# Patient Record
Sex: FEMALE | Race: WHITE | Employment: OTHER
[De-identification: names, ages, dates, MRNs, and addresses within clinical notes are randomized per-mention and may not be internally consistent; named-entity substitution may affect disease eponyms.]

---

## 2017-07-17 PROBLEM — E66.01 OBESITY, CLASS III, BMI 40-49.9 (MORBID OBESITY) (HCC): Status: ACTIVE | Noted: 2017-07-17

## 2017-07-17 PROBLEM — E66.813 OBESITY, CLASS III, BMI 40-49.9 (MORBID OBESITY) (HCC): Status: ACTIVE | Noted: 2017-07-17

## 2018-02-08 ENCOUNTER — TELEPHONE (OUTPATIENT)
Dept: CASE MANAGEMENT | Age: 68
End: 2018-02-08

## 2018-02-08 NOTE — LETTER
___________________________________________________________________________________________________________________________________________________________________________________________________________________________________________________________________________________________________________________________________________________________________________________________________________________________________________________________________________________________________________________________________________________________________________

## 2018-02-08 NOTE — TELEPHONE ENCOUNTER
A lung screening packet was mailed to Kayla Mercado on 2/8/2018. This packet includes: an appointment reminder for her CT Lung Screening scheduled for 2/16/18 , a patient questionnaire,a lung screening brochure and the [facility] Lung Screening Patient Education Sheet.       CARLA Nicholas R.T.(R)(T)  Patient [facility]    [phone]

## 2018-02-19 ENCOUNTER — TELEPHONE (OUTPATIENT)
Dept: CASE MANAGEMENT | Age: 68
End: 2018-02-19

## 2018-03-13 ENCOUNTER — OFFICE VISIT (OUTPATIENT)
Dept: RHEUMATOLOGY | Age: 68
End: 2018-03-13
Payer: MEDICARE

## 2018-03-13 ENCOUNTER — HOSPITAL ENCOUNTER (OUTPATIENT)
Age: 68
Discharge: HOME | End: 2018-03-13
Payer: MEDICARE

## 2018-03-13 VITALS
BODY MASS INDEX: 40.34 KG/M2 | SYSTOLIC BLOOD PRESSURE: 136 MMHG | WEIGHT: 236.3 LBS | HEIGHT: 64 IN | HEART RATE: 89 BPM | RESPIRATION RATE: 20 BRPM | DIASTOLIC BLOOD PRESSURE: 79 MMHG | TEMPERATURE: 98.3 F

## 2018-03-13 DIAGNOSIS — Z79.899 HIGH RISK MEDICATIONS (NOT ANTICOAGULANTS) LONG-TERM USE: ICD-10-CM

## 2018-03-13 DIAGNOSIS — M06.9 RHEUMATOID ARTHRITIS INVOLVING MULTIPLE SITES, UNSPECIFIED RHEUMATOID FACTOR PRESENCE: ICD-10-CM

## 2018-03-13 DIAGNOSIS — M05.79 RHEUMATOID ARTHRITIS INVOLVING MULTIPLE SITES WITH POSITIVE RHEUMATOID FACTOR (HCC): ICD-10-CM

## 2018-03-13 LAB
ALT SERPL-CCNC: 34 U/L (ref 0–32)
AST SERPL-CCNC: 25 U/L (ref 0–31)
C-REACTIVE PROTEIN: 0.3 MG/DL (ref 0–0.4)
CREAT SERPL-MCNC: 0.7 MG/DL (ref 0.5–1)
GFR AFRICAN AMERICAN: >60
GFR NON-AFRICAN AMERICAN: >60 ML/MIN/1.73
HCT VFR BLD CALC: 40.8 % (ref 34–48)
HEMOGLOBIN: 13.7 G/DL (ref 11.5–15.5)
MCH RBC QN AUTO: 29.8 PG (ref 26–35)
MCHC RBC AUTO-ENTMCNC: 33.6 % (ref 32–34.5)
MCV RBC AUTO: 88.7 FL (ref 80–99.9)
PDW BLD-RTO: 14.3 FL (ref 11.5–15)
PLATELET # BLD: 271 E9/L (ref 130–450)
PMV BLD AUTO: 8.6 FL (ref 7–12)
RBC # BLD: 4.6 E12/L (ref 3.5–5.5)
SEDIMENTATION RATE, ERYTHROCYTE: 5 MM/HR (ref 0–20)
WBC # BLD: 8.9 E9/L (ref 4.5–11.5)

## 2018-03-13 PROCEDURE — 99214 OFFICE O/P EST MOD 30 MIN: CPT | Performed by: INTERNAL MEDICINE

## 2018-03-13 PROCEDURE — 82565 ASSAY OF CREATININE: CPT

## 2018-03-13 PROCEDURE — 1090F PRES/ABSN URINE INCON ASSESS: CPT | Performed by: INTERNAL MEDICINE

## 2018-03-13 PROCEDURE — G8417 CALC BMI ABV UP PARAM F/U: HCPCS | Performed by: INTERNAL MEDICINE

## 2018-03-13 PROCEDURE — G8427 DOCREV CUR MEDS BY ELIG CLIN: HCPCS | Performed by: INTERNAL MEDICINE

## 2018-03-13 PROCEDURE — G8399 PT W/DXA RESULTS DOCUMENT: HCPCS | Performed by: INTERNAL MEDICINE

## 2018-03-13 PROCEDURE — 84450 TRANSFERASE (AST) (SGOT): CPT

## 2018-03-13 PROCEDURE — 3017F COLORECTAL CA SCREEN DOC REV: CPT | Performed by: INTERNAL MEDICINE

## 2018-03-13 PROCEDURE — 3014F SCREEN MAMMO DOC REV: CPT | Performed by: INTERNAL MEDICINE

## 2018-03-13 PROCEDURE — 85651 RBC SED RATE NONAUTOMATED: CPT

## 2018-03-13 PROCEDURE — 1123F ACP DISCUSS/DSCN MKR DOCD: CPT | Performed by: INTERNAL MEDICINE

## 2018-03-13 PROCEDURE — 85027 COMPLETE CBC AUTOMATED: CPT

## 2018-03-13 PROCEDURE — 99212 OFFICE O/P EST SF 10 MIN: CPT | Performed by: INTERNAL MEDICINE

## 2018-03-13 PROCEDURE — 86140 C-REACTIVE PROTEIN: CPT

## 2018-03-13 PROCEDURE — 4040F PNEUMOC VAC/ADMIN/RCVD: CPT | Performed by: INTERNAL MEDICINE

## 2018-03-13 PROCEDURE — 36415 COLL VENOUS BLD VENIPUNCTURE: CPT

## 2018-03-13 PROCEDURE — 84460 ALANINE AMINO (ALT) (SGPT): CPT

## 2018-03-13 PROCEDURE — G8484 FLU IMMUNIZE NO ADMIN: HCPCS | Performed by: INTERNAL MEDICINE

## 2018-03-13 PROCEDURE — 1036F TOBACCO NON-USER: CPT | Performed by: INTERNAL MEDICINE

## 2018-03-13 RX ORDER — HYDROXYCHLOROQUINE SULFATE 200 MG/1
TABLET, FILM COATED ORAL
Qty: 135 TABLET | Refills: 3 | Status: SHIPPED | OUTPATIENT
Start: 2018-03-13 | End: 2018-10-02 | Stop reason: SDUPTHER

## 2018-03-13 RX ORDER — FOLIC ACID 1 MG/1
1 TABLET ORAL DAILY
Qty: 90 TABLET | Refills: 3 | Status: SHIPPED | OUTPATIENT
Start: 2018-03-13 | End: 2018-10-02 | Stop reason: SDUPTHER

## 2018-03-13 NOTE — PROGRESS NOTES
J O I N T  C A R E  C L I N I C        The patient is seen in follow-up for:      Here for rheumatoid follow up. Has no complaints today. Did not have labs drawn.                Patient History Update Since Previous Visit      Yes No Comment      New illnesses  x       Seen other healthcare provider x  pcp      New x-ray, labs, or procedures  x       Started / changed / stopped medications  x       New allergies / reactions to medications  x       Changes in family medical history  x       Changes in patient social history  x       Morning stiffness x  Length:  15      Worst Joint:   Feet ankles and hips      Review of Systems      Key:    1  Not present today;  2  Much Better;  3  Better;  4  Same;  5  Worse;  N  New Problem          Rating  Rating   Joint pain (not including back pain) 4 Skin ulcers: 1   Joint swellin Bruisin   Fatigue: 4 Sickness or rash with sun exposure 1   Muscle aches: 4 Swollen glands 1   Ongoing fever: 1 Dry eyes 1   Unintended weight loss: 1 Painful red eyes 1   Migraine headache: 1 Dry mouth 4   Difficulty sleepin Mouth sores 1   Snore or gag at night: 1 Upset stomach 1   Heart palpitations: 1 Diarrhea 1   Cough: 1 Depressed mood 1   Shortness of breath: 1 Overall stress level in life 4   Pain with breathing 1 Overall assessment 4   Skin rash 1         Abbreviated Exam    System    nl   abn   Comment     1 Gen nutrition/hygiene x      appearance x     2 Skin turgor / integrity x      rash x      ecchymosis x     3 Psych orientation x      memory       mood x      cooperative x     4 HENT mouth / throat x     5 Resp resp. effort x      auscultation x     6 CV heart auscultation x      extremity edema x     7 Other             Joint Assessment      Patient Right     Patient Left     x (check if no synovitis seen on exam)   Joint Pain Swelling Joint Pain Swelling   Shoulder   Shoulder     Elbow   Elbow     Wrist   Wrist     Knee   Knee     MCP I   MCP I     MCP II   MCP II

## 2018-03-29 DIAGNOSIS — E55.9 VITAMIN D DEFICIENCY: ICD-10-CM

## 2018-03-29 RX ORDER — MULTIVIT-MIN/IRON/FOLIC ACID/K 18-600-40
2000 CAPSULE ORAL 2 TIMES DAILY
Qty: 60 CAPSULE | Refills: 2
Start: 2018-03-29 | End: 2020-03-11 | Stop reason: SDUPTHER

## 2018-03-29 RX ORDER — MULTIVIT-MIN/IRON/FOLIC ACID/K 18-600-40
1 CAPSULE ORAL 2 TIMES DAILY
Qty: 60 CAPSULE | Refills: 2 | Status: CANCELLED | OUTPATIENT
Start: 2018-03-29

## 2018-04-25 ENCOUNTER — HOSPITAL ENCOUNTER (OUTPATIENT)
Dept: MRI IMAGING | Age: 68
Discharge: HOME | End: 2018-04-27
Payer: MEDICARE

## 2018-04-25 ENCOUNTER — OFFICE VISIT (OUTPATIENT)
Dept: INTERNAL MEDICINE | Age: 68
End: 2018-04-25
Payer: MEDICARE

## 2018-04-25 VITALS
DIASTOLIC BLOOD PRESSURE: 62 MMHG | TEMPERATURE: 97.5 F | WEIGHT: 240.8 LBS | HEIGHT: 64 IN | HEART RATE: 86 BPM | SYSTOLIC BLOOD PRESSURE: 137 MMHG | RESPIRATION RATE: 16 BRPM | BODY MASS INDEX: 41.11 KG/M2

## 2018-04-25 DIAGNOSIS — M25.561 ACUTE PAIN OF RIGHT KNEE: Primary | ICD-10-CM

## 2018-04-25 DIAGNOSIS — M25.561 ACUTE PAIN OF RIGHT KNEE: ICD-10-CM

## 2018-04-25 PROCEDURE — 1123F ACP DISCUSS/DSCN MKR DOCD: CPT

## 2018-04-25 PROCEDURE — 4040F PNEUMOC VAC/ADMIN/RCVD: CPT

## 2018-04-25 PROCEDURE — 3017F COLORECTAL CA SCREEN DOC REV: CPT

## 2018-04-25 PROCEDURE — G8399 PT W/DXA RESULTS DOCUMENT: HCPCS

## 2018-04-25 PROCEDURE — 1036F TOBACCO NON-USER: CPT

## 2018-04-25 PROCEDURE — 6360000004 HC RX CONTRAST MEDICATION: Performed by: RADIOLOGY

## 2018-04-25 PROCEDURE — A9579 GAD-BASE MR CONTRAST NOS,1ML: HCPCS | Performed by: RADIOLOGY

## 2018-04-25 PROCEDURE — 99213 OFFICE O/P EST LOW 20 MIN: CPT

## 2018-04-25 PROCEDURE — 1090F PRES/ABSN URINE INCON ASSESS: CPT

## 2018-04-25 PROCEDURE — 99212 OFFICE O/P EST SF 10 MIN: CPT

## 2018-04-25 PROCEDURE — G8427 DOCREV CUR MEDS BY ELIG CLIN: HCPCS

## 2018-04-25 PROCEDURE — G8417 CALC BMI ABV UP PARAM F/U: HCPCS

## 2018-04-25 PROCEDURE — 73723 MRI JOINT LWR EXTR W/O&W/DYE: CPT

## 2018-04-25 RX ORDER — OXYCODONE HYDROCHLORIDE AND ACETAMINOPHEN 5; 325 MG/1; MG/1
1 TABLET ORAL EVERY 8 HOURS PRN
Qty: 15 TABLET | Refills: 0 | Status: SHIPPED | OUTPATIENT
Start: 2018-04-25 | End: 2018-04-30 | Stop reason: ALTCHOICE

## 2018-04-25 RX ADMIN — GADOTERIDOL 20 ML: 279.3 INJECTION, SOLUTION INTRAVENOUS at 18:03

## 2018-04-25 ASSESSMENT — ENCOUNTER SYMPTOMS
RESPIRATORY NEGATIVE: 1
EYES NEGATIVE: 1
GASTROINTESTINAL NEGATIVE: 1

## 2018-04-30 ENCOUNTER — OFFICE VISIT (OUTPATIENT)
Dept: ORTHOPEDIC SURGERY | Age: 68
End: 2018-04-30
Payer: MEDICARE

## 2018-04-30 ENCOUNTER — OFFICE VISIT (OUTPATIENT)
Dept: INTERNAL MEDICINE | Age: 68
End: 2018-04-30
Payer: MEDICARE

## 2018-04-30 VITALS
SYSTOLIC BLOOD PRESSURE: 130 MMHG | DIASTOLIC BLOOD PRESSURE: 59 MMHG | WEIGHT: 239.6 LBS | RESPIRATION RATE: 20 BRPM | HEART RATE: 87 BPM | HEIGHT: 64 IN | TEMPERATURE: 98.2 F | BODY MASS INDEX: 40.9 KG/M2

## 2018-04-30 VITALS — BODY MASS INDEX: 40.8 KG/M2 | WEIGHT: 239 LBS | HEIGHT: 64 IN

## 2018-04-30 DIAGNOSIS — E78.5 HYPERLIPIDEMIA LDL GOAL <100: ICD-10-CM

## 2018-04-30 DIAGNOSIS — E11.9 CONTROLLED TYPE 2 DIABETES MELLITUS WITHOUT COMPLICATION, WITHOUT LONG-TERM CURRENT USE OF INSULIN (HCC): ICD-10-CM

## 2018-04-30 DIAGNOSIS — S89.91XD INJURY OF RIGHT KNEE, SUBSEQUENT ENCOUNTER: Primary | ICD-10-CM

## 2018-04-30 DIAGNOSIS — M81.0 OSTEOPOROSIS, UNSPECIFIED OSTEOPOROSIS TYPE, UNSPECIFIED PATHOLOGICAL FRACTURE PRESENCE: ICD-10-CM

## 2018-04-30 DIAGNOSIS — I10 HTN, GOAL BELOW 130/80: ICD-10-CM

## 2018-04-30 DIAGNOSIS — M25.561 RIGHT KNEE PAIN, UNSPECIFIED CHRONICITY: Primary | ICD-10-CM

## 2018-04-30 DIAGNOSIS — Z79.899 HIGH RISK MEDICATIONS (NOT ANTICOAGULANTS) LONG-TERM USE: ICD-10-CM

## 2018-04-30 PROCEDURE — 99213 OFFICE O/P EST LOW 20 MIN: CPT | Performed by: INTERNAL MEDICINE

## 2018-04-30 PROCEDURE — 3017F COLORECTAL CA SCREEN DOC REV: CPT | Performed by: ORTHOPAEDIC SURGERY

## 2018-04-30 PROCEDURE — 1090F PRES/ABSN URINE INCON ASSESS: CPT | Performed by: ORTHOPAEDIC SURGERY

## 2018-04-30 PROCEDURE — 3017F COLORECTAL CA SCREEN DOC REV: CPT | Performed by: INTERNAL MEDICINE

## 2018-04-30 PROCEDURE — 1090F PRES/ABSN URINE INCON ASSESS: CPT | Performed by: INTERNAL MEDICINE

## 2018-04-30 PROCEDURE — 99203 OFFICE O/P NEW LOW 30 MIN: CPT | Performed by: ORTHOPAEDIC SURGERY

## 2018-04-30 PROCEDURE — 1036F TOBACCO NON-USER: CPT | Performed by: INTERNAL MEDICINE

## 2018-04-30 PROCEDURE — G8399 PT W/DXA RESULTS DOCUMENT: HCPCS | Performed by: INTERNAL MEDICINE

## 2018-04-30 PROCEDURE — 4040F PNEUMOC VAC/ADMIN/RCVD: CPT | Performed by: INTERNAL MEDICINE

## 2018-04-30 PROCEDURE — 4040F PNEUMOC VAC/ADMIN/RCVD: CPT | Performed by: ORTHOPAEDIC SURGERY

## 2018-04-30 PROCEDURE — G8427 DOCREV CUR MEDS BY ELIG CLIN: HCPCS | Performed by: ORTHOPAEDIC SURGERY

## 2018-04-30 PROCEDURE — G8427 DOCREV CUR MEDS BY ELIG CLIN: HCPCS | Performed by: INTERNAL MEDICINE

## 2018-04-30 PROCEDURE — 1123F ACP DISCUSS/DSCN MKR DOCD: CPT | Performed by: ORTHOPAEDIC SURGERY

## 2018-04-30 PROCEDURE — G8417 CALC BMI ABV UP PARAM F/U: HCPCS | Performed by: INTERNAL MEDICINE

## 2018-04-30 PROCEDURE — 1036F TOBACCO NON-USER: CPT | Performed by: ORTHOPAEDIC SURGERY

## 2018-04-30 PROCEDURE — G8417 CALC BMI ABV UP PARAM F/U: HCPCS | Performed by: ORTHOPAEDIC SURGERY

## 2018-04-30 PROCEDURE — 20610 DRAIN/INJ JOINT/BURSA W/O US: CPT | Performed by: ORTHOPAEDIC SURGERY

## 2018-04-30 PROCEDURE — 3045F PR MOST RECENT HEMOGLOBIN A1C LEVEL 7.0-9.0%: CPT | Performed by: INTERNAL MEDICINE

## 2018-04-30 PROCEDURE — G8399 PT W/DXA RESULTS DOCUMENT: HCPCS | Performed by: ORTHOPAEDIC SURGERY

## 2018-04-30 PROCEDURE — 2022F DILAT RTA XM EVC RTNOPTHY: CPT | Performed by: INTERNAL MEDICINE

## 2018-04-30 PROCEDURE — 1123F ACP DISCUSS/DSCN MKR DOCD: CPT | Performed by: INTERNAL MEDICINE

## 2018-04-30 RX ORDER — OXYCODONE HYDROCHLORIDE AND ACETAMINOPHEN 5; 325 MG/1; MG/1
1 TABLET ORAL EVERY 4 HOURS PRN
COMMUNITY
End: 2018-11-26 | Stop reason: ALTCHOICE

## 2018-04-30 RX ORDER — TRIAMCINOLONE ACETONIDE 40 MG/ML
40 INJECTION, SUSPENSION INTRA-ARTICULAR; INTRAMUSCULAR ONCE
Status: COMPLETED | OUTPATIENT
Start: 2018-04-30 | End: 2018-04-30

## 2018-04-30 RX ORDER — BUPIVACAINE HYDROCHLORIDE 2.5 MG/ML
2 INJECTION, SOLUTION INFILTRATION; PERINEURAL ONCE
Status: COMPLETED | OUTPATIENT
Start: 2018-04-30 | End: 2018-04-30

## 2018-04-30 RX ORDER — ALENDRONATE SODIUM 70 MG/1
TABLET ORAL
Qty: 12 TABLET | Refills: 1 | Status: ON HOLD | OUTPATIENT
Start: 2018-04-30 | End: 2019-03-27

## 2018-04-30 RX ORDER — LIDOCAINE HYDROCHLORIDE 10 MG/ML
2 INJECTION, SOLUTION INFILTRATION; PERINEURAL ONCE
Status: COMPLETED | OUTPATIENT
Start: 2018-04-30 | End: 2018-04-30

## 2018-04-30 RX ORDER — ROSUVASTATIN CALCIUM 5 MG/1
TABLET, COATED ORAL
Qty: 90 TABLET | Refills: 1 | Status: SHIPPED | OUTPATIENT
Start: 2018-04-30 | End: 2018-11-26 | Stop reason: SDUPTHER

## 2018-04-30 RX ORDER — LANOLIN ALCOHOL/MO/W.PET/CERES
1 CREAM (GRAM) TOPICAL DAILY
Qty: 30 TABLET | Refills: 2 | Status: SHIPPED
Start: 2018-04-30 | End: 2021-03-08 | Stop reason: SDUPTHER

## 2018-04-30 RX ORDER — LISINOPRIL 20 MG/1
20 TABLET ORAL DAILY
Qty: 90 TABLET | Refills: 1 | Status: SHIPPED | OUTPATIENT
Start: 2018-04-30 | End: 2018-11-26 | Stop reason: SDUPTHER

## 2018-04-30 RX ADMIN — TRIAMCINOLONE ACETONIDE 40 MG: 40 INJECTION, SUSPENSION INTRA-ARTICULAR; INTRAMUSCULAR at 15:45

## 2018-04-30 RX ADMIN — BUPIVACAINE HYDROCHLORIDE 5 MG: 2.5 INJECTION, SOLUTION INFILTRATION; PERINEURAL at 15:45

## 2018-04-30 RX ADMIN — LIDOCAINE HYDROCHLORIDE 2 ML: 10 INJECTION, SOLUTION INFILTRATION; PERINEURAL at 15:45

## 2018-04-30 ASSESSMENT — ENCOUNTER SYMPTOMS
SHORTNESS OF BREATH: 0
VOMITING: 0
NAUSEA: 0
BLOOD IN STOOL: 0
CONSTIPATION: 0

## 2018-05-07 ENCOUNTER — HOSPITAL ENCOUNTER (OUTPATIENT)
Dept: PHYSICAL THERAPY | Age: 68
Setting detail: THERAPIES SERIES
Discharge: HOME | End: 2018-05-07
Payer: MEDICARE

## 2018-05-07 PROCEDURE — 97161 PT EVAL LOW COMPLEX 20 MIN: CPT | Performed by: PHYSICAL THERAPIST

## 2018-05-07 PROCEDURE — G8978 MOBILITY CURRENT STATUS: HCPCS | Performed by: PHYSICAL THERAPIST

## 2018-05-07 PROCEDURE — G8979 MOBILITY GOAL STATUS: HCPCS | Performed by: PHYSICAL THERAPIST

## 2018-05-07 ASSESSMENT — PAIN DESCRIPTION - ORIENTATION: ORIENTATION: RIGHT

## 2018-05-07 ASSESSMENT — PAIN DESCRIPTION - LOCATION: LOCATION: KNEE

## 2018-05-07 ASSESSMENT — ACTIVITIES OF DAILY LIVING (ADL): EFFECT OF PAIN ON DAILY ACTIVITIES: PAIN HAMPERS PROLONGED STANDING/WALKING

## 2018-05-07 ASSESSMENT — PAIN DESCRIPTION - PAIN TYPE: TYPE: CHRONIC PAIN

## 2018-05-07 ASSESSMENT — PAIN SCALES - GENERAL: PAINLEVEL_OUTOF10: 3

## 2018-05-14 ENCOUNTER — OFFICE VISIT (OUTPATIENT)
Dept: INTERNAL MEDICINE | Age: 68
End: 2018-05-14
Payer: MEDICARE

## 2018-05-14 VITALS
HEIGHT: 64 IN | BODY MASS INDEX: 40.29 KG/M2 | SYSTOLIC BLOOD PRESSURE: 132 MMHG | DIASTOLIC BLOOD PRESSURE: 79 MMHG | TEMPERATURE: 97.4 F | WEIGHT: 236 LBS | HEART RATE: 88 BPM | RESPIRATION RATE: 16 BRPM

## 2018-05-14 DIAGNOSIS — S83.281D TEAR OF LATERAL MENISCUS OF RIGHT KNEE, CURRENT, UNSPECIFIED TEAR TYPE, SUBSEQUENT ENCOUNTER: ICD-10-CM

## 2018-05-14 DIAGNOSIS — I10 HTN, GOAL BELOW 130/80: ICD-10-CM

## 2018-05-14 DIAGNOSIS — E66.01 OBESITY, CLASS III, BMI 40-49.9 (MORBID OBESITY) (HCC): ICD-10-CM

## 2018-05-14 DIAGNOSIS — M05.79 RHEUMATOID ARTHRITIS INVOLVING MULTIPLE SITES WITH POSITIVE RHEUMATOID FACTOR (HCC): ICD-10-CM

## 2018-05-14 DIAGNOSIS — E11.9 CONTROLLED TYPE 2 DIABETES MELLITUS WITHOUT COMPLICATION, WITHOUT LONG-TERM CURRENT USE OF INSULIN (HCC): Primary | ICD-10-CM

## 2018-05-14 PROCEDURE — 3045F PR MOST RECENT HEMOGLOBIN A1C LEVEL 7.0-9.0%: CPT | Performed by: INTERNAL MEDICINE

## 2018-05-14 PROCEDURE — 3017F COLORECTAL CA SCREEN DOC REV: CPT | Performed by: INTERNAL MEDICINE

## 2018-05-14 PROCEDURE — G8399 PT W/DXA RESULTS DOCUMENT: HCPCS | Performed by: INTERNAL MEDICINE

## 2018-05-14 PROCEDURE — 1036F TOBACCO NON-USER: CPT | Performed by: INTERNAL MEDICINE

## 2018-05-14 PROCEDURE — 4040F PNEUMOC VAC/ADMIN/RCVD: CPT | Performed by: INTERNAL MEDICINE

## 2018-05-14 PROCEDURE — 1090F PRES/ABSN URINE INCON ASSESS: CPT | Performed by: INTERNAL MEDICINE

## 2018-05-14 PROCEDURE — G8417 CALC BMI ABV UP PARAM F/U: HCPCS | Performed by: INTERNAL MEDICINE

## 2018-05-14 PROCEDURE — G8427 DOCREV CUR MEDS BY ELIG CLIN: HCPCS | Performed by: INTERNAL MEDICINE

## 2018-05-14 PROCEDURE — 99213 OFFICE O/P EST LOW 20 MIN: CPT | Performed by: INTERNAL MEDICINE

## 2018-05-14 PROCEDURE — 1123F ACP DISCUSS/DSCN MKR DOCD: CPT | Performed by: INTERNAL MEDICINE

## 2018-05-14 PROCEDURE — 2022F DILAT RTA XM EVC RTNOPTHY: CPT | Performed by: INTERNAL MEDICINE

## 2018-05-14 PROCEDURE — 99212 OFFICE O/P EST SF 10 MIN: CPT | Performed by: INTERNAL MEDICINE

## 2018-05-14 ASSESSMENT — ENCOUNTER SYMPTOMS
SORE THROAT: 0
BLURRED VISION: 0
CHEST TIGHTNESS: 0
NAUSEA: 0
HEARTBURN: 0
SHORTNESS OF BREATH: 0
DIARRHEA: 0
TROUBLE SWALLOWING: 0
BACK PAIN: 0
SPUTUM PRODUCTION: 0
DIFFICULTY BREATHING: 0
CONSTIPATION: 0
WHEEZING: 0
ABDOMINAL PAIN: 0
VOMITING: 0
COUGH: 0
RHINORRHEA: 0

## 2018-05-14 ASSESSMENT — COPD QUESTIONNAIRES: COPD: 1

## 2018-06-26 ENCOUNTER — OFFICE VISIT (OUTPATIENT)
Dept: RHEUMATOLOGY | Age: 68
End: 2018-06-26
Payer: MEDICARE

## 2018-06-26 ENCOUNTER — HOSPITAL ENCOUNTER (OUTPATIENT)
Age: 68
Discharge: HOME | End: 2018-06-26
Payer: MEDICARE

## 2018-06-26 VITALS
WEIGHT: 244.3 LBS | TEMPERATURE: 98.2 F | DIASTOLIC BLOOD PRESSURE: 84 MMHG | HEIGHT: 64 IN | RESPIRATION RATE: 18 BRPM | BODY MASS INDEX: 41.71 KG/M2 | HEART RATE: 92 BPM | SYSTOLIC BLOOD PRESSURE: 130 MMHG

## 2018-06-26 DIAGNOSIS — M81.0 AGE-RELATED OSTEOPOROSIS WITHOUT CURRENT PATHOLOGICAL FRACTURE: ICD-10-CM

## 2018-06-26 DIAGNOSIS — M05.79 RHEUMATOID ARTHRITIS INVOLVING MULTIPLE SITES WITH POSITIVE RHEUMATOID FACTOR (HCC): ICD-10-CM

## 2018-06-26 DIAGNOSIS — Z79.899 HIGH RISK MEDICATION USE: Primary | ICD-10-CM

## 2018-06-26 DIAGNOSIS — I10 HTN, GOAL BELOW 130/80: ICD-10-CM

## 2018-06-26 DIAGNOSIS — E66.01 OBESITY, CLASS III, BMI 40-49.9 (MORBID OBESITY) (HCC): ICD-10-CM

## 2018-06-26 DIAGNOSIS — M06.9 RHEUMATOID ARTHRITIS INVOLVING MULTIPLE SITES, UNSPECIFIED RHEUMATOID FACTOR PRESENCE: ICD-10-CM

## 2018-06-26 DIAGNOSIS — E11.9 CONTROLLED TYPE 2 DIABETES MELLITUS WITHOUT COMPLICATION, WITHOUT LONG-TERM CURRENT USE OF INSULIN (HCC): ICD-10-CM

## 2018-06-26 LAB
ALBUMIN SERPL-MCNC: 4.5 G/DL (ref 3.5–5.2)
ALP BLD-CCNC: 40 U/L (ref 35–104)
ALT SERPL-CCNC: 35 U/L (ref 0–32)
ANION GAP SERPL CALCULATED.3IONS-SCNC: 15 MMOL/L (ref 7–16)
AST SERPL-CCNC: 24 U/L (ref 0–31)
BILIRUB SERPL-MCNC: 0.4 MG/DL (ref 0–1.2)
BUN BLDV-MCNC: 12 MG/DL (ref 8–23)
CALCIUM SERPL-MCNC: 8.9 MG/DL (ref 8.6–10.2)
CHLORIDE BLD-SCNC: 101 MMOL/L (ref 98–107)
CO2: 23 MMOL/L (ref 22–29)
CREAT SERPL-MCNC: 0.6 MG/DL (ref 0.5–1)
GFR AFRICAN AMERICAN: >60
GFR NON-AFRICAN AMERICAN: >60 ML/MIN/1.73
GLUCOSE BLD-MCNC: 272 MG/DL (ref 74–109)
HBA1C MFR BLD: 8.2 % (ref 4–5.6)
HCT VFR BLD CALC: 41.4 % (ref 34–48)
HEMOGLOBIN: 14.3 G/DL (ref 11.5–15.5)
MCH RBC QN AUTO: 30.6 PG (ref 26–35)
MCHC RBC AUTO-ENTMCNC: 34.5 % (ref 32–34.5)
MCV RBC AUTO: 88.5 FL (ref 80–99.9)
PDW BLD-RTO: 13.8 FL (ref 11.5–15)
PLATELET # BLD: 242 E9/L (ref 130–450)
PMV BLD AUTO: 8.7 FL (ref 7–12)
POTASSIUM SERPL-SCNC: 4.4 MMOL/L (ref 3.5–5)
RBC # BLD: 4.68 E12/L (ref 3.5–5.5)
SODIUM BLD-SCNC: 139 MMOL/L (ref 132–146)
TOTAL PROTEIN: 6.7 G/DL (ref 6.4–8.3)
WBC # BLD: 7.6 E9/L (ref 4.5–11.5)

## 2018-06-26 PROCEDURE — 85027 COMPLETE CBC AUTOMATED: CPT

## 2018-06-26 PROCEDURE — 4040F PNEUMOC VAC/ADMIN/RCVD: CPT | Performed by: INTERNAL MEDICINE

## 2018-06-26 PROCEDURE — 36415 COLL VENOUS BLD VENIPUNCTURE: CPT

## 2018-06-26 PROCEDURE — 1123F ACP DISCUSS/DSCN MKR DOCD: CPT | Performed by: INTERNAL MEDICINE

## 2018-06-26 PROCEDURE — 3017F COLORECTAL CA SCREEN DOC REV: CPT | Performed by: INTERNAL MEDICINE

## 2018-06-26 PROCEDURE — 1090F PRES/ABSN URINE INCON ASSESS: CPT | Performed by: INTERNAL MEDICINE

## 2018-06-26 PROCEDURE — G8427 DOCREV CUR MEDS BY ELIG CLIN: HCPCS | Performed by: INTERNAL MEDICINE

## 2018-06-26 PROCEDURE — 80053 COMPREHEN METABOLIC PANEL: CPT

## 2018-06-26 PROCEDURE — G8399 PT W/DXA RESULTS DOCUMENT: HCPCS | Performed by: INTERNAL MEDICINE

## 2018-06-26 PROCEDURE — 99214 OFFICE O/P EST MOD 30 MIN: CPT | Performed by: INTERNAL MEDICINE

## 2018-06-26 PROCEDURE — 1036F TOBACCO NON-USER: CPT | Performed by: INTERNAL MEDICINE

## 2018-06-26 PROCEDURE — G8417 CALC BMI ABV UP PARAM F/U: HCPCS | Performed by: INTERNAL MEDICINE

## 2018-06-26 PROCEDURE — 99212 OFFICE O/P EST SF 10 MIN: CPT | Performed by: INTERNAL MEDICINE

## 2018-06-26 PROCEDURE — 83036 HEMOGLOBIN GLYCOSYLATED A1C: CPT

## 2018-09-21 ENCOUNTER — TELEPHONE (OUTPATIENT)
Dept: INTERNAL MEDICINE | Age: 68
End: 2018-09-21

## 2018-10-02 ENCOUNTER — HOSPITAL ENCOUNTER (OUTPATIENT)
Age: 68
Discharge: HOME | End: 2018-10-02
Payer: MEDICARE

## 2018-10-02 ENCOUNTER — OFFICE VISIT (OUTPATIENT)
Dept: RHEUMATOLOGY | Age: 68
End: 2018-10-02
Payer: MEDICARE

## 2018-10-02 VITALS
HEIGHT: 64 IN | RESPIRATION RATE: 18 BRPM | WEIGHT: 238.6 LBS | HEART RATE: 83 BPM | BODY MASS INDEX: 40.74 KG/M2 | DIASTOLIC BLOOD PRESSURE: 77 MMHG | TEMPERATURE: 97.7 F | SYSTOLIC BLOOD PRESSURE: 148 MMHG

## 2018-10-02 DIAGNOSIS — E11.9 CONTROLLED TYPE 2 DIABETES MELLITUS WITHOUT COMPLICATION, WITHOUT LONG-TERM CURRENT USE OF INSULIN (HCC): Primary | ICD-10-CM

## 2018-10-02 DIAGNOSIS — M06.9 RHEUMATOID ARTHRITIS INVOLVING MULTIPLE SITES, UNSPECIFIED RHEUMATOID FACTOR PRESENCE: ICD-10-CM

## 2018-10-02 DIAGNOSIS — Z79.899 HIGH RISK MEDICATIONS (NOT ANTICOAGULANTS) LONG-TERM USE: ICD-10-CM

## 2018-10-02 DIAGNOSIS — M81.0 AGE-RELATED OSTEOPOROSIS WITHOUT CURRENT PATHOLOGICAL FRACTURE: ICD-10-CM

## 2018-10-02 LAB
ALBUMIN SERPL-MCNC: 4.4 G/DL (ref 3.5–5.2)
ALP BLD-CCNC: 36 U/L (ref 35–104)
ALT SERPL-CCNC: 41 U/L (ref 0–32)
ANION GAP SERPL CALCULATED.3IONS-SCNC: 13 MMOL/L (ref 7–16)
AST SERPL-CCNC: 35 U/L (ref 0–31)
BASOPHILS ABSOLUTE: 0.04 E9/L (ref 0–0.2)
BASOPHILS RELATIVE PERCENT: 0.5 % (ref 0–2)
BILIRUB SERPL-MCNC: 0.3 MG/DL (ref 0–1.2)
BUN BLDV-MCNC: 14 MG/DL (ref 8–23)
C-REACTIVE PROTEIN: <0.1 MG/DL (ref 0–0.4)
CALCIUM SERPL-MCNC: 9.4 MG/DL (ref 8.6–10.2)
CHLORIDE BLD-SCNC: 107 MMOL/L (ref 98–107)
CO2: 25 MMOL/L (ref 22–29)
CREAT SERPL-MCNC: 0.9 MG/DL (ref 0.5–1)
EOSINOPHILS ABSOLUTE: 0.27 E9/L (ref 0.05–0.5)
EOSINOPHILS RELATIVE PERCENT: 3.3 % (ref 0–6)
GFR AFRICAN AMERICAN: >60
GFR NON-AFRICAN AMERICAN: >60 ML/MIN/1.73
GLUCOSE BLD-MCNC: 116 MG/DL (ref 74–109)
HCT VFR BLD CALC: 40.7 % (ref 34–48)
HEMOGLOBIN: 13.4 G/DL (ref 11.5–15.5)
IMMATURE GRANULOCYTES #: 0.04 E9/L
IMMATURE GRANULOCYTES %: 0.5 % (ref 0–5)
LYMPHOCYTES ABSOLUTE: 2.1 E9/L (ref 1.5–4)
LYMPHOCYTES RELATIVE PERCENT: 26.1 % (ref 20–42)
MCH RBC QN AUTO: 29.3 PG (ref 26–35)
MCHC RBC AUTO-ENTMCNC: 32.9 % (ref 32–34.5)
MCV RBC AUTO: 89.1 FL (ref 80–99.9)
MONOCYTES ABSOLUTE: 1.02 E9/L (ref 0.1–0.95)
MONOCYTES RELATIVE PERCENT: 12.7 % (ref 2–12)
NEUTROPHILS ABSOLUTE: 4.59 E9/L (ref 1.8–7.3)
NEUTROPHILS RELATIVE PERCENT: 56.9 % (ref 43–80)
PDW BLD-RTO: 13.8 FL (ref 11.5–15)
PLATELET # BLD: 248 E9/L (ref 130–450)
PMV BLD AUTO: 8.9 FL (ref 7–12)
POTASSIUM SERPL-SCNC: 4.4 MMOL/L (ref 3.5–5)
RBC # BLD: 4.57 E12/L (ref 3.5–5.5)
SEDIMENTATION RATE, ERYTHROCYTE: 2 MM/HR (ref 0–20)
SODIUM BLD-SCNC: 145 MMOL/L (ref 132–146)
TOTAL PROTEIN: 6.7 G/DL (ref 6.4–8.3)
WBC # BLD: 8.1 E9/L (ref 4.5–11.5)

## 2018-10-02 PROCEDURE — 1101F PT FALLS ASSESS-DOCD LE1/YR: CPT | Performed by: INTERNAL MEDICINE

## 2018-10-02 PROCEDURE — 1090F PRES/ABSN URINE INCON ASSESS: CPT | Performed by: INTERNAL MEDICINE

## 2018-10-02 PROCEDURE — 3045F PR MOST RECENT HEMOGLOBIN A1C LEVEL 7.0-9.0%: CPT | Performed by: INTERNAL MEDICINE

## 2018-10-02 PROCEDURE — G8399 PT W/DXA RESULTS DOCUMENT: HCPCS | Performed by: INTERNAL MEDICINE

## 2018-10-02 PROCEDURE — 3017F COLORECTAL CA SCREEN DOC REV: CPT | Performed by: INTERNAL MEDICINE

## 2018-10-02 PROCEDURE — 99212 OFFICE O/P EST SF 10 MIN: CPT | Performed by: INTERNAL MEDICINE

## 2018-10-02 PROCEDURE — 1123F ACP DISCUSS/DSCN MKR DOCD: CPT | Performed by: INTERNAL MEDICINE

## 2018-10-02 PROCEDURE — 85651 RBC SED RATE NONAUTOMATED: CPT

## 2018-10-02 PROCEDURE — 80053 COMPREHEN METABOLIC PANEL: CPT

## 2018-10-02 PROCEDURE — G8417 CALC BMI ABV UP PARAM F/U: HCPCS | Performed by: INTERNAL MEDICINE

## 2018-10-02 PROCEDURE — G8484 FLU IMMUNIZE NO ADMIN: HCPCS | Performed by: INTERNAL MEDICINE

## 2018-10-02 PROCEDURE — 1036F TOBACCO NON-USER: CPT | Performed by: INTERNAL MEDICINE

## 2018-10-02 PROCEDURE — 36415 COLL VENOUS BLD VENIPUNCTURE: CPT

## 2018-10-02 PROCEDURE — 2022F DILAT RTA XM EVC RTNOPTHY: CPT | Performed by: INTERNAL MEDICINE

## 2018-10-02 PROCEDURE — 4040F PNEUMOC VAC/ADMIN/RCVD: CPT | Performed by: INTERNAL MEDICINE

## 2018-10-02 PROCEDURE — G8427 DOCREV CUR MEDS BY ELIG CLIN: HCPCS | Performed by: INTERNAL MEDICINE

## 2018-10-02 PROCEDURE — 86140 C-REACTIVE PROTEIN: CPT

## 2018-10-02 PROCEDURE — 85025 COMPLETE CBC W/AUTO DIFF WBC: CPT

## 2018-10-02 PROCEDURE — 99214 OFFICE O/P EST MOD 30 MIN: CPT | Performed by: INTERNAL MEDICINE

## 2018-10-02 RX ORDER — HYDROXYCHLOROQUINE SULFATE 200 MG/1
TABLET, FILM COATED ORAL
Qty: 135 TABLET | Refills: 1 | Status: ON HOLD | OUTPATIENT
Start: 2018-10-02 | End: 2019-04-15 | Stop reason: HOSPADM

## 2018-10-02 RX ORDER — FOLIC ACID 1 MG/1
1 TABLET ORAL DAILY
Qty: 90 TABLET | Refills: 0 | Status: SHIPPED | OUTPATIENT
Start: 2018-10-02 | End: 2019-06-04 | Stop reason: SDUPTHER

## 2018-10-24 ENCOUNTER — TELEPHONE (OUTPATIENT)
Dept: INTERNAL MEDICINE | Age: 68
End: 2018-10-24

## 2018-10-31 ENCOUNTER — TELEPHONE (OUTPATIENT)
Dept: INTERNAL MEDICINE | Age: 68
End: 2018-10-31

## 2018-11-26 ENCOUNTER — OFFICE VISIT (OUTPATIENT)
Dept: INTERNAL MEDICINE | Age: 68
End: 2018-11-26
Payer: MEDICARE

## 2018-11-26 VITALS
RESPIRATION RATE: 16 BRPM | SYSTOLIC BLOOD PRESSURE: 134 MMHG | TEMPERATURE: 98.1 F | WEIGHT: 243 LBS | HEART RATE: 78 BPM | HEIGHT: 64 IN | DIASTOLIC BLOOD PRESSURE: 78 MMHG | BODY MASS INDEX: 41.48 KG/M2

## 2018-11-26 DIAGNOSIS — M05.79 RHEUMATOID ARTHRITIS INVOLVING MULTIPLE SITES WITH POSITIVE RHEUMATOID FACTOR (HCC): ICD-10-CM

## 2018-11-26 DIAGNOSIS — Z09 ENCOUNTER FOR FOLLOW-UP EXAMINATION AFTER COMPLETED TREATMENT FOR CONDITIONS OTHER THAN MALIGNANT NEOPLASM: ICD-10-CM

## 2018-11-26 DIAGNOSIS — E11.9 CONTROLLED TYPE 2 DIABETES MELLITUS WITHOUT COMPLICATION, WITHOUT LONG-TERM CURRENT USE OF INSULIN (HCC): ICD-10-CM

## 2018-11-26 DIAGNOSIS — I10 HTN, GOAL BELOW 130/80: ICD-10-CM

## 2018-11-26 DIAGNOSIS — Z12.39 SCREENING FOR BREAST CANCER: Primary | ICD-10-CM

## 2018-11-26 DIAGNOSIS — R74.01 TRANSAMINITIS: ICD-10-CM

## 2018-11-26 DIAGNOSIS — R25.2 LEG CRAMPS: ICD-10-CM

## 2018-11-26 DIAGNOSIS — E78.5 HYPERLIPIDEMIA LDL GOAL <100: ICD-10-CM

## 2018-11-26 DIAGNOSIS — M85.80 OSTEOPENIA, UNSPECIFIED LOCATION: ICD-10-CM

## 2018-11-26 PROCEDURE — 4040F PNEUMOC VAC/ADMIN/RCVD: CPT | Performed by: INTERNAL MEDICINE

## 2018-11-26 PROCEDURE — 1101F PT FALLS ASSESS-DOCD LE1/YR: CPT | Performed by: INTERNAL MEDICINE

## 2018-11-26 PROCEDURE — 3045F PR MOST RECENT HEMOGLOBIN A1C LEVEL 7.0-9.0%: CPT | Performed by: INTERNAL MEDICINE

## 2018-11-26 PROCEDURE — 2022F DILAT RTA XM EVC RTNOPTHY: CPT | Performed by: INTERNAL MEDICINE

## 2018-11-26 PROCEDURE — G8427 DOCREV CUR MEDS BY ELIG CLIN: HCPCS | Performed by: INTERNAL MEDICINE

## 2018-11-26 PROCEDURE — 99213 OFFICE O/P EST LOW 20 MIN: CPT | Performed by: INTERNAL MEDICINE

## 2018-11-26 PROCEDURE — 1123F ACP DISCUSS/DSCN MKR DOCD: CPT | Performed by: INTERNAL MEDICINE

## 2018-11-26 PROCEDURE — 99212 OFFICE O/P EST SF 10 MIN: CPT | Performed by: INTERNAL MEDICINE

## 2018-11-26 PROCEDURE — G8484 FLU IMMUNIZE NO ADMIN: HCPCS | Performed by: INTERNAL MEDICINE

## 2018-11-26 PROCEDURE — 1090F PRES/ABSN URINE INCON ASSESS: CPT | Performed by: INTERNAL MEDICINE

## 2018-11-26 PROCEDURE — G8399 PT W/DXA RESULTS DOCUMENT: HCPCS | Performed by: INTERNAL MEDICINE

## 2018-11-26 PROCEDURE — 1036F TOBACCO NON-USER: CPT | Performed by: INTERNAL MEDICINE

## 2018-11-26 PROCEDURE — G8417 CALC BMI ABV UP PARAM F/U: HCPCS | Performed by: INTERNAL MEDICINE

## 2018-11-26 PROCEDURE — 3017F COLORECTAL CA SCREEN DOC REV: CPT | Performed by: INTERNAL MEDICINE

## 2018-11-26 RX ORDER — LISINOPRIL 20 MG/1
20 TABLET ORAL DAILY
Qty: 90 TABLET | Refills: 1 | Status: SHIPPED | OUTPATIENT
Start: 2018-11-26 | End: 2019-03-04 | Stop reason: SDUPTHER

## 2018-11-26 RX ORDER — ROSUVASTATIN CALCIUM 5 MG/1
TABLET, COATED ORAL
Qty: 90 TABLET | Refills: 1 | Status: SHIPPED | OUTPATIENT
Start: 2018-11-26 | End: 2019-03-04 | Stop reason: SDUPTHER

## 2018-11-26 ASSESSMENT — ENCOUNTER SYMPTOMS
ABDOMINAL PAIN: 0
VOMITING: 0
NAUSEA: 0
ABDOMINAL DISTENTION: 0
CONSTIPATION: 0
DIARRHEA: 0
WHEEZING: 0

## 2018-11-26 NOTE — PATIENT INSTRUCTIONS
that will allow you to sit while showering. · Get into a tub or shower by putting the weaker leg in first. Get out of a tub or shower with your strong side first.  · Repair loose toilet seats and consider installing a raised toilet seat to make getting on and off the toilet easier. · Keep your bathroom door unlocked while you are in the shower. Where can you learn more? Go to [email or web address] and sign in to your [employer] account. Enter [phone] in the [employer] box to learn more about \"Preventing Falls: Care Instructions. \"     If you do not have an account, please click on the \"Sign Up Now\" link. Current as of: March 16, 2018  Content Version: 11.8  © 5186-2102 Healthwise, Incorporated. Care instructions adapted under license by [facility] ([facility]). If you have questions about a medical condition or this instruction, always ask your healthcare professional. [address] any warranty or liability for your use of this information. 1) Mammogram is ordered- it has been 2 years since your last assessment- strongly advise to schedule. 2) DEXA scan was re-ordered since taking Bisphosphonate holiday- will schedule. 3) Please obtain labs in next month. 4) Please call with any questions.

## 2018-12-14 ENCOUNTER — TELEPHONE (OUTPATIENT)
Dept: CASE MANAGEMENT | Age: 68
End: 2018-12-14

## 2018-12-14 ENCOUNTER — TELEPHONE (OUTPATIENT)
Dept: INTERNAL MEDICINE | Age: 68
End: 2018-12-14

## 2018-12-14 DIAGNOSIS — R91.1 LUNG NODULE: Primary | ICD-10-CM

## 2018-12-14 NOTE — TELEPHONE ENCOUNTER
Called patient- patient had missed multiple appts including follow-up in August to discuss recommended 6 month Dx CT scan for follow-up of small apical lesion. Discussed will order for surveillance. Patient verbalized understanding and agrees to proceed. Order placed.

## 2018-12-14 NOTE — TELEPHONE ENCOUNTER
No call, encounter opened to process CT Lung Screening.       CT Lung Screen 2/16/18 : Findings:    Lung parenchyma and pleura: The tracheobronchial tree is patent. There   are mild centrilobular bullous emphysematous changes in the lungs   bilaterally, most prominently involving the upper lobes. There is a 2   mm subpleural nodule in the right lung apex (series 3, image 24). There is no focal consolidation, pleural effusion or pneumothorax. There are minimal patchy bibasal dependent pulmonary parenchymal   opacities consistent with minimal atelectatic change.        Thyroid: Grossly unremarkable.       Mediastinum: No significant lymphadenopathy.       Jacey: Suboptimally evaluated due to lack of intravenous contrast.       Heart and pericardium: The heart is not enlarged. There is no   pericardial effusion. There are trace coronary artery calcifications.       Chest wall: Unremarkable.       Axilla: No enlarged lymph nodes.       Visualized upper abdomen: There is decreased attenuation of the   hepatic parenchyma, consistent with diffuse fatty infiltration. The   gallbladder is absent and surgical clips are present in the   gallbladder fossa, consistent with prior cholecystectomy. There is a 6   mm calcification in the midpole of the left kidney, consistent with a   nonobstructing calculus (series 2, image 159). There is a 2 mm   calcification in the posterior right hepatic lobe, consistent with a   granuloma (see series 2, image 159). The remaining visualized upper   abdominal organs are grossly unremarkable.       Bones: There are degenerative changes of the spine.           Impression   1. No focal consolidation, pleural effusion or pneumothorax. 2. Right apical pulmonary nodule as described above. Recommend   follow-up as per Fleischner criteria below. 3. Mild pulmonary emphysematous disease. 4. Hepatic steatosis. 5. Nonobstructing left renal calculus.    6. Status post cholecystectomy.       Lung -

## 2018-12-19 ENCOUNTER — TELEPHONE (OUTPATIENT)
Dept: INTERNAL MEDICINE | Age: 68
End: 2018-12-19

## 2018-12-26 ENCOUNTER — HOSPITAL ENCOUNTER (OUTPATIENT)
Dept: CT IMAGING | Age: 68
Discharge: HOME | End: 2018-12-28
Payer: MEDICARE

## 2018-12-26 DIAGNOSIS — R91.1 LUNG NODULE: ICD-10-CM

## 2018-12-26 PROCEDURE — 71250 CT THORAX DX C-: CPT

## 2019-01-15 ENCOUNTER — TELEPHONE (OUTPATIENT)
Dept: INTERNAL MEDICINE | Age: 69
End: 2019-01-15

## 2019-01-30 DIAGNOSIS — M06.9 RHEUMATOID ARTHRITIS INVOLVING MULTIPLE SITES, UNSPECIFIED RHEUMATOID FACTOR PRESENCE: ICD-10-CM

## 2019-02-01 ENCOUNTER — TELEPHONE (OUTPATIENT)
Dept: CASE MANAGEMENT | Age: 69
End: 2019-02-01

## 2019-02-25 ENCOUNTER — TELEPHONE (OUTPATIENT)
Dept: INTERNAL MEDICINE | Age: 69
End: 2019-02-25

## 2019-03-01 ENCOUNTER — HOSPITAL ENCOUNTER (OUTPATIENT)
Age: 69
Discharge: HOME | End: 2019-03-01
Payer: MEDICARE

## 2019-03-01 DIAGNOSIS — E11.9 CONTROLLED TYPE 2 DIABETES MELLITUS WITHOUT COMPLICATION, WITHOUT LONG-TERM CURRENT USE OF INSULIN (HCC): ICD-10-CM

## 2019-03-01 DIAGNOSIS — E78.5 HYPERLIPIDEMIA LDL GOAL <100: ICD-10-CM

## 2019-03-01 LAB
CHOLESTEROL, TOTAL: 128 MG/DL (ref 0–199)
HBA1C MFR BLD: 6 % (ref 4–5.6)
HDLC SERPL-MCNC: 40 MG/DL
LDL CHOLESTEROL CALCULATED: 68 MG/DL (ref 0–99)
TRIGL SERPL-MCNC: 100 MG/DL (ref 0–149)
VLDLC SERPL CALC-MCNC: 20 MG/DL

## 2019-03-01 PROCEDURE — 36415 COLL VENOUS BLD VENIPUNCTURE: CPT

## 2019-03-01 PROCEDURE — 83036 HEMOGLOBIN GLYCOSYLATED A1C: CPT

## 2019-03-01 PROCEDURE — 80061 LIPID PANEL: CPT

## 2019-03-04 ENCOUNTER — OFFICE VISIT (OUTPATIENT)
Dept: INTERNAL MEDICINE | Age: 69
End: 2019-03-04
Payer: MEDICARE

## 2019-03-04 ENCOUNTER — TELEPHONE (OUTPATIENT)
Dept: INTERNAL MEDICINE | Age: 69
End: 2019-03-04

## 2019-03-04 VITALS
RESPIRATION RATE: 14 BRPM | WEIGHT: 227 LBS | HEIGHT: 64 IN | BODY MASS INDEX: 38.76 KG/M2 | SYSTOLIC BLOOD PRESSURE: 145 MMHG | TEMPERATURE: 97.5 F | DIASTOLIC BLOOD PRESSURE: 66 MMHG | HEART RATE: 80 BPM

## 2019-03-04 DIAGNOSIS — Z12.39 SCREENING FOR BREAST CANCER: ICD-10-CM

## 2019-03-04 DIAGNOSIS — E78.5 HYPERLIPIDEMIA LDL GOAL <100: ICD-10-CM

## 2019-03-04 DIAGNOSIS — E11.9 CONTROLLED TYPE 2 DIABETES MELLITUS WITHOUT COMPLICATION, WITHOUT LONG-TERM CURRENT USE OF INSULIN (HCC): ICD-10-CM

## 2019-03-04 DIAGNOSIS — R25.2 LEG CRAMPS: ICD-10-CM

## 2019-03-04 DIAGNOSIS — M05.79 RHEUMATOID ARTHRITIS INVOLVING MULTIPLE SITES WITH POSITIVE RHEUMATOID FACTOR (HCC): ICD-10-CM

## 2019-03-04 DIAGNOSIS — I10 HTN, GOAL BELOW 130/80: ICD-10-CM

## 2019-03-04 PROBLEM — Z23 NEED FOR PROPHYLACTIC VACCINATION AGAINST DIPHTHERIA-TETANUS-PERTUSSIS (DTP): Status: ACTIVE | Noted: 2019-03-04

## 2019-03-04 PROCEDURE — 99214 OFFICE O/P EST MOD 30 MIN: CPT | Performed by: INTERNAL MEDICINE

## 2019-03-04 PROCEDURE — 99212 OFFICE O/P EST SF 10 MIN: CPT | Performed by: INTERNAL MEDICINE

## 2019-03-04 RX ORDER — ROSUVASTATIN CALCIUM 5 MG/1
TABLET, COATED ORAL
Qty: 90 TABLET | Refills: 1 | Status: SHIPPED | OUTPATIENT
Start: 2019-03-04 | End: 2019-03-14 | Stop reason: SDUPTHER

## 2019-03-04 RX ORDER — LISINOPRIL 20 MG/1
20 TABLET ORAL DAILY
Qty: 90 TABLET | Refills: 1 | Status: SHIPPED | OUTPATIENT
Start: 2019-03-04 | End: 2019-03-14 | Stop reason: SDUPTHER

## 2019-03-04 ASSESSMENT — ENCOUNTER SYMPTOMS
CONSTIPATION: 0
NAUSEA: 0
CHANGE IN BOWEL HABIT: 0
COUGH: 0
WHEEZING: 0
ABDOMINAL PAIN: 0
BLURRED VISION: 0
VOMITING: 0
DIARRHEA: 0
SHORTNESS OF BREATH: 0
VISUAL CHANGE: 0
ABDOMINAL DISTENTION: 0
ORTHOPNEA: 0

## 2019-03-05 ENCOUNTER — HOSPITAL ENCOUNTER (OUTPATIENT)
Dept: GENERAL RADIOLOGY | Age: 69
Discharge: HOME | End: 2019-03-07
Payer: MEDICARE

## 2019-03-05 DIAGNOSIS — Z12.39 SCREENING FOR BREAST CANCER: ICD-10-CM

## 2019-03-05 DIAGNOSIS — Z09 ENCOUNTER FOR FOLLOW-UP EXAMINATION AFTER COMPLETED TREATMENT FOR CONDITIONS OTHER THAN MALIGNANT NEOPLASM: ICD-10-CM

## 2019-03-05 DIAGNOSIS — M85.80 OSTEOPENIA, UNSPECIFIED LOCATION: ICD-10-CM

## 2019-03-05 PROCEDURE — 77080 DXA BONE DENSITY AXIAL: CPT

## 2019-03-05 PROCEDURE — 77063 BREAST TOMOSYNTHESIS BI: CPT

## 2019-03-14 DIAGNOSIS — E78.5 HYPERLIPIDEMIA LDL GOAL <100: ICD-10-CM

## 2019-03-14 DIAGNOSIS — M05.79 RHEUMATOID ARTHRITIS INVOLVING MULTIPLE SITES WITH POSITIVE RHEUMATOID FACTOR (HCC): ICD-10-CM

## 2019-03-14 DIAGNOSIS — I10 HTN, GOAL BELOW 130/80: ICD-10-CM

## 2019-03-14 DIAGNOSIS — R25.2 LEG CRAMPS: ICD-10-CM

## 2019-03-14 DIAGNOSIS — E11.9 CONTROLLED TYPE 2 DIABETES MELLITUS WITHOUT COMPLICATION, WITHOUT LONG-TERM CURRENT USE OF INSULIN (HCC): ICD-10-CM

## 2019-03-14 RX ORDER — LISINOPRIL 20 MG/1
20 TABLET ORAL DAILY
Qty: 90 TABLET | Refills: 1 | Status: SHIPPED | OUTPATIENT
Start: 2019-03-14 | End: 2019-07-01 | Stop reason: SDUPTHER

## 2019-03-14 RX ORDER — ROSUVASTATIN CALCIUM 5 MG/1
TABLET, COATED ORAL
Qty: 90 TABLET | Refills: 1 | Status: SHIPPED | OUTPATIENT
Start: 2019-03-14 | End: 2019-07-01 | Stop reason: SDUPTHER

## 2019-03-26 ENCOUNTER — HOSPITAL ENCOUNTER (INPATIENT)
Age: 69
LOS: 3 days | Discharge: HOME | DRG: 853 | End: 2019-03-30
Attending: EMERGENCY MEDICINE | Admitting: INTERNAL MEDICINE
Payer: MEDICARE

## 2019-03-26 DIAGNOSIS — N20.0 KIDNEY STONE: ICD-10-CM

## 2019-03-26 DIAGNOSIS — N17.9 AKI (ACUTE KIDNEY INJURY) (HCC): ICD-10-CM

## 2019-03-26 DIAGNOSIS — N39.0 URINARY TRACT INFECTION IN FEMALE: ICD-10-CM

## 2019-03-26 DIAGNOSIS — A41.9 SEPSIS, DUE TO UNSPECIFIED ORGANISM: Primary | ICD-10-CM

## 2019-03-26 DIAGNOSIS — N13.30 HYDRONEPHROSIS, UNSPECIFIED HYDRONEPHROSIS TYPE: ICD-10-CM

## 2019-03-26 LAB
ALBUMIN SERPL-MCNC: 3.5 G/DL (ref 3.5–5.2)
ALP BLD-CCNC: 65 U/L (ref 35–104)
ALT SERPL-CCNC: 21 U/L (ref 0–32)
ANION GAP SERPL CALCULATED.3IONS-SCNC: 15 MMOL/L (ref 7–16)
AST SERPL-CCNC: 28 U/L (ref 0–31)
BILIRUB SERPL-MCNC: 1.2 MG/DL (ref 0–1.2)
BUN BLDV-MCNC: 32 MG/DL (ref 8–23)
CALCIUM SERPL-MCNC: 8.3 MG/DL (ref 8.6–10.2)
CHLORIDE BLD-SCNC: 101 MMOL/L (ref 98–107)
CO2: 19 MMOL/L (ref 22–29)
CREAT SERPL-MCNC: 1.6 MG/DL (ref 0.5–1)
GFR AFRICAN AMERICAN: 39
GFR NON-AFRICAN AMERICAN: 32 ML/MIN/1.73
GLUCOSE BLD-MCNC: 189 MG/DL (ref 74–99)
LACTIC ACID, SEPSIS: 2.1 MMOL/L (ref 0.5–1.9)
POTASSIUM SERPL-SCNC: 3.6 MMOL/L (ref 3.5–5)
SODIUM BLD-SCNC: 135 MMOL/L (ref 132–146)
TOTAL PROTEIN: 6.3 G/DL (ref 6.4–8.3)
TROPONIN: <0.01 NG/ML (ref 0–0.03)

## 2019-03-26 PROCEDURE — 99285 EMERGENCY DEPT VISIT HI MDM: CPT

## 2019-03-26 PROCEDURE — 87502 INFLUENZA DNA AMP PROBE: CPT

## 2019-03-26 PROCEDURE — 94761 N-INVAS EAR/PLS OXIMETRY MLT: CPT

## 2019-03-26 PROCEDURE — 80053 COMPREHEN METABOLIC PANEL: CPT

## 2019-03-26 PROCEDURE — 93005 ELECTROCARDIOGRAM TRACING: CPT | Performed by: EMERGENCY MEDICINE

## 2019-03-26 PROCEDURE — 85025 COMPLETE CBC W/AUTO DIFF WBC: CPT

## 2019-03-26 PROCEDURE — 84484 ASSAY OF TROPONIN QUANT: CPT

## 2019-03-26 PROCEDURE — 36415 COLL VENOUS BLD VENIPUNCTURE: CPT

## 2019-03-26 PROCEDURE — 2580000003 HC RX 258: Performed by: EMERGENCY MEDICINE

## 2019-03-26 PROCEDURE — 83605 ASSAY OF LACTIC ACID: CPT

## 2019-03-26 RX ORDER — SODIUM CHLORIDE 9 MG/ML
INJECTION, SOLUTION INTRAVENOUS ONCE
Status: COMPLETED | OUTPATIENT
Start: 2019-03-26 | End: 2019-03-26

## 2019-03-26 RX ORDER — ACETAMINOPHEN 500 MG
1000 TABLET ORAL ONCE
Status: COMPLETED | OUTPATIENT
Start: 2019-03-26 | End: 2019-03-27

## 2019-03-26 RX ADMIN — SODIUM CHLORIDE: 9 INJECTION, SOLUTION INTRAVENOUS at 23:05

## 2019-03-26 ASSESSMENT — PAIN SCALES - GENERAL: PAINLEVEL_OUTOF10: 8

## 2019-03-26 ASSESSMENT — PAIN DESCRIPTION - PAIN TYPE: TYPE: ACUTE PAIN

## 2019-03-26 ASSESSMENT — PAIN DESCRIPTION - LOCATION: LOCATION: HEAD

## 2019-03-26 ASSESSMENT — PAIN DESCRIPTION - DESCRIPTORS: DESCRIPTORS: HEADACHE

## 2019-03-27 ENCOUNTER — ANESTHESIA (OUTPATIENT)
Dept: OPERATING ROOM | Age: 69
DRG: 853 | End: 2019-03-27
Payer: MEDICARE

## 2019-03-27 ENCOUNTER — APPOINTMENT (OUTPATIENT)
Dept: CT IMAGING | Age: 69
DRG: 853 | End: 2019-03-27
Payer: MEDICARE

## 2019-03-27 ENCOUNTER — APPOINTMENT (OUTPATIENT)
Dept: GENERAL RADIOLOGY | Age: 69
DRG: 853 | End: 2019-03-27
Payer: MEDICARE

## 2019-03-27 ENCOUNTER — ANESTHESIA EVENT (OUTPATIENT)
Dept: OPERATING ROOM | Age: 69
DRG: 853 | End: 2019-03-27
Payer: MEDICARE

## 2019-03-27 VITALS
SYSTOLIC BLOOD PRESSURE: 124 MMHG | RESPIRATION RATE: 22 BRPM | OXYGEN SATURATION: 95 % | DIASTOLIC BLOOD PRESSURE: 75 MMHG

## 2019-03-27 PROBLEM — N39.0 UTI (URINARY TRACT INFECTION): Status: ACTIVE | Noted: 2019-03-27

## 2019-03-27 PROBLEM — R06.03 RESPIRATORY DISTRESS: Status: ACTIVE | Noted: 2019-03-27

## 2019-03-27 LAB
ALBUMIN SERPL-MCNC: 3.7 G/DL (ref 3.5–5.2)
ALP BLD-CCNC: 82 U/L (ref 35–104)
ALT SERPL-CCNC: 24 U/L (ref 0–32)
ANION GAP SERPL CALCULATED.3IONS-SCNC: 20 MMOL/L (ref 7–16)
AST SERPL-CCNC: 31 U/L (ref 0–31)
B.E.: -11.6 MMOL/L (ref -3–3)
BACTERIA: ABNORMAL /HPF
BASOPHILS ABSOLUTE: 0 E9/L (ref 0–0.2)
BASOPHILS ABSOLUTE: 0.01 E9/L (ref 0–0.2)
BASOPHILS RELATIVE PERCENT: 0.1 % (ref 0–2)
BASOPHILS RELATIVE PERCENT: 0.3 % (ref 0–2)
BILIRUB SERPL-MCNC: 1.2 MG/DL (ref 0–1.2)
BILIRUBIN URINE: NEGATIVE
BLOOD, URINE: ABNORMAL
BUN BLDV-MCNC: 29 MG/DL (ref 8–23)
BURR CELLS: ABNORMAL
CALCIUM SERPL-MCNC: 8.4 MG/DL (ref 8.6–10.2)
CHLORIDE BLD-SCNC: 101 MMOL/L (ref 98–107)
CHLORIDE URINE RANDOM: 27 MMOL/L
CLARITY: CLEAR
CO2: 16 MMOL/L (ref 22–29)
COHB: 1.1 % (ref 0–1.5)
COLOR: YELLOW
CREAT SERPL-MCNC: 1.7 MG/DL (ref 0.5–1)
CREATININE URINE: 183 MG/DL (ref 29–226)
CRITICAL: ABNORMAL
DATE ANALYZED: ABNORMAL
DATE OF COLLECTION: ABNORMAL
DOHLE BODIES: ABNORMAL
EOSINOPHILS ABSOLUTE: 0.01 E9/L (ref 0.05–0.5)
EOSINOPHILS ABSOLUTE: 0.03 E9/L (ref 0.05–0.5)
EOSINOPHILS RELATIVE PERCENT: 0.1 % (ref 0–6)
EOSINOPHILS RELATIVE PERCENT: 0.9 % (ref 0–6)
EPITHELIAL CELLS, UA: ABNORMAL /HPF
FILM ARRAY ADENOVIRUS: NORMAL
FILM ARRAY BORDETELLA PERTUSSIS: NORMAL
FILM ARRAY CHLAMYDOPHILIA PNEUMONIAE: NORMAL
FILM ARRAY CORONAVIRUS 229E: NORMAL
FILM ARRAY CORONAVIRUS HKU1: NORMAL
FILM ARRAY CORONAVIRUS NL63: NORMAL
FILM ARRAY CORONAVIRUS OC43: NORMAL
FILM ARRAY INFLUENZA A VIRUS 09H1: NORMAL
FILM ARRAY INFLUENZA A VIRUS H1: NORMAL
FILM ARRAY INFLUENZA A VIRUS H3: NORMAL
FILM ARRAY INFLUENZA A VIRUS: NORMAL
FILM ARRAY INFLUENZA B: NORMAL
FILM ARRAY METAPNEUMOVIRUS: NORMAL
FILM ARRAY MYCOPLASMA PNEUMONIAE: NORMAL
FILM ARRAY PARAINFLUENZA VIRUS 1: NORMAL
FILM ARRAY PARAINFLUENZA VIRUS 2: NORMAL
FILM ARRAY PARAINFLUENZA VIRUS 3: NORMAL
FILM ARRAY PARAINFLUENZA VIRUS 4: NORMAL
FILM ARRAY RESPIRATORY SYNCITIAL VIRUS: NORMAL
FILM ARRAY RHINOVIRUS/ENTEROVIRUS: NORMAL
FOLATE: 19.8 NG/ML (ref 4.8–24.2)
GFR AFRICAN AMERICAN: 36
GFR NON-AFRICAN AMERICAN: 30 ML/MIN/1.73
GLUCOSE BLD-MCNC: 139 MG/DL (ref 74–99)
GLUCOSE URINE: NEGATIVE MG/DL
HCO3: 11.5 MMOL/L (ref 22–26)
HCT VFR BLD CALC: 36.2 % (ref 34–48)
HCT VFR BLD CALC: 39.4 % (ref 34–48)
HEMOGLOBIN: 12.4 G/DL (ref 11.5–15.5)
HEMOGLOBIN: 12.8 G/DL (ref 11.5–15.5)
HHB: 2.2 % (ref 0–5)
IMMATURE GRANULOCYTES #: 0.23 E9/L
IMMATURE GRANULOCYTES %: 2.3 % (ref 0–5)
INFLUENZA A BY PCR: NOT DETECTED
INFLUENZA B BY PCR: NOT DETECTED
KETONES, URINE: 15 MG/DL
LAB: ABNORMAL
LACTIC ACID: 1.7 MMOL/L (ref 0.5–2.2)
LACTIC ACID: 8.3 MMOL/L (ref 0.5–2.2)
LEUKOCYTE ESTERASE, URINE: ABNORMAL
LYMPHOCYTES ABSOLUTE: 0.3 E9/L (ref 1.5–4)
LYMPHOCYTES ABSOLUTE: 1.02 E9/L (ref 1.5–4)
LYMPHOCYTES RELATIVE PERCENT: 3 % (ref 20–42)
LYMPHOCYTES RELATIVE PERCENT: 33.6 % (ref 20–42)
Lab: ABNORMAL
MAGNESIUM: 1.7 MG/DL (ref 1.6–2.6)
MCH RBC QN AUTO: 29.8 PG (ref 26–35)
MCH RBC QN AUTO: 30.4 PG (ref 26–35)
MCHC RBC AUTO-ENTMCNC: 32.5 % (ref 32–34.5)
MCHC RBC AUTO-ENTMCNC: 34.3 % (ref 32–34.5)
MCV RBC AUTO: 88.7 FL (ref 80–99.9)
MCV RBC AUTO: 91.6 FL (ref 80–99.9)
METER GLUCOSE: 211 MG/DL (ref 74–99)
METER GLUCOSE: 221 MG/DL (ref 74–99)
METER GLUCOSE: 231 MG/DL (ref 74–99)
METER GLUCOSE: 239 MG/DL (ref 74–99)
METER GLUCOSE: 263 MG/DL (ref 74–99)
METHB: 0.3 % (ref 0–1.5)
MODE: ABNORMAL
MONOCYTES ABSOLUTE: 0.15 E9/L (ref 0.1–0.95)
MONOCYTES ABSOLUTE: 0.53 E9/L (ref 0.1–0.95)
MONOCYTES RELATIVE PERCENT: 5.3 % (ref 2–12)
MONOCYTES RELATIVE PERCENT: 5.3 % (ref 2–12)
NEUTROPHILS ABSOLUTE: 1.8 E9/L (ref 1.8–7.3)
NEUTROPHILS ABSOLUTE: 8.96 E9/L (ref 1.8–7.3)
NEUTROPHILS RELATIVE PERCENT: 60.2 % (ref 43–80)
NEUTROPHILS RELATIVE PERCENT: 89.2 % (ref 43–80)
NITRITE, URINE: POSITIVE
O2 SATURATION: 97.8 % (ref 92–98.5)
O2HB: 96.4 % (ref 94–97)
OPERATOR ID: 459
PATIENT TEMP: 37 C
PCO2: 20.9 MMHG (ref 35–45)
PDW BLD-RTO: 13.3 FL (ref 11.5–15)
PDW BLD-RTO: 13.6 FL (ref 11.5–15)
PH BLOOD GAS: 7.36 (ref 7.35–7.45)
PH UA: 5.5 (ref 5–9)
PLATELET # BLD: 118 E9/L (ref 130–450)
PLATELET # BLD: 130 E9/L (ref 130–450)
PMV BLD AUTO: 9.3 FL (ref 7–12)
PMV BLD AUTO: 9.5 FL (ref 7–12)
PO2: 114.5 MMHG (ref 60–100)
POIKILOCYTES: ABNORMAL
POLYCHROMASIA: ABNORMAL
POTASSIUM SERPL-SCNC: 4.17 MMOL/L (ref 3.3–5.1)
POTASSIUM SERPL-SCNC: 4.2 MMOL/L (ref 3.5–5)
POTASSIUM, UR: 71.6 MMOL/L
PRO-BNP: 1843 PG/ML (ref 0–125)
PROTEIN UA: 100 MG/DL
RBC # BLD: 4.08 E12/L (ref 3.5–5.5)
RBC # BLD: 4.3 E12/L (ref 3.5–5.5)
RBC # BLD: NORMAL 10*6/UL
RBC UA: ABNORMAL /HPF (ref 0–2)
SODIUM BLD-SCNC: 137 MMOL/L (ref 132–146)
SODIUM URINE: 47 MMOL/L
SOURCE, BLOOD GAS: ABNORMAL
SPECIFIC GRAVITY UA: >=1.03 (ref 1–1.03)
THB: 13.5 G/DL (ref 11.5–16.5)
TIME ANALYZED: 655
TOTAL PROTEIN: 6.8 G/DL (ref 6.4–8.3)
TROPONIN: 0.02 NG/ML (ref 0–0.03)
TROPONIN: 0.02 NG/ML (ref 0–0.03)
TSH SERPL DL<=0.05 MIU/L-ACNC: 1.06 UIU/ML (ref 0.27–4.2)
UREA NITROGEN, UR: 1378 MG/DL (ref 800–1666)
UROBILINOGEN, URINE: 0.2 E.U./DL
VACUOLATED NEUTROPHILS: ABNORMAL
VITAMIN B-12: 598 PG/ML (ref 211–946)
WBC # BLD: 10 E9/L (ref 4.5–11.5)
WBC # BLD: 3 E9/L (ref 4.5–11.5)
WBC UA: ABNORMAL /HPF (ref 0–5)

## 2019-03-27 PROCEDURE — 83735 ASSAY OF MAGNESIUM: CPT

## 2019-03-27 PROCEDURE — 3700000001 HC ADD 15 MINUTES (ANESTHESIA): Performed by: UROLOGY

## 2019-03-27 PROCEDURE — 84132 ASSAY OF SERUM POTASSIUM: CPT

## 2019-03-27 PROCEDURE — 99223 1ST HOSP IP/OBS HIGH 75: CPT | Performed by: INTERNAL MEDICINE

## 2019-03-27 PROCEDURE — 96365 THER/PROPH/DIAG IV INF INIT: CPT

## 2019-03-27 PROCEDURE — 82805 BLOOD GASES W/O2 SATURATION: CPT

## 2019-03-27 PROCEDURE — 94660 CPAP INITIATION&MGMT: CPT

## 2019-03-27 PROCEDURE — 6370000000 HC RX 637 (ALT 250 FOR IP): Performed by: UROLOGY

## 2019-03-27 PROCEDURE — 2580000003 HC RX 258: Performed by: STUDENT IN AN ORGANIZED HEALTH CARE EDUCATION/TRAINING PROGRAM

## 2019-03-27 PROCEDURE — 6360000002 HC RX W HCPCS

## 2019-03-27 PROCEDURE — 84300 ASSAY OF URINE SODIUM: CPT

## 2019-03-27 PROCEDURE — 94640 AIRWAY INHALATION TREATMENT: CPT

## 2019-03-27 PROCEDURE — 81001 URINALYSIS AUTO W/SCOPE: CPT

## 2019-03-27 PROCEDURE — 82436 ASSAY OF URINE CHLORIDE: CPT

## 2019-03-27 PROCEDURE — 36415 COLL VENOUS BLD VENIPUNCTURE: CPT

## 2019-03-27 PROCEDURE — 87581 M.PNEUMON DNA AMP PROBE: CPT

## 2019-03-27 PROCEDURE — 83880 ASSAY OF NATRIURETIC PEPTIDE: CPT

## 2019-03-27 PROCEDURE — 6360000002 HC RX W HCPCS: Performed by: UROLOGY

## 2019-03-27 PROCEDURE — 87077 CULTURE AEROBIC IDENTIFY: CPT

## 2019-03-27 PROCEDURE — 74176 CT ABD & PELVIS W/O CONTRAST: CPT

## 2019-03-27 PROCEDURE — 2580000003 HC RX 258

## 2019-03-27 PROCEDURE — C1758 CATHETER, URETERAL: HCPCS | Performed by: UROLOGY

## 2019-03-27 PROCEDURE — 80053 COMPREHEN METABOLIC PANEL: CPT

## 2019-03-27 PROCEDURE — 85025 COMPLETE CBC W/AUTO DIFF WBC: CPT

## 2019-03-27 PROCEDURE — 2700000000 HC OXYGEN THERAPY PER DAY

## 2019-03-27 PROCEDURE — 87186 SC STD MICRODIL/AGAR DIL: CPT

## 2019-03-27 PROCEDURE — 87088 URINE BACTERIA CULTURE: CPT

## 2019-03-27 PROCEDURE — 2000000000 HC ICU R&B

## 2019-03-27 PROCEDURE — 2709999900 HC NON-CHARGEABLE SUPPLY: Performed by: UROLOGY

## 2019-03-27 PROCEDURE — 6360000002 HC RX W HCPCS: Performed by: INTERNAL MEDICINE

## 2019-03-27 PROCEDURE — 83605 ASSAY OF LACTIC ACID: CPT

## 2019-03-27 PROCEDURE — 2580000003 HC RX 258: Performed by: UROLOGY

## 2019-03-27 PROCEDURE — 87486 CHLMYD PNEUM DNA AMP PROBE: CPT

## 2019-03-27 PROCEDURE — 3700000000 HC ANESTHESIA ATTENDED CARE: Performed by: UROLOGY

## 2019-03-27 PROCEDURE — 82607 VITAMIN B-12: CPT

## 2019-03-27 PROCEDURE — 84443 ASSAY THYROID STIM HORMONE: CPT

## 2019-03-27 PROCEDURE — 87633 RESP VIRUS 12-25 TARGETS: CPT

## 2019-03-27 PROCEDURE — 2500000003 HC RX 250 WO HCPCS

## 2019-03-27 PROCEDURE — 6360000002 HC RX W HCPCS: Performed by: EMERGENCY MEDICINE

## 2019-03-27 PROCEDURE — 0T778DZ DILATION OF LEFT URETER WITH INTRALUMINAL DEVICE, VIA NATURAL OR ARTIFICIAL OPENING ENDOSCOPIC: ICD-10-PCS | Performed by: UROLOGY

## 2019-03-27 PROCEDURE — 84484 ASSAY OF TROPONIN QUANT: CPT

## 2019-03-27 PROCEDURE — 3600000003 HC SURGERY LEVEL 3 BASE: Performed by: UROLOGY

## 2019-03-27 PROCEDURE — 2580000003 HC RX 258: Performed by: EMERGENCY MEDICINE

## 2019-03-27 PROCEDURE — 71045 X-RAY EXAM CHEST 1 VIEW: CPT

## 2019-03-27 PROCEDURE — 82962 GLUCOSE BLOOD TEST: CPT

## 2019-03-27 PROCEDURE — 84540 ASSAY OF URINE/UREA-N: CPT

## 2019-03-27 PROCEDURE — 84133 ASSAY OF URINE POTASSIUM: CPT

## 2019-03-27 PROCEDURE — 6370000000 HC RX 637 (ALT 250 FOR IP): Performed by: EMERGENCY MEDICINE

## 2019-03-27 PROCEDURE — 3600000013 HC SURGERY LEVEL 3 ADDTL 15MIN: Performed by: UROLOGY

## 2019-03-27 PROCEDURE — 74420 UROGRAPHY RTRGR +-KUB: CPT

## 2019-03-27 PROCEDURE — 87040 BLOOD CULTURE FOR BACTERIA: CPT

## 2019-03-27 PROCEDURE — 82570 ASSAY OF URINE CREATININE: CPT

## 2019-03-27 PROCEDURE — 6370000000 HC RX 637 (ALT 250 FOR IP): Performed by: INTERNAL MEDICINE

## 2019-03-27 PROCEDURE — 93005 ELECTROCARDIOGRAM TRACING: CPT | Performed by: INTERNAL MEDICINE

## 2019-03-27 PROCEDURE — 87798 DETECT AGENT NOS DNA AMP: CPT

## 2019-03-27 PROCEDURE — 6360000002 HC RX W HCPCS: Performed by: STUDENT IN AN ORGANIZED HEALTH CARE EDUCATION/TRAINING PROGRAM

## 2019-03-27 PROCEDURE — 2580000003 HC RX 258: Performed by: INTERNAL MEDICINE

## 2019-03-27 PROCEDURE — 82746 ASSAY OF FOLIC ACID SERUM: CPT

## 2019-03-27 PROCEDURE — 94664 DEMO&/EVAL PT USE INHALER: CPT

## 2019-03-27 PROCEDURE — C2617 STENT, NON-COR, TEM W/O DEL: HCPCS | Performed by: UROLOGY

## 2019-03-27 DEVICE — URETERAL STENT
Type: IMPLANTABLE DEVICE | Status: FUNCTIONAL
Brand: PERCUFLEX™

## 2019-03-27 RX ORDER — MAGNESIUM SULFATE IN WATER 40 MG/ML
2 INJECTION, SOLUTION INTRAVENOUS ONCE
Status: COMPLETED | OUTPATIENT
Start: 2019-03-27 | End: 2019-03-27

## 2019-03-27 RX ORDER — ONDANSETRON 2 MG/ML
4 INJECTION INTRAMUSCULAR; INTRAVENOUS EVERY 6 HOURS PRN
Status: DISCONTINUED | OUTPATIENT
Start: 2019-03-27 | End: 2019-03-30 | Stop reason: HOSPADM

## 2019-03-27 RX ORDER — ASPIRIN 81 MG/1
81 TABLET ORAL DAILY
Status: DISCONTINUED | OUTPATIENT
Start: 2019-03-27 | End: 2019-03-30 | Stop reason: HOSPADM

## 2019-03-27 RX ORDER — HYDROXYCHLOROQUINE SULFATE 200 MG/1
300 TABLET, FILM COATED ORAL DAILY
Status: DISCONTINUED | OUTPATIENT
Start: 2019-03-27 | End: 2019-03-30 | Stop reason: HOSPADM

## 2019-03-27 RX ORDER — SODIUM CHLORIDE 9 MG/ML
INJECTION, SOLUTION INTRAVENOUS CONTINUOUS
Status: DISCONTINUED | OUTPATIENT
Start: 2019-03-27 | End: 2019-03-28

## 2019-03-27 RX ORDER — FENTANYL CITRATE 50 UG/ML
INJECTION, SOLUTION INTRAMUSCULAR; INTRAVENOUS PRN
Status: DISCONTINUED | OUTPATIENT
Start: 2019-03-27 | End: 2019-03-27 | Stop reason: SDUPTHER

## 2019-03-27 RX ORDER — 0.9 % SODIUM CHLORIDE 0.9 %
30 INTRAVENOUS SOLUTION INTRAVENOUS ONCE
Status: COMPLETED | OUTPATIENT
Start: 2019-03-27 | End: 2019-03-27

## 2019-03-27 RX ORDER — SODIUM CHLORIDE 0.9 % (FLUSH) 0.9 %
SYRINGE (ML) INJECTION
Status: DISPENSED
Start: 2019-03-27 | End: 2019-03-27

## 2019-03-27 RX ORDER — NICOTINE POLACRILEX 4 MG
15 LOZENGE BUCCAL PRN
Status: DISCONTINUED | OUTPATIENT
Start: 2019-03-27 | End: 2019-03-30 | Stop reason: HOSPADM

## 2019-03-27 RX ORDER — DEXTROSE MONOHYDRATE 50 MG/ML
100 INJECTION, SOLUTION INTRAVENOUS PRN
Status: DISCONTINUED | OUTPATIENT
Start: 2019-03-27 | End: 2019-03-30 | Stop reason: HOSPADM

## 2019-03-27 RX ORDER — CEFAZOLIN SODIUM 1 G/3ML
INJECTION, POWDER, FOR SOLUTION INTRAMUSCULAR; INTRAVENOUS PRN
Status: DISCONTINUED | OUTPATIENT
Start: 2019-03-27 | End: 2019-03-27 | Stop reason: SDUPTHER

## 2019-03-27 RX ORDER — LIDOCAINE HYDROCHLORIDE 10 MG/ML
INJECTION, SOLUTION EPIDURAL; INFILTRATION; INTRACAUDAL; PERINEURAL PRN
Status: DISCONTINUED | OUTPATIENT
Start: 2019-03-27 | End: 2019-03-27 | Stop reason: SDUPTHER

## 2019-03-27 RX ORDER — METHYLPREDNISOLONE SODIUM SUCCINATE 125 MG/2ML
125 INJECTION, POWDER, LYOPHILIZED, FOR SOLUTION INTRAMUSCULAR; INTRAVENOUS ONCE
Status: COMPLETED | OUTPATIENT
Start: 2019-03-27 | End: 2019-03-27

## 2019-03-27 RX ORDER — METHYLPREDNISOLONE SODIUM SUCCINATE 40 MG/ML
40 INJECTION, POWDER, LYOPHILIZED, FOR SOLUTION INTRAMUSCULAR; INTRAVENOUS EVERY 8 HOURS
Status: DISCONTINUED | OUTPATIENT
Start: 2019-03-27 | End: 2019-03-28

## 2019-03-27 RX ORDER — SODIUM CHLORIDE 9 MG/ML
INJECTION, SOLUTION INTRAVENOUS CONTINUOUS
Status: DISCONTINUED | OUTPATIENT
Start: 2019-03-27 | End: 2019-03-27

## 2019-03-27 RX ORDER — SODIUM CHLORIDE 0.9 % (FLUSH) 0.9 %
10 SYRINGE (ML) INJECTION PRN
Status: DISCONTINUED | OUTPATIENT
Start: 2019-03-27 | End: 2019-03-30 | Stop reason: HOSPADM

## 2019-03-27 RX ORDER — ROSUVASTATIN CALCIUM 5 MG/1
5 TABLET, COATED ORAL NIGHTLY
Status: DISCONTINUED | OUTPATIENT
Start: 2019-03-27 | End: 2019-03-29

## 2019-03-27 RX ORDER — FOLIC ACID 1 MG/1
1 TABLET ORAL DAILY
Status: DISCONTINUED | OUTPATIENT
Start: 2019-03-27 | End: 2019-03-29

## 2019-03-27 RX ORDER — FORMOTEROL FUMARATE 20 UG/2ML
20 SOLUTION RESPIRATORY (INHALATION) 2 TIMES DAILY
Status: DISCONTINUED | OUTPATIENT
Start: 2019-03-27 | End: 2019-03-28

## 2019-03-27 RX ORDER — IPRATROPIUM BROMIDE AND ALBUTEROL SULFATE 2.5; .5 MG/3ML; MG/3ML
1 SOLUTION RESPIRATORY (INHALATION)
Status: DISCONTINUED | OUTPATIENT
Start: 2019-03-27 | End: 2019-03-28

## 2019-03-27 RX ORDER — SODIUM CHLORIDE 0.9 % (FLUSH) 0.9 %
10 SYRINGE (ML) INJECTION EVERY 12 HOURS SCHEDULED
Status: DISCONTINUED | OUTPATIENT
Start: 2019-03-27 | End: 2019-03-30 | Stop reason: HOSPADM

## 2019-03-27 RX ORDER — DEXTROSE MONOHYDRATE 25 G/50ML
12.5 INJECTION, SOLUTION INTRAVENOUS PRN
Status: DISCONTINUED | OUTPATIENT
Start: 2019-03-27 | End: 2019-03-30 | Stop reason: HOSPADM

## 2019-03-27 RX ORDER — BUDESONIDE 0.25 MG/2ML
250 INHALANT ORAL 2 TIMES DAILY
Status: DISCONTINUED | OUTPATIENT
Start: 2019-03-27 | End: 2019-03-28

## 2019-03-27 RX ORDER — SODIUM CHLORIDE 9 MG/ML
INJECTION, SOLUTION INTRAVENOUS CONTINUOUS PRN
Status: DISCONTINUED | OUTPATIENT
Start: 2019-03-27 | End: 2019-03-27 | Stop reason: SDUPTHER

## 2019-03-27 RX ORDER — 0.9 % SODIUM CHLORIDE 0.9 %
1000 INTRAVENOUS SOLUTION INTRAVENOUS ONCE
Status: COMPLETED | OUTPATIENT
Start: 2019-03-27 | End: 2019-03-27

## 2019-03-27 RX ORDER — PROPOFOL 10 MG/ML
INJECTION, EMULSION INTRAVENOUS CONTINUOUS PRN
Status: DISCONTINUED | OUTPATIENT
Start: 2019-03-27 | End: 2019-03-27 | Stop reason: SDUPTHER

## 2019-03-27 RX ADMIN — INSULIN LISPRO 2 UNITS: 100 INJECTION, SOLUTION INTRAVENOUS; SUBCUTANEOUS at 14:48

## 2019-03-27 RX ADMIN — CEFTRIAXONE SODIUM 1 G: 1 INJECTION, POWDER, FOR SOLUTION INTRAMUSCULAR; INTRAVENOUS at 05:03

## 2019-03-27 RX ADMIN — SODIUM CHLORIDE: 9 INJECTION, SOLUTION INTRAVENOUS at 17:15

## 2019-03-27 RX ADMIN — FENTANYL CITRATE 50 MCG: 50 INJECTION, SOLUTION INTRAMUSCULAR; INTRAVENOUS at 16:47

## 2019-03-27 RX ADMIN — BUDESONIDE 250 MCG: 0.25 SUSPENSION RESPIRATORY (INHALATION) at 21:05

## 2019-03-27 RX ADMIN — Medication 10 ML: at 20:43

## 2019-03-27 RX ADMIN — METHYLPREDNISOLONE SODIUM SUCCINATE 125 MG: 125 INJECTION, POWDER, FOR SOLUTION INTRAMUSCULAR; INTRAVENOUS at 10:17

## 2019-03-27 RX ADMIN — METHYLPREDNISOLONE SODIUM SUCCINATE 40 MG: 40 INJECTION, POWDER, FOR SOLUTION INTRAMUSCULAR; INTRAVENOUS at 19:07

## 2019-03-27 RX ADMIN — FENTANYL CITRATE 50 MCG: 50 INJECTION, SOLUTION INTRAMUSCULAR; INTRAVENOUS at 16:51

## 2019-03-27 RX ADMIN — CEFAZOLIN 2000 MG: 1 INJECTION, POWDER, FOR SOLUTION INTRAMUSCULAR; INTRAVENOUS at 16:45

## 2019-03-27 RX ADMIN — PROPOFOL 50 MCG/KG/MIN: 10 INJECTION, EMULSION INTRAVENOUS at 16:47

## 2019-03-27 RX ADMIN — ROSUVASTATIN CALCIUM 5 MG: 5 TABLET, FILM COATED ORAL at 22:08

## 2019-03-27 RX ADMIN — LIDOCAINE HYDROCHLORIDE 20 MG: 10 INJECTION, SOLUTION EPIDURAL; INFILTRATION; INTRACAUDAL; PERINEURAL at 16:47

## 2019-03-27 RX ADMIN — SODIUM CHLORIDE: 9 INJECTION, SOLUTION INTRAVENOUS at 22:03

## 2019-03-27 RX ADMIN — SODIUM CHLORIDE 1000 ML: 9 INJECTION, SOLUTION INTRAVENOUS at 10:00

## 2019-03-27 RX ADMIN — WATER 10 ML: 1 INJECTION INTRAMUSCULAR; INTRAVENOUS; SUBCUTANEOUS at 10:23

## 2019-03-27 RX ADMIN — MEROPENEM 1 G: 1 INJECTION, POWDER, FOR SOLUTION INTRAVENOUS at 23:21

## 2019-03-27 RX ADMIN — BUDESONIDE 250 MCG: 0.25 SUSPENSION RESPIRATORY (INHALATION) at 13:24

## 2019-03-27 RX ADMIN — MAGNESIUM SULFATE HEPTAHYDRATE 2 G: 40 INJECTION, SOLUTION INTRAVENOUS at 10:17

## 2019-03-27 RX ADMIN — Medication 10 ML: at 10:24

## 2019-03-27 RX ADMIN — IPRATROPIUM BROMIDE AND ALBUTEROL SULFATE 1 AMPULE: .5; 3 SOLUTION RESPIRATORY (INHALATION) at 06:40

## 2019-03-27 RX ADMIN — MEROPENEM 1 G: 1 INJECTION, POWDER, FOR SOLUTION INTRAVENOUS at 11:34

## 2019-03-27 RX ADMIN — ENOXAPARIN SODIUM 40 MG: 40 INJECTION, SOLUTION INTRAVENOUS; SUBCUTANEOUS at 10:17

## 2019-03-27 RX ADMIN — INSULIN LISPRO 2 UNITS: 100 INJECTION, SOLUTION INTRAVENOUS; SUBCUTANEOUS at 20:43

## 2019-03-27 RX ADMIN — FORMOTEROL FUMARATE DIHYDRATE 20 MCG: 20 SOLUTION RESPIRATORY (INHALATION) at 13:24

## 2019-03-27 RX ADMIN — FORMOTEROL FUMARATE DIHYDRATE 20 MCG: 20 SOLUTION RESPIRATORY (INHALATION) at 21:05

## 2019-03-27 RX ADMIN — INSULIN LISPRO 2 UNITS: 100 INJECTION, SOLUTION INTRAVENOUS; SUBCUTANEOUS at 19:11

## 2019-03-27 RX ADMIN — SODIUM CHLORIDE 3186 ML: 9 INJECTION, SOLUTION INTRAVENOUS at 11:40

## 2019-03-27 RX ADMIN — IPRATROPIUM BROMIDE AND ALBUTEROL SULFATE 1 AMPULE: .5; 3 SOLUTION RESPIRATORY (INHALATION) at 21:05

## 2019-03-27 RX ADMIN — IPRATROPIUM BROMIDE AND ALBUTEROL SULFATE 1 AMPULE: .5; 3 SOLUTION RESPIRATORY (INHALATION) at 13:25

## 2019-03-27 RX ADMIN — ACETAMINOPHEN 1000 MG: 500 TABLET ORAL at 00:31

## 2019-03-27 RX ADMIN — SODIUM CHLORIDE: 9 INJECTION, SOLUTION INTRAVENOUS at 16:39

## 2019-03-27 ASSESSMENT — PULMONARY FUNCTION TESTS
PIF_VALUE: 1
PIF_VALUE: 0
PIF_VALUE: 1
PIF_VALUE: 1
PIF_VALUE: 0
PIF_VALUE: 1
PIF_VALUE: 0
PIF_VALUE: 1
PIF_VALUE: 1
PIF_VALUE: 0
PIF_VALUE: 0
PIF_VALUE: 1
PIF_VALUE: 1
PIF_VALUE: 0

## 2019-03-27 ASSESSMENT — PAIN DESCRIPTION - DESCRIPTORS
DESCRIPTORS: BURNING;DISCOMFORT;DULL
DESCRIPTORS: ACHING

## 2019-03-27 ASSESSMENT — PAIN SCALES - GENERAL
PAINLEVEL_OUTOF10: 0
PAINLEVEL_OUTOF10: 0
PAINLEVEL_OUTOF10: 2
PAINLEVEL_OUTOF10: 0
PAINLEVEL_OUTOF10: 0
PAINLEVEL_OUTOF10: 5
PAINLEVEL_OUTOF10: 0
PAINLEVEL_OUTOF10: 0

## 2019-03-27 ASSESSMENT — PAIN DESCRIPTION - LOCATION
LOCATION: HEAD
LOCATION: OTHER (COMMENT)

## 2019-03-27 ASSESSMENT — PAIN DESCRIPTION - PAIN TYPE
TYPE: ACUTE PAIN
TYPE: ACUTE PAIN

## 2019-03-27 NOTE — SIGNIFICANT EVENT
Rapid Response Team Note  Date of event: 3/27/2019   Time of event: [address] [place] Edis Gallagher 80y.o. year old female   YOB: 1950   Admit date:  3/26/2019   Location: [address] [place] [address]-A   Witnessed? : [x]Yes  [] No  Monitored? : [x]Yes  [] No  Code status: [x] Full  [] DNR-CCA  []DNR-CC  ______________________________________________________________________  Reason for RRT:    [] RR < 8     [x] RR > 28   [] SpO2 <90%   [] HR < 40 bpm   [] HR > 130 bpm  [] SBP < 90 mmHg    [x] SpO2 <90%   [] LOC   [] Seizures    [] Significant Bleeding Event    [] Other:     Subjective: The patient is a 80 y.o. female with a significant PMH of RA, HTN, DM, COPD, who came to the emergency department complaining of nausea, vomiting and fever for the past 2 days, patient has had back pain radiated to left abdomen, intermittent, with no alleviating or aggravating factors. Her boyfriend also reports some intermittent confusion. She denies SOB, chest pain, cough, diarrhea, vomiting, leg swelling, dysuria, or sick contacts. Per EMS her initial temperature was 106. Upon arrival to the ER patient was started on IVF, CT of the abdomen revealed a 4 mm obstructive stone on the left ureter with hydronephrosis, creatinine elevation 1.7 (baseline 0.9), she was given IVF. During her ER stay patient symptoms improved after acetaminophen was given, rocephin was administered as well. Urology was consulted from ER. This morning upon arrival to floor, patient developed sudden onset severe respiratory distress, central and peripheral cyanosis, she was not previously short of breath before in the ED. BP was 170/10 initially, SpO2 82%, RRT was called, patient was placed on BiPAP, duoneb was given, and her breathing improved significantly, SpO2 100% on BiPAP. ABG revealed 7.36 20.9 114.5, lactic acid was elevated to 8.3 from 2.1 and IVF were given.       Objective:   Vital signs: Temp: 97.2/BP: 170/100/RR: 38/HR: 140  Initial Condition:  Conscious [x] Yes  [] No     Breathing [x] Yes  [] No     Pulse  [x] Yes  [] No    Airway:   [x] Open/ Clear     Intervention: [x] None  [] Pooled secretions     [] Suctioned  [] Stridor      [] Intubation    Lungs:   [x] Symmetrical chest rise/ CTABL Intervention: [] None  [] Use of accessory muscles    [x] NIV (CPAP/BiPAP)  [x] Cyanosis      [] Nasal Oxygen/Mask  [x] Wheezing       [x] ABG             [x] CXR      Circulation:   Rhythm:  [x] Sinus [] Other:   Intervention: [] None            [] IV Access  [] Peripheral              [] Central            [x] EKG            [] Cardioversion            [] Defibrillation     Capillary Refill:  [] > 2 seconds [x] < 2 seconds    Neurologic:   [] NIHSS      [] Pupillary Response:   Response to pain:   [x] Yes  [] No  Follow commands:  [x] Yes  [] No  Facial asymmetry:  [] Yes  [x] No  Motor strength:  [x] Equal  [] Focal deficit:   Diagnostic Test:  Blood Sugar:  139 mg/dL  EKG:    ST  ABG:      Lab Results   Component Value Date    PH 7.360 03/27/2019    PCO2 20.9 03/27/2019    PO2 114.5 03/27/2019    HCO3 11.5 03/27/2019    O2SAT 97.8 03/27/2019     Medication(s) Given:  []  Narcan (Naloxone)   []  Romazicon (Flumazenil)    [x]  Breathing Treatment    [x]  Steroids (Prednisone, Solu-Medrol)  []  Adenosine  [] Cardiac Medicines:     Infusion(s):   [x] Normal Saline    Amount: 1 L       [] Lactate Ringers      [] Other:     Imaging:   [x] CXR:   [] Normal         [] Pneumothorax         [] Pulmonary Edema  [] Infiltrate          [] CT Head  [] Normal          [] ICB          [] [facility]          [] Other:     Laboratory Tests:     CBC:   Lab Results   Component Value Date    WBC 3.0 03/27/2019    RBC 4.30 03/27/2019    HGB 12.8 03/27/2019    HCT 39.4 03/27/2019    MCV 91.6 03/27/2019    MCH 29.8 03/27/2019    MCHC 32.5 03/27/2019    RDW 13.6 03/27/2019     03/27/2019    MPV 9.3 03/27/2019     BMP:    Lab Results   Component Value Date     03/27/2019    K 4.17 03/27/2019  03/27/2019    CO2 16 03/27/2019    BUN 29 03/27/2019    LABALBU 3.7 03/27/2019    LABALBU 4.3 03/25/2011    CREATININE 1.7 03/27/2019    CALCIUM 8.4 03/27/2019    GFRAA 36 03/27/2019    LABGLOM 30 03/27/2019    GLUCOSE 139 03/27/2019    GLUCOSE 125 04/05/2012     Troponin:    Lab Results   Component Value Date    TROPONINI 0.02 03/27/2019     ABG:    Lab Results   Component Value Date    PH 7.360 03/27/2019    PCO2 20.9 03/27/2019    PO2 114.5 03/27/2019    HCO3 11.5 03/27/2019    BE -11.6 03/27/2019    O2SAT 97.8 03/27/2019     LActic acid 8.3      Teams Assessment and Plan:  1. Sepsis complicated by UTI, SOFA scire 8 points (<33% mortality), there is potential risk for septic shock. 2. UTI, 2/2 to obstructive kidney stones  3. Obstructive uropathy, hydronephrosis on left kidney due to 4 mm obstructive stone on left ureter  4. COPD exacerbation, sudden onset respiratory distress that improved with duoneb and BiPAP  5. Acute hypoxic respiratory failure, improved, likely due to bronchospasm or COPD  6. BAHMAN stage 2, likely post renal  7. Lactic acidosis, 8.3      1. Patient will be transferred to ICU  2. Urology consulted  3. Consult ID  4. Urine and blood cultures  5. Start Merropenem  6. IVF bolus  7. Monitor lactic acidosis every 6 months  8. Obtain urine electrolytes, obtain FeNA and monitor renal function and UO  9. Continue BiPAP PRN, and breathing treatments  10. Solumedrol IV was given  11. On MTX and plaquenil at home  12. Monitor BG  13. Avoid nephrotoxic agents      ? Disposition:  [] No transfer   [] Transfer to monitor floor  [x] Transfer to: [x] MICU [] NICU [] CCU [] SICU    Patients family updated: [x] Yes  [] No   Discussed with:  [x] Critical Care Intensivist: Dr. Charal Hamilton      [x] Primary Care Provider: Criss Morgan MD      [] Other: Dr Nupur Sky M.D.   Internal Medicine Resident - PGY 2

## 2019-03-27 NOTE — H&P
[facility]  Internal Medicine Residency Program  History and Physical    Patient:  Wes Gallagher 80 y.o. female MRN: [id number]     Date of Service: 3/27/2019    Hospital Day: 2      Chief complaint: Fever  History of Present Illness   The patient is a 80 y.o. female with a significant PMH of RA, HTN, DM, COPD, who came to the emergency department complaining of nausea, vomiting and fever for the past 2 days, patient has had back pain radiated to left abdomen, intermittent, with no alleviating or aggravating factors. Her boyfriend also reports some intermittent confusion. She denies SOB, chest pain, cough, diarrhea, vomiting, leg swelling, dysuria, or sick contacts. Per EMS her initial temperature was 106. Upon arrival to the ER patient was started on IVF, CT of the abdomen revealed a 4 mm obstructive stone on the left ureter with hydronephrosis, creatinine elevation 1.7 (baseline 0.9), she was given IVF. During her ER stay patient symptoms improved after acetaminophen was given, rocephin was administered as well. Urology was consulted from ER. This morning upon arrival to floor, patient developed sudden onset severe respiratory distress, central and peripheral cyanosis, she was not previously short of breath before in the ED. BP was 170/10 initially, SpO2 82%, RRT was called, patient was placed on BiPAP, duoneb was given, and her breathing improved significantly, SpO2 100% on BiPAP. ABG revealed 7.36 20.9 114.5, lactic acid was elevated to 8.3 from 2.1 and IVF were given.     Past Medical History:      Diagnosis Date    Diabetes mellitus type II, controlled ([address]     Hyperlipidemia     Hypertension     Obesity, Class III, BMI 40-49.9 (morbid obesity) (HCC)     Osteoarthritis     Osteoporosis     Rheumatoid arthritis involving multiple sites ([facility] [address]     Tobacco abuse began age 16    Tubular adenoma of colon 7/2011    colonoscopy       Past Surgical History:        Procedure no slurred speech, no double vision, no tingling or numbness sensation  · Endocrinology: no temperature intolerance, no polyphagia, polydipsia or polyuria  · Hematology: no increased bleeding, no bruising, no lymphadenopathy  · Skin: no skin changes noticed by patient  · Psychology: no depressed mood, no suicidal ideation    Physical Exam   · Vitals: /63   Pulse 114   Temp 97.8 °F (36.6 °C) (Temporal)   Resp 22   Ht 5' 4\" (1.626 m)   Wt 234 lb 2.1 oz (106.2 kg)   SpO2 97%   BMI 40.19 kg/m²     · General Appearance: Alert, in respiratory distress  · Skin: warm and dry, no rash or erythema and ecchymoses noted on perioral and ungual  · Head: normocephalic and atraumatic  · Eyes: pupils equal, round, and reactive to light, extraocular eye movements intact, conjunctivae normal  · ENT: oropharynx clear and moist with normal mucous membranes  · Neck: neck supple and non tender without mass, no thyromegaly or thyroid nodules, no cervical lymphadenopathy   · Pulmonary/Chest: Tachypneic, bilateral expiratory wheezing  · Cardiovascular: normal rate, normal S1 and S2, no gallops, intact distal pulses and no carotid bruits  · Abdomen: Mild tenderness a LLQ, no guarding or rebound  · Extremities: no cyanosis and no clubbing  · Neurologic: reflexes normal and symmetric and no cranial nerve deficit   Labs and Imaging Studies   Basic Labs  Recent Labs     03/26/19 2301 03/27/19  0655 03/27/19  0655    137  --    K 3.6 4.2 4.17    101  --    CO2 19* 16*  --    BUN 32* 29*  --    CREATININE 1.6* 1.7*  --    GLUCOSE 189* 139*  --    CALCIUM 8.3* 8.4*  --        Recent Labs     03/26/19 2301 03/27/19  0655   WBC 10.0 3.0*   RBC 4.08 4.30   HGB 12.4 12.8   HCT 36.2 39.4   MCV 88.7 91.6   MCH 30.4 29.8   MCHC 34.3 32.5   RDW 13.3 13.6    118*   MPV 9.5 9.3       CBC:   Lab Results   Component Value Date    WBC 3.0 03/27/2019    RBC 4.30 03/27/2019    HGB 12.8 03/27/2019    HCT 39.4 03/27/2019    MCV 91.6 03/27/2019    RDW 13.6 03/27/2019     03/27/2019     BMP:    Lab Results   Component Value Date     03/27/2019    K 4.17 03/27/2019     03/27/2019    CO2 16 03/27/2019    BUN 29 03/27/2019     CMP:  Lab Results   Component Value Date     03/27/2019    K 4.17 03/27/2019     03/27/2019    CO2 16 03/27/2019    BUN 29 03/27/2019    PROT 6.8 03/27/2019     TSH:    Lab Results   Component Value Date    TSH 1.600 12/11/2014       Imaging Studies:     Ct Abdomen Pelvis Wo Contrast Additional Contrast? None    Result Date: 3/27/2019  EXAM:  CT Abdomen and Pelvis Without Intravenous Contrast CLINICAL HISTORY:  80years old, female; Pain; Other: Back; Additional info: Abdominal pain TECHNIQUE:  Axial computed tomography images of the abdomen and pelvis without intravenous contrast.  All CT scans at this facility use at least one of these dose optimization techniques: automated exposure control; mA and/or kV adjustment per patient size (includes targeted exams where dose is matched to clinical indication); or iterative reconstruction. Coronal and sagittal reformatted images were created and reviewed. COMPARISON:  No relevant prior studies available. FINDINGS:   Lung bases:  Unremarkable. No mass. No consolidation. ABDOMEN:   Liver:  Fatty infiltration of the liver. Gallbladder and bile ducts:  Unremarkable. No calcified stones. No ductal dilation. Pancreas:  Unremarkable. No ductal dilation. Spleen: The spleen is mildly prominent in size. Adrenals:  Unremarkable. No mass. Kidneys and ureters:  Nonobstructing stones left kidney. No stones in the right kidney or in the right ureter and no hydronephrosis on the right. No hydronephrosis left kidney due to an obstructing 4 mm stone in the proximal left ureter. Right renal cyst measuring larger than a centimeter. Stomach and bowel:  Diverticulosis in the colon without evidence of diverticulitis. No obstruction.   PELVIS: Appendix:  No findings to suggest acute appendicitis. Bladder:  Unremarkable. No stones. Reproductive:  Unremarkable as visualized. ABDOMEN and PELVIS:   Intraperitoneal space:  Unremarkable. No free air. No significant fluid collection. Bones/joints:  Bilateral L5 spondylolyses. No acute fracture. No dislocation. Soft tissues:  Unremarkable. Vasculature: Atherosclerotic disease. No abdominal aortic aneurysm. Lymph nodes:  Unremarkable. No enlarged lymph nodes. Nonobstructing stones left kidney. No stones in the right kidney or in the right ureter and no hydronephrosis on the right. No hydronephrosis left kidney due to an obstructing 4 mm stone in the proximal left ureter. This report has been electronically signed by Scotty Canchola MD.    Dexa Bone Density Axial Skeleton    Result Date: 3/5/2019  Patient MRN:  [id number] : 1950 Age: 80 years Gender: Female Order Date:  3/5/2019 11:15 AM EXAM: DEXA BONE DENSITY AXIAL SKELETON NUMBER OF IMAGES:   3  [facility] for follow[facility]up examination after completed treatment for conditions other than malignant neoplasm  COMPARISON: 2016 TECHNIQUE: Central DEXA scan was performed using AP view of the lumbar spine and AP views of both hips. Bone mineral density was categorized according to the [address] classification. FINDINGS: Lumbar spine: Total bone mineral density of L1-4 on frontal imaging is 1.067 g/sq cm with a T score of -0.9. This represents a 3.4% decrease in bone mineral density relative to the 2016 study. Right hip: Bone mineral density of the right femoral neck is 0.664 g/sq cm with a T score of -2.7. Left hip: Bone mineral density of the left femoral neck is 0.718 g/sq cm with a T score of -2.3. Comparison of the mean value of both hips to the prior exam demonstrates a 3.4% decrease in bone mineral density on today's study.      1. Normal bone mineral density values in the lumbar spine with no statistically significant change. 2. Findings of osteopenia in the left hip with no statistically significant change. 3. Findings of osteoporosis in the right hip with no statistically significant change. Xr Chest Portable    Result Date: 3/27/2019  Patient MRN: [id number] : 1950 Age:  80 years Gender: Female Order Date: 3/27/2019 1:30 AM Exam: XR CHEST PORTABLE Number of Images: 1 view Indication:  Fever with nausea x2 days chest pain Comparison: 3 January 2011 FINDINGS: Pulmonary vascularity normal. Heart size upper normal. Lungs emphysematous. There is some coarsening of pulmonary interstitial markings which appears slightly greater than on prior study and it is not clear whether this represents acute or chronic disease in light of the more than 8 year time interval between the 2 studies. Costophrenic angles sharp. 1. Emphysema. 2. Coarsening of pulmonary interstitial markings acute versus chronic disease. These do appear somewhat more pronounced compared to the study of roughly 8 years prior. Syed Digital Screen W Cad Bilateral    Result Date: 3/5/2019  INDICATION: Screening. HISTORY: The patient has no personal history of cancer. The patient has no family history of breast cancer. The patient has a history of Excisional Biopsy more than 10 years ago - benign - unknown side. MAMMOGRAM VIEWS: The following mammographic views where obtained: bilateral craniocaudal; bilateral craniocaudal with tomosynthesis; bilateral mediolateral oblique; and bilateral mediolateral oblique with tomosynthesis  TOMOSYNTHESIS: Tomosynthesis (3 Dimensional Breast Imaging) was used on this examination to aid in evaluation. COMPARISON: The present examination has been compared to prior imaging studies performed at [address] [place] [address] on 2015 and 2016.   CAD: This exam was reviewed using the [employer] Computer Aided Detection (CAD)  TISSUE DENSITY: The breasts are and procedures. Resident's Assessment and Plan     Assessment  1. Sepsis complicated by UTI, SOFA scire 8 points (<33% mortality), there is potential risk for septic shock. 2. UTI, 2/2 to obstructive kidney stones  3. Obstructive uropathy, hydronephrosis on left kidney due to 4 mm obstructive stone on left ureter  4. COPD exacerbation, sudden onset respiratory distress that improved with duoneb and BiPAP  5. Acute hypoxic respiratory failure, improved, likely due to bronchospasm or COPD  6. BAHMAN stage 2, likely post renal  7. Lactic acidosis, 8.3  8. Acute encephalopathy, improved  9. Hx of RA  10. Hx of DM, well controlled  11. Hx of HTN    Plan  1. Patient will be transferred to ICU  2. Urology consulted  3. Consult ID  4. Urine and blood cultures  5. Start Merropenem  6. IVF bolus  7. Monitor lactic acidosis every 6 months  8. Obtain urine electrolytes, obtain FeNA and monitor renal function and UO  9. Continue BiPAP PRN, and breathing treatments  10. Solumedrol IV was given  11. On MTX and plaquenil at home  12. Monitor BG  13. Avoid nephrotoxic agents    Disposition: ICU    Sherry Mitchell M.D.   Internal Medicine Resident - PGY 2    Attending physician: Dr. Elena Flores

## 2019-03-27 NOTE — CONSULTS
for Pain   Yes Historical Provider, MD   Coenzyme Q10 (CO Q 10 PO) Take 1 tablet by mouth daily QUINOL    Yes Historical Provider, MD   Ascorbic Acid (VITAMIN C) 1000 MG tablet Take 1,000 mg by mouth 2 times daily    Yes Historical Provider, MD   methotrexate (RHEUMATREX) 2.5 MG chemo tablet TAKE 7 TABLETS EVERY WEEK 1/31/19   Mary Welsh MD   Misc. Devices MISC Please give one glucose monitor accuchek or what is covered bu her insurance. 8/13/18   Aj Chahal MD   Lancets MISC Test twice daily  Give what insurance covers 3/26/18   Heriberto Cordova MD   Misc. Devices MISC 1 each by Does not apply route once as needed Glucometer as covered by insurance 10/2/15 10/2/15  Leata Staggers, DO       Allergies:  Randy.Peeling    Social History: :   reports that she quit smoking about 7 years ago. Her smoking use included cigarettes. She has a 40.00 pack-year smoking history. She has never used smokeless tobacco.  ETOH:   reports that she does not drink alcohol. Family History:       Problem Relation Age of Onset    Diabetes Mother     Heart Disease Mother     Arthritis Mother     Diabetes Father     Stroke Father     Diabetes Brother     High Cholesterol Brother     Cancer Paternal Aunt        REVIEW OF SYSTEMS:  · Constitutional: No fever, no chills, no change in weight; good appetite  · HEENT: No blurred vision, no ear problems, no sore throat, no rhinorrhea. · Respiratory: No cough, no sputum production, no pleuritic chest pain, no shortness of breath  · Cardiology: No angina, no dyspnea on exertion, no paroxysmal nocturnal dyspnea, no orthopnea, no palpitation, no leg swelling. · Gastroenterology: No dysphagia, no reflux; no abdominal pain, no nausea or vomiting; no constipation or diarrhea.  No hematochezia   · Genitourinary: No dysuria, no frequency, hesitancy; no hematuria  · Musculoskeletal: no joint pain, no myalgia, no change in range of movement  · Neurology: no focal weakness in extremities, 03/27/2019    MCV 91.6 03/27/2019    RDW 13.6 03/27/2019     03/27/2019       Imaging Studies:     Ct Abdomen Pelvis Wo Contrast Additional Contrast? None    Result Date: 3/27/2019  EXAM:  CT Abdomen and Pelvis Without Intravenous Contrast CLINICAL HISTORY:  80years old, female; Pain; Other: Back; Additional info: Abdominal pain TECHNIQUE:  Axial computed tomography images of the abdomen and pelvis without intravenous contrast.  All CT scans at this facility use at least one of these dose optimization techniques: automated exposure control; mA and/or kV adjustment per patient size (includes targeted exams where dose is matched to clinical indication); or iterative reconstruction. Coronal and sagittal reformatted images were created and reviewed. COMPARISON:  No relevant prior studies available. FINDINGS:   Lung bases:  Unremarkable. No mass. No consolidation. ABDOMEN:   Liver:  Fatty infiltration of the liver. Gallbladder and bile ducts:  Unremarkable. No calcified stones. No ductal dilation. Pancreas:  Unremarkable. No ductal dilation. Spleen: The spleen is mildly prominent in size. Adrenals:  Unremarkable. No mass. Kidneys and ureters:  Nonobstructing stones left kidney. No stones in the right kidney or in the right ureter and no hydronephrosis on the right. No hydronephrosis left kidney due to an obstructing 4 mm stone in the proximal left ureter. Right renal cyst measuring larger than a centimeter. Stomach and bowel:  Diverticulosis in the colon without evidence of diverticulitis. No obstruction. PELVIS:   Appendix:  No findings to suggest acute appendicitis. Bladder:  Unremarkable. No stones. Reproductive:  Unremarkable as visualized. ABDOMEN and PELVIS:   Intraperitoneal space:  Unremarkable. No free air. No significant fluid collection. Bones/joints:  Bilateral L5 spondylolyses. No acute fracture. No dislocation. Soft tissues:  Unremarkable. Vasculature: Atherosclerotic disease. No abdominal aortic aneurysm. Lymph nodes:  Unremarkable. No enlarged lymph nodes. Nonobstructing stones left kidney. No stones in the right kidney or in the right ureter and no hydronephrosis on the right. No hydronephrosis left kidney due to an obstructing 4 mm stone in the proximal left ureter. This report has been electronically signed by Susannah Landers MD.    Dexa Bone Density Axial Skeleton    Result Date: 3/5/2019  Patient MRN:  [id number] : 1950 Age: 80 years Gender: Female Order Date:  3/5/2019 11:15 AM EXAM: DEXA BONE DENSITY AXIAL SKELETON NUMBER OF IMAGES:   3  INDICATION: [address] Encounter for follow-up examination after completed treatment for conditions other than malignant neoplasm  COMPARISON: 2016 TECHNIQUE: Central DEXA scan was performed using AP view of the lumbar spine and AP views of both hips. Bone mineral density was categorized according to the [address] classification. FINDINGS: Lumbar spine: Total bone mineral density of L1-4 on frontal imaging is 1.067 g/sq cm with a T score of -0.9. This represents a 3.4% decrease in bone mineral density relative to the 2016 study. Right hip: Bone mineral density of the right femoral neck is 0.664 g/sq cm with a T score of -2.7. Left hip: Bone mineral density of the left femoral neck is 0.718 g/sq cm with a T score of -2.3. Comparison of the mean value of both hips to the prior exam demonstrates a 3.4% decrease in bone mineral density on today's study. 1. Normal bone mineral density values in the lumbar spine with no statistically significant change. 2. Findings of osteopenia in the left hip with no statistically significant change. 3. Findings of osteoporosis in the right hip with no statistically significant change.      Xr Chest Portable    Result Date: 3/27/2019  Patient MRN: [id number] : 1950 Age:  80 years Gender: Female Order Date: 3/27/2019 1:30 AM Exam: XR CHEST PORTABLE Number of Images: 1 view Indication:  Fever with nausea x2 days chest pain Comparison: 3 January 2011 FINDINGS: Pulmonary vascularity normal. Heart size upper normal. Lungs emphysematous. There is some coarsening of pulmonary interstitial markings which appears slightly greater than on prior study and it is not clear whether this represents acute or chronic disease in light of the more than 8 year time interval between the 2 studies. Costophrenic angles sharp. 1. Emphysema. 2. Coarsening of pulmonary interstitial markings acute versus chronic disease. These do appear somewhat more pronounced compared to the study of roughly 8 years prior. Syed Digital Screen W Cad Bilateral    Result Date: 3/5/2019  INDICATION: Screening. HISTORY: The patient has no personal history of cancer. The patient has no family history of breast cancer. The patient has a history of Excisional Biopsy more than 10 years ago - benign - unknown side. MAMMOGRAM VIEWS: The following mammographic views where obtained: bilateral craniocaudal; bilateral craniocaudal with tomosynthesis; bilateral mediolateral oblique; and bilateral mediolateral oblique with tomosynthesis  TOMOSYNTHESIS: Tomosynthesis (3 Dimensional Breast Imaging) was used on this examination to aid in evaluation. COMPARISON: The present examination has been compared to prior imaging studies performed at [address] [place] [address] on 06/23/2015 and 07/12/2016. CAD: This exam was reviewed using the [employer] Computer Aided Detection (CAD)  TISSUE DENSITY: The breasts are heterogeneously dense (Type 3 density). MAMMOGRAM FINDINGS: Finding 1: No suspicious masses, areas of suspicious architectural distortion, suspicious calcifications, or additional suspicious findings are identified. There are no significant changes from the prior study. IMPRESSION: No mammographic evidence of malignancy. Screening mammogram in 1 year is recommended. ======================================= BI-RADS Category 1:  Negative =======================================  RISK ASSESSMENT: During this patient's visit, information obtained was used to generate a lifetime risk assessment using the Tyrer-Cuzick model (also called the JATIN [International Breast Cancer Intervention Study] Breast Cancer Risk Evaluation Tool). - LIFETIME RISK - Patient has a Tyrer Cuzick score of 6.2% - BREAST TISSUE DENSITY - Heterogeneously dense  -AVERAGE RISK (<15% ) Yearly screening mammograms starting at age 36 and older. Regardless of breast density, tomosynthesis is suggested. Monthly self-breast exams are recommended for women age 27 and older. NOTE: Mammography is not 100% accurate in the detection of breast cancer. Accuracy decreases as mammographic density of the breast increases. A negative mammogram should not deter further evaluation (including biopsy) of suspicious physical findings. Recommendations are based on NCCN ([address]) and ACR [employer] of Radiology) guidelines. Thank you for sending your patient to this ACR and FDA approved facility. If there are any physician concerns regarding this report, a Radiologist can be reached by calling the following number [phone]. Follow up Protocol for the [facility]: BI-RADS 1 or 2:  Center will send a reminder when next mammogram is due. BI-RADS 3:  Center will send a reminder for recommended short term follow up. BI-RADS 0, 4 or 5:  Center will contact patient to schedule all additional views and procedures. Resident's Assessment and Plan     Assessment  1. Sepsis complicated by UTI, SOFA scire 8 points (<33% mortality), there is potential risk for septic shock. 2. UTI, 2/2 to obstructive kidney stones  3. Obstructive uropathy, hydronephrosis on left kidney due to 4 mm obstructive stone on left ureter  4.  COPD exacerbation, sudden onset respiratory distress that improved with duoneb and BiPAP  5. Acute hypoxic respiratory failure, improved, likely due to bronchospasm or COPD  6. BAHMAN stage 2, likely post renal  7. Lactic acidosis, 8.3  8. Acute encephalopathy, improved  9. Hx of RA  10. Hx of DM, well controlled  11. Hx of HTN     Plan  1. Admit to ICU  2. Urology consulted> for OR today  3. Follow urine and blood cultures  4. Merropenem  5. IVF bolus + NS 100cc/h  6. Monitor lactic acidosis every 6 months  7. Obtain urine electrolytes, obtain FeNA and monitor renal function and UO  8. Continue BiPAP PRN, and breathing treatments  9. Solumedrol 40 mg Q8h  10. On MTX and plaquenil at home, hold for now  11. Monitor BG  12. Avoid nephrotoxic agents  13. Pain control per urology  14. Resume diet if no other procedure is planned  15. Pt counseled for possible fever and sepsis after procedure  16. Volume assessment after procedure    DVT prophylaxis/ GI prophylaxis: Lovenox/protonix  Disposition:MICU    Tri Finn MD, PGY-2  Attending physician: Dr. Brandon Sheikh    I personally saw, examined and provided care for the patient. Radiographs, labs and medication list were reviewed by me independently. I spoke with bedside nursing, therapists and consultants. Critical care services and times documented are independent of procedures and multidisciplinary rounds with Residents. Additionally comprehensive, multidisciplinary rounds were conducted with the MICU team. The case was discussed in detail and plans for care were established. Review of Residents documentation was conducted and revisions were made as appropriate.  I agree with the above documented exam, problem list and plan of care with the following additions:    Admitted to the ICU for sepsis in the setting of pyelonephritis from a L obstructing ureteral stone  For cystoscopy today  Antibiotics per ID  Fluid resuscitation  Possible reaction to Rocephin - immediate bronchospasm/SOB - placed on steroids

## 2019-03-27 NOTE — CONSULTS
[address] [place] [address] [facility]    Consultation Note     Admit Date: 3/26/2019 10:44 PM    Reason for Consult:  sepsis    Attending Physician:  Ronny Beard MD       Chief Complaint   Patient presents with    Fever     started 2 days ago    Nausea         History Obtained From:  For a detailed history please see previous and current available medical records. HISTORY OF PRESENT ILLNESS:             The patient is a 80 y.o. female with diabetes admitted with nauea, vomiting and L sided low back pain which radiated to the abdomen. She was also confused. Imaging showed L sided obstructive stone and she was admitted. she was started on iv rocephin at 5 am. She had hypotension and respIratory distress this am and RRT was called and was transferred to the MICU.     Past Medical History:        Diagnosis Date    [facility] mellitus [facility] II, [facility]     Hyperlipidemia     Hypertension     Obesity, Class III, BMI 40-49.9 (morbid obesity) (HCC)     Osteoarthritis     Osteoporosis     Rheumatoid arthritis involving multiple sites ([facility]     Tobacco abuse began age 16    Tubular adenoma of colon 2011    colonoscopy     Past Surgical History:        Procedure Laterality Date    BREAST BIOPSY      benign, pt cannot remember which side    CARPAL TUNNEL RELEASE      right wrist     [facility]     SECTION  1979    CHOLECYSTECTOMY, OPEN      COLONOSCOPY  2011    screening, sigmoid polyp bx (tubular adenoma), Dr. Brittney Kiran, [address] [place] [address] COLONOSCOPY  4/3/2015    submucosal lipoma distal tranverse colon biopsied, diverticulosis, Dr. Brittney Kiran, [facility] [address], [facility]  childhood    TUBAL LIGATION       Current Medications:   Scheduled Meds:   ipratropium-albuterol  1 ampule Inhalation Q4H WA    sodium chloride flush        aspirin EC  81 mg Oral Daily    folic acid  1 mg Oral Daily    hydroxychloroquine  300 mg Oral Daily texture, turgor normal, no rashes or lesions       CBC+dif:  Reviewed and trend followed    Radiology:  Noted    Microbiology:  Pending    Assessment:  · Sepsis complicated UTI  · L URETERAL OBSTRUCTIVE STONE  · Lactic acidosis  · RA on hydroxycholoroquine    Plan:    · Cont iv merrem  · F/u , [facility]  · Urology consulted  · Monitor labs  · Will follow with you    Thank you for having us see this patient in consultation. I will be discussing this case with the treating physicians.     Electronically signed by Patricia Fulton MD on 3/27/2019 at 11:59 AM

## 2019-03-27 NOTE — PLAN OF CARE
Discussed with Dr Salo De La Rosa, about patient about her recent RRT call with event of respiratory distress and elevated lactic acid with concern for impending septic shock. Dr Salo De La Rosa agreed with the transfer to ICU.     Electronically signed by Trang Cross on 3/27/2019 at 9:09 AM

## 2019-03-27 NOTE — CONSULTS
Consults     INPATIENT CONSULTATION RECORD FOR  3/27/2019      [facility]  [address] [facility]. [facility], [address] [facility]  [phone]        REASON FOR CONSULTATION:      Nausea, vomiting, fever, ureteral stone    HISTORY OF PRESENT ILLNESS:      The patient is a 80 y.o. female patient who presents with nausea, vomiting, fever, chills, left flank pain radiating to the groin. Admitted to MICU. Comfortable and hemodynamically stable at the current time. Pain controlled with medications. Son at bedside.         Past Medical History:   Diagnosis Date    Diabetes mellitus type II, controlled ([facility]     Hyperlipidemia     Hypertension     Obesity, Class III, BMI 40-49.9 (morbid obesity) ([facility])     Osteoarthritis     Osteoporosis     Rheumatoid arthritis involving multiple sites ([facility]     Tobacco abuse began age 16    Tubular adenoma of colon 2011    colonoscopy         Past Surgical History:   Procedure Laterality Date    BREAST BIOPSY      benign, pt cannot remember which side    CARPAL TUNNEL RELEASE      right wrist     [facility]     SECTION      CHOLECYSTECTOMY, OPEN      COLONOSCOPY  2011    screening, sigmoid polyp bx (tubular adenoma), Dr. Olivarez Son, [facility] COLONOSCOPY  4/3/2015    submucosal lipoma distal tranverse colon biopsied, diverticulosis, Dr. Olivarez Son, [place] [address], [facility]  childhood    TUBAL LIGATION         Medications Prior to Admission:    Medications Prior to Admission: VENTOLIN  (90 Base) MCG/ACT inhaler, inhale 2 puffs by mouth four times a day if needed  rosuvastatin (CRESTOR) 5 MG tablet, take 1 tablet by mouth once daily  metFORMIN (GLUCOPHAGE) 850 MG tablet, Take 1 tablet by mouth 2 times daily (with meals)  magnesium cl-calcium carbonate (SLOW-MAG) 71.5-119 MG TBEC tablet, take 2 tablets once daily  lisinopril (PRINIVIL;ZESTRIL) 20 MG tablet, Take 1 tablet by mouth daily  folic acid (FOLVITE) 1 MG tablet, Take 1 tablet by mouth daily  hydroxychloroquine (PLAQUENIL) 200 MG tablet, TAKE 1 AND 1/2 TABLETS EVERY DAY  Biotin 300 MCG TABS, Take 1 tablet by mouth daily  Cholecalciferol (VITAMIN D) 2000 units CAPS capsule, Take 2,000 Units by mouth 2 times daily (Patient taking differently: Take 4,000 Units by mouth daily )  aspirin EC 81 MG EC tablet, Take 1 tablet by mouth daily  Naproxen Sodium (ALEVE) 220 MG CAPS, Take 1 capsule by mouth as needed for Pain  Coenzyme Q10 (CO Q 10 PO), Take 1 tablet by mouth daily QUINOL   Ascorbic Acid (VITAMIN C) 1000 MG tablet, Take 1,000 mg by mouth 2 times daily   methotrexate (RHEUMATREX) 2.5 MG chemo tablet, TAKE 7 TABLETS EVERY WEEK  Misc. Devices MISC, Please give one glucose monitor accuchek or what is covered bu her insurance. [DISCONTINUED] alendronate (FOSAMAX) 70 MG tablet, take 1 tablet by mouth every week  Lancets MISC, Test twice daily  Give what insurance covers  [address] Devices MISC, 1 each by Does not apply route once as needed Glucometer as covered by insurance    Allergies:    Patient has no known allergies. Social History:    reports that she quit smoking about 7 years ago. Her smoking use included cigarettes. She has a 40.00 pack-year smoking history. She has never used smokeless tobacco. She reports that she does not drink alcohol or use drugs. Son Nguy[facility]n [facility]    Family History:   Non-contributory to this Urological problem  family history includes Arthritis in her mother; Cancer in her paternal aunt; Diabetes in her brother, father, and mother; Heart Disease in her mother; High Cholesterol in her brother; Stroke in her father.     REVIEW OF SYSTEMS:  Fever, chills  Respiratory: negative for cough and hemoptysis  Cardiovascular: negative for chest pain and dyspnea  Gastrointestinal: +  abdominal pain, nausea and vomiting  Derm: negative for rash and skin lesion(s)  Neurological: negative for seizures and tremors  Endocrine: negative for diabetic symptoms including polydipsia and polyuria  : As above in the HPI, otherwise negative  All other systems negative    PHYSICAL EXAM:    Vitals:  /68   Pulse 100   Temp 98.1 °F (36.7 °C) (Oral)   Resp 18   Ht 5' 4\" (1.626 m)   Wt 228 lb 6.3 oz (103.6 kg)   SpO2 97%   BMI 39.20 kg/m²     General:  Awake, alert, oriented X 3. Well developed, well nourished, well groomed. No apparent distress. HEENT:  Normocephalic, atraumatic. Pupils equal, round. No scleral icterus. No conjunctival injection. Normal lips, teeth, and gums. No nasal discharge. Neck:  Supple, no masses. Heart:  RRR  Lungs:  No audible wheezing. Respirations symmetric and non-labored. Abdomen:  soft, nontender, no masses, no organomegaly, no peritoneal signs  Extremities:  No clubbing, cyanosis, or edema  Skin:  Warm and dry, no open lesions or rashes  Neuro:  Cranial nerves 2-12 intact, no focal deficits  Rectal: deferred  Genitalia:  deferred    LABS:    Lab Results   Component Value Date    WBC 3.0 (L) 03/27/2019    HGB 12.8 03/27/2019    HCT 39.4 03/27/2019    MCV 91.6 03/27/2019     (L) 03/27/2019       Lab Results   Component Value Date    CREATININE 1.7 (H) 03/27/2019       ASSESSMENT / PLAN:      1. Fever, chills, nausea, emesis with probable left ureteral stone. No hydronephrosis. To OR for STAT left ureteral stent insertion. Discussed procedure with patient and son including details and risks / benefits.         Levon Sandifer, M.D.  4:27 PM  3/27/2019

## 2019-03-27 NOTE — ED PROVIDER NOTES
Department of Emergency Medicine   ED  Provider Note  Admit Date/RoomTime: 3/26/2019 10:44 PM  ED Room: 8405/8405-A      History of Present Illness:  3/26/19, Time: 11:46 PM         Ventura Benavides is a 80 y.o. female presenting to the ED for fever and nausea, beginning 2 days ago. The complaint has been persistent, moderate in severity, and worsened by nothing. Pt reports that she has been having fevers, feeling nauseas with minimal emesis and abdominal pain for the past 2 days. States that she had a fever of 101 at home and when EMS arrived on scene her temp was 106. Notes that she may have \"caught something\" when she accompanied her friend to [place] a few days ago. Hx of DM and HTN. Pt denies any hematuria, melena, loc, chest pain, sob, palpitations, chills,  diarrhea, neck pain, extremity pain, edema, HA, congestion, visual disturbances, dysuria, constipation, hematuria, weakness, numbness, tingling, dizziness or any other further symptoms at this time. Review of Systems:   Pertinent positives and negatives are stated within HPI, all other systems reviewed and are negative.    --------------------------------------------- PAST HISTORY ------------------------------------------  Past Medical History:  has a past medical history of Diabetes mellitus type II, controlled ([facility] [address] Hyperlipidemia, Hypertension, Obesity, Class III, BMI 40-49.9 (morbid obesity) ([facility] [address] Osteoarthritis, Osteoporosis, Rheumatoid arthritis involving multiple sites ([facility] [address] Tobacco abuse, and Tubular adenoma of colon. Past Surgical History:  has a past surgical history that includes Tonsillectomy (childhood);  section ();  section ();  section (); Tubal ligation (); Cholecystectomy, open (); Breast biopsy (); Carpal tunnel release (); Colonoscopy (2011); Colonoscopy (4/3/2015); and CYSTOSCOPY INSERTION / REMOVAL STENT / STONE (Left, 3/27/2019).     Social History:  reports Result Value Ref Range    Culture, Blood 2 24 Hours- no growth    Urine Culture   Result Value Ref Range    Urine Culture, Routine      Organism Escherichia coli (A)     Urine Culture, Routine >100,000 CFU/ml        Susceptibility    Escherichia coli - BACTERIAL SUSCEPTIBILITY PANEL BY DEONTE     ampicillin >=^32 Resistant mcg/mL     ceFAZolin <=^4 Sensitive mcg/mL     cefepime <=^0.12 Sensitive mcg/mL     cefTRIAXone <=^0.25 Sensitive mcg/mL     Confirmatory Extended Spectrum Beta-Lactamase Neg  mcg/mL     gentamicin <=^1 Sensitive mcg/mL     imipenem <=^0.25 Sensitive mcg/mL     levofloxacin =^1 Sensitive mcg/mL     nitrofurantoin <=^16 Sensitive mcg/mL     piperacillin-tazobactam <=^4 Sensitive mcg/mL     trimethoprim-sulfamethoxazole <=^20 Sensitive mcg/mL   Respiratory Panel, Film Array   Result Value Ref Range    Film Array Adenovirus       Result: Not Detected  *  *  Normal Range: Not Detected      Film Array Coronavirus HKU1       Result: Not Detected  *  *  Normal Range: Not Detected      Film Array Conoravirus NL63       Result: Not Detected  *  *  Normal Range: Not Detected      Film Array Coronavirus 229E       Result: Not Detected  *  *  Normal Range: Not Detected      Film Array Coronavirus OC43       Result: Not Detected  *  *  Normal Range: Not Detected      Film Array Influenza A Virus       Result: Not Detected  *  *  Normal Range: Not Detected      Film Array Influenza A Virus H1       Result: Not Detected  *  *  Normal Range: Not Detected      Film Array Influenza A Virus 09H1       Result: Not Detected  *  *  Normal Range: Not Detected      Film Array Influenza A Virus H3       Result: Not Detected  *  *  Normal Range: Not Detected      Film Array Influenza B       Result: Not Detected  *  *  Normal Range: Not Detected      Film Array Metapneumovirus       Result: Not Detected  *  *  Normal Range: Not Detected      Film Array Parainfluenza Virus 1       Result: Not Detected  *  *  Normal Range: Not Detected      Film Array Parainfluenza Virus 2       Result: Not Detected  *  *  Normal Range: Not Detected      Film Array Parainfluenza Virus 3       Result: Not Detected  *  *  Normal Range: Not Detected      Film Array Parainfluenza Virus 4       Result: Not Detected  *  *  Normal Range: Not Detected      Film Array Respiratory Syncitial Virus       Result: Not Detected  *  *  Normal Range: Not Detected      Film Array Rhinovirus/Enterovirus       Result: Not Detected  *  *  Normal Range: Not Detected      Film Array Bordetella Pertusis       Result: Not Detected  *  *  Normal Range: Not Detected      Film Array Chlamydophilia Pneumoniae       Result: Not Detected  *  *  Normal Range: Not Detected      Film Array Mycoplasma Pneumoniae       Result: Not Detected  *  *  Normal Range: Not Detected     Rapid influenza A/B antigens   Result Value Ref Range    Influenza A by PCR Not Detected Not Detected    Influenza B by PCR Not Detected Not Detected   Culture Blood #1   Result Value Ref Range    Blood Culture, Routine 24 Hours- no growth    Culture Blood #2   Result Value Ref Range    Culture, Blood 2 24 Hours- no growth    Respiratory Panel, Film Array   Result Value Ref Range    Film Array Adenovirus       Result: Not Detected  *  *  Normal Range: Not Detected      Film Array Coronavirus HKU1       Result: Not Detected  *  *  Normal Range: Not Detected      Film Array Conoravirus NL63       Result: Not Detected  *  *  Normal Range: Not Detected      Film Array Coronavirus 229E       Result: Not Detected  *  *  Normal Range: Not Detected      Film Array Coronavirus OC43       Result: Not Detected  *  *  Normal Range: Not Detected      Film Array Influenza A Virus       Result: Not Detected  *  *  Normal Range: Not Detected      Film Array Influenza A Virus H1       Result: Not Detected  *  *  Normal Range: Not Detected      Film Array Influenza A Virus 09H1       Result: Not Detected  *  *  Normal Range: Not Detected      Film Array Influenza A Virus H3       Result: Not Detected  *  *  Normal Range: Not Detected      Film Array Influenza B       Result: Not Detected  *  *  Normal Range: Not Detected      Film Array Metapneumovirus       Result: Not Detected  *  *  Normal Range: Not Detected      Film Array Parainfluenza Virus 1       Result: Not Detected  *  *  Normal Range: Not Detected      Film Array Parainfluenza Virus 2       Result: Not Detected  *  *  Normal Range: Not Detected      Film Array Parainfluenza Virus 3       Result: Not Detected  *  *  Normal Range: Not Detected      Film Array Parainfluenza Virus 4       Result: Not Detected  *  *  Normal Range: Not Detected      Film Array Respiratory Syncitial Virus       Result: Not Detected  *  *  Normal Range: Not Detected      Film Array Rhinovirus/Enterovirus       Result: Not Detected  *  *  Normal Range: Not Detected      Film Array Bordetella Pertusis       Result: Not Detected  *  *  Normal Range: Not Detected      Film Array Chlamydophilia Pneumoniae       Result: Not Detected  *  *  Normal Range: Not Detected      Film Array Mycoplasma Pneumoniae       Result: Not Detected  *  *  Normal Range: Not Detected     Urine Culture   Result Value Ref Range    Urine Culture, Routine      Organism Escherichia coli (A)     Urine Culture, Routine >100,000 CFU/ml        Susceptibility    Escherichia coli - BACTERIAL SUSCEPTIBILITY PANEL BY DEONTE     ampicillin >=^32 Resistant mcg/mL     ceFAZolin <=^4 Sensitive mcg/mL     cefepime <=^0.12 Sensitive mcg/mL     cefTRIAXone <=^0.25 Sensitive mcg/mL     Confirmatory Extended Spectrum Beta-Lactamase Neg  mcg/mL     gentamicin <=^1 Sensitive mcg/mL     imipenem <=^0.25 Sensitive mcg/mL     levofloxacin =^1 Sensitive mcg/mL     nitrofurantoin <=^16 Sensitive mcg/mL     piperacillin-tazobactam <=^4 Sensitive mcg/mL     trimethoprim-sulfamethoxazole <=^20 Sensitive mcg/mL   CBC Auto Differential   Result Value Ref Range WBC 10.0 4.5 - 11.5 E9/L    RBC 4.08 3.50 - 5.50 E12/L    Hemoglobin 12.4 11.5 - 15.5 g/dL    Hematocrit 36.2 34.0 - 48.0 %    MCV 88.7 80.0 - 99.9 fL    MCH 30.4 26.0 - 35.0 pg    MCHC 34.3 32.0 - 34.5 %    RDW 13.3 11.5 - 15.0 fL    Platelets 792 617 - 913 E9/L    MPV 9.5 7.0 - 12.0 fL    Neutrophils % 89.2 (H) 43.0 - 80.0 %    Immature Granulocytes % 2.3 0.0 - 5.0 %    Lymphocytes % 3.0 (L) 20.0 - 42.0 %    Monocytes % 5.3 2.0 - 12.0 %    Eosinophils % 0.1 0.0 - 6.0 %    Basophils % 0.1 0.0 - 2.0 %    Neutrophils # 8.96 (H) 1.80 - 7.30 E9/L    Immature Granulocytes # 0.23 E9/L    Lymphocytes # 0.30 (L) 1.50 - 4.00 E9/L    Monocytes # 0.53 0.10 - 0.95 E9/L    Eosinophils # 0.01 (L) 0.05 - 0.50 E9/L    Basophils # 0.01 0.00 - 0.20 E9/L    Dohle Bodies 1+     Vacuolated Neutrophils 1+     RBC Morphology Normal    Comprehensive Metabolic Panel   Result Value Ref Range    Sodium 135 132 - 146 mmol/L    Potassium 3.6 3.5 - 5.0 mmol/L    Chloride 101 98 - 107 mmol/L    CO2 19 (L) 22 - 29 mmol/L    Anion Gap 15 7 - 16 mmol/L    Glucose 189 (H) 74 - 99 mg/dL    BUN 32 (H) 8 - 23 mg/dL    CREATININE 1.6 (H) 0.5 - 1.0 mg/dL    GFR Non-African American 32 >=60 mL/min/1.73    GFR African American 39     Calcium 8.3 (L) 8.6 - 10.2 mg/dL    Total Protein 6.3 (L) 6.4 - 8.3 g/dL    Alb 3.5 3.5 - 5.2 g/dL    Total Bilirubin 1.2 0.0 - 1.2 mg/dL    Alkaline Phosphatase 65 35 - 104 U/L    ALT 21 0 - 32 U/L    AST 28 0 - 31 U/L   Troponin   Result Value Ref Range    Troponin <0.01 0.00 - 0.03 ng/mL   Lactate, Sepsis   Result Value Ref Range    Lactic Acid, Sepsis 2.1 (H) 0.5 - 1.9 mmol/L   Urinalysis   Result Value Ref Range    Color, UA Yellow Straw/Yellow    Clarity, UA Clear Clear    Glucose, Ur Negative Negative mg/dL    Bilirubin Urine Negative Negative    Ketones, Urine 15 (A) Negative mg/dL    Specific Gravity, UA >=1.030 1.005 - 1.030    Blood, Urine SMALL (A) Negative    pH, UA 5.5 5.0 - 9.0    Protein,  (A) Negative mg/dL    Urobilinogen, Urine 0.2 <2.0 E.U./dL    Nitrite, Urine POSITIVE (A) Negative    Leukocyte Esterase, Urine SMALL (A) Negative   Microscopic Urinalysis   Result Value Ref Range    WBC, UA 1-3 0 - 5 /HPF    RBC, UA 1-3 0 - 2 /HPF    Epi Cells RARE /HPF    Bacteria, UA MODERATE (A) /HPF   Brain Natriuretic Peptide   Result Value Ref Range    Pro-BNP 1,843 (H) 0 - 125 pg/mL   CBC Auto Differential   Result Value Ref Range    WBC 3.0 (L) 4.5 - 11.5 E9/L    RBC 4.30 3.50 - 5.50 E12/L    Hemoglobin 12.8 11.5 - 15.5 g/dL    Hematocrit 39.4 34.0 - 48.0 %    MCV 91.6 80.0 - 99.9 fL    MCH 29.8 26.0 - 35.0 pg    MCHC 32.5 32.0 - 34.5 %    RDW 13.6 11.5 - 15.0 fL    Platelets 209 (L) 550 - 450 E9/L    MPV 9.3 7.0 - 12.0 fL    Neutrophils % 60.2 43.0 - 80.0 %    Lymphocytes % 33.6 20.0 - 42.0 %    Monocytes % 5.3 2.0 - 12.0 %    Eosinophils % 0.9 0.0 - 6.0 %    Basophils % 0.3 0.0 - 2.0 %    Neutrophils # 1.80 1.80 - 7.30 E9/L    Lymphocytes # 1.02 (L) 1.50 - 4.00 E9/L    Monocytes # 0.15 0.10 - 0.95 E9/L    Eosinophils # 0.03 (L) 0.05 - 0.50 E9/L    Basophils # 0.00 0.00 - 0.20 E9/L    Polychromasia 1+     Poikilocytes 1+     Welch Cells 1+    Lactic Acid, Plasma   Result Value Ref Range    Lactic Acid 8.3 (HH) 0.5 - 2.2 mmol/L   Comprehensive Metabolic Panel   Result Value Ref Range    Sodium 137 132 - 146 mmol/L    Potassium 4.2 3.5 - 5.0 mmol/L    Chloride 101 98 - 107 mmol/L    CO2 16 (L) 22 - 29 mmol/L    Anion Gap 20 (H) 7 - 16 mmol/L    Glucose 139 (H) 74 - 99 mg/dL    BUN 29 (H) 8 - 23 mg/dL    CREATININE 1.7 (H) 0.5 - 1.0 mg/dL    GFR Non-African American 30 >=60 mL/min/1.73    GFR African American 36     Calcium 8.4 (L) 8.6 - 10.2 mg/dL    Total Protein 6.8 6.4 - 8.3 g/dL    Alb 3.7 3.5 - 5.2 g/dL    Total Bilirubin 1.2 0.0 - 1.2 mg/dL    Alkaline Phosphatase 82 35 - 104 U/L    ALT 24 0 - 32 U/L    AST 31 0 - 31 U/L   Magnesium   Result Value Ref Range    Magnesium 1.7 1.6 - 2.6 mg/dL   Troponin   Result Value Ref Range    Troponin 0.02 0.00 - 0.03 ng/mL   Blood Gas, Arterial   Result Value Ref Range    Date Analyzed [id number]     Time Analyzed 0614     Source: Blood Arterial     pH, Blood Gas 7.360 7.350 - 7.450    PCO2 20.9 (L) 35.0 - 45.0 mmHg    PO2 114.5 (H) 60.0 - 100.0 mmHg    HCO3 11.5 (L) 22.0 - 26.0 mmol/L    B.E. -11.6 (L) -3.0 - 3.0 mmol/L    O2 Sat 97.8 92.0 - 98.5 %    O2Hb 96.4 94.0 - 97.0 %    COHb 1.1 0.0 - 1.5 %    MetHb 0.3 0.0 - 1.5 %    HHb 2.2 0.0 - 5.0 %    tHb (est) 13.5 11.5 - 16.5 g/dL    Potassium 4.17 3.30 - 5.10 mmol/L    Mode AFM 8lpm     Date Of Collection      Time Collected      Pt Temp 37.0 C     ID [id number]     Lab [id number]     Critical(s) Notified .  No Critical Values    URINE ELECTROLYTES   Result Value Ref Range    Sodium, Ur 47 Not Established mmol/L    Potassium, Ur 71.6 Not Established mmol/L    Chloride 27 Not Established mmol/L   UREA NITROGEN, URINE   Result Value Ref Range    Urea Nitrogen, Ur 1378 800 - 1666 mg/dL   CREATININE, RANDOM URINE   Result Value Ref Range    Creatinine, Ur 183 29 - 226 mg/dL   Troponin   Result Value Ref Range    Troponin 0.02 0.00 - 0.03 ng/mL   TSH without Reflex   Result Value Ref Range    TSH 1.060 0.270 - 4.200 uIU/mL   Vitamin B12 & folate   Result Value Ref Range    Vitamin B-12 598 211 - 946 pg/mL    Folate 19.8 4.8 - 24.2 ng/mL   Lactic Acid, Plasma   Result Value Ref Range    Lactic Acid 1.7 0.5 - 2.2 mmol/L   Basic Metabolic Panel w/ Reflex to MG   Result Value Ref Range    Sodium 140 132 - 146 mmol/L    Potassium reflex Magnesium 3.9 3.5 - 5.0 mmol/L    Chloride 110 (H) 98 - 107 mmol/L    CO2 14 (L) 22 - 29 mmol/L    Anion Gap 16 7 - 16 mmol/L    Glucose 350 (H) 74 - 99 mg/dL    BUN 28 (H) 8 - 23 mg/dL    CREATININE 1.1 (H) 0.5 - 1.0 mg/dL    GFR Non-African American 49 >=60 mL/min/1.73    GFR African American 60     Calcium 7.3 (L) 8.6 - 10.2 mg/dL   Magnesium   Result Value Ref Range    Magnesium 2.5 1.6 - 2.6 mg/dL   CBC auto differential   Result Value Ref Range    WBC 7.4 4.5 - 11.5 E9/L    RBC 3.47 (L) 3.50 - 5.50 E12/L    Hemoglobin 10.5 (L) 11.5 - 15.5 g/dL    Hematocrit 32.3 (L) 34.0 - 48.0 %    MCV 93.1 80.0 - 99.9 fL    MCH 30.3 26.0 - 35.0 pg    MCHC 32.5 32.0 - 34.5 %    RDW 14.0 11.5 - 15.0 fL    Platelets 92 (L) 636 - 450 E9/L    MPV 10.5 7.0 - 12.0 fL    Neutrophils % 86.2 (H) 43.0 - 80.0 %    Immature Granulocytes % 0.4 0.0 - 5.0 %    Lymphocytes % 6.0 (L) 20.0 - 42.0 %    Monocytes % 7.3 2.0 - 12.0 %    Eosinophils % 0.0 0.0 - 6.0 %    Basophils % 0.1 0.0 - 2.0 %    Neutrophils # 6.41 1.80 - 7.30 E9/L    Immature Granulocytes # 0.03 E9/L    Lymphocytes # 0.45 (L) 1.50 - 4.00 E9/L    Monocytes # 0.54 0.10 - 0.95 E9/L    Eosinophils # 0.00 (L) 0.05 - 0.50 E9/L    Basophils # 0.01 0.00 - 0.20 E9/L    Poikilocytes 2+     Elina Cells 2+    Platelet Confirmation   Result Value Ref Range    Platelet Confirmation CONFIRMED    Beta-Hydroxybutyrate   Result Value Ref Range    Beta-Hydroxybutyrate 0.71 (H) 0.02 - 0.27 mmol/L   Blood Gas, Arterial   Result Value Ref Range    Date Analyzed [id number]     Time Analyzed 0760     Source: Blood Arterial     pH, Blood Gas 7.400 7.350 - 7.450    PCO2 24.4 (L) 35.0 - 45.0 mmHg    PO2 80.1 60.0 - 100.0 mmHg    HCO3 14.8 (L) 22.0 - 26.0 mmol/L    B.E. -8.4 (L) -3.0 - 3.0 mmol/L    O2 Sat 95.3 92.0 - 98.5 %    PO2/FIO2 3.81 mmHg/%    AaDO2 35.2 mmHg    RI(T) 0.44     O2Hb 94.6 94.0 - 97.0 %    COHb 0.5 0.0 - 1.5 %    MetHb 0.2 0.0 - 1.5 %    HHb 4.7 0.0 - 5.0 %    tHb (est) 11.7 11.5 - 16.5 g/dL    Mode RA     FIO2 21.0 %    Date Of Collection      Time Collected      Pt Temp 37.0 C     ID [id number]     Lab [id number]     Critical(s) Notified .  No Critical Values    Troponin   Result Value Ref Range    Troponin <0.01 0.00 - 0.03 ng/mL   Basic metabolic panel   Result Value Ref Range    Sodium 137 132 - 146 mmol/L    Potassium 4.2 3.5 - 5.0 mmol/L    Chloride 106 98 - 107 mmol/L    CO2 17 (L) 22 - 29 mmol/L    Anion Gap 14 7 - 16 mmol/L    Glucose 367 (H) 74 - 99 mg/dL    BUN 31 (H) 8 - 23 mg/dL    CREATININE 1.1 (H) 0.5 - 1.0 mg/dL    GFR Non-African American 49 >=60 mL/min/1.73    GFR African American 60     Calcium 7.8 (L) 8.6 - 10.2 mg/dL   Basic metabolic panel   Result Value Ref Range    Sodium 141 132 - 146 mmol/L    Potassium 3.8 3.5 - 5.0 mmol/L    Chloride 108 (H) 98 - 107 mmol/L    CO2 15 (L) 22 - 29 mmol/L    Anion Gap 18 (H) 7 - 16 mmol/L    Glucose 215 (H) 74 - 99 mg/dL    BUN 34 (H) 8 - 23 mg/dL    CREATININE 1.1 (H) 0.5 - 1.0 mg/dL    GFR Non-African American 49 >=60 mL/min/1.73    GFR African American 60     Calcium 7.7 (L) 8.6 - 10.2 mg/dL   CBC Auto Differential   Result Value Ref Range    WBC 16.9 (H) 4.5 - 11.5 E9/L    RBC 3.71 3.50 - 5.50 E12/L    Hemoglobin 11.1 (L) 11.5 - 15.5 g/dL    Hematocrit 33.0 (L) 34.0 - 48.0 %    MCV 88.9 80.0 - 99.9 fL    MCH 29.9 26.0 - 35.0 pg    MCHC 33.6 32.0 - 34.5 %    RDW 14.1 11.5 - 15.0 fL    Platelets 899 355 - 874 E9/L    MPV 10.2 7.0 - 12.0 fL    Neutrophils % 85.6 (H) 43.0 - 80.0 %    Immature Granulocytes % 1.0 0.0 - 5.0 %    Lymphocytes % 4.9 (L) 20.0 - 42.0 %    Monocytes % 8.4 2.0 - 12.0 %    Eosinophils % 0.0 0.0 - 6.0 %    Basophils % 0.1 0.0 - 2.0 %    Neutrophils # 14.44 (H) 1.80 - 7.30 E9/L    Immature Granulocytes # 0.17 E9/L    Lymphocytes # 0.83 (L) 1.50 - 4.00 E9/L    Monocytes # 1.42 (H) 0.10 - 0.95 E9/L    Eosinophils # 0.00 (L) 0.05 - 0.50 E9/L    Basophils # 0.02 0.00 - 0.20 E9/L   Beta-Hydroxybutyrate   Result Value Ref Range    Beta-Hydroxybutyrate 0.68 (H) 0.02 - 0.27 mmol/L   POCT Glucose   Result Value Ref Range    Meter Glucose 263 (H) 74 - 99 mg/dL   POCT Glucose   Result Value Ref Range    Meter Glucose 239 (H) 74 - 99 mg/dL   POCT Glucose   Result Value Ref Range    Meter Glucose 231 (H) 74 - 99 mg/dL   POCT Glucose   Result Value Ref Range    Meter Glucose 221 (H) 74 - 99 mg/dL   POCT Glucose   Result Value Ref Range kidney or in the    right ureter and no hydronephrosis on the right. No hydronephrosis left kidney    due to an obstructing 4 mm stone in the proximal left ureter. This report has been electronically signed by Akbar Myers MD.      XR CHEST PORTABLE   Final Result      1. Emphysema. 2. Coarsening of pulmonary interstitial markings acute versus chronic   disease. These do appear somewhat more pronounced compared to the   study of roughly 8 years prior. EKG:  This EKG is signed and interpreted by the EP. Time: 2307  Rate: 116  Rhythm: Sinus tachycardia  Interpretation: sinus tachycardia and no ST elevation or depression. No obvious ischemic changes  Comparison: no previous EKG available    ------------------------- NURSING NOTES AND VITALS REVIEWED ---------------------------   The nursing notes within the ED encounter and vital signs as below have been reviewed by myself. /63   Pulse 86   Temp 98.2 °F (36.8 °C) (Temporal)   Resp 18   Ht 5' 4\" (1.626 m)   Wt 235 lb 1.6 oz (106.6 kg)   SpO2 97%   BMI 40.35 kg/m²   Oxygen Saturation Interpretation: Normal    The patients available past medical records and past encounters were reviewed.       ------------------------- ED COURSE/MEDICAL DECISION MAKING----------------------  Medications   sodium chloride flush 0.9 % injection (  Canceled Entry 3/27/19 1137)   aspirin EC tablet 81 mg (81 mg Oral Given 3/29/19 0852)   hydroxychloroquine (PLAQUENIL) tablet 300 mg ( Oral Automatically Held 4/2/19 0900)   sodium chloride flush 0.9 % injection 10 mL (10 mLs Intravenous Given 3/29/19 0837)   sodium chloride flush 0.9 % injection 10 mL (has no administration in time range)   magnesium hydroxide (MILK OF MAGNESIA) 400 MG/5ML suspension 30 mL (has no administration in time range)   ondansetron (ZOFRAN) injection 4 mg (has no administration in time range)   enoxaparin (LOVENOX) injection 40 mg (40 mg Subcutaneous Given 3/29/19 0852)   glucose (GLUTOSE) 40 % oral gel 15 g (has no administration in time range)   dextrose 50 % solution 12.5 g (has no administration in time range)   glucagon (rDNA) injection 1 mg (has no administration in time range)   dextrose 5 % solution (has no administration in time range)   opium-belladonna (B&O SUPPRETTES) 16.2-60 MG suppository 60 mg (60 mg Rectal Given 3/29/19 0633)   pantoprazole (PROTONIX) injection 40 mg (40 mg Intravenous Given 3/29/19 0836)   formoterol (PERFOROMIST) nebulizer solution 20 mcg (has no administration in time range)   budesonide (PULMICORT) nebulizer suspension 250 mcg (has no administration in time range)   ipratropium-albuterol (DUONEB) nebulizer solution 1 ampule (1 ampule Inhalation Given 3/29/19 0230)   benzonatate (TESSALON) capsule 100 mg (100 mg Oral Given 3/29/19 0852)   hydrochlorothiazide (HYDRODIURIL) tablet 12.5 mg (12.5 mg Oral Given 3/29/19 0854)   insulin lispro (HUMALOG) injection vial 0-3 Units (has no administration in time range)   insulin lispro (HUMALOG) injection vial 0-6 Units (1 Units Subcutaneous Given 3/29/19 0908)   insulin lispro (HUMALOG) injection vial 3 Units (3 Units Subcutaneous Given 3/29/19 0907)   levofloxacin (LEVAQUIN) tablet 750 mg (has no administration in time range)   insulin glargine (LANTUS) injection vial 10 Units (has no administration in time range)   rosuvastatin (CRESTOR) tablet 20 mg (has no administration in time range)   0.9 % sodium chloride infusion ( Intravenous Stopped 3/26/19 9723)   acetaminophen (TYLENOL) tablet 1,000 mg (1,000 mg Oral Given 3/27/19 0031)   cefTRIAXone (ROCEPHIN) 1 g in dextrose 5 % 50 mL IVPB (vial-mate) (0 g Intravenous Stopped 3/27/19 2244)   methylPREDNISolone sodium (SOLU-MEDROL) injection 125 mg (125 mg Intravenous Given 3/27/19 1017)   0.9 % sodium chloride bolus (0 mLs Intravenous Stopped 3/27/19 1230)   magnesium sulfate 2 g in 50 mL IVPB premix (0 g Intravenous Stopped 3/27/19 8303)   sterile water injection (10 mLs  Given 3/27/19 1023)   0.9 % sodium chloride bolus (0 mLs Intravenous Stopped 3/27/19 1902)   insulin regular (HUMULIN R;NOVOLIN R) injection 10 Units (10 Units Subcutaneous Given 3/28/19 6639)       Medical Decision Making:    Pt is a 80 y.o. female presenting to the ED for fever and nausea. Pt given IVF and tylenol for fever. Labwork showing an acute kidney injury with a creatinine of 1.6. Lactate mildly elevated at 2.1. No leukocytosis. Urinalysis appears to be infected and positive nitrites and leukocytes. CT of abdomen and pelvis shows nonobstructing stones in the left kidney. There is hydronephrosis of the left kidney due to an obstructing 4 mm stone in the left proximal ureter. Pt started on IV ABX (Rocephin) and will be admitted for further treatment of urosepsis complicated by renal calculi. This patient's ED course included: a personal history and physicial examination, re-evaluation prior to disposition and continuous pulse oximetry    This patient has remained hemodynamically stable during their ED course. Re-Evaluations:           Re-evaluation. Patients symptoms are improving     Counseling: The emergency provider has spoken with the patient and discussed todays results, in addition to providing specific details for the plan of care and counseling regarding the diagnosis and prognosis. Questions are answered at this time and they are agreeable with the plan.     ---------------------------- IMPRESSION AND DISPOSITION ---------------------------------    IMPRESSION  1. Sepsis, due to unspecified organism ([address]    2. Urinary tract infection in female    3. Kidney stone    4. Hydronephrosis, unspecified hydronephrosis type    5.  BAHMAN (acute kidney injury) ([address]        DISPOSITION  Disposition: Admit to med/surg floor  Patient condition is stable    SCRIBE ATTESTATION  3/26/19, 11:46 PM.    This note is prepared by Maritza Carvalho, acting as Scribe for Edward Stoner MD.    Nilo Gamboa Arturo Enrique MD: The scribe's documentation has been prepared under my direction and personally reviewed by me in its entirety. I confirm that the note above accurately reflects all work, treatment, procedures, and medical decision making performed by me. NOTE: This report was transcribed using voice recognition software. Every effort was made to ensure accuracy; however, inadvertent computerized transcription errors may be present.         Saintclair Pavy, MD  03/29/19 2098

## 2019-03-27 NOTE — ED NOTES
Pt updated on plan of care and advised that she will be going for a CT and x-ray. Pt and family voiced understanding.       Ruiz Robledo RN  03/27/19 [id number]

## 2019-03-27 NOTE — OP NOTE
SURGEON: DANIEL J. [place], M.D.     Brie Kern: None. PREOPERATIVE DIAGNOSES: Left ureteral stone, sepsis of urinary origin    POSTOPERATIVE DIAGNOSES: same     OPERATION: Cystoscopy, left ureteral stent insertion, insertion of Nevarez catheter    ANESTHESIA: LMAC. ESTIMATED BLOOD LOSS: Minimal.     INDICATION FOR PROCEDURE: Dutch Sherman is a 80 y.o. female who presents with several days of fever, chills, left flank pain. She has a left distal ureteral stone with sepsis of urinary origin and is admitted to the intensive care unit. She presents for a stat cystoscopy with stent insertion. She understands the risks, benefits, alternatives of the procedure. Signed informed consent and agrees to proceed. PROCEDURE:   The patient was brought into the operating room and placed under anesthesia in the dorsal lithotomy position. She was prepped and draped in a sterile fashion. A 21-Occitan cystoscope with a 30-degree lens was passed through the urethra and into the bladder. The entire length of the urethra was examined and found to be without strictures or other abnormalities. The entire bladder mucosal surface was examined under 30-degree endoscopy and found to be without calculi, tumors, diverticula, or other abnormalities. The ureteral orifices were normal in size, number, and location. .  There is a light pale stone at the left ureteral orifice which was not able to be grasped. .  A wire was passed beyond the stone and into the renal pelvis under fluoroscopic guidance. An immediate rush of purulent material was seen coming from the left ureter consistent with pyonephrosis. A 6 x 24 double-J ureteral stent was left indwelling with a good curl seen in the kidney as well as the bladder. A Nevarez catheter was inserted. Patrick Byrd was awakened from anesthesia and taken recovery in stable condition.     PLAN:  Patrick Byrd will follow-up in 3 weeks for cystoscopy with left ureteroscopy, laser lithotripsy and stone removal.  She does have a distal stone, but very well may have a proximal stone as well.       Boris Valero M.D.  3/27/2019  5:02 PM

## 2019-03-27 NOTE — PROGRESS NOTES
[facility]  Internal Medicine Residency / House Medicine Service      Initial H and P    Attending Physician Statement  I have discussed the case, including pertinent history and exam findings with the resident and the team. I have reviewed all significant past medical history, surgical history, social history, family history, allergies, and home medications independently. I have seen and examined the patient and the key elements of the encounter have been performed by me. I agree with the assessment, plan and orders as documented by the resident. Case Discussed During AM Rounds    Admitted overnight for acute obstructive nephropathy from renal stone    Upon admission to floor- immediate RRT was called due to acute respiratory distress   Management noted and patient was discussed with ICU team for transfer to ICU due to concern for sepsis with potential risk for septic shock    Respiratory status improved with oxygen and breathing treatments    Currently placed on BiPAP     Acute Respiratory Failure- hypoxemic    Tolerating BiPAP   Continue prn BiPAP   ICU following     Sepsis likely secondary to obstructive renal stone    Significant elevation of lactic acidosis during RRT from initial    ? Reaction to initial atb therapy- less likely    ID consultation   Atbs continued   Urology consultation     BAHMAN- ? Etiology   Check urine lytes   IVF bolus initiated    Monitor renal function     Lactic Acidosis    IVF bolus   Cycle lactic acid levels     Leukopenia    Likely infectious in etiology     COPD/Mixed overlap syndrome    Aerosols   Oxygen supplementation   PRN BiPAP     Type II DM- controlled   Hold metformin    ISS     Rheumatoid Arthritis    Hold current meds    Disposition- transferred to ICU on admission. Remainder of medical problems as per resident note.       Calos Turner  Internal Medicine Residency Faculty

## 2019-03-27 NOTE — ED NOTES
Blood cultures obtained from right forearm, per policy. Set one of two drawn at 2301. Blood cultures obtained from left AC, per policy. Set two of two drawn at 2305.               Tono Shelton RN  03/27/19 0682

## 2019-03-28 LAB
AADO2: 35.2 MMHG
ANION GAP SERPL CALCULATED.3IONS-SCNC: 14 MMOL/L (ref 7–16)
ANION GAP SERPL CALCULATED.3IONS-SCNC: 16 MMOL/L (ref 7–16)
B.E.: -8.4 MMOL/L (ref -3–3)
BASOPHILS ABSOLUTE: 0.01 E9/L (ref 0–0.2)
BASOPHILS RELATIVE PERCENT: 0.1 % (ref 0–2)
BETA-HYDROXYBUTYRATE: 0.71 MMOL/L (ref 0.02–0.27)
BUN BLDV-MCNC: 28 MG/DL (ref 8–23)
BUN BLDV-MCNC: 31 MG/DL (ref 8–23)
BURR CELLS: ABNORMAL
CALCIUM SERPL-MCNC: 7.3 MG/DL (ref 8.6–10.2)
CALCIUM SERPL-MCNC: 7.8 MG/DL (ref 8.6–10.2)
CHLORIDE BLD-SCNC: 106 MMOL/L (ref 98–107)
CHLORIDE BLD-SCNC: 110 MMOL/L (ref 98–107)
CO2: 14 MMOL/L (ref 22–29)
CO2: 17 MMOL/L (ref 22–29)
COHB: 0.5 % (ref 0–1.5)
CREAT SERPL-MCNC: 1.1 MG/DL (ref 0.5–1)
CREAT SERPL-MCNC: 1.1 MG/DL (ref 0.5–1)
CRITICAL: ABNORMAL
DATE ANALYZED: ABNORMAL
DATE OF COLLECTION: ABNORMAL
EKG ATRIAL RATE: 130 BPM
EKG P AXIS: 67 DEGREES
EKG P-R INTERVAL: 144 MS
EKG Q-T INTERVAL: 302 MS
EKG QRS DURATION: 70 MS
EKG QTC CALCULATION (BAZETT): 444 MS
EKG R AXIS: -6 DEGREES
EKG T AXIS: 63 DEGREES
EKG VENTRICULAR RATE: 130 BPM
EOSINOPHILS ABSOLUTE: 0 E9/L (ref 0.05–0.5)
EOSINOPHILS RELATIVE PERCENT: 0 % (ref 0–6)
FILM ARRAY ADENOVIRUS: NORMAL
FILM ARRAY BORDETELLA PERTUSSIS: NORMAL
FILM ARRAY CHLAMYDOPHILIA PNEUMONIAE: NORMAL
FILM ARRAY CORONAVIRUS 229E: NORMAL
FILM ARRAY CORONAVIRUS HKU1: NORMAL
FILM ARRAY CORONAVIRUS NL63: NORMAL
FILM ARRAY CORONAVIRUS OC43: NORMAL
FILM ARRAY INFLUENZA A VIRUS 09H1: NORMAL
FILM ARRAY INFLUENZA A VIRUS H1: NORMAL
FILM ARRAY INFLUENZA A VIRUS H3: NORMAL
FILM ARRAY INFLUENZA A VIRUS: NORMAL
FILM ARRAY INFLUENZA B: NORMAL
FILM ARRAY METAPNEUMOVIRUS: NORMAL
FILM ARRAY MYCOPLASMA PNEUMONIAE: NORMAL
FILM ARRAY PARAINFLUENZA VIRUS 1: NORMAL
FILM ARRAY PARAINFLUENZA VIRUS 2: NORMAL
FILM ARRAY PARAINFLUENZA VIRUS 3: NORMAL
FILM ARRAY PARAINFLUENZA VIRUS 4: NORMAL
FILM ARRAY RESPIRATORY SYNCITIAL VIRUS: NORMAL
FILM ARRAY RHINOVIRUS/ENTEROVIRUS: NORMAL
FIO2: 21 %
GFR AFRICAN AMERICAN: 60
GFR AFRICAN AMERICAN: 60
GFR NON-AFRICAN AMERICAN: 49 ML/MIN/1.73
GFR NON-AFRICAN AMERICAN: 49 ML/MIN/1.73
GLUCOSE BLD-MCNC: 350 MG/DL (ref 74–99)
GLUCOSE BLD-MCNC: 367 MG/DL (ref 74–99)
HCO3: 14.8 MMOL/L (ref 22–26)
HCT VFR BLD CALC: 32.3 % (ref 34–48)
HEMOGLOBIN: 10.5 G/DL (ref 11.5–15.5)
HHB: 4.7 % (ref 0–5)
IMMATURE GRANULOCYTES #: 0.03 E9/L
IMMATURE GRANULOCYTES %: 0.4 % (ref 0–5)
LAB: ABNORMAL
LYMPHOCYTES ABSOLUTE: 0.45 E9/L (ref 1.5–4)
LYMPHOCYTES RELATIVE PERCENT: 6 % (ref 20–42)
Lab: ABNORMAL
MAGNESIUM: 2.5 MG/DL (ref 1.6–2.6)
MCH RBC QN AUTO: 30.3 PG (ref 26–35)
MCHC RBC AUTO-ENTMCNC: 32.5 % (ref 32–34.5)
MCV RBC AUTO: 93.1 FL (ref 80–99.9)
METER GLUCOSE: 261 MG/DL (ref 74–99)
METER GLUCOSE: 262 MG/DL (ref 74–99)
METER GLUCOSE: 332 MG/DL (ref 74–99)
METER GLUCOSE: 338 MG/DL (ref 74–99)
METER GLUCOSE: 342 MG/DL (ref 74–99)
METER GLUCOSE: 346 MG/DL (ref 74–99)
METER GLUCOSE: 347 MG/DL (ref 74–99)
METER GLUCOSE: 366 MG/DL (ref 74–99)
METHB: 0.2 % (ref 0–1.5)
MODE: ABNORMAL
MONOCYTES ABSOLUTE: 0.54 E9/L (ref 0.1–0.95)
MONOCYTES RELATIVE PERCENT: 7.3 % (ref 2–12)
NEUTROPHILS ABSOLUTE: 6.41 E9/L (ref 1.8–7.3)
NEUTROPHILS RELATIVE PERCENT: 86.2 % (ref 43–80)
O2 SATURATION: 95.3 % (ref 92–98.5)
O2HB: 94.6 % (ref 94–97)
OPERATOR ID: 2067
PATIENT TEMP: 37 C
PCO2: 24.4 MMHG (ref 35–45)
PDW BLD-RTO: 14 FL (ref 11.5–15)
PFO2: 3.81 MMHG/%
PH BLOOD GAS: 7.4 (ref 7.35–7.45)
PLATELET # BLD: 92 E9/L (ref 130–450)
PLATELET CONFIRMATION: NORMAL
PMV BLD AUTO: 10.5 FL (ref 7–12)
PO2: 80.1 MMHG (ref 60–100)
POIKILOCYTES: ABNORMAL
POTASSIUM REFLEX MAGNESIUM: 3.9 MMOL/L (ref 3.5–5)
POTASSIUM SERPL-SCNC: 4.2 MMOL/L (ref 3.5–5)
RBC # BLD: 3.47 E12/L (ref 3.5–5.5)
RI(T): 0.44
SODIUM BLD-SCNC: 137 MMOL/L (ref 132–146)
SODIUM BLD-SCNC: 140 MMOL/L (ref 132–146)
SOURCE, BLOOD GAS: ABNORMAL
THB: 11.7 G/DL (ref 11.5–16.5)
TIME ANALYZED: 736
TROPONIN: <0.01 NG/ML (ref 0–0.03)
WBC # BLD: 7.4 E9/L (ref 4.5–11.5)

## 2019-03-28 PROCEDURE — 94640 AIRWAY INHALATION TREATMENT: CPT

## 2019-03-28 PROCEDURE — 80048 BASIC METABOLIC PNL TOTAL CA: CPT

## 2019-03-28 PROCEDURE — 6360000002 HC RX W HCPCS: Performed by: UROLOGY

## 2019-03-28 PROCEDURE — 6370000000 HC RX 637 (ALT 250 FOR IP): Performed by: UROLOGY

## 2019-03-28 PROCEDURE — 82962 GLUCOSE BLOOD TEST: CPT

## 2019-03-28 PROCEDURE — 97530 THERAPEUTIC ACTIVITIES: CPT

## 2019-03-28 PROCEDURE — 6360000002 HC RX W HCPCS: Performed by: INTERNAL MEDICINE

## 2019-03-28 PROCEDURE — 99232 SBSQ HOSP IP/OBS MODERATE 35: CPT | Performed by: INTERNAL MEDICINE

## 2019-03-28 PROCEDURE — 97535 SELF CARE MNGMENT TRAINING: CPT

## 2019-03-28 PROCEDURE — 93010 ELECTROCARDIOGRAM REPORT: CPT | Performed by: INTERNAL MEDICINE

## 2019-03-28 PROCEDURE — 2580000003 HC RX 258: Performed by: UROLOGY

## 2019-03-28 PROCEDURE — 6370000000 HC RX 637 (ALT 250 FOR IP): Performed by: INTERNAL MEDICINE

## 2019-03-28 PROCEDURE — 85025 COMPLETE CBC W/AUTO DIFF WBC: CPT

## 2019-03-28 PROCEDURE — 82010 KETONE BODYS QUAN: CPT

## 2019-03-28 PROCEDURE — 97162 PT EVAL MOD COMPLEX 30 MIN: CPT

## 2019-03-28 PROCEDURE — 2000000000 HC ICU R&B

## 2019-03-28 PROCEDURE — 84484 ASSAY OF TROPONIN QUANT: CPT

## 2019-03-28 PROCEDURE — 36415 COLL VENOUS BLD VENIPUNCTURE: CPT

## 2019-03-28 PROCEDURE — 97166 OT EVAL MOD COMPLEX 45 MIN: CPT

## 2019-03-28 PROCEDURE — 82805 BLOOD GASES W/O2 SATURATION: CPT

## 2019-03-28 PROCEDURE — 93005 ELECTROCARDIOGRAM TRACING: CPT | Performed by: INTERNAL MEDICINE

## 2019-03-28 PROCEDURE — C9113 INJ PANTOPRAZOLE SODIUM, VIA: HCPCS | Performed by: INTERNAL MEDICINE

## 2019-03-28 PROCEDURE — 83735 ASSAY OF MAGNESIUM: CPT

## 2019-03-28 RX ORDER — BUDESONIDE 0.25 MG/2ML
250 INHALANT ORAL 2 TIMES DAILY PRN
Status: DISCONTINUED | OUTPATIENT
Start: 2019-03-28 | End: 2019-03-30 | Stop reason: HOSPADM

## 2019-03-28 RX ORDER — IPRATROPIUM BROMIDE AND ALBUTEROL SULFATE 2.5; .5 MG/3ML; MG/3ML
1 SOLUTION RESPIRATORY (INHALATION) 4 TIMES DAILY PRN
Status: DISCONTINUED | OUTPATIENT
Start: 2019-03-28 | End: 2019-03-30 | Stop reason: HOSPADM

## 2019-03-28 RX ORDER — INSULIN GLARGINE 100 [IU]/ML
15 INJECTION, SOLUTION SUBCUTANEOUS
Status: DISCONTINUED | OUTPATIENT
Start: 2019-03-29 | End: 2019-03-29

## 2019-03-28 RX ORDER — ATROPA BELLADONNA AND OPIUM 16.2; 6 MG/1; MG/1
60 SUPPOSITORY RECTAL EVERY 8 HOURS PRN
Status: DISCONTINUED | OUTPATIENT
Start: 2019-03-28 | End: 2019-03-30 | Stop reason: HOSPADM

## 2019-03-28 RX ORDER — METHYLPREDNISOLONE SODIUM SUCCINATE 40 MG/ML
40 INJECTION, POWDER, LYOPHILIZED, FOR SOLUTION INTRAMUSCULAR; INTRAVENOUS EVERY 12 HOURS
Status: DISCONTINUED | OUTPATIENT
Start: 2019-03-28 | End: 2019-03-28

## 2019-03-28 RX ORDER — FORMOTEROL FUMARATE 20 UG/2ML
20 SOLUTION RESPIRATORY (INHALATION) 2 TIMES DAILY PRN
Status: DISCONTINUED | OUTPATIENT
Start: 2019-03-28 | End: 2019-03-30 | Stop reason: HOSPADM

## 2019-03-28 RX ORDER — PANTOPRAZOLE SODIUM 40 MG/10ML
40 INJECTION, POWDER, LYOPHILIZED, FOR SOLUTION INTRAVENOUS DAILY
Status: DISCONTINUED | OUTPATIENT
Start: 2019-03-28 | End: 2019-03-30 | Stop reason: HOSPADM

## 2019-03-28 RX ORDER — INSULIN GLARGINE 100 [IU]/ML
8 INJECTION, SOLUTION SUBCUTANEOUS
Status: DISCONTINUED | OUTPATIENT
Start: 2019-03-28 | End: 2019-03-28

## 2019-03-28 RX ADMIN — INSULIN LISPRO 8 UNITS: 100 INJECTION, SOLUTION INTRAVENOUS; SUBCUTANEOUS at 06:10

## 2019-03-28 RX ADMIN — ROSUVASTATIN CALCIUM 5 MG: 5 TABLET, FILM COATED ORAL at 20:51

## 2019-03-28 RX ADMIN — INSULIN LISPRO 10 UNITS: 100 INJECTION, SOLUTION INTRAVENOUS; SUBCUTANEOUS at 12:12

## 2019-03-28 RX ADMIN — BUDESONIDE 250 MCG: 0.25 SUSPENSION RESPIRATORY (INHALATION) at 09:07

## 2019-03-28 RX ADMIN — ENOXAPARIN SODIUM 40 MG: 40 INJECTION, SOLUTION INTRAVENOUS; SUBCUTANEOUS at 08:49

## 2019-03-28 RX ADMIN — INSULIN LISPRO 8 UNITS: 100 INJECTION, SOLUTION INTRAVENOUS; SUBCUTANEOUS at 17:06

## 2019-03-28 RX ADMIN — FORMOTEROL FUMARATE DIHYDRATE 20 MCG: 20 SOLUTION RESPIRATORY (INHALATION) at 09:07

## 2019-03-28 RX ADMIN — MEROPENEM 1 G: 1 INJECTION, POWDER, FOR SOLUTION INTRAVENOUS at 23:00

## 2019-03-28 RX ADMIN — SODIUM CHLORIDE: 9 INJECTION, SOLUTION INTRAVENOUS at 09:08

## 2019-03-28 RX ADMIN — METHYLPREDNISOLONE SODIUM SUCCINATE 40 MG: 40 INJECTION, POWDER, FOR SOLUTION INTRAMUSCULAR; INTRAVENOUS at 02:10

## 2019-03-28 RX ADMIN — INSULIN LISPRO 4 UNITS: 100 INJECTION, SOLUTION INTRAVENOUS; SUBCUTANEOUS at 17:02

## 2019-03-28 RX ADMIN — INSULIN LISPRO 4 UNITS: 100 INJECTION, SOLUTION INTRAVENOUS; SUBCUTANEOUS at 02:08

## 2019-03-28 RX ADMIN — INSULIN LISPRO 4 UNITS: 100 INJECTION, SOLUTION INTRAVENOUS; SUBCUTANEOUS at 12:12

## 2019-03-28 RX ADMIN — IPRATROPIUM BROMIDE AND ALBUTEROL SULFATE 1 AMPULE: .5; 3 SOLUTION RESPIRATORY (INHALATION) at 06:45

## 2019-03-28 RX ADMIN — PANTOPRAZOLE SODIUM 40 MG: 40 INJECTION, POWDER, FOR SOLUTION INTRAVENOUS at 17:12

## 2019-03-28 RX ADMIN — INSULIN LISPRO 4 UNITS: 100 INJECTION, SOLUTION INTRAVENOUS; SUBCUTANEOUS at 09:19

## 2019-03-28 RX ADMIN — IPRATROPIUM BROMIDE AND ALBUTEROL SULFATE 1 AMPULE: .5; 3 SOLUTION RESPIRATORY (INHALATION) at 18:10

## 2019-03-28 RX ADMIN — INSULIN HUMAN 10 UNITS: 100 INJECTION, SOLUTION PARENTERAL at 15:09

## 2019-03-28 RX ADMIN — INSULIN LISPRO 5 UNITS: 100 INJECTION, SOLUTION INTRAVENOUS; SUBCUTANEOUS at 20:59

## 2019-03-28 RX ADMIN — ASPIRIN 81 MG: 81 TABLET ORAL at 08:48

## 2019-03-28 RX ADMIN — IPRATROPIUM BROMIDE AND ALBUTEROL SULFATE 1 AMPULE: .5; 3 SOLUTION RESPIRATORY (INHALATION) at 12:54

## 2019-03-28 RX ADMIN — MEROPENEM 1 G: 1 INJECTION, POWDER, FOR SOLUTION INTRAVENOUS at 12:06

## 2019-03-28 RX ADMIN — Medication 10 ML: at 20:50

## 2019-03-28 RX ADMIN — INSULIN LISPRO 8 UNITS: 100 INJECTION, SOLUTION INTRAVENOUS; SUBCUTANEOUS at 09:22

## 2019-03-28 RX ADMIN — FOLIC ACID 1 MG: 1 TABLET ORAL at 08:49

## 2019-03-28 RX ADMIN — Medication 10 ML: at 08:50

## 2019-03-28 RX ADMIN — INSULIN GLARGINE 8 UNITS: 100 INJECTION, SOLUTION SUBCUTANEOUS at 06:10

## 2019-03-28 ASSESSMENT — PAIN SCALES - GENERAL
PAINLEVEL_OUTOF10: 0

## 2019-03-28 ASSESSMENT — ENCOUNTER SYMPTOMS
ABDOMINAL PAIN: 0
CHEST TIGHTNESS: 0
VOMITING: 0
SHORTNESS OF BREATH: 0

## 2019-03-28 NOTE — CARE COORDINATION
3/28  Transition of care notes  Was on telem unit  3/27  RRT called for rr> 28  Pox 80  pmh  Copd  Dm htn  Transfer to icu level of caer  On 3/27 s/p post cysto  stent    Per urology  notes=  Can likely transfer to floor today   Can have catheter discontinued once more ambulatory   Continue IVF   Will need left ureteroscopy, laser lithotripsy and stone removal in several weeks once infection has cleared  this can be set up as out patient   Awaiting icu team for plan of care  Iv meropenem  Met with patient up in chair  Lives in 2 story with partner  Her bed bath on first floor  No equipment at home  No history of home care or pau  Has a ramp outside to enter home  Verbalized understanding of her home meds  And understands surgery that was done and that she will need   To return in several weeks for another procedure  No needs identified at present  [address]

## 2019-03-28 NOTE — PROGRESS NOTES
OCCUPATIONAL THERAPY INITIAL EVALUATION      Date:3/28/2019  Patient Name: Idania Mazariegos  MRN: [id number]  : 1950  Room: [address] [place], [place] [address]    Evaluating OT: JEFRY Nova/L    AM-PAC Daily Activity Raw Score: 20/24  Recommended Adaptive Equipment: to be determined; potential needs include shower chair     Comments: Based on patient's functional performance as stated above and level of assistance needed prior to admission, this therapist believes that the patient will likely have no skilled OT needs following hospital discharge. Diagnosis: UTI  Surgery: Cystoscopy, left ureteral stent insertion, insertion of Nevarez catheter 3/28/19   Pertinent Medical History: DM, HLD, HTN, obesity, OA, osteoporosis, RA, tobacco abuse, tubular adenoma of colon     Precautions:  Falls     Home Living: Pt lives with significant other in a 2 story home with ramped entry; bed/bath on main floor  Bathroom setup: walk-in shower  Equipment owned: none    Prior Level of Function: independent with ADLs and with IADLs; using no device for ambulation. 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 ..    Feeding Independent        Grooming SBA standing at sink to brush teeth and brush hair; SBA dynamic standing    improve grooming/hygiene tasks to independent while standing at sink    UB Dressing Independent  To chris/doff hospital gown       LB Dressing SBA  To doff/chris socks while seated in bedside chair; crossover technique utilized with increased time needed    improve LB dressing to independent with AE PRN   Bathing SBA    improve UB/LB bathing to independent   Toileting SBA    improve toileting task and all clothing mgmt to independent   Bed Mobility  Supine to sit: Independent  Sit to supine: NT     Functional Transfers Sit to stand: SBA  Stand to sit: SBA  improve all functional transfers to independent   Functional Mobility SBA with no AE  Community distance; 1 LOB with self-correction  improve functional mobility to independent   Balance Sitting:     Static:  Independent    Dynamic:supervision  Standing: SBA  demo independent dynamic sitting balance EOB during ADL tasks   Endurance/Activity Tolerance Good-  demo good activity tolerance/endurance during 15-20 min ADL task    Visual/  Perceptual Glasses: yes, not in room              Hand dominance: R  UE ROM: RUE: WFL  LUE: WFL  Strength: RUE: grossly 4+/5 LUE: grossly 4+/5   Strength: WFL  Fine Motor Coordination: WFL    Hearing: WNL  Sensation: No c/o numbness or tingling  Tone: WNL  Edema: unremarkable    Vitals:  Blood Pressure at rest 126/71 Blood Pressure post session 153/88   Heart Rate at rest 92 bpm Heart Rate post session 99 bpm   SPO2 at rest 96% RA SPO2 post session 96% RA                             Comments/Treatment Narrative: OK from RN to see patient. Evaluation completed with PT collaboration. Upon arrival patient supine with HOB slightly elevated. Supine to sit. Sit to stand pivot to bedside chair, stand to sit. UE ROM/MMT completed at bedside chair. UB dressing task as noted above. STS. Functional mobility as noted above, stood at sink. Grooming tasks completed as above. Functional mobility to bedside chair. Stand to sit. LB dressing task as above. Patient properly positioned using pillows and bed mechanics to improve interaction with environment, overall functioning and decrease/prevent edema and contractures. After session, patient in position as stated above with all devices within reach, all lines and tubes intact, nursing notified.      Pt required cues and education as noted above for safe facilitation and completion of tasks. During functional activites and ADLs pt educated on proper hand placement, safety technique, sequencing, and energy conservation techniques. Therapist provided skilled monitoring of HR, O2 saturation, blood pressure and patient's response during treatment session. Prior to and at the end of session, environmental modifications/line management completed for patients safety and efficiency of treatment session. Eval Complexity:   · Medium Complexity  · History: Expanded review of medical records and additional review of physical, cognitive, or psychosocial history related to current functional performance  · Exam: 3+ performance deficits  · Assistance/Modification: Min/mod assistance or modifications required to perform tasks. May have comorbidities that affect occupational performance. Assessment of current deficits   Functional mobility [x]  ADLs [x] Strength [x]  Cognition []  Functional transfers  [x] IADLs [x] Safety Awareness []  Endurance [x]  Fine Motor Coordination [] Balance [x] Vision/perception [] Sensation []   Gross Motor Coordination [] ROM [] Delirium []                  Motor Control []    Plan of Care:  ADL retraining [x]   Equipment needs [x]   Neuromuscular re-education [] Energy Conservation Techniques [x]  Functional Transfer training [x] Patient and/or Family Education [x]  Functional Mobility training [x]  Environmental Modifications [x]  Cognitive re-training []   Compensatory techniques for ADLs [x]  Splinting Needs []   Positioning to improve overall function [x]   Therapeutic Activity [x]                       Therapeutic Exercise  [x]  Visual/Perceptual: []    Delirium prevention/treatment  [x]   Other:  []    Rehab Potential: Good for established goals    Patient / Family Goal: None stated     Patient and/or family were instructed/educated on diagnosis, prognosis/goals and plan of care.  Demonstrated good understanding, further information not needed. [] Malnutrition indicators have been identified and nursing has been notified to ensure a dietitian consult is ordered.       Medium Complexity Evaluation + 45 min timed tx     Tx time in: [address] [facility]  Tx time out: [address], OTR/L  [id number]

## 2019-03-28 NOTE — PLAN OF CARE
Problem: Gas Exchange - Impaired:  Goal: Levels of oxygenation will improve  Outcome: Met This Shift     Problem: Respiratory  Goal: No pulmonary complications  Outcome: Met This Shift  Goal: O2 Sat > 90%  Outcome: Met This Shift  Goal: Supplemental O2 requirements decreased  Outcome: Met This Shift

## 2019-03-28 NOTE — PROGRESS NOTES
Physical Therapy    Initial Assessment     Name: Idania Mazariegos  : 1950  MRN: [id number]    Date of Service: 3/28/2019    Evaluating PT:  Alex Zhao, PT, DPT [id number]    Room #:  5310/9491-Q  Diagnosis:  UTI  Procedure:  Cystoscopy, left ureteral stent insertion, insertion of Nevarez catheter 3/28/19  PMHx:  DM, HLD, HTN, obesity, OA, osteoporosis, RA, tobacco abuse, tubular adenoma of colon   Precautions:  Falls   Equipment Needs:  TBD    Pt lives with  in a 2 story home with ramp entry. Bedroom and bathroom are on the main level. Pt ambulated with no AD, independent, and drives PTA. Initial Evaluation  Date: 3/28/19 Treatment Short Term/ Long Term   Goals   AM-PAC 6 Clicks      Was pt agreeable to Eval/treatment? Yes     Does pt have pain? No c/o pain      Bed Mobility  Rolling: Independent   Supine to sit: Independent  Sit to supine: NT  Scooting: Independent to EOB  NA   Transfers Sit to stand: SBA  Stand to sit: SBA  Stand pivot: SBA no AD  Sit to stand: Independent  Stand to sit:  Independent  Stand pivot: Independent    Ambulation    200 feet with no AD CGA  >300 feet with no AD Independent    Stair negotiation: ascended and descended  NT  >4 steps with 1 rail Modified Independent     ROM BUE:  Per OT eval   BLE:  WFL     Strength BUE:  Per OT eval   BLE:  WFL     Balance Sitting EOB:  Independent   Dynamic Standing:  SBA<>CGA  Sitting EOB:  Independent   Dynamic Standing:  Independent      Pt is A & O x 4  RASS:  0   CAM-ICU:  Negative   Sensation:  Pt denies numbness and tingling to extremities  Edema:  Unremarkable     Vitals:  Blood Pressure at rest 126/71 Blood Pressure post session 153/88   Heart Rate at rest 92 bpm Heart Rate post session 99 bpm   SPO2 at rest 96% RA SPO2 post session 96% RA     Functional Status Score-Intensive Care Unit (FSS-ICU)   Rolling    Supine to sit transfer    Unsupported sitting     Sit to stand transfers    Ambulation    Total

## 2019-03-28 NOTE — PROGRESS NOTES
[address] [facility]  Department of Internal Medicine  Internal Medicine Residency Program  Resident Progress  Note      Patient:  Cristobal Gallagher 80 y.o. female MRN: [id number]     Date of Service: 3/28/2019    Allergy: Rocephin [ceftriaxone]  CC: F/u: Fever and back pain  Subjective       The patients seen and examined AM. Says has some chest pain below the sternum, with no radiation. Has some cough. On NC, with no distress. ROS: 10 systems negative, otherwise described above  Objective   Physical Exam:  · Vitals: /69   Pulse 87   Temp 97.8 °F (36.6 °C) (Oral)   Resp 23   Ht 5' 4\" (1.626 m)   Wt 233 lb 11.2 oz (106 kg)   SpO2 97%   BMI 40.11 kg/m²     · I & O - 24hr: I/O this shift:  · In: 240 [P.O.:240]  · Out: 400 [Urine:400]   · General Appearance: alert, appears stated age, cooperative and morbidly obese  · HEENT:  Head: Normocephalic, no lesions, without obvious abnormality. · Neck: supple, symmetrical, trachea midline  · Lung: clear to auscultation bilaterally  · Heart: regular rate and rhythm, S1, S2 normal, no murmur, click, rub or gallop  · Abdomen: soft, non-tender; bowel sounds normal; no masses,  no organomegaly  · Extremities:  extremities normal, atraumatic, no cyanosis or edema  · Musculokeletal: No joint swelling, no muscle tenderness. ROM normal in all joints of extremities.    · Neurologic: Mental status: Alert, oriented, thought content appropriate    Pertinent/ New Labs and Imaging Studies     CBC with Differential:    Lab Results   Component Value Date    WBC 7.4 03/28/2019    RBC 3.47 03/28/2019    HGB 10.5 03/28/2019    HCT 32.3 03/28/2019    PLT 92 03/28/2019    MCV 93.1 03/28/2019    MCH 30.3 03/28/2019    MCHC 32.5 03/28/2019    RDW 14.0 03/28/2019    SEGSPCT 55 02/03/2012    SEGSPCT 69 10/22/2010    LYMPHOPCT 6.0 03/28/2019    MONOPCT 7.3 03/28/2019    EOSPCT 1 10/22/2010    BASOPCT 0.1 03/28/2019    MONOSABS 0.54 03/28/2019    LYMPHSABS 0.45 03/28/2019    EOSABS Merle Chiang

## 2019-03-28 NOTE — PROGRESS NOTES
03/28/2019    MCH 30.3 03/28/2019    MCHC 32.5 03/28/2019    RDW 14.0 03/28/2019    PLT 92 03/28/2019    MPV 10.5 03/28/2019        Notable Cultures:       Culture  Date sent  Result    Nasal      Sputum      Urine Strep pneumonia Ag        Urine Legionella Ag        Blood        Urine          Antibiotic  Days  Day started     meropenem     3/27                         Oxygen:   Nasal cannula L/min     Face mask %     Reservoirs mask %       ABG:  No results found for: PHART, RXN5JVM, PO2ART, L5XPRAZS, CRM5PEY, BEART      Lines:  Site  Day  Date inserted     TLC              PICC              Arterial line              Peripheral line   R foream                        DVT Prophylaxis      Heparin         Enoxaparin   x     PCDs        Imaging studies:  Ct Abdomen Pelvis Wo Contrast Additional Contrast? None    Result Date: 3/27/2019  EXAM:  CT Abdomen and Pelvis Without Intravenous Contrast CLINICAL HISTORY:  80years old, female; Pain; Other: Back; Additional info: Abdominal pain TECHNIQUE:  Axial computed tomography images of the abdomen and pelvis without intravenous contrast.  All CT scans at this facility use at least one of these dose optimization techniques: automated exposure control; mA and/or kV adjustment per patient size (includes targeted exams where dose is matched to clinical indication); or iterative reconstruction. Coronal and sagittal reformatted images were created and reviewed. COMPARISON:  No relevant prior studies available. FINDINGS:   Lung bases:  Unremarkable. No mass. No consolidation. ABDOMEN:   Liver:  Fatty infiltration of the liver. Gallbladder and bile ducts:  Unremarkable. No calcified stones. No ductal dilation. Pancreas:  Unremarkable. No ductal dilation. Spleen: The spleen is mildly prominent in size. Adrenals:  Unremarkable. No mass. Kidneys and ureters:  Nonobstructing stones left kidney.  No stones in the right kidney or in the right ureter and no hydronephrosis on the right. No hydronephrosis left kidney due to an obstructing 4 mm stone in the proximal left ureter. Right renal cyst measuring larger than a centimeter. Stomach and bowel:  Diverticulosis in the colon without evidence of diverticulitis. No obstruction. PELVIS:   Appendix:  No findings to suggest acute appendicitis. Bladder:  Unremarkable. No stones. Reproductive:  Unremarkable as visualized. ABDOMEN and PELVIS:   Intraperitoneal space:  Unremarkable. No free air. No significant fluid collection. Bones/joints:  Bilateral L5 spondylolyses. No acute fracture. No dislocation. Soft tissues:  Unremarkable. Vasculature: Atherosclerotic disease. No abdominal aortic aneurysm. Lymph nodes:  Unremarkable. No enlarged lymph nodes. Nonobstructing stones left kidney. No stones in the right kidney or in the right ureter and no hydronephrosis on the right. No hydronephrosis left kidney due to an obstructing 4 mm stone in the proximal left ureter. This report has been electronically signed by Mario Curry MD.    Fl Retrograde Pyelogram W Wo Kub    Result Date: 3/27/2019  Patient MRN: [id number] : 1950 Age:  80 years Gender: Female Order Date: 3/27/2019 5:15 PM Exam: FL RETROGRADE PYELOGRAM W WO KUB Number of Images: 15 views Indication:  Stent insertion Comparison: None. TECHNIQUE: Intraoperative fluoroscopic images were acquired. Please note, this radiologist was NOT present at the time of this procedure. Fluoroscopy time: 15.9 seconds Reference air kerma: 6.91 mGy FINDINGS: Sequential frontal fluoroscopic images of the abdomen and pelvis demonstrate interval placement of a pigtail stent with the superior tip projecting over the left renal shadow. Intraoperative fluoroscopic imaging as described above. Please refer to the operative report for further details.      Xr Chest Portable    Result Date: 3/27/2019  Patient MRN: [id number] : 1950 Age: 80 years Gender: Female Order Date: 3/27/2019 1:30 AM Exam: XR CHEST PORTABLE Number of Images: 1 view Indication:  Fever with nausea x2 days chest pain Comparison: 3 January 2011 FINDINGS: Pulmonary vascularity normal. Heart size upper normal. Lungs emphysematous. There is some coarsening of pulmonary interstitial markings which appears slightly greater than on prior study and it is not clear whether this represents acute or chronic disease in light of the more than 8 year time interval between the 2 studies. Costophrenic angles sharp. 1. Emphysema. 2. Coarsening of pulmonary interstitial markings acute versus chronic disease. These do appear somewhat more pronounced compared to the study of roughly 8 years prior. Resident's Assessment & Plan     Assessment  1. Sepsis complicated by UTI  2. UTI, 2/2 to obstructive kidney stones, UC shows E.coli  3. Obstructive uropathy, hydronephrosis on left kidney due to 4 mm obstructive stone on left ureter, s/p stent placement 3/27/2019  4. COPD exacerbation, sudden onset respiratory distress that improved with duoneb and BiPAP, improved  5. Acute hypoxic respiratory failure, improved, likely due to bronchospasm or COPD, improved  6. BAHMAN stage 2, likely post renal, improving  7. Lactic acidosis, 8.3, improving  8. Acute encephalopathy, improved  9. Hx of RA  10. Hx of DM, well controlled  11. Hx of HTN     Plan  1. Continue care in ICU due to high sugars  2. S/p stent placement, for lithotripsy in 3 weeks, monitor for fever and sepsis  3. Meropenem  4. Stop IVF, continue oral hydration  5. Monitor lactic acidosis every 6 hours  6. Continue BiPAP PRN, and breathing treatments  7. Stop solumedrol  8. On MTX and plaquenil at home, hold for now  9. Monitor BG q 2 hrs  10. MDSS + 8 units lantus  11. One time regular insulin 10 units given  12. Avoid nephrotoxic agents  13. Pain control per urology  14.  Carb control diet     DVT prophylaxis/ GI prophylaxis: Lovenox/protonix  Disposition:MICU, for possible transfer tomorrow      Miguelina Brown M.D., PGY 2    Attending physician: Dr. Gayleen Boas    I personally saw, examined and provided care for the patient. Radiographs, labs and medication list were reviewed by me independently. I spoke with bedside nursing, therapists and consultants. Critical care services and times documented are independent of procedures and multidisciplinary rounds with Residents. Additionally comprehensive, multidisciplinary rounds were conducted with the MICU team. The case was discussed in detail and plans for care were established. Review of Residents documentation was conducted and revisions were made as appropriate. I agree with the above documented exam, problem list and plan of care with the following additions:    S/p cystoscopy with stent - pus behind blocking stone  Abx per ID  Sepsis now under control  Main issue is uncontrolled hyperglycemia with very mild DKA  Keep in ICU for improved glucose control  Likely transfer within 24 hours  Discussed with Dr. Patti Pedro    33 minutes of CCT spent with the patient, reviewing the chart including imaging studies, and discussing the case with other health care professionals.     Britney Mercer MD

## 2019-03-28 NOTE — PROGRESS NOTES
[facility]  PROGRESS NOTE    Subjective:  Reports she was having bladder pain with urination last night  reports hematuria and purulent drainage I her catheter   Is eating and tolerating diet  Denies N/V, fevers, or chills  Mild left flank pain     Review of Systems   Constitutional: Positive for fatigue. Negative for chills and fever. Respiratory: Negative for chest tightness and shortness of breath. Cardiovascular: Negative for chest pain, palpitations and leg swelling. Gastrointestinal: Negative for abdominal pain and vomiting. Genitourinary: Positive for flank pain and hematuria. Nevarez    Skin: Negative for pallor and rash. Neurological: Negative for dizziness and light-headedness. Psychiatric/Behavioral: Negative for agitation. The patient is not nervous/anxious. Objective:    Vitals:    19 0907   BP:    Pulse:    Resp: 18   Temp:    SpO2: 96%       Allergies: Patient has no known allergies.     PAST MEDICAL HISTORY:   Past Medical History:   Diagnosis Date    Diabetes mellitus type II, controlled ([facility]     Hyperlipidemia     Hypertension     Obesity, Class III, BMI 40-49.9 (morbid obesity) ([facility] [address]     Osteoarthritis     Osteoporosis     Rheumatoid arthritis involving multiple sites ([facility] [address] [facility]     Tobacco abuse began age 16    Tubular adenoma of colon 2011    colonoscopy       PAST SURGICAL HISTORY:   Past Surgical History:   Procedure Laterality Date    BREAST BIOPSY      benign, pt cannot remember which side    CARPAL TUNNEL RELEASE      right wrist   [address]     SECTION      CHOLECYSTECTOMY, OPEN      COLONOSCOPY  2011    screening, sigmoid polyp bx (tubular adenoma), Dr. Chun Alex, [address] [facility] COLONOSCOPY  4/3/2015    submucosal lipoma distal tranverse colon biopsied, diverticulosis, Dr. Jos Monsalve / REMOVAL STENT / STONE Left 3/27/2019    CYSTOSCOPY RETROGRADE PYELOGRAM

## 2019-03-28 NOTE — PROGRESS NOTES
[place]  Internal Medicine Residency / [address]      Attending Physician Statement  I have discussed the case, including pertinent history and exam findings with the resident and the team.  I have seen and examined the patient and the key elements of the encounter have been performed by me. I agree with the assessment, plan and orders as documented by the resident. Case Discussed During AM Rounds    Remains in ICU    Atbs continued   S/P ureteral stent placement   ID/Urology/ICU team following    Some chest discomfort this am    NO tele changes     Sepsis complicated by left ureteral obstructive stone with pyonephrosis    ID/ICU/Urology following   Atbs continued   S/P stent placement    Plan for 3 week follow-up for lithotripsy per op report    Continue current management     BAHMAN- improving   Pre-renal azotemia   IVFs continued     Metabolic acidosis   AG is 16   Possibly hyperchloremic metabolic acidosis    Slight increase in BHB- but likely not DKA    Prior LA resolved    Anemia- new onset   ? Etiology   Monitor H/H    Thrombocytopenia- likely secondary to underlying sepsis     Type II DM- uncontrolled    Likely secondary to infection/stress   A1c at baseline less than 7%   Hold home meds   Insulin regimen initiated   Reduce steroids    No current signs of DKA- ABG completed and repeat BMP planned for noon to follow if any worsening     Chest pain   Check ECG    Check troponins     Lactic Acidosis- resolved     Leukopenia- resolved     Disposition- Consider Transfer monitored bed if stable this afternoon          Remainder of medical problems as per resident note.       [place]  Internal Medicine Residency Faculty

## 2019-03-29 LAB
ANION GAP SERPL CALCULATED.3IONS-SCNC: 18 MMOL/L (ref 7–16)
BASOPHILS ABSOLUTE: 0.02 E9/L (ref 0–0.2)
BASOPHILS RELATIVE PERCENT: 0.1 % (ref 0–2)
BETA-HYDROXYBUTYRATE: 0.68 MMOL/L (ref 0.02–0.27)
BUN BLDV-MCNC: 34 MG/DL (ref 8–23)
CALCIUM SERPL-MCNC: 7.7 MG/DL (ref 8.6–10.2)
CHLORIDE BLD-SCNC: 108 MMOL/L (ref 98–107)
CO2: 15 MMOL/L (ref 22–29)
CREAT SERPL-MCNC: 1.1 MG/DL (ref 0.5–1)
EKG ATRIAL RATE: 100 BPM
EKG ATRIAL RATE: 116 BPM
EKG P AXIS: 66 DEGREES
EKG P AXIS: 67 DEGREES
EKG P-R INTERVAL: 156 MS
EKG P-R INTERVAL: 158 MS
EKG Q-T INTERVAL: 334 MS
EKG Q-T INTERVAL: 374 MS
EKG QRS DURATION: 70 MS
EKG QRS DURATION: 76 MS
EKG QTC CALCULATION (BAZETT): 464 MS
EKG QTC CALCULATION (BAZETT): 482 MS
EKG R AXIS: -12 DEGREES
EKG R AXIS: -18 DEGREES
EKG T AXIS: 24 DEGREES
EKG T AXIS: 52 DEGREES
EKG VENTRICULAR RATE: 100 BPM
EKG VENTRICULAR RATE: 116 BPM
EOSINOPHILS ABSOLUTE: 0 E9/L (ref 0.05–0.5)
EOSINOPHILS RELATIVE PERCENT: 0 % (ref 0–6)
GFR AFRICAN AMERICAN: 60
GFR NON-AFRICAN AMERICAN: 49 ML/MIN/1.73
GLUCOSE BLD-MCNC: 215 MG/DL (ref 74–99)
HCT VFR BLD CALC: 33 % (ref 34–48)
HEMOGLOBIN: 11.1 G/DL (ref 11.5–15.5)
IMMATURE GRANULOCYTES #: 0.17 E9/L
IMMATURE GRANULOCYTES %: 1 % (ref 0–5)
LYMPHOCYTES ABSOLUTE: 0.83 E9/L (ref 1.5–4)
LYMPHOCYTES RELATIVE PERCENT: 4.9 % (ref 20–42)
MCH RBC QN AUTO: 29.9 PG (ref 26–35)
MCHC RBC AUTO-ENTMCNC: 33.6 % (ref 32–34.5)
MCV RBC AUTO: 88.9 FL (ref 80–99.9)
METER GLUCOSE: 105 MG/DL (ref 74–99)
METER GLUCOSE: 146 MG/DL (ref 74–99)
METER GLUCOSE: 158 MG/DL (ref 74–99)
METER GLUCOSE: 168 MG/DL (ref 74–99)
METER GLUCOSE: 187 MG/DL (ref 74–99)
MONOCYTES ABSOLUTE: 1.42 E9/L (ref 0.1–0.95)
MONOCYTES RELATIVE PERCENT: 8.4 % (ref 2–12)
NEUTROPHILS ABSOLUTE: 14.44 E9/L (ref 1.8–7.3)
NEUTROPHILS RELATIVE PERCENT: 85.6 % (ref 43–80)
ORGANISM: ABNORMAL
ORGANISM: ABNORMAL
PDW BLD-RTO: 14.1 FL (ref 11.5–15)
PLATELET # BLD: 175 E9/L (ref 130–450)
PMV BLD AUTO: 10.2 FL (ref 7–12)
POTASSIUM SERPL-SCNC: 3.8 MMOL/L (ref 3.5–5)
RBC # BLD: 3.71 E12/L (ref 3.5–5.5)
SODIUM BLD-SCNC: 141 MMOL/L (ref 132–146)
URINE CULTURE, ROUTINE: ABNORMAL
WBC # BLD: 16.9 E9/L (ref 4.5–11.5)

## 2019-03-29 PROCEDURE — 80048 BASIC METABOLIC PNL TOTAL CA: CPT

## 2019-03-29 PROCEDURE — 2580000003 HC RX 258: Performed by: UROLOGY

## 2019-03-29 PROCEDURE — 1200000000 HC SEMI PRIVATE

## 2019-03-29 PROCEDURE — 97535 SELF CARE MNGMENT TRAINING: CPT

## 2019-03-29 PROCEDURE — 85025 COMPLETE CBC W/AUTO DIFF WBC: CPT

## 2019-03-29 PROCEDURE — 6370000000 HC RX 637 (ALT 250 FOR IP): Performed by: NURSE PRACTITIONER

## 2019-03-29 PROCEDURE — C9113 INJ PANTOPRAZOLE SODIUM, VIA: HCPCS | Performed by: INTERNAL MEDICINE

## 2019-03-29 PROCEDURE — 94640 AIRWAY INHALATION TREATMENT: CPT

## 2019-03-29 PROCEDURE — 6370000000 HC RX 637 (ALT 250 FOR IP): Performed by: INTERNAL MEDICINE

## 2019-03-29 PROCEDURE — 82010 KETONE BODYS QUAN: CPT

## 2019-03-29 PROCEDURE — 6360000002 HC RX W HCPCS: Performed by: INTERNAL MEDICINE

## 2019-03-29 PROCEDURE — 97530 THERAPEUTIC ACTIVITIES: CPT

## 2019-03-29 PROCEDURE — 6360000002 HC RX W HCPCS: Performed by: UROLOGY

## 2019-03-29 PROCEDURE — 99232 SBSQ HOSP IP/OBS MODERATE 35: CPT | Performed by: INTERNAL MEDICINE

## 2019-03-29 PROCEDURE — 82962 GLUCOSE BLOOD TEST: CPT

## 2019-03-29 PROCEDURE — 6370000000 HC RX 637 (ALT 250 FOR IP): Performed by: UROLOGY

## 2019-03-29 PROCEDURE — 36415 COLL VENOUS BLD VENIPUNCTURE: CPT

## 2019-03-29 RX ORDER — INSULIN GLARGINE 100 [IU]/ML
10 INJECTION, SOLUTION SUBCUTANEOUS
Status: DISCONTINUED | OUTPATIENT
Start: 2019-03-30 | End: 2019-03-29

## 2019-03-29 RX ORDER — HYDROCHLOROTHIAZIDE 12.5 MG/1
12.5 TABLET ORAL DAILY
Status: DISCONTINUED | OUTPATIENT
Start: 2019-03-29 | End: 2019-03-30 | Stop reason: HOSPADM

## 2019-03-29 RX ORDER — ROSUVASTATIN CALCIUM 20 MG/1
20 TABLET, COATED ORAL NIGHTLY
Status: DISCONTINUED | OUTPATIENT
Start: 2019-03-29 | End: 2019-03-30 | Stop reason: HOSPADM

## 2019-03-29 RX ORDER — LEVOFLOXACIN 750 MG/1
750 TABLET ORAL DAILY
Status: DISCONTINUED | OUTPATIENT
Start: 2019-03-29 | End: 2019-03-30 | Stop reason: HOSPADM

## 2019-03-29 RX ORDER — INSULIN GLARGINE 100 [IU]/ML
12 INJECTION, SOLUTION SUBCUTANEOUS
Status: DISCONTINUED | OUTPATIENT
Start: 2019-03-30 | End: 2019-03-30 | Stop reason: HOSPADM

## 2019-03-29 RX ORDER — LEVOFLOXACIN 500 MG/1
500 TABLET, FILM COATED ORAL DAILY
Status: DISCONTINUED | OUTPATIENT
Start: 2019-03-29 | End: 2019-03-29

## 2019-03-29 RX ORDER — BENZONATATE 100 MG/1
100 CAPSULE ORAL 3 TIMES DAILY PRN
Status: DISCONTINUED | OUTPATIENT
Start: 2019-03-29 | End: 2019-03-30 | Stop reason: HOSPADM

## 2019-03-29 RX ADMIN — HYDROCHLOROTHIAZIDE 12.5 MG: 12.5 TABLET ORAL at 08:54

## 2019-03-29 RX ADMIN — MEROPENEM 1 G: 1 INJECTION, POWDER, FOR SOLUTION INTRAVENOUS at 11:00

## 2019-03-29 RX ADMIN — INSULIN LISPRO 3 UNITS: 100 INJECTION, SOLUTION INTRAVENOUS; SUBCUTANEOUS at 09:07

## 2019-03-29 RX ADMIN — Medication 10 ML: at 08:37

## 2019-03-29 RX ADMIN — ASPIRIN 81 MG: 81 TABLET ORAL at 08:52

## 2019-03-29 RX ADMIN — FOLIC ACID 1 MG: 1 TABLET ORAL at 08:52

## 2019-03-29 RX ADMIN — IPRATROPIUM BROMIDE AND ALBUTEROL SULFATE 1 AMPULE: .5; 3 SOLUTION RESPIRATORY (INHALATION) at 02:30

## 2019-03-29 RX ADMIN — INSULIN LISPRO 3 UNITS: 100 INJECTION, SOLUTION INTRAVENOUS; SUBCUTANEOUS at 17:05

## 2019-03-29 RX ADMIN — BENZONATATE 100 MG: 100 CAPSULE ORAL at 20:40

## 2019-03-29 RX ADMIN — ROSUVASTATIN CALCIUM 20 MG: 20 TABLET, FILM COATED ORAL at 20:40

## 2019-03-29 RX ADMIN — INSULIN LISPRO 1 UNITS: 100 INJECTION, SOLUTION INTRAVENOUS; SUBCUTANEOUS at 17:06

## 2019-03-29 RX ADMIN — LEVOFLOXACIN 750 MG: 750 TABLET, FILM COATED ORAL at 15:49

## 2019-03-29 RX ADMIN — INSULIN GLARGINE 15 UNITS: 100 INJECTION, SOLUTION SUBCUTANEOUS at 08:31

## 2019-03-29 RX ADMIN — BENZONATATE 100 MG: 100 CAPSULE ORAL at 08:52

## 2019-03-29 RX ADMIN — ATROPA BELLADONNA AND OPIUM 60 MG: 16.2; 6 SUPPOSITORY RECTAL at 06:33

## 2019-03-29 RX ADMIN — PANTOPRAZOLE SODIUM 40 MG: 40 INJECTION, POWDER, FOR SOLUTION INTRAVENOUS at 08:36

## 2019-03-29 RX ADMIN — ENOXAPARIN SODIUM 40 MG: 40 INJECTION, SOLUTION INTRAVENOUS; SUBCUTANEOUS at 08:52

## 2019-03-29 RX ADMIN — INSULIN LISPRO 1 UNITS: 100 INJECTION, SOLUTION INTRAVENOUS; SUBCUTANEOUS at 09:08

## 2019-03-29 RX ADMIN — BENZONATATE 100 MG: 100 CAPSULE ORAL at 15:49

## 2019-03-29 ASSESSMENT — ENCOUNTER SYMPTOMS
VOMITING: 0
NAUSEA: 0
CHEST TIGHTNESS: 0
CONSTIPATION: 0
ABDOMINAL PAIN: 0
COUGH: 1

## 2019-03-29 ASSESSMENT — PAIN SCALES - GENERAL
PAINLEVEL_OUTOF10: 0

## 2019-03-29 NOTE — PROGRESS NOTES
CC- abd pain    Subjective: The patient is awake and alert. Tolerating medications. Reports no side effects. Afebrile. 10 ROS otherwise negative unless otherwise specified above. Objective:    Vitals:    03/29/19 1100   BP:    Pulse: 83   Resp: 18   Temp:    SpO2: 98%       General Appearance:    Awake, alert , no acute distress.    HEENT:    Normocephalic,PERRL,neck supple, no JVD, mucosa moist, no thrush   Lungs:     Clear to auscultation bilaterally, no wheeze , crackles   Heart:    Regular rate and rhythm, no murmur   Abdomen:     Soft, non-tender, not distended  bowel sounds present,   Extremities:   No edema,no open wound,no erythema, non  tender   Skin:   no rashes or lesions        CBC+dif:  Reviewed and trend followed     Radiology:  Noted     Microbiology:  UC E COLI     Assessment:  · Sepsis from E coli complicated UTI POD #1 Cystoscopy, left ureteral stent insertion, insertion of Nevarez catheter  · L URETERAL OBSTRUCTIVE STONE  · Lactic acidosis  · RA on hydroxycholoroquine     Plan:    · Switch to oral levaquin  · DC iv merrem 1 g q12    Electronically signed by Triston Judd MD on 3/29/2019 at 11:33 AM

## 2019-03-29 NOTE — PROGRESS NOTES
Physical Therapy    Daily treatment note      Name: Denisha Juarez  : 1950  MRN: [id number]    Date of Service: 3/29/2019    Evaluating PT:  Antione Loyd, PT, DPT [id number]    Room #:  6637/4302-K  Diagnosis:  UTI  Procedure:  Cystoscopy, left ureteral stent insertion, insertion of Nevarez catheter 3/28/19  PMHx:  DM, HLD, HTN, obesity, OA, osteoporosis, RA, tobacco abuse, tubular adenoma of colon   Precautions:  Falls   Equipment Needs:  TBD    Pt lives with  in a 2 story home with ramp entry. Bedroom and bathroom are on the main level. Pt ambulated with no AD, independent, and drives PTA. Initial Evaluation  Date: 3/28/19 Treatment  3/29/19 Short Term/ Long Term   Goals   AM-PAC 6 Clicks 18/93 43/77     Was pt agreeable to Eval/treatment? Yes Yes    Does pt have pain? No c/o pain  No c/o pain     Bed Mobility  Rolling: Independent   Supine to sit: Independent  Sit to supine: NT  Scooting: Independent to EOB Rolling: Independent   Supine to sit: Min A  Sit to supine: NT  Scooting: Independent to EOB NA   Transfers Sit to stand: SBA  Stand to sit: SBA  Stand pivot: SBA no AD Sit to stand: SBA  Stand to sit: SBA  Stand pivot: SBA no AD Sit to stand: Independent  Stand to sit:  Independent  Stand pivot: Independent    Ambulation    200 feet with no AD  feet with no AD CGA >300 feet with no AD Independent    Stair negotiation: ascended and descended  NT 12 steps with 1 rail CGA >4 steps with 1 rail Modified Independent     ROM BUE:  Per OT eval   BLE:  WFL See eval    Strength BUE:  Per OT eval   BLE:  WFL See eval    Balance Sitting EOB:  Independent   Dynamic Standing:  SBA<>CGA Sitting EOB:  Independent   Dynamic Standing:  SBA<>CGA Sitting EOB:  Independent   Dynamic Standing:  Independent      Pt is A & O x 4  RASS:  0   CAM-ICU:  Negative   Sensation:  Pt denies numbness and tingling to extremities  Edema:  Unremarkable     Vitals:  Blood Pressure at rest 150/94 Blood Pressure post session -   Heart Rate at rest 90 bpm Heart Rate post session -   SPO2 at rest 98% RA SPO2 post session -     Functional Status Score-Intensive Care Unit (FSS-ICU)   Rolling 7/7   Supine to sit transfer 4/7    Unsupported sitting  7/7   Sit to stand transfers 5/7   Ambulation 4/7   Total  27     Patient education  Pt educated on PT role, safety during functional mobility, gait training, stair training     Patient response to education:   Pt verbalized understanding Pt demonstrated skill Pt requires further education in this area   Yes Yes Reinforce      Comments:  Pt received supine and agreeable to PT treatment with OT collaboration. Pt cleared for participation by RN prior to session. Vitals monitored during session. Pt requires some assistance of trunk during bed mobility today. Reports not sleeping well and very tired this AM.  BP at /76. Pt performed STS and stand pivot to chair. Assisted with gown change. Pt ambulated around unit and performed stair training. Pt became SOB and mildly diaphoretic upon completion of x12 stairs using 1 handrail. Given standing rest break in hallway. Pt returned to room and sat in chair. Vitals assessed.  bpm and SpO2 97% after mobility. Pt left under the care of OT undergoing further ADL training. RN updated on session. Discussed pt case at interdisciplinary rounds in AM.     PLAN  Pt is making good progress towards established goals. Continue PT POC.        Time in  0745  Time out  94 Stewart Galindo, PT, DPT  [id number]

## 2019-03-29 NOTE — CARE COORDINATION
3/29  Transition of care notes  Remains in icu  Needed check of frequent blood sugars  Now on po antib  Up with pt this am  Met with patient  Updated on status  Nevarez out  No needs identified  She know she will return in several weeks for another procedure  Now going to gen floor  Miki Cuellar RN Case Manager

## 2019-03-29 NOTE — PROGRESS NOTES
[address]   Department of Internal Medicine  Internal Medicine Residency Program  Resident Progress  Note      Patient:  Kash Gallagher 80 y.o. female MRN: [id number]     Date of Service: 3/29/2019    Allergy: Rocephin [ceftriaxone]  CC: F/u: Fever and back pain  Subjective       Overnight events: no    Relevant reports or input from nurses/consults/labs:  - Glucose 167 mg/dL  - For transfer per ICU team    Current status description: Doing excellently- Denies further complaints. Sitting ip in chair and enjoying breakfast. Afebrile overnight. Ambulation: walking over 200 feet yesterday. ROS: 10 systems negative, otherwise described above  Objective   Physical Exam:  · Vitals: /76   Pulse 94   Temp 97 °F (36.1 °C) (Temporal)   Resp (!) 37   Ht 5' 4\" (1.626 m)   Wt 235 lb 1.6 oz (106.6 kg)   SpO2 98%   BMI 40.35 kg/m²     I & O - 24hr: I/O this shift:  In: -   · Out: 65 [Urine:65]   · General Appearance: alert, appears stated age, cooperative and morbidly obese  · HEENT:  Head: Normocephalic, no lesions, without obvious abnormality. · Neck: supple, symmetrical, trachea midline  · Lung: clear to auscultation bilaterally  · Heart: regular rate and rhythm, S1, S2 normal, no murmur, click, rub or gallop  · Abdomen: soft, non-tender; bowel sounds normal; no masses,  no organomegaly  · Extremities:  extremities normal, atraumatic, no cyanosis or edema  · Musculokeletal: No joint swelling, no muscle tenderness. ROM normal in all joints of extremities.    · Neurologic: Mental status: Alert, oriented, thought content appropriate    Pertinent/ New Labs and Imaging Studies     CBC with Differential:    Lab Results   Component Value Date    WBC 16.9 03/29/2019    RBC 3.71 03/29/2019    HGB 11.1 03/29/2019    HCT 33.0 03/29/2019     03/29/2019    MCV 88.9 03/29/2019    MCH 29.9 03/29/2019    MCHC 33.6 03/29/2019    RDW 14.1 03/29/2019    SEGSPCT 55 02/03/2012    SEGSPCT 69 10/22/2010 DVT/GI  Protonix/Lovenox    Diet: carb control    Transfer out of ICU today     Fiorella López M.D., PGY 2    Attending physician: Dr. Vikas Duggan

## 2019-03-29 NOTE — PROGRESS NOTES
2011    screening, sigmoid polyp bx (tubular adenoma), Dr. Rosenda Feliciano, [address] [facility] COLONOSCOPY  4/3/2015    submucosal lipoma distal tranverse colon biopsied, diverticulosis, Dr. Rosenda Feliciano, [address] [facility] / REMOVAL STENT / STONE Left 3/27/2019    CYSTOSCOPY RETROGRADE PYELOGRAM STENT INSERTION performed by Fernie Rojas MD at [facility]  childhood    TUBAL LIGATION          PAST FAMILY HISTORY:    Family History   Problem Relation Age of Onset    Diabetes Mother     Heart Disease Mother     Arthritis Mother     Diabetes Father     Stroke Father     Diabetes Brother     High Cholesterol Brother     Cancer Paternal Aunt        PAST SOCIAL HISTORY:    Social History     Socioeconomic History    Marital status:      Spouse name: None    Number of children: None    Years of education: None    Highest education level: None   Occupational History    None   Social Needs    Financial resource strain: None    Food insecurity:     Worry: None     Inability: None    Transportation needs:     Medical: None     Non-medical: None   Tobacco Use    Smoking status: Former Smoker     Packs/day: 1.00     Years: 40.00     Pack years: 40.00     Types: Cigarettes     Last attempt to quit: 2011     Years since quittin.7    Smokeless tobacco: Never Used   Substance and Sexual Activity    Alcohol use: No     Alcohol/week: 0.0 oz     Comment: rarely    Drug use: No     Comment: occasional    Sexual activity: Not Currently   Lifestyle    Physical activity:     Days per week: None     Minutes per session: None    Stress: None   Relationships    Social connections:     Talks on phone: None     Gets together: None     Attends Druze service: None     Active member of club or organization: None     Attends meetings of clubs or organizations: None     Relationship status: None    Intimate partner violence:     Fear of current or ex partner: None     Emotionally abused: None     Physically abused: None     Forced sexual activity: None   Other Topics Concern    None   Social History Narrative    None       IV:    dextrose         PRN: benzonatate, opium-belladonna, formoterol, budesonide, ipratropium-albuterol, sodium chloride flush, magnesium hydroxide, ondansetron, glucose, dextrose, glucagon (rDNA), dextrose    Scheduled:    hydrochlorothiazide  12.5 mg Oral Daily    insulin lispro  0-3 Units Subcutaneous Nightly    insulin lispro  0-6 Units Subcutaneous TID WC    insulin lispro  3 Units Subcutaneous TID WC    pantoprazole  40 mg Intravenous Daily    insulin glargine  15 Units Subcutaneous QAM AC    aspirin EC  81 mg Oral Daily    folic acid  1 mg Oral Daily    [Held by provider] hydroxychloroquine  300 mg Oral Daily    rosuvastatin  5 mg Oral Nightly    sodium chloride flush  10 mL Intravenous 2 times per day    enoxaparin  40 mg Subcutaneous Daily    meropenem  1 g Intravenous Q12H       Lab Results   Component Value Date     03/29/2019    K 3.8 03/29/2019    K 3.9 03/28/2019    BUN 34 03/29/2019    CREATININE 1.1 03/29/2019        Lab Results   Component Value Date    HGB 11.1 03/29/2019    HCT 33.0 03/29/2019       Physical Exam    Skin dry, without rashes  Respirations non-labored, intact  Abdomen soft, non-tender, non-distended. Active bowel sounds.   Alert and oriented x3  Nevarez draining clear, yellow to pink urine      Assessment and Plan:  POD #1 Cystoscopy, left ureteral stent insertion, insertion of Nevarez catheter     Good urine output  Iv antibiotics - rocephin stopped due to SOB  Merrem started   Afebrile over night and hemodynamically stable   Can  transfer to floor today   Can have catheter discontinued   Advised to push fluids - 2.5 L a day   Will need left ureteroscopy, laser lithotripsy and stone removal in several weeks once infection has cleared  this can be set up as out patient  Discussed risk factors for kidney stones; high protein diet, high red meat intake. High sodium intake, caffeine intake, milk products, oxalates, High vitamin C supplements.    We will plan for stone work up 4 weeks after  stone is removed     Advised the office will call her to get her set up for second procedure which will be done as outpatient     We will s/o call if needed     EDILIA Mojica  3/29/2019  9:30 AM

## 2019-03-29 NOTE — PROGRESS NOTES
[facility]  Internal Medicine Residency / [address]      Attending Physician Statement  I have discussed the case, including pertinent history and exam findings with the resident and the team.  I have seen and examined the patient and the key elements of the encounter have been performed by me. I agree with the assessment, plan and orders as documented by the resident. Case Discussed During AM Rounds    No overnight events   Transfer from ICU today    Jazzy Handler noted in urine culture- ID following   Urology following for planned outpatient intervention    Glucose improving and monitor HCO3     Sepsis complicated by left ureteral obstructive stone with pyonephrosis    ID/ICU/Urology following   Atbs continued   S/P stent placement    Plan for 3 week follow-up for lithotripsy per op report    Continue current management     BAHMAN- improving   Pre-renal azotemia    Cr stable    Continue current management     Metabolic acidosis   Bicarb remains low   Repeat BMP this afternoon    Glucose monitoring continued     Anemia- stable   H/H stable     Thrombocytopenia- resolved     Type II DM- uncontrolled    Glucose improving   Steroids stopped    Monitor closely     Disposition- Transfer to tele          Remainder of medical problems as per resident note.       Daphnie Mora  Internal Medicine Residency Faculty

## 2019-03-29 NOTE — PROGRESS NOTES
OCCUPATIONAL THERAPY TREATMENT NOTE      Date:3/29/2019  Patient Name: Meli Lambert  MRN: [id number]  : 1950  Room: [address] [place] [employer] [address]    Evaluating OT: JEFRY Carrington/L    AM-PAC Daily Activity Raw Score: 20/24  Recommended Adaptive Equipment: to be determined; potential needs include shower chair     Comments: Based on patient's functional performance as stated above and level of assistance needed prior to admission, this therapist believes that the patient will likely have no skilled OT needs following hospital discharge. Diagnosis: UTI  Surgery: Cystoscopy, left ureteral stent insertion, insertion of Nevarez catheter 3/28/19   Pertinent Medical History: DM, HLD, HTN, obesity, OA, osteoporosis, RA, tobacco abuse, tubular adenoma of colon     Precautions:  Falls     Home Living: Pt lives with significant other in a 2 story home with ramped entry; bed/bath on main floor  Bathroom setup: walk-in shower  Equipment owned: none    Prior Level of Function: independent with ADLs and with IADLs; using no device for ambulation. 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 ..    Feeding Independent Independent       Grooming SBA standing at sink to brush teeth and brush hair; SBA dynamic standing Supervision to brush teeth at sink in standing   improve grooming/hygiene tasks to independent while standing at sink    UB Dressing Independent  To chris/doff hospital gown Independent  To chris/doff gown while seated/standing      LB Dressing SBA  To doff/chris socks while seated in bedside chair; crossover technique utilized with increased time needed    improve LB dressing to independent with AE PRN   Bathing SBA Supervision overall  Independent to wash underarms and arms with bathcloths and to wash in seated; supervision to bathe perineal areas in standing improve UB/LB bathing to independent   Toileting SBA SBA   improve toileting task and all clothing mgmt to independent   Bed Mobility  Supine to sit: Independent  Sit to supine: NT Supine to sit: Independent  Sit to supine: NT    Functional Transfers Sit to stand: SBA  Stand to sit: SBA Sit to stand: SBA  Stand to sit: SBA improve all functional transfers to independent   Functional Mobility SBA with no AE  Community distance; 1 LOB with self-correction SBA with no AE  In-room distance; further distance completed with PT improve functional mobility to independent   Balance Sitting:     Static:  Independent    Dynamic:supervision  Standing: SBA Sitting:     Static:  Independent   Dynamic:independent  Standing: Supervision demo independent dynamic sitting balance EOB during ADL tasks   Endurance/Activity Tolerance Good- Good- demo good activity tolerance/endurance during 15-20 min ADL task    Visual/  Perceptual Glasses: yes, not in room              Hand dominance: R  Edema: unremarkable    Vitals:  Blood Pressure at rest 150/94 Blood Pressure post session 147/108   Heart Rate at rest 90 bpm Heart Rate post session 96 bpm   SPO2 at rest 98% RA SPO2 post session 98% on RA                            Comments/Treatment Narrative: OK from RN to see patient. Treatment completed with PT collaboration. Upon arrival patient supine with HOB slightly elevated. Supine to sit. Sit to stand pivot to bedside chair, stand to sit. UB dressing task as noted above. STS. Functional mobility as noted above, to bedside chair, STS. Bathing tasks completed in seated /standing as above. STS and walked to sink, grooming tasks in standing at sink as noted above. Functional mobility to bedside chair. Stand to sit. Patient properly positioned using pillows and bed mechanics to improve interaction with environment, overall functioning and decrease/prevent edema and contractures. After session, patient in position as stated above with all devices within reach, all lines and tubes intact, nursing notified. Pt required cues and education as noted above for safe facilitation and completion of tasks. During functional activites and ADLs pt educated on proper hand placement, safety technique, sequencing, and energy conservation techniques. Therapist provided skilled monitoring of HR, O2 saturation, blood pressure and patient's response during treatment session. Prior to and at the end of session, environmental modifications/line management completed for patients safety and efficiency of treatment session. Patient has made fair progress toward set goals, continue with POC.     60 min timed tx     Tx time in: 0583  Tx time out: [address] Ryder Hernandez, OTR/L  [id number]

## 2019-03-30 VITALS
DIASTOLIC BLOOD PRESSURE: 69 MMHG | WEIGHT: 243.83 LBS | TEMPERATURE: 98.3 F | OXYGEN SATURATION: 99 % | BODY MASS INDEX: 41.63 KG/M2 | HEART RATE: 79 BPM | RESPIRATION RATE: 18 BRPM | HEIGHT: 64 IN | SYSTOLIC BLOOD PRESSURE: 141 MMHG

## 2019-03-30 LAB
ANION GAP SERPL CALCULATED.3IONS-SCNC: 11 MMOL/L (ref 7–16)
ATYPICAL LYMPHOCYTE RELATIVE PERCENT: 1.7 % (ref 0–4)
BASOPHILIC STIPPLING: ABNORMAL
BASOPHILS ABSOLUTE: 0 E9/L (ref 0–0.2)
BASOPHILS RELATIVE PERCENT: 0.5 % (ref 0–2)
BUN BLDV-MCNC: 29 MG/DL (ref 8–23)
CALCIUM SERPL-MCNC: 8.1 MG/DL (ref 8.6–10.2)
CHLORIDE BLD-SCNC: 104 MMOL/L (ref 98–107)
CO2: 19 MMOL/L (ref 22–29)
CREAT SERPL-MCNC: 1.1 MG/DL (ref 0.5–1)
EOSINOPHILS ABSOLUTE: 0 E9/L (ref 0.05–0.5)
EOSINOPHILS RELATIVE PERCENT: 0.3 % (ref 0–6)
GFR AFRICAN AMERICAN: 60
GFR NON-AFRICAN AMERICAN: 49 ML/MIN/1.73
GLUCOSE BLD-MCNC: 93 MG/DL (ref 74–99)
HCT VFR BLD CALC: 41.2 % (ref 34–48)
HEMOGLOBIN: 13.4 G/DL (ref 11.5–15.5)
LYMPHOCYTES ABSOLUTE: 1.57 E9/L (ref 1.5–4)
LYMPHOCYTES RELATIVE PERCENT: 8.7 % (ref 20–42)
MCH RBC QN AUTO: 29.7 PG (ref 26–35)
MCHC RBC AUTO-ENTMCNC: 32.5 % (ref 32–34.5)
MCV RBC AUTO: 91.4 FL (ref 80–99.9)
METER GLUCOSE: 101 MG/DL (ref 74–99)
METER GLUCOSE: 143 MG/DL (ref 74–99)
MONOCYTES ABSOLUTE: 0.63 E9/L (ref 0.1–0.95)
MONOCYTES RELATIVE PERCENT: 4.3 % (ref 2–12)
MYELOCYTE PERCENT: 0.9 % (ref 0–0)
NEUTROPHILS ABSOLUTE: 13.35 E9/L (ref 1.8–7.3)
NEUTROPHILS RELATIVE PERCENT: 84.3 % (ref 43–80)
PDW BLD-RTO: 14 FL (ref 11.5–15)
PLATELET # BLD: 205 E9/L (ref 130–450)
PMV BLD AUTO: 10 FL (ref 7–12)
POIKILOCYTES: ABNORMAL
POLYCHROMASIA: ABNORMAL
POTASSIUM SERPL-SCNC: 4.2 MMOL/L (ref 3.5–5)
RBC # BLD: 4.51 E12/L (ref 3.5–5.5)
SODIUM BLD-SCNC: 134 MMOL/L (ref 132–146)
SPHEROCYTES: ABNORMAL
WBC # BLD: 15.7 E9/L (ref 4.5–11.5)

## 2019-03-30 PROCEDURE — 2580000003 HC RX 258: Performed by: UROLOGY

## 2019-03-30 PROCEDURE — 80048 BASIC METABOLIC PNL TOTAL CA: CPT

## 2019-03-30 PROCEDURE — 6360000002 HC RX W HCPCS: Performed by: INTERNAL MEDICINE

## 2019-03-30 PROCEDURE — 6370000000 HC RX 637 (ALT 250 FOR IP): Performed by: INTERNAL MEDICINE

## 2019-03-30 PROCEDURE — 6360000002 HC RX W HCPCS: Performed by: UROLOGY

## 2019-03-30 PROCEDURE — 6370000000 HC RX 637 (ALT 250 FOR IP): Performed by: UROLOGY

## 2019-03-30 PROCEDURE — 82962 GLUCOSE BLOOD TEST: CPT

## 2019-03-30 PROCEDURE — 94640 AIRWAY INHALATION TREATMENT: CPT

## 2019-03-30 PROCEDURE — 36415 COLL VENOUS BLD VENIPUNCTURE: CPT

## 2019-03-30 PROCEDURE — C9113 INJ PANTOPRAZOLE SODIUM, VIA: HCPCS | Performed by: INTERNAL MEDICINE

## 2019-03-30 PROCEDURE — 85025 COMPLETE CBC W/AUTO DIFF WBC: CPT

## 2019-03-30 PROCEDURE — 99238 HOSP IP/OBS DSCHRG MGMT 30/<: CPT | Performed by: INTERNAL MEDICINE

## 2019-03-30 RX ORDER — LEVOFLOXACIN 750 MG/1
750 TABLET ORAL DAILY
Qty: 7 TABLET | Refills: 0 | Status: SHIPPED | OUTPATIENT
Start: 2019-03-31 | End: 2019-04-04 | Stop reason: SDUPTHER

## 2019-03-30 RX ORDER — LEVOFLOXACIN 750 MG/1
750 TABLET ORAL DAILY
Qty: 5 TABLET | Refills: 0 | Status: SHIPPED | OUTPATIENT
Start: 2019-03-31 | End: 2019-03-30

## 2019-03-30 RX ADMIN — INSULIN GLARGINE 12 UNITS: 100 INJECTION, SOLUTION SUBCUTANEOUS at 06:37

## 2019-03-30 RX ADMIN — ASPIRIN 81 MG: 81 TABLET ORAL at 09:39

## 2019-03-30 RX ADMIN — INSULIN LISPRO 3 UNITS: 100 INJECTION, SOLUTION INTRAVENOUS; SUBCUTANEOUS at 13:07

## 2019-03-30 RX ADMIN — PANTOPRAZOLE SODIUM 40 MG: 40 INJECTION, POWDER, FOR SOLUTION INTRAVENOUS at 09:40

## 2019-03-30 RX ADMIN — ENOXAPARIN SODIUM 40 MG: 40 INJECTION, SOLUTION INTRAVENOUS; SUBCUTANEOUS at 09:39

## 2019-03-30 RX ADMIN — HYDROCHLOROTHIAZIDE 12.5 MG: 12.5 TABLET ORAL at 09:39

## 2019-03-30 RX ADMIN — FORMOTEROL FUMARATE DIHYDRATE 20 MCG: 20 SOLUTION RESPIRATORY (INHALATION) at 08:21

## 2019-03-30 RX ADMIN — BUDESONIDE 250 MCG: 0.25 SUSPENSION RESPIRATORY (INHALATION) at 08:22

## 2019-03-30 RX ADMIN — LEVOFLOXACIN 750 MG: 750 TABLET, FILM COATED ORAL at 09:39

## 2019-03-30 RX ADMIN — INSULIN LISPRO 1 UNITS: 100 INJECTION, SOLUTION INTRAVENOUS; SUBCUTANEOUS at 13:05

## 2019-03-30 RX ADMIN — Medication 10 ML: at 09:40

## 2019-03-30 RX ADMIN — IPRATROPIUM BROMIDE AND ALBUTEROL SULFATE 1 AMPULE: .5; 3 SOLUTION RESPIRATORY (INHALATION) at 08:21

## 2019-03-30 ASSESSMENT — PAIN SCALES - GENERAL: PAINLEVEL_OUTOF10: 0

## 2019-03-30 NOTE — PROGRESS NOTES
[address] [facility]  Department of Internal Medicine  Internal Medicine Residency Program  Resident Progress  Note      Patient:  Devon Gallagher 80 y.o. female MRN: [id number]     Date of Service: 3/30/2019    Allergy: Rocephin [ceftriaxone]  CC: F/u: Fever and back pain  Subjective       The patient seen and examined AM. She currently denies any active complaints. Says wants to go home. ROS: 10 systems negative, otherwise described above  Objective   Physical Exam:  · Vitals: BP (!) 141/69   Pulse 79   Temp 98.3 °F (36.8 °C) (Temporal)   Resp 18   Ht 5' 4\" (1.626 m)   Wt 243 lb 13.3 oz (110.6 kg)   SpO2 99%   BMI 41.85 kg/m²     · I & O - 24hr: No intake/output data recorded. · General Appearance: alert, appears stated age, cooperative and morbidly obese  · HEENT:  Head: Normocephalic, no lesions, without obvious abnormality. · Neck: supple, symmetrical, trachea midline  · Lung: clear to auscultation bilaterally  · Heart: regular rate and rhythm, S1, S2 normal, no murmur, click, rub or gallop  · Abdomen: soft, non-tender; bowel sounds normal; no masses,  no organomegaly  · Extremities:  extremities normal, atraumatic, no cyanosis or edema  · Musculokeletal: No joint swelling, no muscle tenderness. ROM normal in all joints of extremities.    · Neurologic: Mental status: Alert, oriented, thought content appropriate    Pertinent/ New Labs and Imaging Studies     CBC with Differential:    Lab Results   Component Value Date    WBC 15.7 03/30/2019    RBC 4.51 03/30/2019    HGB 13.4 03/30/2019    HCT 41.2 03/30/2019     03/30/2019    MCV 91.4 03/30/2019    MCH 29.7 03/30/2019    MCHC 32.5 03/30/2019    RDW 14.0 03/30/2019    SEGSPCT 55 02/03/2012    SEGSPCT 69 10/22/2010    LYMPHOPCT 8.7 03/30/2019    MONOPCT 4.3 03/30/2019    MYELOPCT 0.9 03/30/2019    EOSPCT 1 10/22/2010    BASOPCT 0.5 03/30/2019    MONOSABS 0.63 03/30/2019    LYMPHSABS 1.57 03/30/2019    EOSABS 0.00 03/30/2019    BASOSABS 0.00 03/30/2019     CMP:    Lab Results   Component Value Date     03/30/2019    K 4.2 03/30/2019    K 3.9 03/28/2019     03/30/2019    CO2 19 03/30/2019    BUN 29 03/30/2019    CREATININE 1.1 03/30/2019    GFRAA 60 03/30/2019    LABGLOM 49 03/30/2019    GLUCOSE 93 03/30/2019    GLUCOSE 125 04/05/2012    PROT 6.8 03/27/2019    LABALBU 3.7 03/27/2019    LABALBU 4.3 03/25/2011    CALCIUM 8.1 03/30/2019    BILITOT 1.2 03/27/2019    ALKPHOS 82 03/27/2019    AST 31 03/27/2019    ALT 24 03/27/2019       Radiology:  CT ABDOMEN PELVIS WO CONTRAST Additional Contrast  Nonobstructing stones left kidney. No stones in the right kidney or in the  right ureter and no hydronephrosis on the right. No hydronephrosis left kidney due to an obstructing 4 mm stone in the proximal left ureter. Resident's Assessment and PLan     Urosepis/pyelonephritis 2/2 obstructive uropathy   S/p urethral stent placement for 4 mm stone in the left ureter  E.coli in the urine   ID switched antibiotics to Levaquin and okay to discharge with 7 days of Levaquin  Urology outpatient for stent exchange & lithotripsy - f/u note     Acute hypoxic respiratory failure   Underlying mild COPD/Emphysema- resolved     Type 2 DM   On Metformin at home, holding 2/2 contrast during procedure  Fairly controlled now  Carb control diet  Continue Metformin on discharge    BAHMAN- Pre renal and with component of obstructive uropathy  Currently stable    Thrombocytopenia - resolved  Likely 2/2 sepsis    Hx of RA  No recent flares  Plaquenil to resume on discharge    DVT/GI  Protonix/Lovenox    Diet: carb control    Discharge to home today    Reilly Garcia M.D., PGY 3    Attending physician: Dr. Shirlyn Soulier, [address] [facility]      Attending Physician Statement  I have discussed the case, including pertinent history and exam findings with the resident. I have seen and examined the patient and the key elements of the encounter have been performed by me.   I agree with

## 2019-03-30 NOTE — PLAN OF CARE
Problem: Discharge Planning:  Goal: Discharged to appropriate level of care  Description  Discharged to appropriate level of care  Outcome: Met This Shift     Problem: Gas Exchange - Impaired:  Goal: Levels of oxygenation will improve  Description  Levels of oxygenation will improve  Outcome: Met This Shift

## 2019-03-31 ENCOUNTER — CARE COORDINATION (OUTPATIENT)
Dept: CASE MANAGEMENT | Age: 69
End: 2019-03-31

## 2019-03-31 DIAGNOSIS — N39.0 URINARY TRACT INFECTION WITH HEMATURIA, SITE UNSPECIFIED: Primary | ICD-10-CM

## 2019-03-31 DIAGNOSIS — R31.9 URINARY TRACT INFECTION WITH HEMATURIA, SITE UNSPECIFIED: Primary | ICD-10-CM

## 2019-03-31 PROCEDURE — 1111F DSCHRG MED/CURRENT MED MERGE: CPT | Performed by: INTERNAL MEDICINE

## 2019-04-01 ENCOUNTER — TELEPHONE (OUTPATIENT)
Dept: INTERNAL MEDICINE | Age: 69
End: 2019-04-01

## 2019-04-01 LAB
BLOOD CULTURE, ROUTINE: NORMAL
BLOOD CULTURE, ROUTINE: NORMAL
CULTURE, BLOOD 2: NORMAL

## 2019-04-01 NOTE — TELEPHONE ENCOUNTER
Called patient- discussed after review with Dr. Eddie Yeh- late entry 1/4 blood cultures + for GNR- likely Ecoli- which noted in urine. Admitted for sepsis secondary to obstructed stone with ECOLI positive. Following with ID. Will discuss with Dr. Lloyd Burgos for any additional recommendations. Follow-up appt to be scheduled as post-hospital follow-up tomorrow 1 pm prior to originally scheduled Rheum appt at 2:30 PM. Patient verbalized understanding.

## 2019-04-02 ENCOUNTER — OFFICE VISIT (OUTPATIENT)
Dept: RHEUMATOLOGY | Age: 69
End: 2019-04-02
Payer: MEDICARE

## 2019-04-02 ENCOUNTER — OFFICE VISIT (OUTPATIENT)
Dept: INTERNAL MEDICINE | Age: 69
End: 2019-04-02
Payer: MEDICARE

## 2019-04-02 VITALS
RESPIRATION RATE: 20 BRPM | WEIGHT: 217 LBS | HEIGHT: 64 IN | BODY MASS INDEX: 37.05 KG/M2 | HEART RATE: 90 BPM | SYSTOLIC BLOOD PRESSURE: 130 MMHG | TEMPERATURE: 97.8 F | DIASTOLIC BLOOD PRESSURE: 80 MMHG

## 2019-04-02 VITALS
HEART RATE: 90 BPM | BODY MASS INDEX: 37.05 KG/M2 | RESPIRATION RATE: 18 BRPM | WEIGHT: 217 LBS | SYSTOLIC BLOOD PRESSURE: 130 MMHG | DIASTOLIC BLOOD PRESSURE: 80 MMHG | TEMPERATURE: 97.8 F | HEIGHT: 64 IN

## 2019-04-02 DIAGNOSIS — N20.0 URINARY TRACT OBSTRUCTION BY KIDNEY STONE: ICD-10-CM

## 2019-04-02 DIAGNOSIS — E66.01 OBESITY, CLASS III, BMI 40-49.9 (MORBID OBESITY) (HCC): ICD-10-CM

## 2019-04-02 DIAGNOSIS — N13.6 ACUTE PYONEPHROSIS: ICD-10-CM

## 2019-04-02 DIAGNOSIS — M81.0 OSTEOPOROSIS, UNSPECIFIED OSTEOPOROSIS TYPE, UNSPECIFIED PATHOLOGICAL FRACTURE PRESENCE: ICD-10-CM

## 2019-04-02 DIAGNOSIS — M05.79 RHEUMATOID ARTHRITIS INVOLVING MULTIPLE SITES WITH POSITIVE RHEUMATOID FACTOR (HCC): ICD-10-CM

## 2019-04-02 DIAGNOSIS — N13.8 URINARY TRACT OBSTRUCTION BY KIDNEY STONE: ICD-10-CM

## 2019-04-02 DIAGNOSIS — A41.51 SEPSIS DUE TO ESCHERICHIA COLI (HCC): Primary | ICD-10-CM

## 2019-04-02 DIAGNOSIS — M06.9 RHEUMATOID ARTHRITIS INVOLVING MULTIPLE SITES, UNSPECIFIED RHEUMATOID FACTOR PRESENCE: Primary | ICD-10-CM

## 2019-04-02 DIAGNOSIS — Z79.899 HIGH RISK MEDICATIONS (NOT ANTICOAGULANTS) LONG-TERM USE: ICD-10-CM

## 2019-04-02 DIAGNOSIS — N17.9 AKI (ACUTE KIDNEY INJURY) (HCC): ICD-10-CM

## 2019-04-02 DIAGNOSIS — E87.20 METABOLIC ACIDOSIS: ICD-10-CM

## 2019-04-02 LAB — ORGANISM: ABNORMAL

## 2019-04-02 PROCEDURE — 99495 TRANSJ CARE MGMT MOD F2F 14D: CPT | Performed by: INTERNAL MEDICINE

## 2019-04-02 PROCEDURE — 99214 OFFICE O/P EST MOD 30 MIN: CPT | Performed by: INTERNAL MEDICINE

## 2019-04-02 PROCEDURE — 1111F DSCHRG MED/CURRENT MED MERGE: CPT | Performed by: INTERNAL MEDICINE

## 2019-04-02 PROCEDURE — 99212 OFFICE O/P EST SF 10 MIN: CPT | Performed by: INTERNAL MEDICINE

## 2019-04-02 RX ORDER — METHOTREXATE 2.5 MG/1
10 TABLET ORAL WEEKLY
Qty: 13 TABLET | Refills: 2 | Status: SHIPPED | OUTPATIENT
Start: 2019-04-02 | End: 2019-06-04 | Stop reason: SDUPTHER

## 2019-04-02 RX ORDER — HYDROXYCHLOROQUINE SULFATE 200 MG/1
200 TABLET, FILM COATED ORAL DAILY
Qty: 30 TABLET | Refills: 3 | Status: SHIPPED | OUTPATIENT
Start: 2019-04-02 | End: 2019-06-04 | Stop reason: SDUPTHER

## 2019-04-02 ASSESSMENT — ENCOUNTER SYMPTOMS
NAUSEA: 1
SHORTNESS OF BREATH: 0
CONSTIPATION: 0
WHEEZING: 0
BLOOD IN STOOL: 0
COUGH: 0
VOMITING: 0
ABDOMINAL DISTENTION: 0
DIARRHEA: 0

## 2019-04-02 ASSESSMENT — PATIENT HEALTH QUESTIONNAIRE - PHQ9
SUM OF ALL RESPONSES TO PHQ QUESTIONS 1-9: 0
1. LITTLE INTEREST OR PLEASURE IN DOING THINGS: 0
SUM OF ALL RESPONSES TO PHQ9 QUESTIONS 1 & 2: 0
SUM OF ALL RESPONSES TO PHQ QUESTIONS 1-9: 0
2. FEELING DOWN, DEPRESSED OR HOPELESS: 0

## 2019-04-02 NOTE — PATIENT INSTRUCTIONS
Patient Education        Preventing Falls: Care Instructions  Your Care Instructions    Getting around your home safely can be a challenge if you have injuries or health problems that make it easy for you to fall. Loose rugs and furniture in walkways are among the dangers for many older people who have problems walking or who have poor eyesight. People who have conditions such as arthritis, osteoporosis, or dementia also have to be careful not to fall. You can make your home safer with a few simple measures. Follow-up care is a key part of your treatment and safety. Be sure to make and go to all appointments, and call your doctor if you are having problems. It's also a good idea to know your test results and keep a list of the medicines you take. How can you care for yourself at home? Taking care of yourself  · You may get dizzy if you do not drink enough water. To prevent dehydration, drink plenty of fluids, enough so that your urine is light yellow or clear like water. Choose water and other caffeine-free clear liquids. If you have kidney, heart, or liver disease and have to limit fluids, talk with your doctor before you increase the amount of fluids you drink. · Exercise regularly to improve your strength, muscle tone, and balance. Walk if you can. Swimming may be a good choice if you cannot walk easily. · Have your vision and hearing checked each year or any time you notice a change. If you have trouble seeing and hearing, you might not be able to avoid objects and could lose your balance. · Know the side effects of the medicines you take. Ask your doctor or pharmacist whether the medicines you take can affect your balance. Sleeping pills or sedatives can affect your balance. · Limit the amount of alcohol you drink. Alcohol can impair your balance and other senses. · Ask your doctor whether calluses or corns on your feet need to be removed.  If you wear loose-fitting shoes because of calluses or corns, that will allow you to sit while showering. · Get into a tub or shower by putting the weaker leg in first. Get out of a tub or shower with your strong side first.  · Repair loose toilet seats and consider installing a raised toilet seat to make getting on and off the toilet easier. · Keep your bathroom door unlocked while you are in the shower. Where can you learn more? Go to [email or web address] org and sign in to your [employer] account. Enter [phone] [id number] [phone] in the [employer] box to learn more about \"Preventing Falls: Care Instructions. \"     If you do not have an account, please click on the \"Sign Up Now\" link. Current as of: March 15, 2018  Content Version: 11.9  © 1343-5402 [employer]. Care instructions adapted under license by [facility] ([facility]). If you have questions about a medical condition or this instruction, always ask your healthcare professional. [address] any warranty or liability for your use of this information. continue Levaquin until prescription completed as instructed  continue all other medications as instructed. Have blood work done prior to next visit on Friday as instructed  Follow up as scheduled with urology and   Please call if any questions or concerns.

## 2019-04-02 NOTE — PROGRESS NOTES
Post-Discharge Transitional Care Management Services or Hospital Follow Up      Godwin Gallagher   YOB: 1950    Date of Office Visit:  4/2/2019  Date of Hospital Admission: 3/26/19  Date of Hospital Discharge: 3/30/19  Readmission Risk Score(high >=14%.  Medium >=10%):Readmission Risk Score: 16      Care management risk score Rising risk (score 2-5) and Complex Care (Scores >=6): 3     Non face to face  following discharge, date last encounter closed (first attempt may have been earlier): 4/1/2019  3:46 PM 4/1/2019  3:46 PM    Call initiated 2 business days of discharge: Yes     Patient Active Problem List   Diagnosis    DM II (diabetes mellitus, type II), controlled ([facility]    Tobacco abuse, in remission    Hyperlipidemia LDL goal <100    GERD (gastroesophageal reflux disease)    Vitamin D deficiency    HTN, goal below 130/80    Back pain    Osteoporosis    [facility] arthritis involving multiple sites [facility]    History of colon polyps    Lipoma of colon    Diverticulosis of colon    Obesity, Class III, BMI 40-49.9 (morbid obesity) ([facility]    Need for prophylactic vaccination against diphtheria-tetanus-pertussis (DTP)    UTI (urinary tract infection)    Respiratory distress       Allergies   Allergen Reactions    Rocephin [Ceftriaxone] Shortness Of Breath     Bronchospasm 3/27 and hypotension immediately after ceftriaxone       Medications listed as ordered at the time of discharge from hospital   [place], [address] Medication Instructions [id number]    Printed on:04/02/19 6636   Medication Information                      aspirin EC 81 MG EC tablet  Take 1 tablet by mouth daily             Biotin 300 MCG TABS  Take 1 tablet by mouth daily             Cholecalciferol (VITAMIN D) 2000 units CAPS capsule  Take 2,000 Units by mouth 2 times daily             Coenzyme Q10 (CO Q 10 PO)  Take 1 tablet by mouth daily QUINOL              folic acid (FOLVITE) 1 MG tablet  Take 1 tablet by mouth daily             hydroxychloroquine (PLAQUENIL) 200 MG tablet  TAKE 1 AND 1/2 TABLETS EVERY DAY             Lancets MISC  Test twice daily  Give what insurance covers             levofloxacin (LEVAQUIN) 750 MG tablet  Take 1 tablet by mouth daily for 7 days             lisinopril (PRINIVIL;ZESTRIL) 20 MG tablet  Take 1 tablet by mouth daily             magnesium cl-calcium carbonate (SLOW-MAG) 71.5-119 MG TBEC tablet  take 2 tablets once daily             metFORMIN (GLUCOPHAGE) 850 MG tablet  Take 1 tablet by mouth 2 times daily (with meals)             methotrexate (RHEUMATREX) 2.5 MG chemo tablet  TAKE 7 TABLETS EVERY WEEK             Misc. Devices MISC  1 each by Does not apply route once as needed Glucometer as covered by [employer]. Devices MISC  Please give one glucose monitor accuchek or what is covered bu her insurance.              rosuvastatin (CRESTOR) 5 MG tablet  take 1 tablet by mouth once daily             VENTOLIN  (90 Base) MCG/ACT inhaler  inhale 2 puffs by mouth four times a day if needed                   Medications marked \"taking\" at this time  Outpatient Medications Marked as Taking for the 4/2/19 encounter (Office Visit) with Gama Dennis MD   Medication Sig Dispense Refill    levofloxacin (LEVAQUIN) 750 MG tablet Take 1 tablet by mouth daily for 7 days 7 tablet 0    VENTOLIN  (90 Base) MCG/ACT inhaler inhale 2 puffs by mouth four times a day if needed 1 Inhaler 2    rosuvastatin (CRESTOR) 5 MG tablet take 1 tablet by mouth once daily 90 tablet 1    metFORMIN (GLUCOPHAGE) 850 MG tablet Take 1 tablet by mouth 2 times daily (with meals) 180 tablet 1    magnesium cl-calcium carbonate (SLOW-MAG) 71.5-119 MG TBEC tablet take 2 tablets once daily 60 tablet 1    lisinopril (PRINIVIL;ZESTRIL) 20 MG tablet Take 1 tablet by mouth daily 90 tablet 1    folic acid (FOLVITE) 1 MG tablet Take 1 tablet by mouth daily 90 tablet 0    Biotin 300 MCG TABS Take 1 tablet by mouth daily 30 tablet 2    Cholecalciferol (VITAMIN D) 2000 units CAPS capsule Take 2,000 Units by mouth 2 times daily (Patient taking differently: Take 4,000 Units by mouth daily ) 60 capsule 2    Lancets MISC Test twice daily  Give what insurance covers 100 each 5    aspirin EC 81 MG EC tablet Take 1 tablet by mouth daily 90 tablet 1    Coenzyme Q10 (CO Q 10 PO) Take 1 tablet by mouth daily QUINOL           Medications patient taking as of now reconciled against medications ordered at time of hospital discharge: Yes    Chief Complaint   Patient presents with   [facility]       HPI    Inpatient course: Discharge summary reviewed- see chart. Interval history/Current status: Admitted for acute sepsis secondary to obstructed renal stone s/p ureteral stenting. Atbs continued- on levaquin with + Marvin Speedy in urine and 1/4 blood cultures. Reviewed sensitivities and + sensitivity of ecoli in blood/urine to levaquin. Tolerating atb- slight nausea. Drinking plenty of fluids. No difficulty urinating; slight irritation after sims catheter- rapidly improving. Plan for intervention on 4/14 by Dr. Carolina Khan. Spoke with Dr. Raciel Parekh and reviewed cultures- instructed to continued levaquin with stop date on 4/7/19. Review of Systems   Constitutional: Negative for chills and fever. Respiratory: Negative for cough, shortness of breath and wheezing. Cardiovascular: Negative for chest pain, palpitations and leg swelling. Gastrointestinal: Positive for nausea (intermittently ). Negative for abdominal distention, blood in stool, constipation, diarrhea and vomiting. Genitourinary: Negative for difficulty urinating, dyspareunia, dysuria, flank pain, hematuria and urgency. Neurological: Negative for dizziness and headaches.        Vitals:    04/02/19 1302 04/02/19 1316   BP: (!) 140/80 130/80   Site: Left Upper Arm    Position: Sitting    Cuff Size: Large Adult    Pulse: 90    Resp: 20    Temp: 97.8 °F (36.6 °C)    TempSrc: Oral    Weight: 217 lb (98.4 kg)    Height: 5' 4\" (1.626 m)      Body mass index is 37.25 kg/m². Wt Readings from Last 3 Encounters:   04/02/19 217 lb (98.4 kg)   03/30/19 243 lb 13.3 oz (110.6 kg)   03/04/19 227 lb (103 kg)     BP Readings from Last 3 Encounters:   04/02/19 130/80   03/30/19 (!) 141/69   03/27/19 124/75       Physical Exam   Constitutional: She is oriented to person, place, and time. No distress. Cardiovascular: Normal rate, regular rhythm, normal heart sounds and intact distal pulses. Exam reveals no friction rub. No murmur heard. Pulmonary/Chest: Effort normal and breath sounds normal. She has no wheezes. She has no rales. Abdominal: Soft. Bowel sounds are normal. There is no tenderness. There is CVA tenderness (No significant CVA tenderness on exam ). There is no rigidity and no guarding. Neurological: She is alert and oriented to person, place, and time. Skin: Skin is warm and dry. She is not diaphoretic. Psychiatric: She has a normal mood and affect. Assessment/Plan:  1. Sepsis due to Escherichia coli [facility])  Improving  Continue levaquin to stop date  Maintain follow-up with Urology   Rheum appt today to discuss RA management   Continue current management  Repeat BMP/CBC for persistent leukocytosis and acidosis prior to discharge secondary to diagnosis. - CBC WITH AUTO DIFFERENTIAL; Future  - Basic Metabolic Panel; Future    2. Acute pyonephrosis  Stable- plan for intervention in 2 weeks     3. Urinary tract obstruction by kidney stone  As above     4. BAHMAN (acute kidney injury) ([address]  Improving  Cr. 1.1 upon DC   Continue current management  Oral hydration  Repeat BMP on Friday   - Basic Metabolic Panel; Future    5. Metabolic acidosis  As above   - Basic Metabolic Panel;  Future        Medical Decision Making: moderate complexity

## 2019-04-02 NOTE — PROGRESS NOTES
J O I N T  C A R E  C L I N I C        The patient is seen in follow-up for:  Rheumatoid  Arthritis   missed appointments-- ran out of meds 3 weeks ago MTX    Then got sick one week ago   Urosepis/pyelonephritis 2/2 obstructive uropathy   S/p urethral stent placement for 4 mm stone in the left ureter  E.coli in the urine   [facility]  Urology outpatient for stent exchange & lithotripsy - f/u next week     BAHMAN- Pre renal and with component of obstructive uropathy  Pus drained-by stent  Acute hypoxic respiratory failure   Underlying mild COPD/Emphysema- resolved     At this time stopped HCQ  But was on steroids in hosptial   Hx of RA  No recent flares  Plaquenil to resume on discharge  Joints are doing stable right now -- off meds 1-3 weeks  bilateral sore hands    Am stiffness 10-15min  Seen ortho -- dr. Yane Ortiz going to him anymore\"  -- knee better  Last visit --Mild flare of hands- Lwrist             Patient History Update Since Previous Visit      Yes No Comment      New illnesses x  Hospitalize for renal calculi with pus, sepsis      Seen other healthcare provider x  Dr. Valadez Single x-ray, labs, or procedures x  Had Surgery      Started / changed / stopped medications x  Methrexate and Plaquinel stopped      New allergies / reactions to medications x  ?  Antibiotic IV unable to breathe      Changes in family medical history  x       Changes in patient social history  x       Morning stiffness x  Length:  15 miinutes      Worst Joint: low back and hips      Review of Systems      Key:    1  Not present today;  2  Much Better;  3  Better;  4  Same;  5  Worse;  N  New Problem          Rating  Rating   Joint pain (not including back pain) 4 Skin ulcers: 1   Joint swellin Bruisin   Fatigue: 4 Sickness or rash with sun exposure 1   Muscle aches: 4 Swollen glands 1   Ongoing fever: 3 Dry eyes 3   Unintended weight loss: 1 Painful red eyes 1   Migraine headache: 1 Dry mouth 2   Difficulty sleepin Mouth sores 1   Snore or gag at night: 1 Upset stomach n   Heart palpitations: 1 Diarrhea 1   Cough: 4 Depressed mood 1   Shortness of breath: 1 Overall stress level in life 4   Pain with breathing 1 Overall assessment 4   Skin rash 1         Abbreviated Exam    System    nl   abn   Comment     1 Gen nutrition/hygiene x      appearance x     2 Skin turgor / integrity x      rash x      ecchymosis x     3 Psych orientation x      memory x      mood x      cooperative x     4 HENT mouth / throat x     5 Resp resp. effort x      auscultation x     6 CV heart auscultation x      extremity edema x     7 Other             Joint Assessment      Patient Right     Patient Left     x (check if no synovitis seen on exam)   Joint Pain Swelling Joint Pain Swelling   Shoulder   Shoulder     Elbow   Elbow     Wrist   Wrist     Knee   Knee     MCP I   MCP I     MCP II   MCP II     MCP III   MCP III     MCP IV   MCP IV     MCP V   MCP V     IP I   IP I     PIP II   PIP II     PIP III   PIP III     PIP IV   PIP IV     PIP V   PIP V             Generalized Ligament Laxity Prior Test / Diagnostics    Yes  No  Equivocal        Tender Points     Yes s No  Equivocal         GTB pain / tenderness     SI pain / tenderness     Heberdens Nodes         Impression     Rheumatoid  Arthritis   missed appointments-- ran out of meds 3 weeks ago MTX  --non compliance fu --then got sick      Hospitalization--then got sick one week ago   Urosepis/pyelonephritis 2/2 obstructive uropathy   S/p urethral stent placement for 4 mm stone in the left ureter  E.coli in the urine   ID switched antibiotics to Levaquin 4/7 days left of [facility]  Urology outpatient for stent exchange & lithotripsy - f/u next week     BAHMAN- Pre renal and with component of obstructive uropathy  Pus drained-by stent  Acute hypoxic respiratory failure   Underlying mild COPD/Emphysema- resolved         RA --Mild flare of hands-- left wrist  +RA  +CCP +RF   Inflammatory arthritis -stable off MTX [id number]  At this time stopped HCQ  But was on steroids in hosptial   Hx of RA  No recent flares  Plaquenil to resume on discharge  Joints are doing stable right now -- off meds 1-3 weeks  bilateral sore hands    Am stiffness 10-15min  Seen ortho -- dr. Ceballos Fast going to him anymore\"  -- knee better  Last visit --Mild flare of hands- Lwrist      High risk meds- CBC, liver stable,  Cr. 0.6  Inc liver in past, borderline--but since lost weight  Liver alt 24, AST 31 in March     dm2-aic 8.2  Down to 6.0,inc bmi  -- ?fatty liver-- dm2 stable   lost weight intentional then got sick-- now 217 (total 30 lbs)  Osteoporosis-- self stopped alendronate 6 months ago  There is severe osteopenia in the femoral necks  -2.2 Tscore  -- discussed risk/benefits at length  Vit D am, calcium at night-- inconsistent use of alendronate--dicssussed reassess in future, on fu dexa       Plan     Resume  MTX at lower dose 10mg /week--after finishing abx  Resume hcq 200mg daily    (OK to defer restarting alendronate till later reassess)    Labs prior to return    Fu June 4th        Counseling and Coordination of Care    Prognosis  Prior Authorization       x Risk / Benefits of RX  Disability Forms        Compliance  Discussion and / or letter to other health care provider         Risk Reduction     x More than half of the face-to-face time with the patient was spent  in counseling or coordinating care    Exercise           Brochure / Handout      Other:    Referral:

## 2019-04-02 NOTE — PROGRESS NOTES
Patient verbalized understanding of office instructions. She will call with questions or concerns. She was given discharge instructions, and scripts for Lab work to be done. All questions were fully answered.   Printed AVS given

## 2019-04-03 ENCOUNTER — CARE COORDINATION (OUTPATIENT)
Dept: CASE MANAGEMENT | Age: 69
End: 2019-04-03

## 2019-04-03 NOTE — CARE COORDINATION
[facility] Transitions Follow Up Call    4/3/2019    Patient: Paula Gallagher  Patient : 1950   MRN: [id number]  Reason for Admission: UTI, sepsis, s/p cysto with stent placement 3/27/19  Discharge Date: 3/30/19 RARS: Readmission Risk Score: 12    Spoke with: [facility] Transitions Subsequent and Final Call    Subsequent and Final Calls  Do you have any ongoing symptoms?:  No  Have your medications changed?:  No  Do you have any questions related to your medications?:  No  Do you currently have any active services?:  No  Do you have any needs or concerns that I can assist you with?:  No  Identified Barriers:  None  Care Transitions Interventions  No Identified Needs  Other Interventions:          Spoke with Blanquita Mora for follow up care transition call. She reports that she is continuing to feel \"good\". She reports mild \"tingling\" with urination but denies any pain, urgency, or frequency. She is currently napping and denies any concerns or needs at this time, she is agreeable to follow up next week.      Follow Up  Future Appointments   Date Time Provider [place]   2019  2:30 PM Lisette Jean MD [facility]   6/10/2019  8:30 AM Rufina Hope MD [facility]       Denisse Sotomayor RN

## 2019-04-04 ENCOUNTER — HOSPITAL ENCOUNTER (OUTPATIENT)
Age: 69
Discharge: HOME | End: 2019-04-04
Payer: MEDICARE

## 2019-04-04 ENCOUNTER — TELEPHONE (OUTPATIENT)
Dept: INTERNAL MEDICINE | Age: 69
End: 2019-04-04

## 2019-04-04 DIAGNOSIS — Z79.899 HIGH RISK MEDICATIONS (NOT ANTICOAGULANTS) LONG-TERM USE: ICD-10-CM

## 2019-04-04 DIAGNOSIS — A41.51 SEPSIS DUE TO ESCHERICHIA COLI (HCC): Primary | ICD-10-CM

## 2019-04-04 DIAGNOSIS — M81.0 OSTEOPOROSIS, UNSPECIFIED OSTEOPOROSIS TYPE, UNSPECIFIED PATHOLOGICAL FRACTURE PRESENCE: ICD-10-CM

## 2019-04-04 DIAGNOSIS — M06.9 RHEUMATOID ARTHRITIS INVOLVING MULTIPLE SITES, UNSPECIFIED RHEUMATOID FACTOR PRESENCE: ICD-10-CM

## 2019-04-04 LAB
ALBUMIN SERPL-MCNC: 3.7 G/DL (ref 3.5–5.2)
ALP BLD-CCNC: 41 U/L (ref 35–104)
ALT SERPL-CCNC: 17 U/L (ref 0–32)
ANION GAP SERPL CALCULATED.3IONS-SCNC: 12 MMOL/L (ref 7–16)
ANISOCYTOSIS: ABNORMAL
AST SERPL-CCNC: 20 U/L (ref 0–31)
BASOPHILS ABSOLUTE: 0 E9/L (ref 0–0.2)
BASOPHILS RELATIVE PERCENT: 0.3 % (ref 0–2)
BILIRUB SERPL-MCNC: 0.4 MG/DL (ref 0–1.2)
BUN BLDV-MCNC: 14 MG/DL (ref 8–23)
C-REACTIVE PROTEIN: 1.5 MG/DL (ref 0–0.4)
CALCIUM SERPL-MCNC: 9.1 MG/DL (ref 8.6–10.2)
CHLORIDE BLD-SCNC: 101 MMOL/L (ref 98–107)
CO2: 25 MMOL/L (ref 22–29)
CREAT SERPL-MCNC: 1.2 MG/DL (ref 0.5–1)
EOSINOPHILS ABSOLUTE: 0.16 E9/L (ref 0.05–0.5)
EOSINOPHILS RELATIVE PERCENT: 0.9 % (ref 0–6)
GFR AFRICAN AMERICAN: 54
GFR NON-AFRICAN AMERICAN: 45 ML/MIN/1.73
GLUCOSE BLD-MCNC: 85 MG/DL (ref 74–99)
HCT VFR BLD CALC: 42.6 % (ref 34–48)
HEMOGLOBIN: 14 G/DL (ref 11.5–15.5)
LYMPHOCYTES ABSOLUTE: 1.07 E9/L (ref 1.5–4)
LYMPHOCYTES RELATIVE PERCENT: 6.1 % (ref 20–42)
MCH RBC QN AUTO: 29.4 PG (ref 26–35)
MCHC RBC AUTO-ENTMCNC: 32.9 % (ref 32–34.5)
MCV RBC AUTO: 89.3 FL (ref 80–99.9)
METAMYELOCYTES RELATIVE PERCENT: 0.9 % (ref 0–1)
MONOCYTES ABSOLUTE: 2.31 E9/L (ref 0.1–0.95)
MONOCYTES RELATIVE PERCENT: 13 % (ref 2–12)
NEUTROPHILS ABSOLUTE: 14.24 E9/L (ref 1.8–7.3)
NEUTROPHILS RELATIVE PERCENT: 79.1 % (ref 43–80)
PDW BLD-RTO: 13.4 FL (ref 11.5–15)
PLATELET # BLD: 352 E9/L (ref 130–450)
PMV BLD AUTO: 8.5 FL (ref 7–12)
POLYCHROMASIA: ABNORMAL
POTASSIUM SERPL-SCNC: 4.2 MMOL/L (ref 3.5–5)
RBC # BLD: 4.77 E12/L (ref 3.5–5.5)
SEDIMENTATION RATE, ERYTHROCYTE: 37 MM/HR (ref 0–20)
SODIUM BLD-SCNC: 138 MMOL/L (ref 132–146)
TOTAL PROTEIN: 6.9 G/DL (ref 6.4–8.3)
VITAMIN D 25-HYDROXY: 41 NG/ML (ref 30–100)
WBC # BLD: 17.8 E9/L (ref 4.5–11.5)

## 2019-04-04 PROCEDURE — 85025 COMPLETE CBC W/AUTO DIFF WBC: CPT

## 2019-04-04 PROCEDURE — 86140 C-REACTIVE PROTEIN: CPT

## 2019-04-04 PROCEDURE — 82306 VITAMIN D 25 HYDROXY: CPT

## 2019-04-04 PROCEDURE — 80053 COMPREHEN METABOLIC PANEL: CPT

## 2019-04-04 PROCEDURE — 85651 RBC SED RATE NONAUTOMATED: CPT

## 2019-04-04 PROCEDURE — 36415 COLL VENOUS BLD VENIPUNCTURE: CPT

## 2019-04-04 RX ORDER — LEVOFLOXACIN 750 MG/1
750 TABLET ORAL DAILY
Qty: 7 TABLET | Refills: 0 | Status: SHIPPED | OUTPATIENT
Start: 2019-04-08 | End: 2019-04-15

## 2019-04-04 NOTE — TELEPHONE ENCOUNTER
Called patient and discussed results with patient. Cr is stable at 1.2. Hydrating well. States feels fatigued today, but no additional symptoms. Urinary symptoms have improved. No systemic symptoms- afebrile. No additional findings. Discussed results of persistent leukocytosis- planning for intervention on 4/15 with Urology. Discussed with patient recommendations from Dr. Chase Larkin- plan to extend levaquin for additional 7 days to be completed in anticipation of intervention. Anticipatory guidance given- patient continues to feel better since discharge- no alarm symptoms- does not feel needs to be re-evaluated at this time- if any new onset symptoms including fevers, chills, back pain, new urinary symptoms- seek immediate medical attn- all questions answered. Also gave ID office number to patient for call to office to establish with Dr. Chase Larkin. Patient will contact office tomorrow. Additional levaquin order sent to pharmacy to initiate on 4/8 after completion of original order and continue till 4/15.

## 2019-04-08 ENCOUNTER — CARE COORDINATION (OUTPATIENT)
Dept: CASE MANAGEMENT | Age: 69
End: 2019-04-08

## 2019-04-09 RX ORDER — ASCORBIC ACID 500 MG
2000 TABLET ORAL 2 TIMES DAILY
Status: ON HOLD | COMMUNITY
End: 2020-11-19 | Stop reason: HOSPADM

## 2019-04-09 NOTE — CARE COORDINATION
[facility] Transitions Follow Up Call    2019    Patient: Zaheer Gallagher  Patient : 1950   MRN: [id number]  Reason for Admission: UTI, sepsis   Discharge Date: 3/30/19 RARS: Readmission Risk Score: 16    Attempted to reach the patient for Care Transition call post hospital discharge. Message left with CTC's contact information requesting return phone call.      Follow Up  Future Appointments   Date Time Provider Mil Dueñas   2019 10:00 AM Jeanette Vanegas APRN - NP AFLNE[facility]INFILSA ROMAN [facility] INF   2019  2:30 PM Jaqueline Jaimes MD [facility]   6/10/2019  8:30 AM Liborio Mcrae MD [facility]       Shelton Cowart RN

## 2019-04-09 NOTE — CARE COORDINATION
[facility] Transitions Follow Up Call    2019    Patient: Fortunato Gallagher  Patient : 1950   MRN: [id number]  Reason for Admission: UTI, sepsis   Discharge Date: 3/30/19 RARS: Readmission Risk Score: 12         Spoke with: [facility] Transitions Subsequent and Final Call    Subsequent and Final Calls  Do you have any ongoing symptoms?:  Yes  Patient-reported symptoms:  Fatigue  Have your medications changed?:  No  Do you have any questions related to your medications?:  No  Do you currently have any active services?:  No  Do you have any needs or concerns that I can assist you with?:  No  Identified Barriers:  None  Care Transitions Interventions  No Identified Needs  Other Interventions:          Betsy Pacheco returned [facility]'s call for follow up care transition call. She reports that she feels tired but otherwise she denies any pain or burning with urination, urgency, or frequency. She reports that she is looking forward to having her next procedure done on 4/15/19 with Dr. Mena and is hopeful that it will help her to continue to improve. Betsy Pacheco denies any needs, questions, or concerns at this time and denies the need for further follow up calls at this time.     Follow Up  Future Appointments   Date Time Provider Mil Dueñas   2019 10:00 AM NHAN Mccray NP [facility] INF   2019  2:30 PM Lori Trujillo MD [facility]   6/10/2019  8:30 AM Michela Arreola MD [facility]       Noni Barkley RN

## 2019-04-09 NOTE — PROGRESS NOTES
[facility] PRE-ADMISSION TESTING INSTRUCTIONS    The Preadmission Testing patient is instructed accordingly using the following criteria (check applicable):    ARRIVAL INSTRUCTIONS:  [x] Parking the day of Surgery is located in the Main Entrance lot. Upon entering the door, make an immediate right to the surgery reception desk    [x] Complimentary [facility] Parking is available Monday through Friday 6 am to 6 pm    [x] Bring photo ID and insurance card    [] Bring in a copy of Living will or Durable Power of  papers. [x] Please be sure to arrange for responsible adult to provide transportation to and from the hospital    [x] Please arrange for responsible adult to be with you for the 24 hour period post procedure due to having anesthesia      GENERAL INSTRUCTIONS:    [x] Nothing by mouth after midnight, including gum, candy, mints or water    [x] You may brush your teeth, but do not swallow any water    [] Take medications as instructed with 1-2 oz of water    [x] Stop herbal supplements and vitamins 5 days prior to procedure    [x] Follow preop dosing of blood thinners per physician instructions    [] Take 1/2 dose of evening insulin, but no insulin after midnight    [x] No oral diabetic medications after midnight    [x] If diabetic and have low blood sugar or feel symptomatic, take 1-2oz apple juice only    [x] Bring inhalers day of surgery    [] Bring C-PAP/ Bi-Pap day of surgery    [] Bring urine specimen day of surgery    [x] Shower or bath with soap, lather and rinse well, AM of Surgery, no lotion, powders or creams     [] Follow bowel prep as instructed per surgeon    [] No tobacco products within 24 hours of surgery     [x] No alcohol or illegal drug use within 24 hours of surgery.     [x] Jewelry, body piercing's, eyeglasses, contact lenses and dentures are not permitted into surgery (bring cases)      [x] Please do not wear any nail polish, make up or hair products on the day of surgery    [x] If not already done, you can expect a call from registration    [x] You can expect a call the business day prior to procedure to notify you if your arrival time changes    [x] If you receive a survey after surgery we would greatly appreciate your comments    [] Parent/guardian of a minor must accompany their child and remain on the premises  the entire time they are under our care     [] Pediatric patients may bring favorite toy, blanket or comfort item with them    [] A caregiver or family member must remain with the patient during their stay if they are mentally handicapped, have dementia, disoriented or unable to use a call light or would be a safety concern if left unattended    [x] Please notify surgeon if you develop any illness between now and time of surgery (cold, cough, sore throat, fever, nausea, vomiting) or any signs of infections  including skin, wounds, and dental.    [x]  The Outpatient Pharmacy is available to fill your prescription here on your day of surgery, ask your preop nurse for details    [] Other instructions  EDUCATIONAL MATERIALS PROVIDED:    [] PAT Preoperative Education Packet/Booklet     [] Medication List    [] Fluoroscopy Information Pamphlet    [] Transfusion bracelet applied with instructions    [] Joint replacement video reviewed    [] Shower with soap, lather and rinse well, and use CHG wipes provided the evening before surgery as instructed

## 2019-04-10 ENCOUNTER — TELEPHONE (OUTPATIENT)
Dept: INTERNAL MEDICINE | Age: 69
End: 2019-04-10

## 2019-04-15 ENCOUNTER — ANESTHESIA EVENT (OUTPATIENT)
Dept: OPERATING ROOM | Age: 69
End: 2019-04-15
Payer: MEDICARE

## 2019-04-15 ENCOUNTER — HOSPITAL ENCOUNTER (OUTPATIENT)
Age: 69
Setting detail: OUTPATIENT SURGERY
Discharge: HOME | End: 2019-04-15
Attending: UROLOGY | Admitting: UROLOGY
Payer: MEDICARE

## 2019-04-15 ENCOUNTER — ANESTHESIA (OUTPATIENT)
Dept: OPERATING ROOM | Age: 69
End: 2019-04-15
Payer: MEDICARE

## 2019-04-15 ENCOUNTER — HOSPITAL ENCOUNTER (OUTPATIENT)
Dept: GENERAL RADIOLOGY | Age: 69
Discharge: HOME | End: 2019-04-17
Attending: UROLOGY
Payer: MEDICARE

## 2019-04-15 VITALS
TEMPERATURE: 96.8 F | HEART RATE: 80 BPM | HEIGHT: 64 IN | DIASTOLIC BLOOD PRESSURE: 58 MMHG | SYSTOLIC BLOOD PRESSURE: 119 MMHG | BODY MASS INDEX: 36.19 KG/M2 | WEIGHT: 212 LBS | RESPIRATION RATE: 20 BRPM | OXYGEN SATURATION: 98 %

## 2019-04-15 VITALS — OXYGEN SATURATION: 95 % | SYSTOLIC BLOOD PRESSURE: 118 MMHG | DIASTOLIC BLOOD PRESSURE: 61 MMHG

## 2019-04-15 DIAGNOSIS — R52 PAIN: ICD-10-CM

## 2019-04-15 DIAGNOSIS — G89.18 POST-OPERATIVE PAIN: Primary | ICD-10-CM

## 2019-04-15 LAB — METER GLUCOSE: 99 MG/DL (ref 74–99)

## 2019-04-15 PROCEDURE — 7100000010 HC PHASE II RECOVERY - FIRST 15 MIN: Performed by: UROLOGY

## 2019-04-15 PROCEDURE — 2720000010 HC SURG SUPPLY STERILE: Performed by: UROLOGY

## 2019-04-15 PROCEDURE — 7100000011 HC PHASE II RECOVERY - ADDTL 15 MIN: Performed by: UROLOGY

## 2019-04-15 PROCEDURE — 6360000002 HC RX W HCPCS: Performed by: NURSE ANESTHETIST, CERTIFIED REGISTERED

## 2019-04-15 PROCEDURE — 3600000013 HC SURGERY LEVEL 3 ADDTL 15MIN: Performed by: UROLOGY

## 2019-04-15 PROCEDURE — 2500000003 HC RX 250 WO HCPCS: Performed by: NURSE ANESTHETIST, CERTIFIED REGISTERED

## 2019-04-15 PROCEDURE — 6360000002 HC RX W HCPCS: Performed by: UROLOGY

## 2019-04-15 PROCEDURE — 2709999900 HC NON-CHARGEABLE SUPPLY: Performed by: UROLOGY

## 2019-04-15 PROCEDURE — 74420 UROGRAPHY RTRGR +-KUB: CPT

## 2019-04-15 PROCEDURE — 2580000003 HC RX 258: Performed by: UROLOGY

## 2019-04-15 PROCEDURE — 3700000000 HC ANESTHESIA ATTENDED CARE: Performed by: UROLOGY

## 2019-04-15 PROCEDURE — C1769 GUIDE WIRE: HCPCS | Performed by: UROLOGY

## 2019-04-15 PROCEDURE — C2617 STENT, NON-COR, TEM W/O DEL: HCPCS | Performed by: UROLOGY

## 2019-04-15 PROCEDURE — 6370000000 HC RX 637 (ALT 250 FOR IP)

## 2019-04-15 PROCEDURE — 3600000003 HC SURGERY LEVEL 3 BASE: Performed by: UROLOGY

## 2019-04-15 PROCEDURE — 82365 CALCULUS SPECTROSCOPY: CPT

## 2019-04-15 PROCEDURE — 82962 GLUCOSE BLOOD TEST: CPT

## 2019-04-15 PROCEDURE — 88300 SURGICAL PATH GROSS: CPT

## 2019-04-15 PROCEDURE — 3700000001 HC ADD 15 MINUTES (ANESTHESIA): Performed by: UROLOGY

## 2019-04-15 DEVICE — URETERAL STENT SET
Type: IMPLANTABLE DEVICE | Site: URETER | Status: FUNCTIONAL
Brand: PERCUFLEX™

## 2019-04-15 RX ORDER — CIPROFLOXACIN 250 MG/1
250 TABLET, FILM COATED ORAL 2 TIMES DAILY
Qty: 6 TABLET | Refills: 0 | Status: SHIPPED | OUTPATIENT
Start: 2019-04-15 | End: 2019-04-25

## 2019-04-15 RX ORDER — SODIUM CHLORIDE 0.9 % (FLUSH) 0.9 %
10 SYRINGE (ML) INJECTION EVERY 12 HOURS SCHEDULED
Status: DISCONTINUED | OUTPATIENT
Start: 2019-04-15 | End: 2019-04-15 | Stop reason: HOSPADM

## 2019-04-15 RX ORDER — HYDROCODONE BITARTRATE AND ACETAMINOPHEN 5; 325 MG/1; MG/1
1 TABLET ORAL ONCE
Status: COMPLETED | OUTPATIENT
Start: 2019-04-15 | End: 2019-04-15

## 2019-04-15 RX ORDER — SODIUM CHLORIDE 0.9 % (FLUSH) 0.9 %
10 SYRINGE (ML) INJECTION PRN
Status: DISCONTINUED | OUTPATIENT
Start: 2019-04-15 | End: 2019-04-15 | Stop reason: HOSPADM

## 2019-04-15 RX ORDER — SODIUM CHLORIDE 9 MG/ML
INJECTION, SOLUTION INTRAVENOUS CONTINUOUS
Status: DISCONTINUED | OUTPATIENT
Start: 2019-04-15 | End: 2019-04-15 | Stop reason: HOSPADM

## 2019-04-15 RX ORDER — HYDROCODONE BITARTRATE AND ACETAMINOPHEN 5; 325 MG/1; MG/1
1 TABLET ORAL EVERY 6 HOURS PRN
Qty: 10 TABLET | Refills: 0 | Status: SHIPPED | OUTPATIENT
Start: 2019-04-15 | End: 2019-04-22

## 2019-04-15 RX ORDER — CIPROFLOXACIN 2 MG/ML
400 INJECTION, SOLUTION INTRAVENOUS EVERY 12 HOURS
Status: DISCONTINUED | OUTPATIENT
Start: 2019-04-15 | End: 2019-04-15 | Stop reason: HOSPADM

## 2019-04-15 RX ORDER — HYDROCODONE BITARTRATE AND ACETAMINOPHEN 5; 325 MG/1; MG/1
TABLET ORAL
Status: COMPLETED
Start: 2019-04-15 | End: 2019-04-15

## 2019-04-15 RX ORDER — FENTANYL CITRATE 50 UG/ML
INJECTION, SOLUTION INTRAMUSCULAR; INTRAVENOUS PRN
Status: DISCONTINUED | OUTPATIENT
Start: 2019-04-15 | End: 2019-04-15 | Stop reason: SDUPTHER

## 2019-04-15 RX ORDER — MIDAZOLAM HYDROCHLORIDE 1 MG/ML
INJECTION INTRAMUSCULAR; INTRAVENOUS PRN
Status: DISCONTINUED | OUTPATIENT
Start: 2019-04-15 | End: 2019-04-15 | Stop reason: SDUPTHER

## 2019-04-15 RX ORDER — LIDOCAINE HYDROCHLORIDE 20 MG/ML
INJECTION, SOLUTION EPIDURAL; INFILTRATION; INTRACAUDAL; PERINEURAL PRN
Status: DISCONTINUED | OUTPATIENT
Start: 2019-04-15 | End: 2019-04-15 | Stop reason: SDUPTHER

## 2019-04-15 RX ORDER — PROPOFOL 10 MG/ML
INJECTION, EMULSION INTRAVENOUS CONTINUOUS PRN
Status: DISCONTINUED | OUTPATIENT
Start: 2019-04-15 | End: 2019-04-15 | Stop reason: SDUPTHER

## 2019-04-15 RX ADMIN — PROPOFOL 180 MCG/KG/MIN: 10 INJECTION, EMULSION INTRAVENOUS at 15:38

## 2019-04-15 RX ADMIN — SODIUM CHLORIDE: 9 INJECTION, SOLUTION INTRAVENOUS at 13:26

## 2019-04-15 RX ADMIN — HYDROCODONE BITARTRATE AND ACETAMINOPHEN 1 TABLET: 5; 325 TABLET ORAL at 16:27

## 2019-04-15 RX ADMIN — CIPROFLOXACIN 400 MG: 2 INJECTION, SOLUTION INTRAVENOUS at 13:53

## 2019-04-15 RX ADMIN — LIDOCAINE HYDROCHLORIDE 50 MG: 20 INJECTION, SOLUTION EPIDURAL; INFILTRATION; INTRACAUDAL; PERINEURAL at 15:38

## 2019-04-15 RX ADMIN — FENTANYL CITRATE 50 MCG: 50 INJECTION, SOLUTION INTRAMUSCULAR; INTRAVENOUS at 15:48

## 2019-04-15 RX ADMIN — MIDAZOLAM HYDROCHLORIDE 2 MG: 1 INJECTION, SOLUTION INTRAMUSCULAR; INTRAVENOUS at 15:33

## 2019-04-15 RX ADMIN — FENTANYL CITRATE 50 MCG: 50 INJECTION, SOLUTION INTRAMUSCULAR; INTRAVENOUS at 15:38

## 2019-04-15 ASSESSMENT — PAIN DESCRIPTION - ORIENTATION
ORIENTATION: INNER

## 2019-04-15 ASSESSMENT — PAIN DESCRIPTION - ONSET
ONSET: ON-GOING

## 2019-04-15 ASSESSMENT — PAIN DESCRIPTION - FREQUENCY
FREQUENCY: CONTINUOUS

## 2019-04-15 ASSESSMENT — PULMONARY FUNCTION TESTS
PIF_VALUE: 1
PIF_VALUE: 0
PIF_VALUE: 0
PIF_VALUE: 1
PIF_VALUE: 0
PIF_VALUE: 0
PIF_VALUE: 1
PIF_VALUE: 1
PIF_VALUE: 0
PIF_VALUE: 1
PIF_VALUE: 1
PIF_VALUE: 0
PIF_VALUE: 1
PIF_VALUE: 0
PIF_VALUE: 1
PIF_VALUE: 0
PIF_VALUE: 1

## 2019-04-15 ASSESSMENT — PAIN DESCRIPTION - DESCRIPTORS
DESCRIPTORS: ACHING
DESCRIPTORS: ACHING
DESCRIPTORS: DISCOMFORT
DESCRIPTORS: ACHING

## 2019-04-15 ASSESSMENT — PAIN SCALES - GENERAL
PAINLEVEL_OUTOF10: 10
PAINLEVEL_OUTOF10: 10
PAINLEVEL_OUTOF10: 8
PAINLEVEL_OUTOF10: 10

## 2019-04-15 ASSESSMENT — PAIN DESCRIPTION - LOCATION
LOCATION: ABDOMEN

## 2019-04-15 ASSESSMENT — PAIN DESCRIPTION - PAIN TYPE
TYPE: SURGICAL PAIN

## 2019-04-15 ASSESSMENT — PAIN - FUNCTIONAL ASSESSMENT: PAIN_FUNCTIONAL_ASSESSMENT: 0-10

## 2019-04-15 NOTE — OP NOTE
SURGEON: MARQUIS Fenton:       PREOPERATIVE DIAGNOSIS: Left ureteral calculus. POSTOPERATIVE DIAGNOSIS: Left ureteral calculus. OPERATION: Cystoscopy, left ureteroscopy, stone basket extraction ureteral stent placement    ANESTHESIA: LMAC. ESTIMATED BLOOD LOSS: Minimal.     COMPLICATIONS: none      INDICATIONS FOR PROCEDURE: Mayur hannon 80 y.o. with a history of nephrolithiasis. The patient has a left ureteral calculus and is having severe pain. The patient presents for ureteroscopy, laser lithotripsy, understands the risks, benefits and alternatives of the procedure, signed informed consent, and agreed to proceed. DESCRIPTION OF PROCEDURE: The patient was brought into the operating room and placed under anesthesia in the dorsal lithotomy position. The patient was prepped and draped in a sterile fashion. A 21-Romansh cystoscope with a 30-degree lens was passed into the bladder. The entire urethra was examined and found to be within normal limits. 30- degree endoscopy was utilized to inspect the entire bladder mucosal surface. There was no evidence of tumors, calculi, diverticula, or other abnormalities throughout the entire bladder. The ureteral orifices were normal in size, number, and location. The entire cystoscopic procedure was within normal limits. The bladder was drained. The stent coming from the left ureteral orifice was delivered to the urethral meatus and removed. A wire was passed beyond the stone into the renal pelvis under fluoroscopic guidance. A semirigid ureteroscope was passed into the left ureter and a stone was identified within the ureter. The stone was removed with the [facility] basket, with minimal minor trauma to the ureter. The remainder of the ureter was examined and found to be free of stones. A 4.8 x 24 double-J ureteral stent was passed over the wire, with good curl seen in the kidney as well as in the bladder.  The bladder was drained. The patient was awakened from anesthesia and taken to recovery in stable condition. PLAN: The patient is stone-free on the left side. An indwelling stent with a string was left indwelling. She will remove this stent Friday morning and will follow-up if any issues in the future.

## 2019-04-15 NOTE — H&P
(LEVAQUIN) 750 MG tablet Take 1 tablet by mouth daily for 7 days Per ID: Additional 7 days of treatment 4/8/19 4/15/19 Yes Jeff Mccabe MD   rosuvastatin (CRESTOR) 5 MG tablet take 1 tablet by mouth once daily 3/14/19  Yes Jeff Mccabe MD   metFORMIN (GLUCOPHAGE) 850 MG tablet Take 1 tablet by mouth 2 times daily (with meals) 3/14/19  Yes Jeff Mccabe MD   lisinopril (PRINIVIL;ZESTRIL) 20 MG tablet Take 1 tablet by mouth daily 3/14/19  Yes Jeff Mccabe MD   methotrexate (RHEUMATREX) 2.5 MG chemo tablet TAKE 7 TABLETS EVERY WEEK  Patient taking differently: TAKE 4 TABLETS EVERY WEEK / has been off since 3/2019 per Dr. Garcia Head 4/2/19   Katrina Joiner MD   hydroxychloroquine (PLAQUENIL) 200 MG tablet Take 1 tablet by mouth daily 4/2/19 4/1/20  Katrina Joiner MD   methotrexate (RHEUMATREX) 2.5 MG chemo tablet Take 4 tablets by mouth once a week 4/2/19 4/1/20  Katrina Joiner MD   VENTOLIN  (05 Base) MCG/ACT inhaler inhale 2 puffs by mouth four times a day if needed 3/14/19   Jeff Mccabe MD   magnesium cl-calcium carbonate (SLOW-MAG) 71.5-119 MG TBEC tablet take 2 tablets once daily  Patient taking differently: 1 tablet daily take 2 tablets once daily / taking for leg cramps / Ld 4/9/2019 3/14/19   Jeff Mccabe MD   folic acid (FOLVITE) 1 MG tablet Take 1 tablet by mouth daily  Patient taking differently: Take 1 mg by mouth daily OTC / LD 4/9/2019 10/2/18   Katrina Joiner MD   hydroxychloroquine (PLAQUENIL) 200 MG tablet TAKE 1 AND 1/2 TABLETS EVERY DAY 10/2/18   Katrina Joiner MD   Misc. Devices MISC Please give one glucose monitor accuchek or what is covered bu her insurance.  8/13/18   Kathia Mendez MD   Biotin 300 MCG TABS Take 1 tablet by mouth daily 4/30/18   Jeff Mccabe MD   Cholecalciferol (VITAMIN D) 2000 units CAPS capsule Take 2,000 Units by mouth 2 times daily  Patient taking differently: Take 2,000 Units by mouth daily  3/29/18   Radha Cheema Rei Ly MD   Lancets MISC Test twice daily  Give what insurance covers 3/26/18   Narcisa Villalba MD   aspirin EC 81 MG EC tablet Take 1 tablet by mouth daily 3/26/18   Narcisa Villalba MD   Coenzyme Q10 (CO Q 10 PO) Take 1 tablet by mouth daily QUINOL  / LD 2019    Historical Provider, MD   Misc. Devices MISC 1 each by Does not apply route once as needed Glucometer as covered by insurance 10/2/15 10/2/15  Jarvis Goldmann, DO        Allergies: Rocephin [ceftriaxone]    Social History     Tobacco Use    Smoking status: Former Smoker     Packs/day: 1.00     Years: 40.00     Pack years: 40.00     Types: Cigarettes     Last attempt to quit: 2011     Years since quittin.7    Smokeless tobacco: Never Used   Substance Use Topics    Alcohol use: Yes     Alcohol/week: 0.0 oz     Comment: rarely        Review of Systems:  Respiratory: negative for cough and hemoptysis  Cardiovascular: negative for chest pain and dyspnea  Gastrointestinal: negative for abdominal pain, diarrhea, nausea and vomiting  Genitourinary: as per HPI, otherwise negative. Derm: negative for rash and skin lesion(s)  Neurological: negative for seizures and tremors  Endocrine: negative for diabetic symptoms including polydipsia and polyuria  All other systems negative    Physical Exam:  Vitals:    04/15/19 1304   BP: (!) 101/52   Pulse: 67   Resp: 16   Temp: 97.8 °F (36.6 °C)   SpO2: 97%      Skin:  Warm and dry.   No rash or bruises  HEENT:  PERRLA, EOMI  Neck:  No JVD, No thyromegaly  Cardiac:  RRR  Lungs:  No audible wheezes, symmetric respirations, non-labored  Abdomen: Soft, non-tender, non-distended  Extremities:  No clubbing, edema or cyanosis  Neurological:  Moves all extremities, normal DTR    Lab Results   Component Value Date    WBC 17.8 (H) 2019    HGB 14.0 2019    HCT 42.6 2019    MCV 89.3 2019     2019       Lab Results   Component Value Date    CREATININE 1.2 (H) 2019 No results found for: PSA    No results for input(s): LABURIN in the last 72 hours. No results for input(s): BC in the last 72 hours. No results for input(s): Yash Coventry in the last 72 hours. Assessment and Plan:  1.  To OR for Cystoscopy, Retrograde Pyelograms, Ureteroscopy, Laser Lithotripsy, Stone Extraction, Stent Placement, left

## 2019-04-15 NOTE — ANESTHESIA PRE PROCEDURE
Department of Anesthesiology  Preprocedure Note       Name:  Lilly Mccormick   Age:  80 y.o.  :  1950                                          MRN:  [id number]         Date:  4/15/2019      Surgeon: Britney Baez):  Maureen Lombardi MD    Procedure: CYSTOSCOPY RETROGRADE PYELOGRAM LASER LITHOTRIPSY URETEROSCOPTY, LEFT STENT INSERTION (Left )    Medications prior to admission:   Prior to Admission medications    Medication Sig Start Date End Date Taking?  Authorizing Provider   vitamin C (ASCORBIC ACID) 500 MG tablet Take 500 mg by mouth 2 times daily LD 2019   Yes Historical Provider, MD   Cyanocobalamin (VITAMIN B 12 PO) Take by mouth daily LD 2019   Yes Historical Provider, MD   levofloxacin (LEVAQUIN) 750 MG tablet Take 1 tablet by mouth daily for 7 days Per ID: Additional 7 days of treatment 4/8/19 4/15/19 Yes Calos Turner MD   rosuvastatin (CRESTOR) 5 MG tablet take 1 tablet by mouth once daily 3/14/19  Yes Calos Turner MD   metFORMIN (GLUCOPHAGE) 850 MG tablet Take 1 tablet by mouth 2 times daily (with meals) 3/14/19  Yes Calos Turner MD   lisinopril (PRINIVIL;ZESTRIL) 20 MG tablet Take 1 tablet by mouth daily 3/14/19  Yes Calos Turner MD   methotrexate (RHEUMATREX) 2.5 MG chemo tablet TAKE 7 TABLETS EVERY WEEK  Patient taking differently: TAKE 4 TABLETS EVERY WEEK / has been off since 3/2019 per Dr. Pelon Montejo 19   Juliana Avalos MD   hydroxychloroquine (PLAQUENIL) 200 MG tablet Take 1 tablet by mouth daily 19  Juliana Avalos MD   methotrexate (RHEUMATREX) 2.5 MG chemo tablet Take 4 tablets by mouth once a week 19  Juliana Avalos MD   VENTOLIN  (11 Base) MCG/ACT inhaler inhale 2 puffs by mouth four times a day if needed 3/14/19   Calos Turner MD   magnesium cl-calcium carbonate (SLOW-MAG) 71.5-119 MG TBEC tablet take 2 tablets once daily  Patient taking differently: 1 tablet daily take 2 tablets once daily / taking for leg cramps / Ld 4/9/2019 3/14/19   Sandra Esquivel MD   folic acid (FOLVITE) 1 MG tablet Take 1 tablet by mouth daily  Patient taking differently: Take 1 mg by mouth daily OTC / LD 4/9/2019 10/2/18   Camron Smallwood MD   hydroxychloroquine (PLAQUENIL) 200 MG tablet TAKE 1 AND 1/2 TABLETS EVERY DAY 10/2/18   Camron Smallwood MD   Misc. Devices MISC Please give one glucose monitor accuchek or what is covered bu her insurance. 8/13/18   Octavio Lopez MD   Biotin 300 MCG TABS Take 1 tablet by mouth daily 4/30/18   Sandra Esquivel MD   Cholecalciferol (VITAMIN D) 2000 units CAPS capsule Take 2,000 Units by mouth 2 times daily  Patient taking differently: Take 2,000 Units by mouth daily  3/29/18   Sandra Esquivel MD   Lancets MISC Test twice daily  Give what insurance covers 3/26/18   Sandra Esquivel MD   aspirin EC 81 MG EC tablet Take 1 tablet by mouth daily 3/26/18   Sandra Esquivel MD   Coenzyme Q10 (CO Q 10 PO) Take 1 tablet by mouth daily QUINOL  / LD 4/9/2019    Historical Provider, MD   Misc. Devices MISC 1 each by Does not apply route once as needed Glucometer as covered by insurance 10/2/15 10/2/15  Jack Republic, DO       Current medications:    Current Facility-Administered Medications   Medication Dose Route Frequency Provider Last Rate Last Dose    0.9 % sodium chloride infusion   Intravenous Continuous Ladan Cui  mL/hr at 04/15/19 1326      sodium chloride flush 0.9 % injection 10 mL  10 mL Intravenous 2 times per day Ladan Cui MD        sodium chloride flush 0.9 % injection 10 mL  10 mL Intravenous PRN Ladan Cui MD        ciprofloxacin (CIPRO) IVPB 400 mg  400 mg Intravenous Q12H Ladan Cui  mL/hr at 04/15/19 1353 400 mg at 04/15/19 1353       Allergies:     Allergies   Allergen Reactions    Rocephin [Ceftriaxone] Shortness Of Breath     Bronchospasm 3/27 and hypotension immediately after ceftriaxone       Problem List: Patient Active Problem List   Diagnosis Code    DM II (diabetes mellitus, type II), controlled ([facility] E11.9    Tobacco abuse, 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    Hyperlipidemia LDL goal <100 E78.5    GERD (gastroesophageal reflux disease) K21.9    Vitamin D deficiency E55.9    HTN, goal below 130/80 I10    Back pain M54.9    Osteoporosis M81.0    Rheumatoid arthritis involving multiple sites ([facility] [facility] M06.9    History of colon polyps Z86.010    Lipoma of colon D17.5    Diverticulosis of colon K57.30    Obesity, Class III, BMI 40-49.9 (morbid obesity) ([facility]) E66.01    Need for prophylactic vaccination against diphtheria-tetanus-pertussis (DTP) Z23    UTI (urinary tract infection) N39.0    Respiratory distress R06.03       Past Medical History:        Diagnosis Date    COPD (chronic obstructive pulmonary disease) ([facility])     mild    Diabetes mellitus type II, controlled ([facility] [address] [facility]     Hyperlipidemia     Hypertension     Kidney stone     Obesity, Class III, BMI 40-49.9 (morbid obesity) ([facility] [facility]     Osteoarthritis     Osteoporosis     Rheumatoid arthritis involving multiple sites ([facility] [address] [facility]     follows with Dr. Dione Rey [facility])     on Levaquin per Dr. Wanda Santoyo since dischaarge from [facility] 3/30/2019    Tobacco abuse began age 16    past hx    Tubular adenoma of colon 2011    colonoscopy       Past Surgical History:        Procedure Laterality Date    BREAST BIOPSY      benign, pt cannot remember which side    CARPAL TUNNEL RELEASE      right wrist     SECTION       SECTION       SECTION      CHOLECYSTECTOMY, OPEN      COLONOSCOPY  2011    screening, sigmoid polyp bx (tubular adenoma), Dr. Boubacar Clark, [address] [place] [address] COLONOSCOPY  4/3/2015    submucosal lipoma distal tranverse colon biopsied, diverticulosis, Dr. Gautam Vallejo / REMOVAL STENT / STONE Left 3/27/2019    CYSTOSCOPY RETROGRADE PYELOGRAM STENT INSERTION performed by Osiris Rodríguez MD

## 2019-04-15 NOTE — PROGRESS NOTES
1605 admitted to [facility] iv cont nourishment offered iv cont  1615 family at bedside

## 2019-04-15 NOTE — ANESTHESIA POSTPROCEDURE EVALUATION
Department of Anesthesiology  Postprocedure Note    Patient: Denisha Juarez  MRN: [id number]  YOB: 1950  Date of evaluation: 4/15/2019  Time:  4:19 PM     Procedure Summary     Date:  04/15/19 Room / Location:  [facility] OR 06 / [facility] OR    Anesthesia Start:  3474 Anesthesia Stop:  1998    Procedure:  CYSTOSCOPY RETROGRADE URETEROSCOPY, stone basket extraction, LEFT STENT INSERTION (Left ) Diagnosis:  (URETERAL STONE)    Surgeon:  Nohemi Ricci MD Responsible Provider:  Roro Rubio MD    Anesthesia Type:  MAC ASA Status:  3          Anesthesia Type: MAC    Dominguez Phase I: Dominguez Score: 10    Dominguez Phase II:      Last vitals: Reviewed and per EMR flowsheets.        Anesthesia Post Evaluation    Patient location during evaluation: PACU  Patient participation: complete - patient participated  Level of consciousness: awake and alert  Pain score: 2  Airway patency: patent  Nausea & Vomiting: no vomiting and no nausea  Complications: no  Cardiovascular status: hemodynamically stable  Respiratory status: spontaneous ventilation  Hydration status: stable

## 2019-04-19 LAB
CALCULI COMPOSITION: NORMAL
MASS: 43 MG
STONE DESCRIPTION: NORMAL
STONE NUMBER: 1
STONE SIZE: NORMAL MM

## 2019-04-26 PROBLEM — N39.0 UTI (URINARY TRACT INFECTION): Status: RESOLVED | Noted: 2019-03-27 | Resolved: 2019-04-26

## 2019-05-09 NOTE — DISCHARGE SUMMARY
Discharge Summary Note  Patient ID:  Marissa Rodriguez  [id number]  80 y.o.  1950    Admit date: 3/26/2019    Discharge date and time: 3/30/2019  5:25 PM     Admitting Physician: Khoi Barber MD     Discharge Physician: Sanjuana Colby    Admission Diagnoses:   UTI (urinary tract infection) [N39.0]  UTI (urinary tract infection) [N39.0]  Respiratory distress [R06.03]    Discharge Diagnoses:   Obstructive urosepsis  Uncontrolled Diabetes Mellitus  BAHMAN-Resolved  Acute hypoxic respiratory failure- Resolved    Admission Condition: critical    Discharged Condition: stable    Hospital Course: 80 y.o. female with a significant PMH of RA, HTN, DM, COPD, who came to the emergency department complaining of nausea, vomiting and fever for 2 days. CT of the abdomen revealed a 4 mm obstructive stone on the left ureter with hydronephrosis, creatinine elevation 1.7 (baseline 0.9), she was given IVF. During her ER stay patient symptoms improved after acetaminophen was given, rocephin was administered as well. Urology was consulted from ER. Next morning upon arrival to floor, patient developed sudden onset severe respiratory distress, central and peripheral cyanosis, RRT was called, patient was placed on BiPAP, duoneb was given, and her breathing improved significantly, SpO2 100% on BiPAP. ABG revealed 7.36 20.9 114.5, lactic acid was elevated to 8.3 from 2.1 and IVF were given. Patient was transferred to ICU. She underwent urethral stent placement by urology, also found to have gram negative rods(E Coli) in the urine. She was treated with Meropenem initially. Patient improved over the course of stay, ultimately on Nasal canula and improved oxygen saturation back to baseline. ID changed antibiotics to Levaquin and recommended to stay on it for more 7 days upon discharge. She was started on Insulin during stay to control blood sugars, increased due to sepsis.  Blood sugars also improved over the stay as sepsis resolved. Patient is been discharged with the following recommendations and follow up. Consults: Urology, Infectious diseases    Discharge Exam:  · General Appearance: alert, appears stated age, cooperative and morbidly obese  · HEENT:  Head: Normocephalic, no lesions, without obvious abnormality. · Neck: supple, symmetrical, trachea midline  · Lung: clear to auscultation bilaterally  · Heart: regular rate and rhythm, S1, S2 normal, no murmur, click, rub or gallop  · Abdomen: soft, non-tender; bowel sounds normal; no masses,  no organomegaly  · Extremities:  extremities normal, atraumatic, no cyanosis or edema  · Musculokeletal: No joint swelling, no muscle tenderness. ROM normal in all joints of extremities. · Neurologic: Mental status: Alert, oriented, thought content appropriate    Patient Instructions:     Discharge Medications:   Gayathri Rosases   Home Medication Instructions [id number]    Printed on:05/09/19 4488   Medication Information                      aspirin EC 81 MG EC tablet  Take 1 tablet by mouth daily             Biotin 300 MCG TABS  Take 1 tablet by mouth daily             Cholecalciferol (VITAMIN D) 2000 units CAPS capsule  Take 2,000 Units by mouth 2 times daily             Coenzyme Q10 (CO Q 10 PO)  Take 1 tablet by mouth daily QUINOL  / LD 9/1/4024             folic acid (FOLVITE) 1 MG tablet  Take 1 tablet by mouth daily             Lancets MISC  Test twice daily  Give what insurance covers             lisinopril (PRINIVIL;ZESTRIL) 20 MG tablet  Take 1 tablet by mouth daily             magnesium cl-calcium carbonate (SLOW-MAG) 71.5-119 MG TBEC tablet  take 2 tablets once daily             metFORMIN (GLUCOPHAGE) 850 MG tablet  Take 1 tablet by mouth 2 times daily (with meals)             Misc. Devices MISC  1 each by Does not apply route once as needed Glucometer as covered by [employer].  Devices MISC  Please give one glucose monitor accuchek or what is covered [employer] her insurance.              rosuvastatin (CRESTOR) 5 MG tablet  take 1 tablet by mouth once daily             VENTOLIN  (90 Base) MCG/ACT inhaler  inhale 2 puffs by mouth four times a day if needed                 Discharge to:  Home  Diet: Carb conterolled  Activity: activity as tolerated   Exercise: As tolerated   Drive with seat belt on    Follow up with Ambulatory Clinic in a week with Dr Merrill Officer,     Electronically signed by Delores Busch  on 5/9/19 at 8:19 AM

## 2019-06-03 ENCOUNTER — HOSPITAL ENCOUNTER (OUTPATIENT)
Age: 69
Discharge: HOME | End: 2019-06-03
Payer: MEDICARE

## 2019-06-03 LAB
ALBUMIN SERPL-MCNC: 4.5 G/DL (ref 3.5–5.2)
ALP BLD-CCNC: 40 U/L (ref 35–104)
ALT SERPL-CCNC: 17 U/L (ref 0–32)
ANION GAP SERPL CALCULATED.3IONS-SCNC: 16 MMOL/L (ref 7–16)
AST SERPL-CCNC: 21 U/L (ref 0–31)
BASOPHILS ABSOLUTE: 0.04 E9/L (ref 0–0.2)
BASOPHILS RELATIVE PERCENT: 0.5 % (ref 0–2)
BILIRUB SERPL-MCNC: 0.3 MG/DL (ref 0–1.2)
BUN BLDV-MCNC: 21 MG/DL (ref 8–23)
C-REACTIVE PROTEIN: 0.2 MG/DL (ref 0–0.4)
CALCIUM SERPL-MCNC: 9.4 MG/DL (ref 8.6–10.2)
CHLORIDE BLD-SCNC: 104 MMOL/L (ref 98–107)
CO2: 22 MMOL/L (ref 22–29)
CREAT SERPL-MCNC: 1 MG/DL (ref 0.5–1)
EOSINOPHILS ABSOLUTE: 0.24 E9/L (ref 0.05–0.5)
EOSINOPHILS RELATIVE PERCENT: 3 % (ref 0–6)
GFR AFRICAN AMERICAN: >60
GFR NON-AFRICAN AMERICAN: 55 ML/MIN/1.73
GLUCOSE BLD-MCNC: 107 MG/DL (ref 74–99)
HCT VFR BLD CALC: 38.1 % (ref 34–48)
HEMOGLOBIN: 12.5 G/DL (ref 11.5–15.5)
IMMATURE GRANULOCYTES #: 0.05 E9/L
IMMATURE GRANULOCYTES %: 0.6 % (ref 0–5)
LYMPHOCYTES ABSOLUTE: 1.84 E9/L (ref 1.5–4)
LYMPHOCYTES RELATIVE PERCENT: 23.3 % (ref 20–42)
MCH RBC QN AUTO: 29.3 PG (ref 26–35)
MCHC RBC AUTO-ENTMCNC: 32.8 % (ref 32–34.5)
MCV RBC AUTO: 89.2 FL (ref 80–99.9)
MONOCYTES ABSOLUTE: 0.77 E9/L (ref 0.1–0.95)
MONOCYTES RELATIVE PERCENT: 9.8 % (ref 2–12)
NEUTROPHILS ABSOLUTE: 4.95 E9/L (ref 1.8–7.3)
NEUTROPHILS RELATIVE PERCENT: 62.8 % (ref 43–80)
PDW BLD-RTO: 14.9 FL (ref 11.5–15)
PLATELET # BLD: 283 E9/L (ref 130–450)
PMV BLD AUTO: 8.7 FL (ref 7–12)
POTASSIUM SERPL-SCNC: 4.5 MMOL/L (ref 3.5–5)
RBC # BLD: 4.27 E12/L (ref 3.5–5.5)
SEDIMENTATION RATE, ERYTHROCYTE: 15 MM/HR (ref 0–20)
SODIUM BLD-SCNC: 142 MMOL/L (ref 132–146)
TOTAL PROTEIN: 7.2 G/DL (ref 6.4–8.3)
VITAMIN D 25-HYDROXY: 40 NG/ML (ref 30–100)
WBC # BLD: 7.9 E9/L (ref 4.5–11.5)

## 2019-06-03 PROCEDURE — 80053 COMPREHEN METABOLIC PANEL: CPT

## 2019-06-03 PROCEDURE — 82306 VITAMIN D 25 HYDROXY: CPT

## 2019-06-03 PROCEDURE — 86140 C-REACTIVE PROTEIN: CPT

## 2019-06-03 PROCEDURE — 85651 RBC SED RATE NONAUTOMATED: CPT

## 2019-06-03 PROCEDURE — 36415 COLL VENOUS BLD VENIPUNCTURE: CPT

## 2019-06-03 PROCEDURE — 85025 COMPLETE CBC W/AUTO DIFF WBC: CPT

## 2019-06-04 ENCOUNTER — OFFICE VISIT (OUTPATIENT)
Dept: RHEUMATOLOGY | Age: 69
End: 2019-06-04
Payer: MEDICARE

## 2019-06-04 VITALS — BODY MASS INDEX: 37.22 KG/M2 | RESPIRATION RATE: 12 BRPM | HEIGHT: 64 IN | WEIGHT: 218 LBS

## 2019-06-04 DIAGNOSIS — E11.9 CONTROLLED TYPE 2 DIABETES MELLITUS WITHOUT COMPLICATION, WITHOUT LONG-TERM CURRENT USE OF INSULIN (HCC): ICD-10-CM

## 2019-06-04 DIAGNOSIS — M81.0 AGE-RELATED OSTEOPOROSIS WITHOUT CURRENT PATHOLOGICAL FRACTURE: ICD-10-CM

## 2019-06-04 DIAGNOSIS — Z79.899 HIGH RISK MEDICATIONS (NOT ANTICOAGULANTS) LONG-TERM USE: ICD-10-CM

## 2019-06-04 DIAGNOSIS — M06.9 RHEUMATOID ARTHRITIS INVOLVING MULTIPLE SITES, UNSPECIFIED RHEUMATOID FACTOR PRESENCE: Primary | ICD-10-CM

## 2019-06-04 PROCEDURE — 99212 OFFICE O/P EST SF 10 MIN: CPT | Performed by: INTERNAL MEDICINE

## 2019-06-04 PROCEDURE — 99214 OFFICE O/P EST MOD 30 MIN: CPT | Performed by: INTERNAL MEDICINE

## 2019-06-04 RX ORDER — METHOTREXATE 2.5 MG/1
20 TABLET ORAL WEEKLY
Qty: 32 TABLET | Refills: 2 | Status: SHIPPED | OUTPATIENT
Start: 2019-06-04 | End: 2019-08-16 | Stop reason: SDUPTHER

## 2019-06-04 RX ORDER — HYDROXYCHLOROQUINE SULFATE 200 MG/1
300 TABLET, FILM COATED ORAL DAILY
Qty: 45 TABLET | Refills: 2 | Status: SHIPPED | OUTPATIENT
Start: 2019-06-04 | End: 2019-09-03 | Stop reason: SDUPTHER

## 2019-06-04 RX ORDER — FOLIC ACID 1 MG/1
1 TABLET ORAL DAILY
Qty: 90 TABLET | Refills: 0 | Status: SHIPPED | OUTPATIENT
Start: 2019-06-04 | End: 2019-09-03 | Stop reason: SDUPTHER

## 2019-06-04 NOTE — PROGRESS NOTES
J O I N T  C A R E  C L I N I C        The patient is seen in follow-up for:RA WORSE SINCE LAST VISIT    IN mARCH   Urosepis/pyelonephritis 2/2 obstructive uropathy   S/p urethral stent placement for 4 mm stone in the left ureter  E.coli in the urine   SP stent exchange & lithotripsy      BAHMAN- Pre renal and with component of obstructive uropathy        Acute hypoxic respiratory failure   Underlying mild COPD/Emphysema- resolved      At this time stopped HCQ  But was on steroids in hosptial   Hx of RA  No recent flares  Plaquenil to resume on discharge  Joints FLARED OFF HCQ AND mtx  bilateral sore hands     Seen ortho -- dr. Mckeon Fail going to him anymore\"  -- knee better               Patient History Update Since Previous Visit      Yes No Comment      New illnesses X        Seen other healthcare provider X        New x-ray, labs, or procedures X        Started / changed / stopped medications  X       New allergies / reactions to medications  X       Changes in family medical history  X       Changes in patient social history  X       Morning stiffness X  Length:15 MINS      Worst Joint:HANDS      Review of Systems      Key:    1  Not present today;  2  Much Better;  3  Better;  4  Same;  5  Worse;  N  New Problem          Rating  Rating   Joint pain (not including back pain) 5 Skin ulcers: 1   Joint swellin Bruisin   Fatigue: 3 Sickness or rash with sun exposure 1   Muscle aches: 4 Swollen glands 1   Ongoing fever: 1 Dry eyes 4   Unintended weight loss: 1 Painful red eyes 4   Migraine headache: 1 Dry mouth 4   Difficulty sleepin Mouth sores 1   Snore or gag at night: 1 Upset stomach 1   Heart palpitations: 1 Diarrhea 1   Cough: 1 Depressed mood 1   Shortness of breath: 1 Overall stress level in life 4   Pain with breathing 1 Overall assessment 5   Skin rash 1         Abbreviated Exam    System    nl   abn   Comment     1 Gen nutrition/hygiene X      appearance X     2 Skin turgor / integrity X rash X      ecchymosis X     3 Psych orientation X      memory X      mood X      cooperative X     4 HENT mouth / throat X     5 Resp resp. effort X      auscultation X     6 CV heart auscultation X      extremity edema X     7 Other             Joint Assessment      Patient Right     Patient Left     X (check if no synovitis seen on exam)   Joint Pain Swelling Joint Pain Swelling   Shoulder   Shoulder     Elbow   Elbow     Wrist   Wrist     Knee   Knee     MCP I   MCP I     MCP II   MCP II     MCP III   MCP III     MCP IV   MCP IV     MCP V   MCP V     IP I   IP I     PIP II   PIP II     PIP III   PIP III     PIP IV   PIP IV     PIP V   PIP V             Generalized Ligament Laxity Prior Test / Diagnostics    Yes  No  Equivocal        Tender Points     Yes X No  Equivocal         GTB pain / tenderness     SI pain / tenderness     Heberdens Nodes         Impression   +RA  +CCP +RF --inc back to full dose meds,  crp 0.2, sed rate 15  RA WORSE SINCE LAST VISIT  --WAS OFF MEDS, THEN RESUMED IN mAPRIL  NOW SHE self titrated up dose in past month    March:  Urosepis/pyelonephritis 2/2 obstructive uropathy   S/p urethral stent placement for 4 mm stone in the left ureter  E.coli in the urine   SP stent exchange & lithotripsy      BAHMAN- Pre renal and with component of obstructive uropathy  Cr.  Now 1.0    High risk meds-- liver, cbc N  Acute hypoxic respiratory failure   Underlying mild COPD/Emphysema- resolved      dm2-,inc bmi  -- ?fatty liver- better - dm2 stable   lost weight intentional then got sick--     Osteoporosis-- self stopped alendronate 8 months ago  There is severe osteopenia in the femoral necks  -2.2 Tscore  -- discussed risk/benefits at length  Vit D am, calcium at night-- inconsistent use of alendronate--dicssussed reassess in future, on fu dexa     Generalized OA knees  Seen ortho -- dr. Jose Raul Shah going to him anymore\"  -- knee better       Plan     Methotrexate 8 tabs/weekly, hydroxychloroquine 1 1/2 tabs daily   Same meds  Labs on fu in 3months          Counseling and Coordination of Care    Prognosis  Prior Authorization       x Risk / Benefits of RX  Disability Forms        Compliance  Discussion and / or letter to other health care provider         Risk Reduction     x More than half of the face-to-face time with the patient was spent  in counseling or coordinating care    Exercise           Brochure / Handout      Other:    Referral:

## 2019-06-04 NOTE — PATIENT INSTRUCTIONS
Methotrexate 8 tabs/weekly, hydroxychloroquine 1 1/2 tabs daily   Same OTC folate  Same meds  Labs on fu in 3months

## 2019-07-01 ENCOUNTER — OFFICE VISIT (OUTPATIENT)
Dept: INTERNAL MEDICINE | Age: 69
End: 2019-07-01
Payer: MEDICARE

## 2019-07-01 VITALS
BODY MASS INDEX: 37.22 KG/M2 | HEIGHT: 64 IN | RESPIRATION RATE: 12 BRPM | WEIGHT: 218 LBS | TEMPERATURE: 98 F | DIASTOLIC BLOOD PRESSURE: 69 MMHG | SYSTOLIC BLOOD PRESSURE: 134 MMHG | HEART RATE: 77 BPM

## 2019-07-01 DIAGNOSIS — I10 HTN, GOAL BELOW 130/80: ICD-10-CM

## 2019-07-01 DIAGNOSIS — E11.9 CONTROLLED TYPE 2 DIABETES MELLITUS WITHOUT COMPLICATION, WITHOUT LONG-TERM CURRENT USE OF INSULIN (HCC): ICD-10-CM

## 2019-07-01 DIAGNOSIS — R20.0 LEFT ARM NUMBNESS: ICD-10-CM

## 2019-07-01 DIAGNOSIS — M05.79 RHEUMATOID ARTHRITIS INVOLVING MULTIPLE SITES WITH POSITIVE RHEUMATOID FACTOR (HCC): ICD-10-CM

## 2019-07-01 DIAGNOSIS — M54.2 NECK PAIN: ICD-10-CM

## 2019-07-01 DIAGNOSIS — L65.9 HAIR LOSS: ICD-10-CM

## 2019-07-01 DIAGNOSIS — R41.3 MEMORY CHANGE: ICD-10-CM

## 2019-07-01 DIAGNOSIS — E78.5 HYPERLIPIDEMIA LDL GOAL <100: Primary | ICD-10-CM

## 2019-07-01 PROCEDURE — 99213 OFFICE O/P EST LOW 20 MIN: CPT | Performed by: INTERNAL MEDICINE

## 2019-07-01 PROCEDURE — 99214 OFFICE O/P EST MOD 30 MIN: CPT | Performed by: INTERNAL MEDICINE

## 2019-07-01 RX ORDER — LISINOPRIL 20 MG/1
20 TABLET ORAL DAILY
Qty: 90 TABLET | Refills: 1 | Status: SHIPPED | OUTPATIENT
Start: 2019-07-01 | End: 2019-12-30 | Stop reason: SDUPTHER

## 2019-07-01 RX ORDER — ROSUVASTATIN CALCIUM 5 MG/1
TABLET, COATED ORAL
Qty: 90 TABLET | Refills: 1 | Status: SHIPPED | OUTPATIENT
Start: 2019-07-01 | End: 2019-12-30 | Stop reason: SDUPTHER

## 2019-07-01 RX ORDER — MAGNESIUM OXIDE 400 MG/1
400 TABLET ORAL DAILY
COMMUNITY
End: 2020-06-24

## 2019-07-01 ASSESSMENT — ENCOUNTER SYMPTOMS
DIARRHEA: 0
NAUSEA: 0
SHORTNESS OF BREATH: 0
BLOOD IN STOOL: 0
COUGH: 0
VOMITING: 0
WHEEZING: 0
CONSTIPATION: 0

## 2019-07-01 NOTE — PROGRESS NOTES
[facility]  InternalMedicine Residency Program  ACC Note      SUBJECTIVE:  CC: had concerns including Alopecia; Generalized Body Aches (left hand numb); Diabetes; and Memory Loss (noticed slight change). Dunia Tapia presented to the [facility] for a routine visit. HPI    Presents for follow-up appointment. Patient states since last appt seen by Rheumatology with records reviewed. Noting new symptoms of acute hair loss noted since resuming Rheumatologic meds. Notes long-standing Left hand numbness- multiple years- seen by Neurosurgery in the remote past- but now  Noting constant symptoms of numbness/tingling with improvement in certain neck movements. No recurrent hospitalizations- admitted previously for obstructed renal calculi s/p intervention and follow-up with Urology. Analysis of stone yields calcium oxalate etiology. She continues to increase oral hydration with water. No additional restrictions noted at this time. Also noting some subtle changes at time with memory. Does not note significant findings. States minor occurrences. Denies any changes in ADLs and IADLs. Continues to function well at work. Review of Systems   Constitutional: Negative for chills, diaphoresis, fatigue and fever. Respiratory: Negative for cough, shortness of breath and wheezing. Cardiovascular: Negative for chest pain, palpitations and leg swelling. Gastrointestinal: Negative for blood in stool, constipation, diarrhea, nausea and vomiting. Genitourinary: Negative for dysuria. Musculoskeletal: Positive for arthralgias and neck pain. Neurological: Positive for numbness (left hand ). Negative for dizziness, syncope and light-headedness. Psychiatric/Behavioral: Negative for dysphoric mood.        Current Outpatient Medications on File Prior to Visit   Medication Sig Dispense Refill    magnesium oxide (MAG-OX) 400 MG tablet Take 400 mg by mouth daily      hydroxychloroquine (PLAQUENIL) 200 MG tablet Take 1.5 tablets by mouth daily 45 tablet 2    methotrexate (RHEUMATREX) 2.5 MG chemo tablet Take 8 tablets by mouth once a week 32 tablet 2    folic acid (FOLVITE) 1 MG tablet Take 1 tablet by mouth daily 90 tablet 0    vitamin C (ASCORBIC ACID) 500 MG tablet Take 500 mg by mouth daily LD 4/9/2019       Cyanocobalamin (VITAMIN B 12 PO) Take by mouth daily LD 4/9/2019      rosuvastatin (CRESTOR) 5 MG tablet take 1 tablet by mouth once daily 90 tablet 1    metFORMIN (GLUCOPHAGE) 850 MG tablet Take 1 tablet by mouth 2 times daily (with meals) 180 tablet 1    lisinopril (PRINIVIL;ZESTRIL) 20 MG tablet Take 1 tablet by mouth daily 90 tablet 1    Biotin 300 MCG TABS Take 1 tablet by mouth daily 30 tablet 2    Cholecalciferol (VITAMIN D) 2000 units CAPS capsule Take 2,000 Units by mouth 2 times daily (Patient taking differently: Take 2,000 Units by mouth daily ) 60 capsule 2    aspirin EC 81 MG EC tablet Take 1 tablet by mouth daily 90 tablet 1    Coenzyme Q10 (CO Q 10 PO) Take 1 tablet by mouth daily QUINOL  / LD 4/9/2019      VENTOLIN  (90 Base) MCG/ACT inhaler inhale 2 puffs by mouth four times a day if needed 1 Inhaler 2    magnesium cl-calcium carbonate (SLOW-MAG) 71.5-119 MG TBEC tablet take 2 tablets once daily (Patient taking differently: 1 tablet daily take 2 tablets once daily / taking for leg cramps / Ld 4/9/2019) 60 tablet 1    Misc. Devices MISC Please give one glucose monitor accuchek or what is covered bu her insurance. 1 Device 0    Lancets MISC Test twice daily  Give what insurance covers 100 each 5    Misc. Devices MISC 1 each by Does not apply route once as needed Glucometer as covered by insurance 1 Device 0     No current facility-administered medications on file prior to visit. I have reviewed all pertinent PMHx, PSHx, FamHx, SocialHx,medications, and allergies and updated history as appropriate.     OBJECTIVE:    VS: /69   Pulse 77   Temp 98 °F

## 2019-07-02 ENCOUNTER — TELEPHONE (OUTPATIENT)
Dept: INTERNAL MEDICINE | Age: 69
End: 2019-07-02

## 2019-07-12 ENCOUNTER — HOSPITAL ENCOUNTER (OUTPATIENT)
Age: 69
Discharge: HOME | End: 2019-07-14
Payer: MEDICARE

## 2019-07-12 ENCOUNTER — HOSPITAL ENCOUNTER (OUTPATIENT)
Dept: GENERAL RADIOLOGY | Age: 69
Discharge: HOME | End: 2019-07-14
Payer: MEDICARE

## 2019-07-12 DIAGNOSIS — M54.2 NECK PAIN: ICD-10-CM

## 2019-07-12 DIAGNOSIS — M05.79 RHEUMATOID ARTHRITIS INVOLVING MULTIPLE SITES WITH POSITIVE RHEUMATOID FACTOR (HCC): ICD-10-CM

## 2019-07-12 DIAGNOSIS — R20.0 LEFT ARM NUMBNESS: ICD-10-CM

## 2019-07-12 PROCEDURE — 72050 X-RAY EXAM NECK SPINE 4/5VWS: CPT

## 2019-07-16 ENCOUNTER — TELEPHONE (OUTPATIENT)
Dept: INTERNAL MEDICINE | Age: 69
End: 2019-07-16

## 2019-08-16 DIAGNOSIS — M06.9 RHEUMATOID ARTHRITIS INVOLVING MULTIPLE SITES, UNSPECIFIED RHEUMATOID FACTOR PRESENCE: ICD-10-CM

## 2019-08-19 RX ORDER — METHOTREXATE 2.5 MG/1
20 TABLET ORAL WEEKLY
Qty: 32 TABLET | Refills: 0 | Status: SHIPPED | OUTPATIENT
Start: 2019-08-19 | End: 2019-09-03 | Stop reason: SDUPTHER

## 2019-09-03 ENCOUNTER — HOSPITAL ENCOUNTER (OUTPATIENT)
Age: 69
Discharge: HOME | End: 2019-09-03
Payer: MEDICARE

## 2019-09-03 ENCOUNTER — OFFICE VISIT (OUTPATIENT)
Dept: RHEUMATOLOGY | Age: 69
End: 2019-09-03
Payer: MEDICARE

## 2019-09-03 ENCOUNTER — TELEPHONE (OUTPATIENT)
Dept: INTERNAL MEDICINE | Age: 69
End: 2019-09-03

## 2019-09-03 ENCOUNTER — TELEPHONE (OUTPATIENT)
Dept: RHEUMATOLOGY | Age: 69
End: 2019-09-03

## 2019-09-03 VITALS
BODY MASS INDEX: 37.39 KG/M2 | DIASTOLIC BLOOD PRESSURE: 69 MMHG | RESPIRATION RATE: 12 BRPM | WEIGHT: 219 LBS | HEART RATE: 86 BPM | SYSTOLIC BLOOD PRESSURE: 137 MMHG | TEMPERATURE: 97.1 F | HEIGHT: 64 IN

## 2019-09-03 DIAGNOSIS — M06.9 RHEUMATOID ARTHRITIS INVOLVING MULTIPLE SITES, UNSPECIFIED RHEUMATOID FACTOR PRESENCE: ICD-10-CM

## 2019-09-03 DIAGNOSIS — L65.9 HAIR LOSS: ICD-10-CM

## 2019-09-03 DIAGNOSIS — Z79.899 HIGH RISK MEDICATIONS (NOT ANTICOAGULANTS) LONG-TERM USE: ICD-10-CM

## 2019-09-03 DIAGNOSIS — M17.12 LOCALIZED OSTEOARTHRITIS OF LEFT KNEE: ICD-10-CM

## 2019-09-03 DIAGNOSIS — E66.01 OBESITY, CLASS III, BMI 40-49.9 (MORBID OBESITY) (HCC): ICD-10-CM

## 2019-09-03 DIAGNOSIS — M81.0 OSTEOPOROSIS, UNSPECIFIED OSTEOPOROSIS TYPE, UNSPECIFIED PATHOLOGICAL FRACTURE PRESENCE: Primary | ICD-10-CM

## 2019-09-03 DIAGNOSIS — E11.9 CONTROLLED TYPE 2 DIABETES MELLITUS WITHOUT COMPLICATION, WITHOUT LONG-TERM CURRENT USE OF INSULIN (HCC): ICD-10-CM

## 2019-09-03 LAB
ALBUMIN SERPL-MCNC: 4.6 G/DL (ref 3.5–5.2)
ALP BLD-CCNC: 38 U/L (ref 35–104)
ALT SERPL-CCNC: 16 U/L (ref 0–32)
ANION GAP SERPL CALCULATED.3IONS-SCNC: 12 MMOL/L (ref 7–16)
AST SERPL-CCNC: 17 U/L (ref 0–31)
BILIRUB SERPL-MCNC: 0.4 MG/DL (ref 0–1.2)
BUN BLDV-MCNC: 18 MG/DL (ref 8–23)
C-REACTIVE PROTEIN: 0.1 MG/DL (ref 0–0.4)
CALCIUM SERPL-MCNC: 9.4 MG/DL (ref 8.6–10.2)
CHLORIDE BLD-SCNC: 106 MMOL/L (ref 98–107)
CO2: 26 MMOL/L (ref 22–29)
CREAT SERPL-MCNC: 1 MG/DL (ref 0.5–1)
CREATININE URINE: 96 MG/DL (ref 29–226)
GFR AFRICAN AMERICAN: >60
GFR NON-AFRICAN AMERICAN: 55 ML/MIN/1.73
GLUCOSE BLD-MCNC: 135 MG/DL (ref 74–99)
MICROALBUMIN UR-MCNC: 13.9 MG/L
MICROALBUMIN/CREAT UR-RTO: 14.5 (ref 0–30)
POTASSIUM SERPL-SCNC: 4.8 MMOL/L (ref 3.5–5)
SEDIMENTATION RATE, ERYTHROCYTE: 1 MM/HR (ref 0–20)
SODIUM BLD-SCNC: 144 MMOL/L (ref 132–146)
T4 FREE: 1.3 NG/DL (ref 0.93–1.7)
TOTAL PROTEIN: 7.2 G/DL (ref 6.4–8.3)
TSH SERPL DL<=0.05 MIU/L-ACNC: 2.64 UIU/ML (ref 0.27–4.2)

## 2019-09-03 PROCEDURE — 84443 ASSAY THYROID STIM HORMONE: CPT

## 2019-09-03 PROCEDURE — 80053 COMPREHEN METABOLIC PANEL: CPT

## 2019-09-03 PROCEDURE — 82044 UR ALBUMIN SEMIQUANTITATIVE: CPT

## 2019-09-03 PROCEDURE — 84439 ASSAY OF FREE THYROXINE: CPT

## 2019-09-03 PROCEDURE — 86140 C-REACTIVE PROTEIN: CPT

## 2019-09-03 PROCEDURE — 85651 RBC SED RATE NONAUTOMATED: CPT

## 2019-09-03 PROCEDURE — 99214 OFFICE O/P EST MOD 30 MIN: CPT | Performed by: INTERNAL MEDICINE

## 2019-09-03 PROCEDURE — 82570 ASSAY OF URINE CREATININE: CPT

## 2019-09-03 PROCEDURE — 99212 OFFICE O/P EST SF 10 MIN: CPT | Performed by: INTERNAL MEDICINE

## 2019-09-03 PROCEDURE — 36415 COLL VENOUS BLD VENIPUNCTURE: CPT

## 2019-09-03 RX ORDER — HYDROXYCHLOROQUINE SULFATE 200 MG/1
300 TABLET, FILM COATED ORAL DAILY
Qty: 45 TABLET | Refills: 3 | Status: SHIPPED | OUTPATIENT
Start: 2019-09-03 | End: 2020-02-04 | Stop reason: SDUPTHER

## 2019-09-03 RX ORDER — FOLIC ACID 1 MG/1
1 TABLET ORAL DAILY
Qty: 90 TABLET | Refills: 1 | Status: SHIPPED | OUTPATIENT
Start: 2019-09-03 | End: 2020-02-04 | Stop reason: SDUPTHER

## 2019-09-03 RX ORDER — METHOTREXATE 2.5 MG/1
20 TABLET ORAL WEEKLY
Qty: 32 TABLET | Refills: 3 | Status: SHIPPED | OUTPATIENT
Start: 2019-09-03 | End: 2020-02-04 | Stop reason: SDUPTHER

## 2019-09-03 NOTE — PROGRESS NOTES
J O I N T  C A R E  C L I N I C        The patient is seen in follow-up for:RA    Improved RA, CTS issues, discussed osteoporosis.          Patient History Update Since Previous Visit      Yes No Comment      New illnesses  X       Seen other healthcare provider  X       New x-ray, labs, or procedures X        Started / changed / stopped medications  X       New allergies / reactions to medications  X       Changes in family medical history  X       Changes in patient social history  X       Morning stiffness X  Length:15MINS      Worst Joint:HIP      Review of Systems      Key:    1  Not present today;  2  Much Better;  3  Better;  4  Same;  5  Worse;  N  New Problem          Rating  Rating   Joint pain (not including back pain) 4 Skin ulcers: 1   Joint swellin Bruisin   Fatigue: 1 Sickness or rash with sun exposure 1   Muscle aches: 4 Swollen glands 1   Ongoing fever: 1 Dry eyes 3   Unintended weight loss: 1 Painful red eyes 1   Migraine headache: 1 Dry mouth 1   Difficulty sleepin Mouth sores 1   Snore or gag at night: 1 Upset stomach 1   Heart palpitations: 1 Diarrhea 1   Cough: 1 Depressed mood 1   Shortness of breath: 1 Overall stress level in life 4   Pain with breathing 1 Overall assessment 3   Skin rash 1         Abbreviated Exam    System    nl   abn   Comment     1 Gen nutrition/hygiene x      appearance x     2 Skin turgor / integrity x      rash x      ecchymosis x     3 Psych orientation x      memory x      mood x      cooperative x     4 HENT mouth / throat x     5 Resp resp. effort x      auscultation x     6 CV heart auscultation x      extremity edema x     7 Other             Joint Assessment      Patient Right     Patient Left     x (check if no synovitis seen on exam)   Joint Pain Swelling Joint Pain Swelling   Shoulder   Shoulder     Elbow   Elbow     Wrist   Wrist     Knee   Knee     MCP I   MCP I     MCP II   MCP II     MCP III   MCP III     MCP IV   MCP IV     MCP V   MCP V

## 2019-09-03 NOTE — TELEPHONE ENCOUNTER
Reviewed Rheum assessment. Patient previously seen by Armando for EMG/NCT and noted severe carpal tunnel syndrome. Recommending surgical assessment and noted per Rheum. Recommend Ortho Hand assessment. Discuss with patient if preference for referral for assessment.

## 2019-09-04 RX ORDER — ALENDRONATE SODIUM 70 MG/1
70 TABLET ORAL
Qty: 12 TABLET | Refills: 1 | Status: SHIPPED
Start: 2019-09-04 | End: 2020-08-13 | Stop reason: SDUPTHER

## 2019-09-06 ENCOUNTER — APPOINTMENT (OUTPATIENT)
Dept: CT IMAGING | Age: 69
End: 2019-09-06
Payer: MEDICARE

## 2019-09-06 ENCOUNTER — HOSPITAL ENCOUNTER (EMERGENCY)
Age: 69
Discharge: HOME | End: 2019-09-06
Attending: EMERGENCY MEDICINE
Payer: MEDICARE

## 2019-09-06 VITALS
HEART RATE: 86 BPM | DIASTOLIC BLOOD PRESSURE: 84 MMHG | RESPIRATION RATE: 16 BRPM | SYSTOLIC BLOOD PRESSURE: 136 MMHG | TEMPERATURE: 97.9 F | HEIGHT: 64 IN | BODY MASS INDEX: 36.88 KG/M2 | OXYGEN SATURATION: 97 % | WEIGHT: 216 LBS

## 2019-09-06 DIAGNOSIS — N39.0 URINARY TRACT INFECTION IN FEMALE: ICD-10-CM

## 2019-09-06 DIAGNOSIS — K57.92 DIVERTICULITIS: Primary | ICD-10-CM

## 2019-09-06 LAB
ALBUMIN SERPL-MCNC: 4.2 G/DL (ref 3.5–5.2)
ALP BLD-CCNC: 38 U/L (ref 35–104)
ALT SERPL-CCNC: 16 U/L (ref 0–32)
ANION GAP SERPL CALCULATED.3IONS-SCNC: 14 MMOL/L (ref 7–16)
AST SERPL-CCNC: 15 U/L (ref 0–31)
BACTERIA: ABNORMAL /HPF
BASOPHILS ABSOLUTE: 0.02 E9/L (ref 0–0.2)
BASOPHILS RELATIVE PERCENT: 0.2 % (ref 0–2)
BILIRUB SERPL-MCNC: 0.4 MG/DL (ref 0–1.2)
BILIRUBIN URINE: NEGATIVE
BLOOD, URINE: NEGATIVE
BUN BLDV-MCNC: 21 MG/DL (ref 8–23)
CALCIUM SERPL-MCNC: 9.2 MG/DL (ref 8.6–10.2)
CHLORIDE BLD-SCNC: 105 MMOL/L (ref 98–107)
CLARITY: CLEAR
CO2: 23 MMOL/L (ref 22–29)
COLOR: YELLOW
CREAT SERPL-MCNC: 1 MG/DL (ref 0.5–1)
EOSINOPHILS ABSOLUTE: 0.13 E9/L (ref 0.05–0.5)
EOSINOPHILS RELATIVE PERCENT: 1.4 % (ref 0–6)
EPITHELIAL CELLS, UA: ABNORMAL /HPF
GFR AFRICAN AMERICAN: >60
GFR NON-AFRICAN AMERICAN: 55 ML/MIN/1.73
GLUCOSE BLD-MCNC: 157 MG/DL (ref 74–99)
GLUCOSE URINE: NEGATIVE MG/DL
HCT VFR BLD CALC: 38.4 % (ref 34–48)
HEMOGLOBIN: 12.5 G/DL (ref 11.5–15.5)
IMMATURE GRANULOCYTES #: 0.04 E9/L
IMMATURE GRANULOCYTES %: 0.4 % (ref 0–5)
KETONES, URINE: NEGATIVE MG/DL
LACTIC ACID, SEPSIS: 1.7 MMOL/L (ref 0.5–1.9)
LEUKOCYTE ESTERASE, URINE: ABNORMAL
LIPASE: 130 U/L (ref 13–60)
LYMPHOCYTES ABSOLUTE: 1.58 E9/L (ref 1.5–4)
LYMPHOCYTES RELATIVE PERCENT: 16.9 % (ref 20–42)
MCH RBC QN AUTO: 29.1 PG (ref 26–35)
MCHC RBC AUTO-ENTMCNC: 32.6 % (ref 32–34.5)
MCV RBC AUTO: 89.5 FL (ref 80–99.9)
MONOCYTES ABSOLUTE: 1.02 E9/L (ref 0.1–0.95)
MONOCYTES RELATIVE PERCENT: 10.9 % (ref 2–12)
NEUTROPHILS ABSOLUTE: 6.57 E9/L (ref 1.8–7.3)
NEUTROPHILS RELATIVE PERCENT: 70.2 % (ref 43–80)
NITRITE, URINE: NEGATIVE
PDW BLD-RTO: 14.1 FL (ref 11.5–15)
PH UA: 5.5 (ref 5–9)
PLATELET # BLD: 199 E9/L (ref 130–450)
PMV BLD AUTO: 8.8 FL (ref 7–12)
POTASSIUM SERPL-SCNC: 4.6 MMOL/L (ref 3.5–5)
PROTEIN UA: NEGATIVE MG/DL
RBC # BLD: 4.29 E12/L (ref 3.5–5.5)
RBC UA: ABNORMAL /HPF (ref 0–2)
SODIUM BLD-SCNC: 142 MMOL/L (ref 132–146)
SPECIFIC GRAVITY UA: 1.02 (ref 1–1.03)
TOTAL PROTEIN: 6.9 G/DL (ref 6.4–8.3)
UROBILINOGEN, URINE: 0.2 E.U./DL
WBC # BLD: 9.4 E9/L (ref 4.5–11.5)
WBC UA: ABNORMAL /HPF (ref 0–5)

## 2019-09-06 PROCEDURE — 99284 EMERGENCY DEPT VISIT MOD MDM: CPT

## 2019-09-06 PROCEDURE — 81001 URINALYSIS AUTO W/SCOPE: CPT

## 2019-09-06 PROCEDURE — 87186 SC STD MICRODIL/AGAR DIL: CPT

## 2019-09-06 PROCEDURE — 83605 ASSAY OF LACTIC ACID: CPT

## 2019-09-06 PROCEDURE — 74177 CT ABD & PELVIS W/CONTRAST: CPT

## 2019-09-06 PROCEDURE — 87088 URINE BACTERIA CULTURE: CPT

## 2019-09-06 PROCEDURE — 80053 COMPREHEN METABOLIC PANEL: CPT

## 2019-09-06 PROCEDURE — 83690 ASSAY OF LIPASE: CPT

## 2019-09-06 PROCEDURE — 36415 COLL VENOUS BLD VENIPUNCTURE: CPT

## 2019-09-06 PROCEDURE — 85025 COMPLETE CBC W/AUTO DIFF WBC: CPT

## 2019-09-06 PROCEDURE — 6360000004 HC RX CONTRAST MEDICATION: Performed by: RADIOLOGY

## 2019-09-06 PROCEDURE — 6370000000 HC RX 637 (ALT 250 FOR IP): Performed by: EMERGENCY MEDICINE

## 2019-09-06 RX ORDER — METRONIDAZOLE 500 MG/1
500 TABLET ORAL ONCE
Status: COMPLETED | OUTPATIENT
Start: 2019-09-06 | End: 2019-09-06

## 2019-09-06 RX ORDER — CEFDINIR 300 MG/1
300 CAPSULE ORAL ONCE
Status: COMPLETED | OUTPATIENT
Start: 2019-09-06 | End: 2019-09-06

## 2019-09-06 RX ORDER — CEFDINIR 300 MG/1
300 CAPSULE ORAL 2 TIMES DAILY
Qty: 10 CAPSULE | Refills: 0 | Status: SHIPPED | OUTPATIENT
Start: 2019-09-06 | End: 2019-09-11

## 2019-09-06 RX ORDER — METRONIDAZOLE 500 MG/1
500 TABLET ORAL 3 TIMES DAILY
Qty: 30 TABLET | Refills: 0 | Status: SHIPPED | OUTPATIENT
Start: 2019-09-06 | End: 2019-09-16

## 2019-09-06 RX ORDER — HYDROCODONE BITARTRATE AND ACETAMINOPHEN 5; 325 MG/1; MG/1
1 TABLET ORAL EVERY 8 HOURS PRN
Qty: 6 TABLET | Refills: 0 | Status: SHIPPED | OUTPATIENT
Start: 2019-09-06 | End: 2019-09-09

## 2019-09-06 RX ADMIN — METRONIDAZOLE 500 MG: 500 TABLET, FILM COATED ORAL at 06:31

## 2019-09-06 RX ADMIN — CEFDINIR 300 MG: 300 CAPSULE ORAL at 06:32

## 2019-09-06 RX ADMIN — IOPAMIDOL 110 ML: 755 INJECTION, SOLUTION INTRAVENOUS at 05:24

## 2019-09-06 ASSESSMENT — PAIN SCALES - GENERAL: PAINLEVEL_OUTOF10: 8

## 2019-09-06 ASSESSMENT — PAIN DESCRIPTION - ORIENTATION: ORIENTATION: LEFT;LOWER

## 2019-09-06 ASSESSMENT — PAIN DESCRIPTION - LOCATION: LOCATION: ABDOMEN

## 2019-09-08 LAB
ORGANISM: ABNORMAL
URINE CULTURE, ROUTINE: ABNORMAL

## 2019-10-02 ENCOUNTER — TELEPHONE (OUTPATIENT)
Dept: RHEUMATOLOGY | Age: 69
End: 2019-10-02

## 2019-10-07 ENCOUNTER — OFFICE VISIT (OUTPATIENT)
Dept: INTERNAL MEDICINE | Age: 69
End: 2019-10-07
Payer: MEDICARE

## 2019-10-07 VITALS
SYSTOLIC BLOOD PRESSURE: 138 MMHG | DIASTOLIC BLOOD PRESSURE: 64 MMHG | HEART RATE: 79 BPM | RESPIRATION RATE: 12 BRPM | HEIGHT: 64 IN | WEIGHT: 221 LBS | BODY MASS INDEX: 37.73 KG/M2 | TEMPERATURE: 97.6 F

## 2019-10-07 DIAGNOSIS — Z23 NEED FOR SHINGLES VACCINE: Primary | ICD-10-CM

## 2019-10-07 DIAGNOSIS — Z00.00 ROUTINE GENERAL MEDICAL EXAMINATION AT A HEALTH CARE FACILITY: ICD-10-CM

## 2019-10-07 DIAGNOSIS — E11.9 CONTROLLED TYPE 2 DIABETES MELLITUS WITHOUT COMPLICATION, WITHOUT LONG-TERM CURRENT USE OF INSULIN (HCC): ICD-10-CM

## 2019-10-07 DIAGNOSIS — Z23 NEED FOR TETANUS, DIPHTHERIA, AND ACELLULAR PERTUSSIS (TDAP) VACCINE: ICD-10-CM

## 2019-10-07 PROCEDURE — G0438 PPPS, INITIAL VISIT: HCPCS | Performed by: INTERNAL MEDICINE

## 2019-10-07 PROCEDURE — 99212 OFFICE O/P EST SF 10 MIN: CPT | Performed by: INTERNAL MEDICINE

## 2019-10-07 ASSESSMENT — ENCOUNTER SYMPTOMS
CONSTIPATION: 0
SHORTNESS OF BREATH: 0
WHEEZING: 0
COUGH: 1
DIARRHEA: 0
NAUSEA: 0
VOMITING: 0

## 2019-10-07 ASSESSMENT — LIFESTYLE VARIABLES
HOW OFTEN DO YOU HAVE A DRINK CONTAINING ALCOHOL: 1
HOW MANY STANDARD DRINKS CONTAINING ALCOHOL DO YOU HAVE ON A TYPICAL DAY: 0
AUDIT-C TOTAL SCORE: 1
HOW OFTEN DURING THE LAST YEAR HAVE YOU BEEN UNABLE TO REMEMBER WHAT HAPPENED THE NIGHT BEFORE BECAUSE YOU HAD BEEN DRINKING: 0
AUDIT TOTAL SCORE: 1
HOW OFTEN DURING THE LAST YEAR HAVE YOU NEEDED AN ALCOHOLIC DRINK FIRST THING IN THE MORNING TO GET YOURSELF GOING AFTER A NIGHT OF HEAVY DRINKING: 0
HOW OFTEN DURING THE LAST YEAR HAVE YOU FAILED TO DO WHAT WAS NORMALLY EXPECTED FROM YOU BECAUSE OF DRINKING: 0
HOW OFTEN DO YOU HAVE SIX OR MORE DRINKS ON ONE OCCASION: 0
HOW OFTEN DURING THE LAST YEAR HAVE YOU FOUND THAT YOU WERE NOT ABLE TO STOP DRINKING ONCE YOU HAD STARTED: 0
HOW OFTEN DURING THE LAST YEAR HAVE YOU HAD A FEELING OF GUILT OR REMORSE AFTER DRINKING: 0
HAVE YOU OR SOMEONE ELSE BEEN INJURED AS A RESULT OF YOUR DRINKING: 0
HAS A RELATIVE, FRIEND, DOCTOR, OR ANOTHER HEALTH PROFESSIONAL EXPRESSED CONCERN ABOUT YOUR DRINKING OR SUGGESTED YOU CUT DOWN: 0

## 2019-10-07 ASSESSMENT — PATIENT HEALTH QUESTIONNAIRE - PHQ9
SUM OF ALL RESPONSES TO PHQ QUESTIONS 1-9: 0
SUM OF ALL RESPONSES TO PHQ QUESTIONS 1-9: 0

## 2019-12-20 ENCOUNTER — TELEPHONE (OUTPATIENT)
Dept: CASE MANAGEMENT | Age: 69
End: 2019-12-20

## 2019-12-30 ENCOUNTER — OFFICE VISIT (OUTPATIENT)
Dept: INTERNAL MEDICINE | Age: 69
End: 2019-12-30
Payer: MEDICARE

## 2019-12-30 ENCOUNTER — CARE COORDINATION (OUTPATIENT)
Dept: CARE COORDINATION | Age: 69
End: 2019-12-30

## 2019-12-30 VITALS
WEIGHT: 227 LBS | HEIGHT: 64 IN | SYSTOLIC BLOOD PRESSURE: 141 MMHG | RESPIRATION RATE: 12 BRPM | BODY MASS INDEX: 38.76 KG/M2 | HEART RATE: 79 BPM | TEMPERATURE: 97.5 F | DIASTOLIC BLOOD PRESSURE: 65 MMHG

## 2019-12-30 DIAGNOSIS — E11.9 CONTROLLED TYPE 2 DIABETES MELLITUS WITHOUT COMPLICATION, WITHOUT LONG-TERM CURRENT USE OF INSULIN (HCC): ICD-10-CM

## 2019-12-30 DIAGNOSIS — R19.5 CHANGE IN STOOL: ICD-10-CM

## 2019-12-30 DIAGNOSIS — I10 HTN, GOAL BELOW 130/80: ICD-10-CM

## 2019-12-30 DIAGNOSIS — E78.5 HYPERLIPIDEMIA LDL GOAL <100: ICD-10-CM

## 2019-12-30 DIAGNOSIS — K92.1 SYMPTOM OF BLOOD IN STOOL: Primary | ICD-10-CM

## 2019-12-30 PROCEDURE — 99213 OFFICE O/P EST LOW 20 MIN: CPT | Performed by: INTERNAL MEDICINE

## 2019-12-30 PROCEDURE — 99214 OFFICE O/P EST MOD 30 MIN: CPT | Performed by: INTERNAL MEDICINE

## 2019-12-30 RX ORDER — ROSUVASTATIN CALCIUM 5 MG/1
TABLET, COATED ORAL
Qty: 90 TABLET | Refills: 2 | Status: SHIPPED
Start: 2019-12-30 | End: 2020-06-15 | Stop reason: SDUPTHER

## 2019-12-30 RX ORDER — PANTOPRAZOLE SODIUM 40 MG/1
40 TABLET, DELAYED RELEASE ORAL
Qty: 30 TABLET | Refills: 1 | Status: SHIPPED
Start: 2019-12-30 | End: 2020-03-11 | Stop reason: SDUPTHER

## 2019-12-30 RX ORDER — LISINOPRIL 20 MG/1
20 TABLET ORAL DAILY
Qty: 90 TABLET | Refills: 2 | Status: SHIPPED
Start: 2019-12-30 | End: 2020-06-15 | Stop reason: SDUPTHER

## 2019-12-30 SDOH — HEALTH STABILITY: MENTAL HEALTH: HOW MANY STANDARD DRINKS CONTAINING ALCOHOL DO YOU HAVE ON A TYPICAL DAY?: 1 OR 2

## 2019-12-30 SDOH — HEALTH STABILITY: MENTAL HEALTH
STRESS IS WHEN SOMEONE FEELS TENSE, NERVOUS, ANXIOUS, OR CAN'T SLEEP AT NIGHT BECAUSE THEIR MIND IS TROUBLED. HOW STRESSED ARE YOU?: NOT AT ALL

## 2019-12-30 SDOH — ECONOMIC STABILITY: TRANSPORTATION INSECURITY
IN THE PAST 12 MONTHS, HAS THE LACK OF TRANSPORTATION KEPT YOU FROM MEDICAL APPOINTMENTS OR FROM GETTING MEDICATIONS?: NO

## 2019-12-30 SDOH — ECONOMIC STABILITY: FOOD INSECURITY: WITHIN THE PAST 12 MONTHS, THE FOOD YOU BOUGHT JUST DIDN'T LAST AND YOU DIDN'T HAVE MONEY TO GET MORE.: NEVER TRUE

## 2019-12-30 SDOH — HEALTH STABILITY: MENTAL HEALTH: HOW OFTEN DO YOU HAVE A DRINK CONTAINING ALCOHOL?: MONTHLY OR LESS

## 2019-12-30 SDOH — ECONOMIC STABILITY: INCOME INSECURITY: HOW HARD IS IT FOR YOU TO PAY FOR THE VERY BASICS LIKE FOOD, HOUSING, MEDICAL CARE, AND HEATING?: NOT HARD AT ALL

## 2019-12-30 SDOH — ECONOMIC STABILITY: TRANSPORTATION INSECURITY
IN THE PAST 12 MONTHS, HAS LACK OF TRANSPORTATION KEPT YOU FROM MEETINGS, WORK, OR FROM GETTING THINGS NEEDED FOR DAILY LIVING?: NO

## 2019-12-30 SDOH — ECONOMIC STABILITY: FOOD INSECURITY: WITHIN THE PAST 12 MONTHS, YOU WORRIED THAT YOUR FOOD WOULD RUN OUT BEFORE YOU GOT MONEY TO BUY MORE.: NEVER TRUE

## 2019-12-30 ASSESSMENT — ENCOUNTER SYMPTOMS
VOMITING: 0
CONSTIPATION: 0
WHEEZING: 0
SHORTNESS OF BREATH: 0
ANAL BLEEDING: 0
ABDOMINAL DISTENTION: 0
DIARRHEA: 0
ABDOMINAL PAIN: 0
BLOOD IN STOOL: 1
NAUSEA: 0

## 2019-12-30 ASSESSMENT — PATIENT HEALTH QUESTIONNAIRE - PHQ9
SUM OF ALL RESPONSES TO PHQ QUESTIONS 1-9: 0
SUM OF ALL RESPONSES TO PHQ QUESTIONS 1-9: 0

## 2020-01-03 ENCOUNTER — HOSPITAL ENCOUNTER (OUTPATIENT)
Age: 70
Discharge: HOME | End: 2020-01-03
Payer: MEDICARE

## 2020-01-03 LAB
ALBUMIN SERPL-MCNC: 4.5 G/DL (ref 3.5–5.2)
ALP BLD-CCNC: 36 U/L (ref 35–104)
ALT SERPL-CCNC: 22 U/L (ref 0–32)
ANION GAP SERPL CALCULATED.3IONS-SCNC: 13 MMOL/L (ref 7–16)
AST SERPL-CCNC: 24 U/L (ref 0–31)
BASOPHILS ABSOLUTE: 0.03 E9/L (ref 0–0.2)
BASOPHILS RELATIVE PERCENT: 0.5 % (ref 0–2)
BILIRUB SERPL-MCNC: 0.5 MG/DL (ref 0–1.2)
BUN BLDV-MCNC: 19 MG/DL (ref 8–23)
CALCIUM SERPL-MCNC: 9.2 MG/DL (ref 8.6–10.2)
CHLORIDE BLD-SCNC: 103 MMOL/L (ref 98–107)
CHOLESTEROL, TOTAL: 149 MG/DL (ref 0–199)
CO2: 25 MMOL/L (ref 22–29)
CREAT SERPL-MCNC: 1.1 MG/DL (ref 0.5–1)
EOSINOPHILS ABSOLUTE: 0.14 E9/L (ref 0.05–0.5)
EOSINOPHILS RELATIVE PERCENT: 2.4 % (ref 0–6)
GFR AFRICAN AMERICAN: 60
GFR NON-AFRICAN AMERICAN: 49 ML/MIN/1.73
GLUCOSE BLD-MCNC: 141 MG/DL (ref 74–99)
HBA1C MFR BLD: 5.7 % (ref 4–5.6)
HCT VFR BLD CALC: 39.3 % (ref 34–48)
HDLC SERPL-MCNC: 50 MG/DL
HEMOGLOBIN: 12.6 G/DL (ref 11.5–15.5)
IMMATURE GRANULOCYTES #: 0.02 E9/L
IMMATURE GRANULOCYTES %: 0.3 % (ref 0–5)
LDL CHOLESTEROL CALCULATED: 70 MG/DL (ref 0–99)
LYMPHOCYTES ABSOLUTE: 1.18 E9/L (ref 1.5–4)
LYMPHOCYTES RELATIVE PERCENT: 20 % (ref 20–42)
MCH RBC QN AUTO: 28.9 PG (ref 26–35)
MCHC RBC AUTO-ENTMCNC: 32.1 % (ref 32–34.5)
MCV RBC AUTO: 90.1 FL (ref 80–99.9)
MONOCYTES ABSOLUTE: 0.76 E9/L (ref 0.1–0.95)
MONOCYTES RELATIVE PERCENT: 12.9 % (ref 2–12)
NEUTROPHILS ABSOLUTE: 3.76 E9/L (ref 1.8–7.3)
NEUTROPHILS RELATIVE PERCENT: 63.9 % (ref 43–80)
PDW BLD-RTO: 13.7 FL (ref 11.5–15)
PLATELET # BLD: 215 E9/L (ref 130–450)
PMV BLD AUTO: 8.5 FL (ref 7–12)
POTASSIUM SERPL-SCNC: 4.8 MMOL/L (ref 3.5–5)
RBC # BLD: 4.36 E12/L (ref 3.5–5.5)
SODIUM BLD-SCNC: 141 MMOL/L (ref 132–146)
TOTAL PROTEIN: 7.1 G/DL (ref 6.4–8.3)
TRIGL SERPL-MCNC: 146 MG/DL (ref 0–149)
VLDLC SERPL CALC-MCNC: 29 MG/DL
WBC # BLD: 5.9 E9/L (ref 4.5–11.5)

## 2020-01-03 PROCEDURE — 87338 HPYLORI STOOL AG IA: CPT

## 2020-01-03 PROCEDURE — 83036 HEMOGLOBIN GLYCOSYLATED A1C: CPT

## 2020-01-03 PROCEDURE — 85025 COMPLETE CBC W/AUTO DIFF WBC: CPT

## 2020-01-03 PROCEDURE — 36415 COLL VENOUS BLD VENIPUNCTURE: CPT

## 2020-01-03 PROCEDURE — G0328 FECAL BLOOD SCRN IMMUNOASSAY: HCPCS

## 2020-01-03 PROCEDURE — 80061 LIPID PANEL: CPT

## 2020-01-03 PROCEDURE — 80053 COMPREHEN METABOLIC PANEL: CPT

## 2020-01-04 LAB — OCCULT BLOOD SCREENING: NORMAL

## 2020-01-05 LAB — H PYLORI ANTIGEN STOOL: NEGATIVE

## 2020-01-06 ENCOUNTER — TELEPHONE (OUTPATIENT)
Dept: INTERNAL MEDICINE | Age: 70
End: 2020-01-06

## 2020-01-06 NOTE — TELEPHONE ENCOUNTER
H pylori and stool occult received- both negative. Previously discussed prior labs with patient- please inform of negative values to patient.

## 2020-01-15 ENCOUNTER — TELEPHONE (OUTPATIENT)
Dept: SURGERY | Age: 70
End: 2020-01-15

## 2020-01-15 ENCOUNTER — CARE COORDINATION (OUTPATIENT)
Dept: CARE COORDINATION | Age: 70
End: 2020-01-15

## 2020-01-15 NOTE — TELEPHONE ENCOUNTER
Pt cancelled appt with Dr. Rosy Jiang for tomorrow at 3:00 pm stating that her issue has resolved, pt state that she will reschedule if needed, MA verbalized understanding.   Electronically signed by Yuri Olivarez on 1/15/20 at 3:51 PM

## 2020-01-23 ENCOUNTER — CARE COORDINATION (OUTPATIENT)
Dept: CARE COORDINATION | Age: 70
End: 2020-01-23

## 2020-02-04 ENCOUNTER — OFFICE VISIT (OUTPATIENT)
Dept: RHEUMATOLOGY | Age: 70
End: 2020-02-04
Payer: MEDICARE

## 2020-02-04 ENCOUNTER — HOSPITAL ENCOUNTER (OUTPATIENT)
Age: 70
Discharge: HOME | End: 2020-02-04
Payer: MEDICARE

## 2020-02-04 VITALS
RESPIRATION RATE: 12 BRPM | TEMPERATURE: 97.5 F | HEIGHT: 64 IN | WEIGHT: 220 LBS | DIASTOLIC BLOOD PRESSURE: 70 MMHG | HEART RATE: 96 BPM | SYSTOLIC BLOOD PRESSURE: 136 MMHG | BODY MASS INDEX: 37.56 KG/M2

## 2020-02-04 LAB
ALBUMIN SERPL-MCNC: 4.8 G/DL (ref 3.5–5.2)
ALP BLD-CCNC: 39 U/L (ref 35–104)
ALT SERPL-CCNC: 19 U/L (ref 0–32)
ANION GAP SERPL CALCULATED.3IONS-SCNC: 12 MMOL/L (ref 7–16)
AST SERPL-CCNC: 21 U/L (ref 0–31)
BASOPHILS ABSOLUTE: 0.04 E9/L (ref 0–0.2)
BASOPHILS RELATIVE PERCENT: 0.5 % (ref 0–2)
BILIRUB SERPL-MCNC: 0.5 MG/DL (ref 0–1.2)
BUN BLDV-MCNC: 18 MG/DL (ref 8–23)
C-REACTIVE PROTEIN: 0.1 MG/DL (ref 0–0.4)
CALCIUM SERPL-MCNC: 9.9 MG/DL (ref 8.6–10.2)
CHLORIDE BLD-SCNC: 101 MMOL/L (ref 98–107)
CO2: 25 MMOL/L (ref 22–29)
CREAT SERPL-MCNC: 1.2 MG/DL (ref 0.5–1)
EOSINOPHILS ABSOLUTE: 0.15 E9/L (ref 0.05–0.5)
EOSINOPHILS RELATIVE PERCENT: 1.9 % (ref 0–6)
GFR AFRICAN AMERICAN: 54
GFR NON-AFRICAN AMERICAN: 45 ML/MIN/1.73
GLUCOSE BLD-MCNC: 116 MG/DL (ref 74–99)
HCT VFR BLD CALC: 41.5 % (ref 34–48)
HEMOGLOBIN: 13.3 G/DL (ref 11.5–15.5)
IMMATURE GRANULOCYTES #: 0.03 E9/L
IMMATURE GRANULOCYTES %: 0.4 % (ref 0–5)
LYMPHOCYTES ABSOLUTE: 1.77 E9/L (ref 1.5–4)
LYMPHOCYTES RELATIVE PERCENT: 22.5 % (ref 20–42)
MCH RBC QN AUTO: 28.6 PG (ref 26–35)
MCHC RBC AUTO-ENTMCNC: 32 % (ref 32–34.5)
MCV RBC AUTO: 89.2 FL (ref 80–99.9)
MONOCYTES ABSOLUTE: 1.15 E9/L (ref 0.1–0.95)
MONOCYTES RELATIVE PERCENT: 14.6 % (ref 2–12)
NEUTROPHILS ABSOLUTE: 4.72 E9/L (ref 1.8–7.3)
NEUTROPHILS RELATIVE PERCENT: 60.1 % (ref 43–80)
PDW BLD-RTO: 14.2 FL (ref 11.5–15)
PLATELET # BLD: 257 E9/L (ref 130–450)
PMV BLD AUTO: 8.7 FL (ref 7–12)
POTASSIUM SERPL-SCNC: 4.8 MMOL/L (ref 3.5–5)
RBC # BLD: 4.65 E12/L (ref 3.5–5.5)
SODIUM BLD-SCNC: 138 MMOL/L (ref 132–146)
TOTAL PROTEIN: 7.4 G/DL (ref 6.4–8.3)
WBC # BLD: 7.9 E9/L (ref 4.5–11.5)

## 2020-02-04 PROCEDURE — G8484 FLU IMMUNIZE NO ADMIN: HCPCS | Performed by: INTERNAL MEDICINE

## 2020-02-04 PROCEDURE — G8399 PT W/DXA RESULTS DOCUMENT: HCPCS | Performed by: INTERNAL MEDICINE

## 2020-02-04 PROCEDURE — G8417 CALC BMI ABV UP PARAM F/U: HCPCS | Performed by: INTERNAL MEDICINE

## 2020-02-04 PROCEDURE — 1090F PRES/ABSN URINE INCON ASSESS: CPT | Performed by: INTERNAL MEDICINE

## 2020-02-04 PROCEDURE — 80053 COMPREHEN METABOLIC PANEL: CPT

## 2020-02-04 PROCEDURE — 86140 C-REACTIVE PROTEIN: CPT

## 2020-02-04 PROCEDURE — 3017F COLORECTAL CA SCREEN DOC REV: CPT | Performed by: INTERNAL MEDICINE

## 2020-02-04 PROCEDURE — G8427 DOCREV CUR MEDS BY ELIG CLIN: HCPCS | Performed by: INTERNAL MEDICINE

## 2020-02-04 PROCEDURE — 99214 OFFICE O/P EST MOD 30 MIN: CPT | Performed by: INTERNAL MEDICINE

## 2020-02-04 PROCEDURE — 85025 COMPLETE CBC W/AUTO DIFF WBC: CPT

## 2020-02-04 PROCEDURE — 36415 COLL VENOUS BLD VENIPUNCTURE: CPT

## 2020-02-04 PROCEDURE — 99212 OFFICE O/P EST SF 10 MIN: CPT | Performed by: INTERNAL MEDICINE

## 2020-02-04 PROCEDURE — 1036F TOBACCO NON-USER: CPT | Performed by: INTERNAL MEDICINE

## 2020-02-04 PROCEDURE — 1123F ACP DISCUSS/DSCN MKR DOCD: CPT | Performed by: INTERNAL MEDICINE

## 2020-02-04 PROCEDURE — 4040F PNEUMOC VAC/ADMIN/RCVD: CPT | Performed by: INTERNAL MEDICINE

## 2020-02-04 RX ORDER — FOLIC ACID 1 MG/1
1 TABLET ORAL DAILY
Qty: 90 TABLET | Refills: 0 | Status: SHIPPED
Start: 2020-02-04 | End: 2020-06-15 | Stop reason: SDUPTHER

## 2020-02-04 RX ORDER — METHOTREXATE 2.5 MG/1
20 TABLET ORAL WEEKLY
Qty: 32 TABLET | Refills: 2 | Status: SHIPPED
Start: 2020-02-04 | End: 2020-04-13

## 2020-02-04 RX ORDER — HYDROXYCHLOROQUINE SULFATE 200 MG/1
300 TABLET, FILM COATED ORAL DAILY
Qty: 45 TABLET | Refills: 2 | Status: SHIPPED
Start: 2020-02-04 | End: 2020-03-10

## 2020-02-04 NOTE — PROGRESS NOTES
Benefits of RX  Disability Forms        Compliance  Discussion and / or letter to other health care provider         Risk Reduction     x More than half of the face-to-face time with the patient was spent  in counseling or coordinating care    Exercise           Brochure / Handout      Other:    Referral:

## 2020-02-12 ENCOUNTER — OFFICE VISIT (OUTPATIENT)
Dept: INTERNAL MEDICINE | Age: 70
End: 2020-02-12
Payer: MEDICARE

## 2020-02-12 ENCOUNTER — CARE COORDINATION (OUTPATIENT)
Dept: CARE COORDINATION | Age: 70
End: 2020-02-12

## 2020-02-12 VITALS
WEIGHT: 225.1 LBS | SYSTOLIC BLOOD PRESSURE: 133 MMHG | DIASTOLIC BLOOD PRESSURE: 74 MMHG | HEART RATE: 85 BPM | RESPIRATION RATE: 16 BRPM | BODY MASS INDEX: 38.43 KG/M2 | TEMPERATURE: 97.2 F | HEIGHT: 64 IN

## 2020-02-12 PROCEDURE — 4040F PNEUMOC VAC/ADMIN/RCVD: CPT | Performed by: INTERNAL MEDICINE

## 2020-02-12 PROCEDURE — G8484 FLU IMMUNIZE NO ADMIN: HCPCS | Performed by: INTERNAL MEDICINE

## 2020-02-12 PROCEDURE — 1123F ACP DISCUSS/DSCN MKR DOCD: CPT | Performed by: INTERNAL MEDICINE

## 2020-02-12 PROCEDURE — 99212 OFFICE O/P EST SF 10 MIN: CPT | Performed by: INTERNAL MEDICINE

## 2020-02-12 PROCEDURE — 1036F TOBACCO NON-USER: CPT | Performed by: INTERNAL MEDICINE

## 2020-02-12 PROCEDURE — G8399 PT W/DXA RESULTS DOCUMENT: HCPCS | Performed by: INTERNAL MEDICINE

## 2020-02-12 PROCEDURE — G8427 DOCREV CUR MEDS BY ELIG CLIN: HCPCS | Performed by: INTERNAL MEDICINE

## 2020-02-12 PROCEDURE — 3044F HG A1C LEVEL LT 7.0%: CPT | Performed by: INTERNAL MEDICINE

## 2020-02-12 PROCEDURE — G8417 CALC BMI ABV UP PARAM F/U: HCPCS | Performed by: INTERNAL MEDICINE

## 2020-02-12 PROCEDURE — 3017F COLORECTAL CA SCREEN DOC REV: CPT | Performed by: INTERNAL MEDICINE

## 2020-02-12 PROCEDURE — 2022F DILAT RTA XM EVC RTNOPTHY: CPT | Performed by: INTERNAL MEDICINE

## 2020-02-12 PROCEDURE — 99214 OFFICE O/P EST MOD 30 MIN: CPT | Performed by: INTERNAL MEDICINE

## 2020-02-12 PROCEDURE — 1090F PRES/ABSN URINE INCON ASSESS: CPT | Performed by: INTERNAL MEDICINE

## 2020-02-12 PROCEDURE — 20550 NJX 1 TENDON SHEATH/LIGAMENT: CPT | Performed by: INTERNAL MEDICINE

## 2020-02-12 PROCEDURE — 6360000002 HC RX W HCPCS

## 2020-02-12 RX ORDER — TRIAMCINOLONE ACETONIDE 40 MG/ML
30 INJECTION, SUSPENSION INTRA-ARTICULAR; INTRAMUSCULAR ONCE
Status: COMPLETED | OUTPATIENT
Start: 2020-02-12 | End: 2020-02-12

## 2020-02-12 RX ORDER — LIDOCAINE HYDROCHLORIDE 10 MG/ML
1 INJECTION, SOLUTION EPIDURAL; INFILTRATION; INTRACAUDAL; PERINEURAL ONCE
Status: COMPLETED | OUTPATIENT
Start: 2020-02-12 | End: 2020-02-12

## 2020-02-12 RX ADMIN — LIDOCAINE HYDROCHLORIDE 1 ML: 10 INJECTION, SOLUTION EPIDURAL; INFILTRATION; INTRACAUDAL; PERINEURAL at 15:45

## 2020-02-12 RX ADMIN — TRIAMCINOLONE ACETONIDE 30 MG: 40 INJECTION, SUSPENSION INTRA-ARTICULAR; INTRAMUSCULAR at 15:45

## 2020-02-12 NOTE — PROGRESS NOTES
Pt's injection done by Dr. Stephanie Celaya. Site right trigger thumb  Lot [id number]  Exp Date 09/2021  [address] [id number]    Right trigger thumb injected without difficulty  Pt left prior to receiving discharge instructions. AVS mailed to patient.
in fatigue, joint swelling and pain, in past two months back on meds      CTS - L hand severe with thenar atrophy and weakness  Also numb radiating up forearm  - highly recommending fu for CTS surgery-- which she proceeded with in NOV  Healing well, some APB stable, numbess better-- not radiating up forearm    R trigger thumb noted  injection r flexor trigger thumb done  30mg kenalog/lido  aspetic technique     No complifation s/p     Fu rheum visit already scheduled

## 2020-02-14 SDOH — HEALTH STABILITY: PHYSICAL HEALTH: ON AVERAGE, HOW MANY MINUTES DO YOU ENGAGE IN EXERCISE AT THIS LEVEL?: 20 MIN

## 2020-02-14 SDOH — SOCIAL STABILITY: SOCIAL NETWORK: HOW OFTEN DO YOU ATTEND CHURCH OR RELIGIOUS SERVICES?: MORE THAN 4 TIMES PER YEAR

## 2020-02-14 SDOH — SOCIAL STABILITY: SOCIAL NETWORK: HOW OFTEN DO YOU ATTENT MEETINGS OF THE CLUB OR ORGANIZATION YOU BELONG TO?: NEVER

## 2020-02-14 SDOH — SOCIAL STABILITY: SOCIAL NETWORK
DO YOU BELONG TO ANY CLUBS OR ORGANIZATIONS SUCH AS CHURCH GROUPS UNIONS, FRATERNAL OR ATHLETIC GROUPS, OR SCHOOL GROUPS?: NO

## 2020-02-14 SDOH — HEALTH STABILITY: PHYSICAL HEALTH: ON AVERAGE, HOW MANY DAYS PER WEEK DO YOU ENGAGE IN MODERATE TO STRENUOUS EXERCISE (LIKE A BRISK WALK)?: 2 DAYS

## 2020-02-14 SDOH — SOCIAL STABILITY: SOCIAL NETWORK: ARE YOU MARRIED, WIDOWED, DIVORCED, SEPARATED, NEVER MARRIED, OR LIVING WITH A PARTNER?: DIVORCED

## 2020-02-14 SDOH — SOCIAL STABILITY: SOCIAL NETWORK: HOW OFTEN DO YOU GET TOGETHER WITH FRIENDS OR RELATIVES?: ONCE A WEEK

## 2020-02-14 SDOH — SOCIAL STABILITY: SOCIAL NETWORK
IN A TYPICAL WEEK, HOW MANY TIMES DO YOU TALK ON THE PHONE WITH FAMILY, FRIENDS, OR NEIGHBORS?: MORE THAN THREE TIMES A WEEK

## 2020-02-14 NOTE — CARE COORDINATION
apply route once as needed Glucometer as covered by insurance 10/2/15 10/2/15  Eliana Cochran, DO       Future Appointments   Date Time Provider Mil Christopheri   3/30/2020  8:30 AM Son Alvarez MD [facility]   5/5/2020  1:45 PM Magali Yang MD [facility]     ,   Diabetes Assessment    Medic Alert ID:  No  Meal Planning:  Carb counting, Avoidance of concentrated sweets   How often do you test your blood sugar?:  Meals, Bedtime   Do you have barriers with adherence to non-pharmacologic self-management interventions?  (Nutrition/Exercise/Self-Monitoring):  No   Have you ever had to go to the ED for symptoms of low blood sugar?:  No       Do you have hyperglycemia symptoms?:  Yes   Frequency of Episodes:  1 Per:  Month   Do you have hypoglycemia symptoms?:  No   Last Blood Sugar Value:  120   Blood Sugar Monitoring Regimen:  Morning Fasting, At Bedtime   Blood Sugar Trends:  No Change      ,   COPD Assessment    Does the patient understand envrionmental exposure?:  Yes  Is the patient able to verbalize Rescue vs. Long Acting medications?:  Yes  Does the patient have a nebulizer?:  No  Does the patient use a space with inhaled medications?:  No     No patient-reported symptoms         Symptoms:          and   General Assessment

## 2020-02-28 ENCOUNTER — CARE COORDINATION (OUTPATIENT)
Dept: CARE COORDINATION | Age: 70
End: 2020-02-28

## 2020-02-28 ENCOUNTER — HOSPITAL ENCOUNTER (OUTPATIENT)
Age: 70
Discharge: HOME | End: 2020-03-01
Payer: MEDICARE

## 2020-02-28 ENCOUNTER — OFFICE VISIT (OUTPATIENT)
Dept: INTERNAL MEDICINE | Age: 70
End: 2020-02-28
Payer: MEDICARE

## 2020-02-28 VITALS
HEIGHT: 64 IN | BODY MASS INDEX: 37.9 KG/M2 | TEMPERATURE: 97.7 F | RESPIRATION RATE: 18 BRPM | WEIGHT: 222 LBS | HEART RATE: 79 BPM | DIASTOLIC BLOOD PRESSURE: 68 MMHG | SYSTOLIC BLOOD PRESSURE: 114 MMHG

## 2020-02-28 LAB
BACTERIA: ABNORMAL /HPF
BILIRUBIN URINE: NEGATIVE
BILIRUBIN, POC: ABNORMAL
BLOOD URINE, POC: ABNORMAL
BLOOD, URINE: ABNORMAL
CLARITY, POC: ABNORMAL
CLARITY: ABNORMAL
COLOR, POC: YELLOW
COLOR: ABNORMAL
EPITHELIAL CELLS, UA: ABNORMAL /HPF
GLUCOSE URINE, POC: ABNORMAL
GLUCOSE URINE: NEGATIVE MG/DL
KETONES, POC: ABNORMAL
KETONES, URINE: NEGATIVE MG/DL
LEUKOCYTE EST, POC: ABNORMAL
LEUKOCYTE ESTERASE, URINE: ABNORMAL
NITRITE, POC: ABNORMAL
NITRITE, URINE: NEGATIVE
PH UA: 5.5 (ref 5–9)
PH, POC: 5.5
PROTEIN UA: NEGATIVE MG/DL
PROTEIN, POC: ABNORMAL
RBC UA: ABNORMAL /HPF (ref 0–2)
SPECIFIC GRAVITY UA: 1.02 (ref 1–1.03)
SPECIFIC GRAVITY, POC: 1.02
UROBILINOGEN, POC: 0.2
UROBILINOGEN, URINE: 0.2 E.U./DL
WBC UA: ABNORMAL /HPF (ref 0–5)

## 2020-02-28 PROCEDURE — G8484 FLU IMMUNIZE NO ADMIN: HCPCS | Performed by: RADIOLOGY

## 2020-02-28 PROCEDURE — G8417 CALC BMI ABV UP PARAM F/U: HCPCS | Performed by: RADIOLOGY

## 2020-02-28 PROCEDURE — 4040F PNEUMOC VAC/ADMIN/RCVD: CPT | Performed by: RADIOLOGY

## 2020-02-28 PROCEDURE — G8399 PT W/DXA RESULTS DOCUMENT: HCPCS | Performed by: RADIOLOGY

## 2020-02-28 PROCEDURE — 81002 URINALYSIS NONAUTO W/O SCOPE: CPT | Performed by: RADIOLOGY

## 2020-02-28 PROCEDURE — 99213 OFFICE O/P EST LOW 20 MIN: CPT | Performed by: RADIOLOGY

## 2020-02-28 PROCEDURE — 87186 SC STD MICRODIL/AGAR DIL: CPT

## 2020-02-28 PROCEDURE — 81001 URINALYSIS AUTO W/SCOPE: CPT

## 2020-02-28 PROCEDURE — 87088 URINE BACTERIA CULTURE: CPT

## 2020-02-28 PROCEDURE — 1123F ACP DISCUSS/DSCN MKR DOCD: CPT | Performed by: RADIOLOGY

## 2020-02-28 PROCEDURE — 3017F COLORECTAL CA SCREEN DOC REV: CPT | Performed by: RADIOLOGY

## 2020-02-28 PROCEDURE — 1036F TOBACCO NON-USER: CPT | Performed by: RADIOLOGY

## 2020-02-28 PROCEDURE — G8427 DOCREV CUR MEDS BY ELIG CLIN: HCPCS | Performed by: RADIOLOGY

## 2020-02-28 PROCEDURE — 1090F PRES/ABSN URINE INCON ASSESS: CPT | Performed by: RADIOLOGY

## 2020-02-28 RX ORDER — LEVOFLOXACIN 500 MG/1
500 TABLET, FILM COATED ORAL DAILY
Qty: 10 TABLET | Refills: 0 | Status: SHIPPED
Start: 2020-02-28 | End: 2020-02-28 | Stop reason: ALTCHOICE

## 2020-02-28 RX ORDER — LEVOFLOXACIN 250 MG/1
250 TABLET ORAL DAILY
Qty: 5 TABLET | Refills: 0 | Status: SHIPPED | OUTPATIENT
Start: 2020-02-28 | End: 2020-03-04

## 2020-02-28 ASSESSMENT — PATIENT HEALTH QUESTIONNAIRE - PHQ9
SUM OF ALL RESPONSES TO PHQ QUESTIONS 1-9: 0
SUM OF ALL RESPONSES TO PHQ9 QUESTIONS 1 & 2: 0
2. FEELING DOWN, DEPRESSED OR HOPELESS: 0
SUM OF ALL RESPONSES TO PHQ QUESTIONS 1-9: 0
1. LITTLE INTEREST OR PLEASURE IN DOING THINGS: 0

## 2020-02-28 NOTE — CARE COORDINATION
mouth daily CBD oil cap    Historical Provider, MD   methotrexate (RHEUMATREX) 2.5 MG chemo tablet Take 8 tablets by mouth once a week 2/4/20 2/3/21  Amarilis Ramos MD   hydroxychloroquine (PLAQUENIL) 200 MG tablet Take 1.5 tablets by mouth daily 2/4/20 2/3/21  Amarilis Ramos MD   folic acid (FOLVITE) 1 MG tablet Take 1 tablet by mouth daily 2/4/20   Amarilis Ramos MD   rosuvastatin (CRESTOR) 5 MG tablet take 1 tablet by mouth once daily 12/30/19   Amado Muro MD   metFORMIN (GLUCOPHAGE) 850 MG tablet Take 1 tablet by mouth 2 times daily (with meals) 12/30/19   Amado Muro MD   lisinopril (PRINIVIL;ZESTRIL) 20 MG tablet Take 1 tablet by mouth daily 12/30/19   Amado Muro MD   VENTOLIN  (96 Base) MCG/ACT inhaler inhale 2 puffs by mouth four times a day if needed 12/30/19   Amado Muro MD   pantoprazole (PROTONIX) 40 MG tablet Take 1 tablet by mouth every morning (before breakfast)  Patient taking differently: Take 40 mg by mouth daily as needed  12/30/19   Amado Muro MD   zoster recombinant adjuvanted vaccine [facility]) 50 MCG/0.5ML SUSR injection 1 dose now and repeat in 2-6 months 10/7/19   Amado Muro MD   alendronate (FOSAMAX) 70 MG tablet Take 1 tablet by mouth every 7 days  Patient not taking: Reported on 2/28/2020 9/4/19   Amarilis Ramos MD   magnesium oxide (MAG-OX) 400 MG tablet Take 400 mg by mouth daily    Historical Provider, MD   vitamin C (ASCORBIC ACID) 500 MG tablet Take 500 mg by mouth daily LD 4/9/2019     Historical Provider, MD   Cyanocobalamin (VITAMIN B 12 PO) Take by mouth daily LD 4/9/2019    Historical Provider, MD   magnesium cl-calcium carbonate (SLOW-MAG) 71.5-119 MG TBEC tablet take 2 tablets once daily  Patient taking differently: 1 tablet daily take 2 tablets once daily / taking for leg cramps / Ld 4/9/2019 3/14/19   Amado Muro MD   Misc.  Devices MISC Please give one glucose monitor accuchek or what is covered bu her insurance. 8/13/18   Gabe Chandler MD   Biotin 300 MCG TABS Take 1 tablet by mouth daily 4/30/18   Katheryn Benito MD   Cholecalciferol (VITAMIN D) 2000 units CAPS capsule Take 2,000 Units by mouth 2 times daily  Patient taking differently: Take 2,000 Units by mouth daily  3/29/18   Katheryn Benito MD   Lancets MISC Test twice daily  Give what insurance covers 3/26/18   Katheryn Benito MD   aspirin EC 81 MG EC tablet Take 1 tablet by mouth daily 3/26/18   Katheryn Benito MD   Coenzyme Q10 (CO Q 10 PO) Take 1 tablet by mouth daily QUINOL  / LD 4/9/2019    Historical Provider, MD   Misc. Devices MISC 1 each by Does not apply route once as needed Glucometer as covered by insurance 10/2/15 10/2/15  Derek Gann, DO       Future Appointments   Date Time Provider Mil Dueñas   3/30/2020  8:30 AM Katheryn Benito MD [facility]   5/5/2020  1:45 PM Rolando Paz MD [facility]   6/8/2020  8:00 AM Katheryn Benito MD [facility]     ,   Diabetes Assessment    Medic Alert ID:  No  Meal Planning:  Carb counting, Avoidance of concentrated sweets   How often do you test your blood sugar?:  Meals, Bedtime   Do you have barriers with adherence to non-pharmacologic self-management interventions?  (Nutrition/Exercise/Self-Monitoring):  No   Have you ever had to go to the ED for symptoms of low blood sugar?:  No       Do you have hyperglycemia symptoms?:  No   Do you have hypoglycemia symptoms?:  No   Last Blood Sugar Value:  120   Blood Sugar Monitoring Regimen:  Morning Fasting, At Bedtime   Blood Sugar Trends:  No Change      ,   COPD Assessment    Does the patient understand envrionmental exposure?:  Yes  Is the patient able to verbalize Rescue vs. Long Acting medications?:  Yes  Does the patient have a nebulizer?:  No  Does the patient use a space with inhaled medications?:  No            Symptoms:      Symptom course:  stable  Breathlessness:  none  Increase use of rapid

## 2020-02-28 NOTE — PATIENT INSTRUCTIONS
that will allow you to sit while showering. · Get into a tub or shower by putting the weaker leg in first. Get out of a tub or shower with your strong side first.  · Repair loose toilet seats and consider installing a raised toilet seat to make getting on and off the toilet easier. · Keep your bathroom door unlocked while you are in the shower. Where can you learn more? Go to [email or web address] and sign in to your [employer] account. Enter [phone] in the [employer] box to learn more about \"Preventing Falls: Care Instructions. \"     If you do not have an account, please click on the \"Sign Up Now\" link. Current as of: August 6, 2019  Content Version: 12.3  © 9005-0674 Healthwise, Inc[employer]. Care instructions adapted under license by [facility] ([facility]). If you have questions about a medical condition or this instruction, always ask your healthcare professional. [place] [address] any warranty or liability for your use of this information.       - Please take Levaquin 250 mg daily for 5 days; do not take magnesium while taking the Levaquin   Dr. Daphne Bassett    Follow up as scheduled. Please call for an earlier appointment if needed or with any questions or concerns.   Stevie Hu RN

## 2020-02-28 NOTE — PROGRESS NOTES
[facility]    Attending Physician Statement:  Alysha Storey. Emily Gonzales M.D., F.A.C.P. I have discussed the case, including pertinent history and exam findings with the resident. I have seen and examined the patient and the key elements of the encounter have been performed by me. I agree with the assessment, plan and orders as documented by the resident. Patient here for same day appt   Complaining pain at the end of urination   Noted + U/A consistent with UTI   Hx of Ecoli UTI resistant to ampicillin/cefazolin   Rxn to Ceftriaxone in the hospital   No CVA tenderness on exam   Afebrile   Recommend empiric management   Treat with levaquin-use of levaquin the past successfully for ecoli   Anticipatory guidance given for acute or new onset symptoms necessitating immediate re-evaluation. ? Cystitis   Levaquin   Urine cx   Discussed appropriate administration and potential side effects   Renal U/S   Likely not due to recurrent stone   May need Urology assessment again for recurrent UTIs. All questions answered    Remainder of medical problems as per resident note.
Dr. Daphne Bassett notified of POCT urine results. Discharge instructions reviewed with patient. Patient verbalizes understanding. AVS given to patient. Urine specimen sent to the lab.
(PRINIVIL;ZESTRIL) 20 MG tablet Take 1 tablet by mouth daily 90 tablet 2    VENTOLIN  (90 Base) MCG/ACT inhaler inhale 2 puffs by mouth four times a day if needed 1 Inhaler 2    pantoprazole (PROTONIX) 40 MG tablet Take 1 tablet by mouth every morning (before breakfast) (Patient taking differently: Take 40 mg by mouth daily as needed ) 30 tablet 1    magnesium oxide (MAG-OX) 400 MG tablet Take 400 mg by mouth daily      vitamin C (ASCORBIC ACID) 500 MG tablet Take 500 mg by mouth daily LD 4/9/2019       Cyanocobalamin (VITAMIN B 12 PO) Take by mouth daily LD 4/9/2019      magnesium cl-calcium carbonate (SLOW-MAG) 71.5-119 MG TBEC tablet take 2 tablets once daily (Patient taking differently: 1 tablet daily take 2 tablets once daily / taking for leg cramps / Ld 4/9/2019) 60 tablet 1    Biotin 300 MCG TABS Take 1 tablet by mouth daily 30 tablet 2    Cholecalciferol (VITAMIN D) 2000 units CAPS capsule Take 2,000 Units by mouth 2 times daily (Patient taking differently: Take 2,000 Units by mouth daily ) 60 capsule 2    Lancets MISC Test twice daily  Give what insurance covers 100 each 5    aspirin EC 81 MG EC tablet Take 1 tablet by mouth daily 90 tablet 1    Coenzyme Q10 (CO Q 10 PO) Take 1 tablet by mouth daily QUINOL  / LD 4/9/2019         I have reviewed all pertinent PMHx, PSHx, FamHx, SocialHx, medications, and allergies and updated history as appropriate. OBJECTIVE:    VS: /68 (Site: Left Upper Arm, Position: Sitting, Cuff Size: Large Adult)   Pulse 79   Temp 97.7 °F (36.5 °C) (Oral)   Resp 18   Ht 5' 4\" (1.626 m)   Wt 222 lb (100.7 kg)   BMI 38.11 kg/m²   General appearance: Alert, Awake, Oriented times 3, no distress  Lungs: Lungs clear to auscultation bilaterally. No rhonchi, crackles or wheezes  Heart: S1 S2  Regular rate and rhythm. No rub, murmur or gallop  Abdomen: Abdomen soft but obese, non-tender.  non-distended BS normal. No masses, organomegaly, no guarding rebound or

## 2020-02-28 NOTE — LETTER
February 28, 2020      [address]  [address] [facility]      Dear Allyson Gan: It was a pleasure talking with you. I have included information about COPD that we talked about during our conversation. Please review, and if you have any questions or concerns please feel free to discuss with your physician at your next appointment, or you can contact me at [phone]. I have enclosed a COPD zone handout, and some daily blood sugar logs,    Keep up the good work and I look forward to talking with you soon! If you have any questions or concerns, please don't hesitate to call.       Sincerely,      Mark Ku RN  Care Coordinator  Office: [phone]  Cell:  [phone]

## 2020-03-01 LAB
ORGANISM: ABNORMAL
URINE CULTURE, ROUTINE: ABNORMAL

## 2020-03-02 ENCOUNTER — TELEPHONE (OUTPATIENT)
Dept: INTERNAL MEDICINE | Age: 70
End: 2020-03-02

## 2020-03-06 ENCOUNTER — HOSPITAL ENCOUNTER (OUTPATIENT)
Dept: ULTRASOUND IMAGING | Age: 70
Discharge: HOME | End: 2020-03-08
Payer: MEDICARE

## 2020-03-06 PROCEDURE — 76770 US EXAM ABDO BACK WALL COMP: CPT

## 2020-03-10 RX ORDER — HYDROXYCHLOROQUINE SULFATE 200 MG/1
TABLET, FILM COATED ORAL
Qty: 135 TABLET | Refills: 0 | Status: SHIPPED
Start: 2020-03-10 | End: 2020-04-07 | Stop reason: SDUPTHER

## 2020-03-11 ENCOUNTER — OFFICE VISIT (OUTPATIENT)
Dept: INTERNAL MEDICINE | Age: 70
End: 2020-03-11
Payer: MEDICARE

## 2020-03-11 VITALS
SYSTOLIC BLOOD PRESSURE: 118 MMHG | TEMPERATURE: 97.7 F | WEIGHT: 228 LBS | DIASTOLIC BLOOD PRESSURE: 57 MMHG | RESPIRATION RATE: 16 BRPM | HEART RATE: 78 BPM | BODY MASS INDEX: 38.93 KG/M2 | HEIGHT: 64 IN

## 2020-03-11 PROBLEM — J42 CHRONIC BRONCHITIS (HCC): Status: ACTIVE | Noted: 2020-03-11

## 2020-03-11 PROBLEM — R06.03 RESPIRATORY DISTRESS: Status: RESOLVED | Noted: 2019-03-27 | Resolved: 2020-03-11

## 2020-03-11 PROBLEM — Z23 NEED FOR PROPHYLACTIC VACCINATION AGAINST DIPHTHERIA-TETANUS-PERTUSSIS (DTP): Status: RESOLVED | Noted: 2019-03-04 | Resolved: 2020-03-11

## 2020-03-11 LAB
INFLUENZA A ANTIGEN, POC: NORMAL
INFLUENZA B ANTIGEN, POC: NORMAL

## 2020-03-11 PROCEDURE — 3017F COLORECTAL CA SCREEN DOC REV: CPT | Performed by: INTERNAL MEDICINE

## 2020-03-11 PROCEDURE — G8926 SPIRO NO PERF OR DOC: HCPCS | Performed by: INTERNAL MEDICINE

## 2020-03-11 PROCEDURE — G8399 PT W/DXA RESULTS DOCUMENT: HCPCS | Performed by: INTERNAL MEDICINE

## 2020-03-11 PROCEDURE — 1036F TOBACCO NON-USER: CPT | Performed by: INTERNAL MEDICINE

## 2020-03-11 PROCEDURE — 99214 OFFICE O/P EST MOD 30 MIN: CPT | Performed by: INTERNAL MEDICINE

## 2020-03-11 PROCEDURE — G8484 FLU IMMUNIZE NO ADMIN: HCPCS | Performed by: INTERNAL MEDICINE

## 2020-03-11 PROCEDURE — 3023F SPIROM DOC REV: CPT | Performed by: INTERNAL MEDICINE

## 2020-03-11 PROCEDURE — 1123F ACP DISCUSS/DSCN MKR DOCD: CPT | Performed by: INTERNAL MEDICINE

## 2020-03-11 PROCEDURE — 4040F PNEUMOC VAC/ADMIN/RCVD: CPT | Performed by: INTERNAL MEDICINE

## 2020-03-11 PROCEDURE — G8427 DOCREV CUR MEDS BY ELIG CLIN: HCPCS | Performed by: INTERNAL MEDICINE

## 2020-03-11 PROCEDURE — G8417 CALC BMI ABV UP PARAM F/U: HCPCS | Performed by: INTERNAL MEDICINE

## 2020-03-11 PROCEDURE — 99212 OFFICE O/P EST SF 10 MIN: CPT | Performed by: INTERNAL MEDICINE

## 2020-03-11 PROCEDURE — 1090F PRES/ABSN URINE INCON ASSESS: CPT | Performed by: INTERNAL MEDICINE

## 2020-03-11 PROCEDURE — 87804 INFLUENZA ASSAY W/OPTIC: CPT | Performed by: INTERNAL MEDICINE

## 2020-03-11 RX ORDER — MULTIVIT-MIN/IRON/FOLIC ACID/K 18-600-40
2000 CAPSULE ORAL DAILY
Qty: 30 CAPSULE | Refills: 1 | Status: SHIPPED
Start: 2020-03-11 | End: 2020-06-15 | Stop reason: SDUPTHER

## 2020-03-11 RX ORDER — PANTOPRAZOLE SODIUM 40 MG/1
40 TABLET, DELAYED RELEASE ORAL
Qty: 30 TABLET | Refills: 1 | Status: SHIPPED
Start: 2020-03-11 | End: 2020-06-15 | Stop reason: SDUPTHER

## 2020-03-11 ASSESSMENT — ENCOUNTER SYMPTOMS
HEMATEMESIS: 0
RHINORRHEA: 0
HEMATOCHEZIA: 1
ABDOMINAL PAIN: 0
COUGH: 1
WHEEZING: 0
NAUSEA: 0
DIARRHEA: 0
HEMOPTYSIS: 0
HEARTBURN: 1
BLOOD IN STOOL: 0
SORE THROAT: 0
CONSTIPATION: 0
TROUBLE SWALLOWING: 0
RECTAL PAIN: 0
VOMITING: 0
SHORTNESS OF BREATH: 0

## 2020-03-11 NOTE — PROGRESS NOTES
[facility]  InternalMedicine Residency Program  ACC Note      SUBJECTIVE:  CC: had concerns including Results; Croup (x 1 week); and Medication Refill. Larissa Boeck presented to the [facility] for a routine visit. Patient seen this morning for follow-up appointment. Seen 1 week prior for acute cystitis symptoms with findings consistent with ECOLI UTI s/p treatment with levaquin due to prior cephalosporin concerned allergy and prior resistance. Patient's UTI symptoms have completely resolved. S/P Renal U/S- reviewed with patient today- no noted stones, no noted masses, no noted hydronephrosis, no noted bladder changes, no signs of AAA (unremarkable exam). Since last appt- noted acute cough symptoms- currently 1 day history. No noted associated fevers, chills, nausea, vomiting, myalgias, or additional findings. Notes non-productive cough but wet. No recent travel history or potential exposure risks at this time. Afebrile in the office today. Last full visit in December- patient was complaining of bloody stools- completed CBC and initiated PPI. Hpylori was negative, CBC was stable, and patient was established for repeat colonoscopy with Dr. Tiff Navas. . Patient has hx of diverticulosis and hx of tubular adenoma and submucosal lipoma. Patient cancelled appt for repeat dx colonoscopy assessment on 1/13. States all symptoms remain resolved. Off PPI, but requesting refill. Notes some intermittent heartburn symptoms. Cough   This is a new problem. The current episode started yesterday. The problem has been unchanged. The cough is non-productive (wet in nature). Associated symptoms include heartburn (intermittent). Pertinent negatives include no chest pain, chills, fever, headaches, hemoptysis, myalgias, rash, rhinorrhea, sore throat, shortness of breath, sweats, weight loss or wheezing. The symptoms are aggravated by lying down. She has tried nothing for the symptoms.  Her past medical history is significant for COPD (Noted on previous assessment). Rectal Bleeding    The current episode started more than 2 weeks ago. The problem has been resolved. Associated symptoms include coughing. Pertinent negatives include no anorexia, no fever, no abdominal pain, no diarrhea, no hematemesis, no nausea, no rectal pain, no vomiting, no hematuria, no vaginal discharge, no chest pain, no headaches and no rash. Recently, medical care has been given by the PCP. Review of Systems   Constitutional: Negative for activity change, appetite change, chills, diaphoresis, fever and weight loss. HENT: Negative for rhinorrhea, sore throat and trouble swallowing. Respiratory: Positive for cough. Negative for hemoptysis, shortness of breath and wheezing. Cardiovascular: Negative for chest pain, palpitations and leg swelling. Gastrointestinal: Positive for heartburn (intermittent) and hematochezia. Negative for abdominal pain, anorexia, blood in stool, constipation, diarrhea, hematemesis, nausea, rectal pain and vomiting. Genitourinary: Negative for difficulty urinating, dysuria, frequency, hematuria, urgency and vaginal discharge. Musculoskeletal: Negative for myalgias. Skin: Negative for rash. Neurological: Negative for dizziness, syncope, light-headedness and headaches. Hematological: Negative for adenopathy. Does not bruise/bleed easily.        Current Outpatient Medications on File Prior to Visit   Medication Sig Dispense Refill    hydroxychloroquine (PLAQUENIL) 200 MG tablet TAKE 1 AND 1/2 TABLETS BY  MOUTH DAILY 135 tablet 0    methotrexate (RHEUMATREX) 2.5 MG chemo tablet Take 8 tablets by mouth once a week 32 tablet 2    folic acid (FOLVITE) 1 MG tablet Take 1 tablet by mouth daily 90 tablet 0    rosuvastatin (CRESTOR) 5 MG tablet take 1 tablet by mouth once daily 90 tablet 2    metFORMIN (GLUCOPHAGE) 850 MG tablet Take 1 tablet by mouth 2 times daily (with meals) 180 tablet 2 not refuse influenza vaccine and continues to refuse despite counseling based on ACIP recommendations and risk of morbidity and even mortality. Patient has URI symptoms- mild in nature- oxygen sat 99% on RA; afebrile; no tachycardia; no obvious exposure risk. Discussed symptomatic management  Prn inhaler  OTC cough suppression- Coricidin discussed- no noted Drug to Drug interactions noted with OTC medication. Anticipatory guidance given- any acute findings- seek immediate medical attention  If productive cough or changes- consider CXR and extend levaquin use  Resume PPI as PPI has been stopped prior and cough now intermittent     Vitamin D deficiency  -     Cholecalciferol (VITAMIN D) 50 MCG (2000 UT) CAPS capsule; Take 2,000 Units by mouth daily    Gastroesophageal reflux disease without esophagitis  -     pantoprazole (PROTONIX) 40 MG tablet; Take 1 tablet by mouth every morning (before breakfast)    Symptom of blood in stool- resolved    Cancelled repeat appt with Dr. Neris Castro to reschedule at this time and consider repeat CSCOPE    Bleeding certainly could be related to underlying diverticulosis   Repeat CBC x 2 since event- which has since resolved- remains unremarkable   Follow-up advised   Continue PPI         RTC: 3 months     I have reviewed my findings and recommendations with Magdalena Up.     Alisha Olmos MD, [address]   3/11/2020 10:32 AM

## 2020-03-11 NOTE — PATIENT INSTRUCTIONS
1) Please follow symptoms. Continue to use as needed inhaler. Good hydration. Over the counter cough suppression can be used. Reviewed Coricidin product has no noted drug to drug interactions. 2) Discussed immune health. 3) Call office if any progressive symptoms- will discuss antibiotic for bacterial bronchitis if worsening. 4) Please call with any questions.

## 2020-03-12 ENCOUNTER — TELEPHONE (OUTPATIENT)
Dept: INTERNAL MEDICINE | Age: 70
End: 2020-03-12

## 2020-03-12 ENCOUNTER — OFFICE VISIT (OUTPATIENT)
Dept: INTERNAL MEDICINE | Age: 70
End: 2020-03-12
Payer: MEDICARE

## 2020-03-12 ENCOUNTER — HOSPITAL ENCOUNTER (OUTPATIENT)
Age: 70
Discharge: HOME | End: 2020-03-14
Payer: MEDICARE

## 2020-03-12 VITALS
TEMPERATURE: 98 F | DIASTOLIC BLOOD PRESSURE: 62 MMHG | HEART RATE: 92 BPM | RESPIRATION RATE: 12 BRPM | SYSTOLIC BLOOD PRESSURE: 134 MMHG | OXYGEN SATURATION: 98 %

## 2020-03-12 LAB
ADENOVIRUS BY PCR: NOT DETECTED
BORDETELLA PARAPERTUSSIS BY PCR: NOT DETECTED
BORDETELLA PERTUSSIS BY PCR: NOT DETECTED
CHLAMYDOPHILIA PNEUMONIAE BY PCR: NOT DETECTED
CORONAVIRUS 229E BY PCR: NOT DETECTED
CORONAVIRUS HKU1 BY PCR: NOT DETECTED
CORONAVIRUS NL63 BY PCR: DETECTED
CORONAVIRUS OC43 BY PCR: NOT DETECTED
HUMAN METAPNEUMOVIRUS BY PCR: NOT DETECTED
HUMAN RHINOVIRUS/ENTEROVIRUS BY PCR: NOT DETECTED
INFLUENZA A H1-2009 BY PCR: DETECTED
INFLUENZA B BY PCR: NOT DETECTED
MYCOPLASMA PNEUMONIAE BY PCR: NOT DETECTED
PARAINFLUENZA VIRUS 1 BY PCR: NOT DETECTED
PARAINFLUENZA VIRUS 2 BY PCR: NOT DETECTED
PARAINFLUENZA VIRUS 3 BY PCR: NOT DETECTED
PARAINFLUENZA VIRUS 4 BY PCR: NOT DETECTED
RESPIRATORY SYNCYTIAL VIRUS BY PCR: NOT DETECTED

## 2020-03-12 PROCEDURE — 1123F ACP DISCUSS/DSCN MKR DOCD: CPT | Performed by: INTERNAL MEDICINE

## 2020-03-12 PROCEDURE — G8428 CUR MEDS NOT DOCUMENT: HCPCS | Performed by: INTERNAL MEDICINE

## 2020-03-12 PROCEDURE — G8417 CALC BMI ABV UP PARAM F/U: HCPCS | Performed by: INTERNAL MEDICINE

## 2020-03-12 PROCEDURE — 4040F PNEUMOC VAC/ADMIN/RCVD: CPT | Performed by: INTERNAL MEDICINE

## 2020-03-12 PROCEDURE — 3017F COLORECTAL CA SCREEN DOC REV: CPT | Performed by: INTERNAL MEDICINE

## 2020-03-12 PROCEDURE — G8484 FLU IMMUNIZE NO ADMIN: HCPCS | Performed by: INTERNAL MEDICINE

## 2020-03-12 PROCEDURE — G8399 PT W/DXA RESULTS DOCUMENT: HCPCS | Performed by: INTERNAL MEDICINE

## 2020-03-12 PROCEDURE — 1090F PRES/ABSN URINE INCON ASSESS: CPT | Performed by: INTERNAL MEDICINE

## 2020-03-12 PROCEDURE — 99214 OFFICE O/P EST MOD 30 MIN: CPT | Performed by: INTERNAL MEDICINE

## 2020-03-12 PROCEDURE — 0100U HC RESPIRPTHGN MULT REV TRANS & AMP PRB TECH 21 TRGT: CPT

## 2020-03-12 PROCEDURE — 1036F TOBACCO NON-USER: CPT | Performed by: INTERNAL MEDICINE

## 2020-03-12 PROCEDURE — 99212 OFFICE O/P EST SF 10 MIN: CPT | Performed by: INTERNAL MEDICINE

## 2020-03-12 RX ORDER — OSELTAMIVIR PHOSPHATE 30 MG/1
30 CAPSULE ORAL 2 TIMES DAILY
Qty: 10 CAPSULE | Refills: 0 | Status: SHIPPED
Start: 2020-03-12 | End: 2020-05-05 | Stop reason: ALTCHOICE

## 2020-03-12 ASSESSMENT — ENCOUNTER SYMPTOMS
DIARRHEA: 0
BLOOD IN STOOL: 0
COUGH: 1
CHEST TIGHTNESS: 1
SINUS PAIN: 1
NAUSEA: 0
SINUS PRESSURE: 1
CONSTIPATION: 0
ABDOMINAL PAIN: 0
HEMOPTYSIS: 0
SHORTNESS OF BREATH: 0
VOMITING: 0
WHEEZING: 1

## 2020-03-12 NOTE — PROGRESS NOTES
(intermittent). Pertinent negatives include no chest pain, chills, hemoptysis, myalgias, shortness of breath (no worsening ), sweats or weight loss. She has tried a beta-agonist inhaler (States improvement with MDI ) for the symptoms. Her past medical history is significant for COPD. Review of Systems   Constitutional: Positive for appetite change (decreased appetite ), fatigue and fever. Negative for activity change, chills, diaphoresis and weight loss. HENT: Positive for sinus pressure, sinus pain and tinnitus (recurrent). Respiratory: Positive for cough, chest tightness and wheezing (intermittent). Negative for hemoptysis and shortness of breath (no worsening ).         + chest congestion    Cardiovascular: Negative for chest pain, palpitations and leg swelling. Gastrointestinal: Negative for abdominal pain, blood in stool, constipation, diarrhea, nausea and vomiting. Genitourinary: Negative for dysuria and hematuria. Musculoskeletal: Negative for arthralgias and myalgias. Neurological: Positive for headaches. Negative for dizziness, light-headedness and numbness.        Current Outpatient Medications on File Prior to Visit   Medication Sig Dispense Refill    pantoprazole (PROTONIX) 40 MG tablet Take 1 tablet by mouth every morning (before breakfast) 30 tablet 1    Cholecalciferol (VITAMIN D) 50 MCG (2000 UT) CAPS capsule Take 2,000 Units by mouth daily 30 capsule 1    hydroxychloroquine (PLAQUENIL) 200 MG tablet TAKE 1 AND 1/2 TABLETS BY  MOUTH DAILY 135 tablet 0    NONFORMULARY Take 1 capsule by mouth daily CBD oil cap      methotrexate (RHEUMATREX) 2.5 MG chemo tablet Take 8 tablets by mouth once a week 32 tablet 2    folic acid (FOLVITE) 1 MG tablet Take 1 tablet by mouth daily 90 tablet 0    rosuvastatin (CRESTOR) 5 MG tablet take 1 tablet by mouth once daily 90 tablet 2    metFORMIN (GLUCOPHAGE) 850 MG tablet Take 1 tablet by mouth 2 times daily (with meals) 180 tablet 2    lisinopril (PRINIVIL;ZESTRIL) 20 MG tablet Take 1 tablet by mouth daily 90 tablet 2    VENTOLIN  (90 Base) MCG/ACT inhaler inhale 2 puffs by mouth four times a day if needed 1 Inhaler 2    zoster recombinant adjuvanted vaccine (SHINGRIX) 50 MCG/0.5ML SUSR injection 1 dose now and repeat in 2-6 months 0.5 mL 0    alendronate (FOSAMAX) 70 MG tablet Take 1 tablet by mouth every 7 days (Patient not taking: Reported on 2/28/2020) 12 tablet 1    magnesium oxide (MAG-OX) 400 MG tablet Take 400 mg by mouth daily      vitamin C (ASCORBIC ACID) 500 MG tablet Take 500 mg by mouth daily LD 4/9/2019       Cyanocobalamin (VITAMIN B 12 PO) Take by mouth daily LD 4/9/2019      magnesium cl-calcium carbonate (SLOW-MAG) 71.5-119 MG TBEC tablet take 2 tablets once daily (Patient taking differently: 1 tablet daily take 2 tablets once daily / taking for leg cramps / Ld 4/9/2019) 60 tablet 1    Misc. Devices MISC Please give one glucose monitor accuchek or what is covered bu her insurance. 1 Device 0    Biotin 300 MCG TABS Take 1 tablet by mouth daily 30 tablet 2    Lancets MISC Test twice daily  Give what insurance covers 100 each 5    aspirin EC 81 MG EC tablet Take 1 tablet by mouth daily 90 tablet 1    Coenzyme Q10 (CO Q 10 PO) Take 1 tablet by mouth daily QUINOL  / LD 4/9/2019      Misc. Devices MISC 1 each by Does not apply route once as needed Glucometer as covered by insurance 1 Device 0     No current facility-administered medications on file prior to visit. I have reviewed all pertinent PMHx, PSHx, FamHx, SocialHx,medications, and allergies and updated history as appropriate. OBJECTIVE:    VS: /62   Pulse 92   Temp 98 °F (36.7 °C) (Oral)   Resp 12   SpO2 98% Comment: RA  Physical Exam  Constitutional:       Appearance: She is obese. She is ill-appearing. She is not toxic-appearing. Cardiovascular:      Rate and Rhythm: Normal rate and regular rhythm. Pulses: Normal pulses.

## 2020-03-13 ENCOUNTER — TELEPHONE (OUTPATIENT)
Dept: INTERNAL MEDICINE | Age: 70
End: 2020-03-13

## 2020-03-20 ENCOUNTER — CARE COORDINATION (OUTPATIENT)
Dept: CARE COORDINATION | Age: 70
End: 2020-03-20

## 2020-04-07 NOTE — TELEPHONE ENCOUNTER
Last Appointment:  3/12/2020  Future Appointments   Date Time Provider [facility]   5/5/2020  1:45 PM Annabella Zimmerman MD [facility]   6/8/2020  1:00 PM Rylee Astudillo MD [facility]

## 2020-04-08 RX ORDER — HYDROXYCHLOROQUINE SULFATE 200 MG/1
TABLET, FILM COATED ORAL
Qty: 135 TABLET | Refills: 0 | Status: SHIPPED
Start: 2020-04-08 | End: 2020-05-05 | Stop reason: SDUPTHER

## 2020-04-16 ENCOUNTER — CARE COORDINATION (OUTPATIENT)
Dept: CARE COORDINATION | Age: 70
End: 2020-04-16

## 2020-04-16 NOTE — CARE COORDINATION
-Attempted to reach pt for care coordination call and review recent BS trends and assess COPD status, however, no answer.  -Left detailed VM requesting a call back.

## 2020-04-17 ENCOUNTER — TELEPHONE (OUTPATIENT)
Dept: RHEUMATOLOGY | Age: 70
End: 2020-04-17

## 2020-05-05 ENCOUNTER — OFFICE VISIT (OUTPATIENT)
Dept: RHEUMATOLOGY | Age: 70
End: 2020-05-05
Payer: MEDICARE

## 2020-05-05 VITALS
BODY MASS INDEX: 39.44 KG/M2 | WEIGHT: 231 LBS | SYSTOLIC BLOOD PRESSURE: 124 MMHG | TEMPERATURE: 97.7 F | DIASTOLIC BLOOD PRESSURE: 73 MMHG | HEIGHT: 64 IN | RESPIRATION RATE: 12 BRPM | HEART RATE: 88 BPM

## 2020-05-05 PROCEDURE — 3017F COLORECTAL CA SCREEN DOC REV: CPT | Performed by: INTERNAL MEDICINE

## 2020-05-05 PROCEDURE — G8417 CALC BMI ABV UP PARAM F/U: HCPCS | Performed by: INTERNAL MEDICINE

## 2020-05-05 PROCEDURE — 1036F TOBACCO NON-USER: CPT | Performed by: INTERNAL MEDICINE

## 2020-05-05 PROCEDURE — 1123F ACP DISCUSS/DSCN MKR DOCD: CPT | Performed by: INTERNAL MEDICINE

## 2020-05-05 PROCEDURE — G8399 PT W/DXA RESULTS DOCUMENT: HCPCS | Performed by: INTERNAL MEDICINE

## 2020-05-05 PROCEDURE — 1090F PRES/ABSN URINE INCON ASSESS: CPT | Performed by: INTERNAL MEDICINE

## 2020-05-05 PROCEDURE — 99214 OFFICE O/P EST MOD 30 MIN: CPT | Performed by: INTERNAL MEDICINE

## 2020-05-05 PROCEDURE — 4040F PNEUMOC VAC/ADMIN/RCVD: CPT | Performed by: INTERNAL MEDICINE

## 2020-05-05 PROCEDURE — 99212 OFFICE O/P EST SF 10 MIN: CPT | Performed by: INTERNAL MEDICINE

## 2020-05-05 PROCEDURE — G8427 DOCREV CUR MEDS BY ELIG CLIN: HCPCS | Performed by: INTERNAL MEDICINE

## 2020-05-05 RX ORDER — HYDROXYCHLOROQUINE SULFATE 200 MG/1
TABLET, FILM COATED ORAL
Qty: 135 TABLET | Refills: 0 | Status: SHIPPED
Start: 2020-05-05 | End: 2020-06-25 | Stop reason: SDUPTHER

## 2020-05-05 NOTE — PROGRESS NOTES
J O I N T  C A R E  C L I N I C        The patient is seen in follow-up for RA unchanged    Stble RA symptoms     no new covid symptoms  URI last month or so  UTI 2months ago    cbd no help  Now trying \"bone marrow pills\" OTC           Patient History Update Since Previous Visit      Yes No Comment      New illnesses X  HEAD PAIN RECENT  TO TOUCH      Seen other healthcare provider  X       New x-ray, labs, or procedures  X       Started / changed / stopped medications X  BONE MARROW PILLS      New allergies / reactions to medications  X       Changes in family medical history  X       Changes in patient social history  X       Morning stiffness X  Length:15 MINS      Worst Joint:HIPS AND LOW BACK      Review of Systems      Key:    1  Not present today;  2  Much Better;  3  Better;  4  Same;  5  Worse;  N  New Problem          Rating  Rating   Joint pain (not including back pain) 4 Skin ulcers: 1   Joint swellin Bruisin   Fatigue: 4 Sickness or rash with sun exposure 1   Muscle aches: 4 Swollen glands 1   Ongoing fever: 1 Dry eyes 1   Unintended weight loss: 1 Painful red eyes 1   Migraine headache: 1 Dry mouth 4   Difficulty sleepin Mouth sores 1   Snore or gag at night: 1 Upset stomach 1   Heart palpitations: 1 Diarrhea 1   Cough: 1 Depressed mood 1   Shortness of breath: 1 Overall stress level in life 5   Pain with breathing 1 Overall assessment 4   Skin rash 1         Abbreviated Exam    System    nl   abn   Comment     1 Gen nutrition/hygiene x      appearance x     2 Skin turgor / integrity x      rash x      ecchymosis x     3 Psych orientation x      memory x      mood x      cooperative x     4 HENT mouth / throat x     5 Resp resp. effort x      auscultation x     6 CV heart auscultation x      extremity edema x     7 Other             Joint Assessment      Patient Right     Patient Left     x (check if no synovitis seen on exam)   Joint Pain Swelling Joint Pain Swelling   Shoulder Authorization       x Risk / Benefits of RX  Disability Forms        Compliance  Discussion and / or letter to other health care provider         Risk Reduction     x More than half of the face-to-face time with the patient was spent  in counseling or coordinating care    Exercise           Brochure / Handout      Other:    Referral:

## 2020-05-05 NOTE — PROGRESS NOTES
Patient verbalized understanding of office instructions. She will call with questions or concerns. She was given discharge instructions, scripts for lab work that needs done prior to next visit. All questions were fully answered. Printed AVS given to pt.

## 2020-06-15 ENCOUNTER — HOSPITAL ENCOUNTER (OUTPATIENT)
Age: 70
Discharge: HOME | End: 2020-06-15
Payer: MEDICARE

## 2020-06-15 ENCOUNTER — OFFICE VISIT (OUTPATIENT)
Dept: INTERNAL MEDICINE | Age: 70
End: 2020-06-15
Payer: MEDICARE

## 2020-06-15 VITALS
WEIGHT: 230 LBS | BODY MASS INDEX: 39.27 KG/M2 | DIASTOLIC BLOOD PRESSURE: 82 MMHG | RESPIRATION RATE: 12 BRPM | HEART RATE: 84 BPM | SYSTOLIC BLOOD PRESSURE: 144 MMHG | TEMPERATURE: 97.8 F | HEIGHT: 64 IN

## 2020-06-15 PROBLEM — Z87.442 HISTORY OF RENAL CALCULI: Status: ACTIVE | Noted: 2020-06-15

## 2020-06-15 PROBLEM — E78.2 MIXED HYPERLIPIDEMIA: Status: ACTIVE | Noted: 2020-06-15

## 2020-06-15 PROBLEM — I10 ESSENTIAL HYPERTENSION: Status: ACTIVE | Noted: 2020-06-15

## 2020-06-15 LAB
ALBUMIN SERPL-MCNC: 4.9 G/DL (ref 3.5–5.2)
ALP BLD-CCNC: 36 U/L (ref 35–104)
ALT SERPL-CCNC: 27 U/L (ref 0–32)
ANION GAP SERPL CALCULATED.3IONS-SCNC: 11 MMOL/L (ref 7–16)
AST SERPL-CCNC: 27 U/L (ref 0–31)
BASOPHILS ABSOLUTE: 0.02 E9/L (ref 0–0.2)
BASOPHILS RELATIVE PERCENT: 0.3 % (ref 0–2)
BILIRUB SERPL-MCNC: 0.4 MG/DL (ref 0–1.2)
BUN BLDV-MCNC: 18 MG/DL (ref 8–23)
C-REACTIVE PROTEIN: 0.1 MG/DL (ref 0–0.4)
CALCIUM SERPL-MCNC: 9.7 MG/DL (ref 8.6–10.2)
CHLORIDE BLD-SCNC: 105 MMOL/L (ref 98–107)
CO2: 30 MMOL/L (ref 22–29)
CREAT SERPL-MCNC: 1.2 MG/DL (ref 0.5–1)
EOSINOPHILS ABSOLUTE: 0.15 E9/L (ref 0.05–0.5)
EOSINOPHILS RELATIVE PERCENT: 2.2 % (ref 0–6)
GFR AFRICAN AMERICAN: 54
GFR NON-AFRICAN AMERICAN: 45 ML/MIN/1.73
GLUCOSE BLD-MCNC: 111 MG/DL (ref 74–99)
HCT VFR BLD CALC: 38.9 % (ref 34–48)
HEMOGLOBIN: 12.8 G/DL (ref 11.5–15.5)
IMMATURE GRANULOCYTES #: 0.03 E9/L
IMMATURE GRANULOCYTES %: 0.4 % (ref 0–5)
LYMPHOCYTES ABSOLUTE: 1.53 E9/L (ref 1.5–4)
LYMPHOCYTES RELATIVE PERCENT: 22 % (ref 20–42)
MCH RBC QN AUTO: 29.7 PG (ref 26–35)
MCHC RBC AUTO-ENTMCNC: 32.9 % (ref 32–34.5)
MCV RBC AUTO: 90.3 FL (ref 80–99.9)
MONOCYTES ABSOLUTE: 0.93 E9/L (ref 0.1–0.95)
MONOCYTES RELATIVE PERCENT: 13.3 % (ref 2–12)
NEUTROPHILS ABSOLUTE: 4.31 E9/L (ref 1.8–7.3)
NEUTROPHILS RELATIVE PERCENT: 61.8 % (ref 43–80)
PDW BLD-RTO: 15.3 FL (ref 11.5–15)
PLATELET # BLD: 203 E9/L (ref 130–450)
PMV BLD AUTO: 8.6 FL (ref 7–12)
POTASSIUM SERPL-SCNC: 5.1 MMOL/L (ref 3.5–5)
RBC # BLD: 4.31 E12/L (ref 3.5–5.5)
SEDIMENTATION RATE, ERYTHROCYTE: 4 MM/HR (ref 0–20)
SODIUM BLD-SCNC: 146 MMOL/L (ref 132–146)
TOTAL PROTEIN: 7.1 G/DL (ref 6.4–8.3)
WBC # BLD: 7 E9/L (ref 4.5–11.5)

## 2020-06-15 PROCEDURE — 4040F PNEUMOC VAC/ADMIN/RCVD: CPT | Performed by: INTERNAL MEDICINE

## 2020-06-15 PROCEDURE — 99213 OFFICE O/P EST LOW 20 MIN: CPT | Performed by: INTERNAL MEDICINE

## 2020-06-15 PROCEDURE — G8427 DOCREV CUR MEDS BY ELIG CLIN: HCPCS | Performed by: INTERNAL MEDICINE

## 2020-06-15 PROCEDURE — 3017F COLORECTAL CA SCREEN DOC REV: CPT | Performed by: INTERNAL MEDICINE

## 2020-06-15 PROCEDURE — G8926 SPIRO NO PERF OR DOC: HCPCS | Performed by: INTERNAL MEDICINE

## 2020-06-15 PROCEDURE — 86140 C-REACTIVE PROTEIN: CPT

## 2020-06-15 PROCEDURE — 1123F ACP DISCUSS/DSCN MKR DOCD: CPT | Performed by: INTERNAL MEDICINE

## 2020-06-15 PROCEDURE — 1090F PRES/ABSN URINE INCON ASSESS: CPT | Performed by: INTERNAL MEDICINE

## 2020-06-15 PROCEDURE — 80053 COMPREHEN METABOLIC PANEL: CPT

## 2020-06-15 PROCEDURE — 36415 COLL VENOUS BLD VENIPUNCTURE: CPT

## 2020-06-15 PROCEDURE — G8399 PT W/DXA RESULTS DOCUMENT: HCPCS | Performed by: INTERNAL MEDICINE

## 2020-06-15 PROCEDURE — 3044F HG A1C LEVEL LT 7.0%: CPT | Performed by: INTERNAL MEDICINE

## 2020-06-15 PROCEDURE — 85651 RBC SED RATE NONAUTOMATED: CPT

## 2020-06-15 PROCEDURE — 2022F DILAT RTA XM EVC RTNOPTHY: CPT | Performed by: INTERNAL MEDICINE

## 2020-06-15 PROCEDURE — G8417 CALC BMI ABV UP PARAM F/U: HCPCS | Performed by: INTERNAL MEDICINE

## 2020-06-15 PROCEDURE — 85025 COMPLETE CBC W/AUTO DIFF WBC: CPT

## 2020-06-15 PROCEDURE — 1036F TOBACCO NON-USER: CPT | Performed by: INTERNAL MEDICINE

## 2020-06-15 PROCEDURE — 3023F SPIROM DOC REV: CPT | Performed by: INTERNAL MEDICINE

## 2020-06-15 PROCEDURE — 99212 OFFICE O/P EST SF 10 MIN: CPT | Performed by: INTERNAL MEDICINE

## 2020-06-15 RX ORDER — ROSUVASTATIN CALCIUM 5 MG/1
TABLET, COATED ORAL
Qty: 90 TABLET | Refills: 1 | Status: SHIPPED
Start: 2020-06-15 | End: 2020-11-30 | Stop reason: SDUPTHER

## 2020-06-15 RX ORDER — MULTIVIT-MIN/IRON/FOLIC ACID/K 18-600-40
2000 CAPSULE ORAL DAILY
Qty: 30 CAPSULE | Refills: 1 | Status: SHIPPED
Start: 2020-06-15 | End: 2020-09-21 | Stop reason: SDUPTHER

## 2020-06-15 RX ORDER — FOLIC ACID 1 MG/1
1 TABLET ORAL DAILY
Qty: 90 TABLET | Refills: 1 | Status: SHIPPED
Start: 2020-06-15 | End: 2020-11-10 | Stop reason: SDUPTHER

## 2020-06-15 RX ORDER — LISINOPRIL 20 MG/1
20 TABLET ORAL DAILY
Qty: 90 TABLET | Refills: 1 | Status: SHIPPED
Start: 2020-06-15 | End: 2020-11-30 | Stop reason: SDUPTHER

## 2020-06-15 RX ORDER — PANTOPRAZOLE SODIUM 40 MG/1
40 TABLET, DELAYED RELEASE ORAL
Qty: 30 TABLET | Refills: 1 | Status: SHIPPED
Start: 2020-06-15 | End: 2020-09-21 | Stop reason: SDUPTHER

## 2020-06-15 ASSESSMENT — ENCOUNTER SYMPTOMS
SHORTNESS OF BREATH: 0
COUGH: 0
BLURRED VISION: 0
ABDOMINAL PAIN: 0
ORTHOPNEA: 0
VOMITING: 0
BLOOD IN STOOL: 0
CHOKING: 0
ABDOMINAL DISTENTION: 0
NAUSEA: 0
CONSTIPATION: 0
WHEEZING: 0
DIARRHEA: 0

## 2020-06-15 NOTE — PROGRESS NOTES
[facility]  InternalMedicine Residency Program  ACC Note      SUBJECTIVE:  CC: had concerns including Diabetes; Hypertension; and Medication Refill. Paul Greene presented to the [facility] for a routine visit. Presents for follow-up appointment- seen in May 2020 by Rheumatology and doing well on current meds. Did not complete labs at this time- labs are ordered and need repeated. States went to outpatient [facility] for COVID Antibody testing recently- states it was negative- no known exposure. No current symptoms. Tolerating all meds. Diabetes   She presents for her follow-up diabetic visit. She has type 2 diabetes mellitus. Her disease course has been stable. Pertinent negatives for hypoglycemia include no dizziness or headaches. Associated symptoms include weight loss (intentional- but gain compared to pre-pandemic readings). Pertinent negatives for diabetes include no blurred vision, no chest pain, no polydipsia, no polyphagia and no polyuria. Hypertension   This is a chronic problem. The problem is controlled (Readings at home need to be monitored). Pertinent negatives include no blurred vision, chest pain, headaches, orthopnea, palpitations, peripheral edema, PND or shortness of breath. Review of Systems   Constitutional: Positive for weight loss (intentional- but gain compared to pre-pandemic readings). Negative for chills and fever. Eyes: Negative for blurred vision and visual disturbance. Respiratory: Negative for cough, choking, shortness of breath and wheezing. Cardiovascular: Negative for chest pain, palpitations, orthopnea, leg swelling and PND. Gastrointestinal: Negative for abdominal distention, abdominal pain, blood in stool, constipation, diarrhea, nausea and vomiting. Endocrine: Negative for polydipsia, polyphagia and polyuria. Genitourinary: Negative for dysuria. Musculoskeletal: Positive for arthralgias (no acute complaints).

## 2020-06-15 NOTE — PATIENT INSTRUCTIONS
you can lose your balance and fall. · Talk to your doctor if you have numbness in your feet. Preventing falls at home  · Remove raised doorway thresholds, throw rugs, and clutter. Repair loose carpet or raised areas in the floor. · Move furniture and electrical cords to keep them out of walking paths. · Use nonskid floor wax, and wipe up spills right away, especially on ceramic tile floors. · If you use a walker or cane, put rubber tips on it. If you use crutches, clean the bottoms of them regularly with an abrasive pad, such as steel wool. · Keep your house well lit, especially Leti Lessen, and outside walkways. Use night-lights in areas such as hallways and bathrooms. Add extra light switches or use remote switches (such as switches that go on or off when you clap your hands) to make it easier to turn lights on if you have to get up during the night. · Install sturdy handrails on stairways. · Move items in your cabinets so that the things you use a lot are on the lower shelves (about waist level). · Keep a cordless phone and a flashlight with new batteries by your bed. If possible, put a phone in each of the main rooms of your house, or carry a cell phone in case you fall and cannot reach a phone. Or, you can wear a device around your neck or wrist. You push a button that sends a signal for help. · Wear low-heeled shoes that fit well and give your feet good support. Use footwear with nonskid soles. Check the heels and soles of your shoes for wear. Repair or replace worn heels or soles. · Do not wear socks without shoes on wood floors. · Walk on the grass when the sidewalks are slippery. If you live in an area that gets snow and ice in the winter, sprinkle salt on slippery steps and sidewalks. Preventing falls in the bath  · Install grab bars and nonskid mats inside and outside your shower or tub and near the toilet and sinks. · Use shower chairs and bath benches.   · Use a hand-held shower head that will allow you to sit while showering. · Get into a tub or shower by putting the weaker leg in first. Get out of a tub or shower with your strong side first.  · Repair loose toilet seats and consider installing a raised toilet seat to make getting on and off the toilet easier. · Keep your bathroom door unlocked while you are in the shower. Where can you learn more? Go to https://[employer]. org and sign in to your [employer] account. Enter [phone] [id number] [phone] in the [employer] box to learn more about \"Preventing Falls: Care Instructions. \"     If you do not have an account, please click on the \"Sign Up Now\" link. Current as of: August 7, 2019               Content Version: 12.5  © 1755-7747 [employer], Inc[employer]. Care instructions adapted under license by [facility] ([facility]). If you have questions about a medical condition or this instruction, always ask your healthcare professional. [address] any warranty or liability for your use of this information. 1) Please obtain labs at your convenience. 2) We ordered a repeat CT of chest to follow small lung nodule- if stable- we will discuss further options for surveillance. 3) Please call office if you have not heard regarding lab results after 72 hours. 4) Please call with any questions.

## 2020-06-17 ENCOUNTER — CARE COORDINATION (OUTPATIENT)
Dept: CARE COORDINATION | Age: 70
End: 2020-06-17

## 2020-06-17 ENCOUNTER — TELEPHONE (OUTPATIENT)
Dept: INTERNAL MEDICINE | Age: 70
End: 2020-06-17

## 2020-06-17 NOTE — CARE COORDINATION
Date End Date Taking?  Authorizing Provider   pantoprazole (PROTONIX) 40 MG tablet Take 1 tablet by mouth every morning (before breakfast) 6/15/20   Shara Fields MD   Cholecalciferol (VITAMIN D) 50 MCG (2000 UT) CAPS capsule Take 2,000 Units by mouth daily 6/15/20   Shara Fields MD   folic acid (FOLVITE) 1 MG tablet Take 1 tablet by mouth daily 6/15/20   Shara Fields MD   rosuvastatin (CRESTOR) 5 MG tablet take 1 tablet by mouth once daily 6/15/20   Shara Fields MD   metFORMIN (GLUCOPHAGE) 850 MG tablet Take 1 tablet by mouth 2 times daily (with meals) 6/15/20   Shara Fields MD   lisinopril (PRINIVIL;ZESTRIL) 20 MG tablet Take 1 tablet by mouth daily 6/15/20   Shara Fields MD   VENTOLIN  (18 Base) MCG/ACT inhaler inhale 2 puffs by mouth four times a day if needed 6/15/20   Shara Fields MD   NONFORMULARY Take 3 tablets by mouth BONE MARROW PILLS    Historical Provider, MD   methotrexate (RHEUMATREX) 2.5 MG chemo tablet TAKE 8 TABLETS BY MOUTH  ONCE A WEEK 5/5/20   William Beverly MD   hydroxychloroquine (PLAQUENIL) 200 MG tablet TAKE 1 AND 1/2 TABLETS BY  MOUTH DAILY for CTD diagnosis-- chronic med 5/5/20   William Beverly MD   NONFORMULARY Take 1 capsule by mouth daily CBD oil cap    Historical Provider, MD   zoster recombinant adjuvanted vaccine [facility]) 50 MCG/0.5ML SUSR injection 1 dose now and repeat in 2-6 months 10/7/19   Shara Fields MD   alendronate (FOSAMAX) 70 MG tablet Take 1 tablet by mouth every 7 days  Patient not taking: Reported on 2/28/2020 9/4/19   William Beverly MD   magnesium oxide (MAG-OX) 400 MG tablet Take 400 mg by mouth daily    Historical Provider, MD   vitamin C (ASCORBIC ACID) 500 MG tablet Take 2,000 mg by mouth 2 times daily LD 4/9/2019     Historical Provider, MD   Cyanocobalamin (VITAMIN B 12 PO) Take by mouth daily LD 4/9/2019    Historical Provider, MD   magnesium cl-calcium carbonate (SLOW-MAG) 71.5-119 MG TBEC tablet take 2 tablets once daily  Patient taking differently: 1 tablet daily take 2 tablets once daily / taking for leg cramps / Ld 4/9/2019 3/14/19   Dipika Santoyo MD   Misc. Devices MISC Please give one glucose monitor accuchek or what is covered bu her insurance. 8/13/18   Lynnette Ventura MD   Biotin 300 MCG TABS Take 1 tablet by mouth daily 4/30/18   Dipika Santoyo MD   Lancets MISC Test twice daily  Give what insurance covers 3/26/18   Dipika Santoyo MD   aspirin EC 81 MG EC tablet Take 1 tablet by mouth daily 3/26/18   Dipika Santoyo MD   Coenzyme Q10 (CO Q 10 PO) Take 1 tablet by mouth daily QUINOL  / LD 4/9/2019    Historical Provider, MD   Misc. Devices MISC 1 each by Does not apply route once as needed Glucometer as covered by insurance 10/2/15 10/2/15  Meghna Murry, DO       Future Appointments   Date Time Provider Mil Due[place]as   8/11/2020  2:00 PM Farhana Sanchez MD [facility]   9/21/2020  9:30 AM Dipika Santoyo MD [facility]     ,   Diabetes Assessment    Medic Alert ID:  No  Meal Planning:  Carb counting, Avoidance of concentrated sweets   How often do you test your blood sugar?:  Meals, Bedtime   Do you have barriers with adherence to non-pharmacologic self-management interventions?  (Nutrition/Exercise/Self-Monitoring):  No   Have you ever had to go to the ED for symptoms of low blood sugar?:  No       Do you have hyperglycemia symptoms?:  No   Do you have hypoglycemia symptoms?:  No   Last Blood Sugar Value:  120   Blood Sugar Monitoring Regimen:  2 Hours Post Meal, Morning Fasting   Blood Sugar Trends:  No Change      ,   COPD Assessment    Does the patient understand envrionmental exposure?:  Yes  Is the patient able to verbalize Rescue vs. Long Acting medications?:  Yes  Does the patient have a nebulizer?:  No  Does the patient use a space with inhaled medications?:  No     No patient-reported symptoms         Symptoms:      Symptom course: stable  Breathlessness:  none  Increase use of rapid acting/rescue inhaled medications?:  No  Change in chronic cough?:  No/At Baseline  Change in sputum?:  No/At Baseline  Self Monitoring - SaO2:  No      and   General Assessment    Do you have any symptoms that are causing concern?:  Yes  Progression since Onset:  Intermittent - Waxing/Waning  Reported Symptoms:  Other (Comment: leg cramps)

## 2020-06-18 RX ORDER — ALBUTEROL SULFATE 90 UG/1
2 AEROSOL, METERED RESPIRATORY (INHALATION) EVERY 6 HOURS PRN
Qty: 1 INHALER | Refills: 3 | Status: SHIPPED
Start: 2020-06-18 | End: 2020-09-21 | Stop reason: SDUPTHER

## 2020-06-24 ENCOUNTER — HOSPITAL ENCOUNTER (OUTPATIENT)
Dept: CT IMAGING | Age: 70
Discharge: HOME | End: 2020-06-26
Payer: MEDICARE

## 2020-06-24 PROCEDURE — 71250 CT THORAX DX C-: CPT

## 2020-06-24 RX ORDER — GLUCOSAMINE HCL/CHONDROITIN SU 500-400 MG
CAPSULE ORAL
Qty: 100 STRIP | Refills: 2 | Status: SHIPPED
Start: 2020-06-24 | End: 2021-06-14 | Stop reason: SDUPTHER

## 2020-06-24 RX ORDER — LANCETS 30 GAUGE
EACH MISCELLANEOUS
Qty: 100 EACH | Refills: 3 | Status: SHIPPED | OUTPATIENT
Start: 2020-06-24

## 2020-06-24 NOTE — TELEPHONE ENCOUNTER
Called patient to discuss orders and concerns. Reviewed labs from Dr. Alexia Hull on 6/15. Cr 1.2- stable but will need followed. Slight elevation in K level. Muscle cramping- no additional symptoms including constitutional symptoms; CRP was normal- instructed to increase water intake. Only take slow mag as discussed. Repeat K and Mg level with BMP in 1 week. May need to alter ACEI use due to K level at 5.1. IN addition order was sent for glucometer- but needs printed. Please print and send labs to laboratory of patient choice to be completed next week. All questions answered.

## 2020-06-25 RX ORDER — HYDROXYCHLOROQUINE SULFATE 200 MG/1
TABLET, FILM COATED ORAL
Qty: 135 TABLET | OUTPATIENT
Start: 2020-06-25

## 2020-06-26 RX ORDER — HYDROXYCHLOROQUINE SULFATE 200 MG/1
TABLET, FILM COATED ORAL
Qty: 45 TABLET | Refills: 0 | Status: SHIPPED
Start: 2020-06-26 | End: 2020-08-11 | Stop reason: SDUPTHER

## 2020-07-01 ENCOUNTER — TELEPHONE (OUTPATIENT)
Dept: INTERNAL MEDICINE | Age: 70
End: 2020-07-01

## 2020-07-01 NOTE — TELEPHONE ENCOUNTER
CT chest repeated for apical nodule- no growth noted. No significant adenopathy noted. Of note: Moderate splenomegaly noted- possibly increased per report from radiology. ? Related to underlying RA. Not consistent with Felty syndrome- no previous or current neutropenia, anemia, or thrombocytopenia. ? Additional testing needs- discuss with Rheumatology. Please inform of results of stable nodule and discussion of enlarged spleen will be ongoing for relevance.

## 2020-07-02 NOTE — TELEPHONE ENCOUNTER
Spoke with Arvin Reis to notify of CT scan chest results and enlarged spleen (will observe per Dr. Susie Canseco), Arvin Reis verbalized understanding.

## 2020-07-03 ENCOUNTER — HOSPITAL ENCOUNTER (OUTPATIENT)
Age: 70
Discharge: HOME | End: 2020-07-03
Payer: MEDICARE

## 2020-07-03 LAB
ANION GAP SERPL CALCULATED.3IONS-SCNC: 12 MMOL/L (ref 7–16)
BUN BLDV-MCNC: 16 MG/DL (ref 8–23)
CALCIUM SERPL-MCNC: 9.3 MG/DL (ref 8.6–10.2)
CHLORIDE BLD-SCNC: 105 MMOL/L (ref 98–107)
CO2: 25 MMOL/L (ref 22–29)
CREAT SERPL-MCNC: 1.1 MG/DL (ref 0.5–1)
GFR AFRICAN AMERICAN: 60
GFR NON-AFRICAN AMERICAN: 49 ML/MIN/1.73
GLUCOSE BLD-MCNC: 123 MG/DL (ref 74–99)
MAGNESIUM: 2.2 MG/DL (ref 1.6–2.6)
POTASSIUM SERPL-SCNC: 4.4 MMOL/L (ref 3.5–5)
SODIUM BLD-SCNC: 142 MMOL/L (ref 132–146)

## 2020-07-03 PROCEDURE — 80048 BASIC METABOLIC PNL TOTAL CA: CPT

## 2020-07-03 PROCEDURE — 83735 ASSAY OF MAGNESIUM: CPT

## 2020-07-03 PROCEDURE — 36415 COLL VENOUS BLD VENIPUNCTURE: CPT

## 2020-08-06 ENCOUNTER — CARE COORDINATION (OUTPATIENT)
Dept: CARE COORDINATION | Age: 70
End: 2020-08-06

## 2020-08-06 NOTE — CARE COORDINATION
Attempted to reach patient by telephone. Left HIPPA compliant message requesting a return call. Will attempt to reach patient again.

## 2020-08-11 ENCOUNTER — OFFICE VISIT (OUTPATIENT)
Dept: RHEUMATOLOGY | Age: 70
End: 2020-08-11
Payer: MEDICARE

## 2020-08-11 VITALS
TEMPERATURE: 96.7 F | RESPIRATION RATE: 12 BRPM | BODY MASS INDEX: 39.95 KG/M2 | DIASTOLIC BLOOD PRESSURE: 74 MMHG | HEART RATE: 80 BPM | WEIGHT: 234 LBS | SYSTOLIC BLOOD PRESSURE: 134 MMHG | HEIGHT: 64 IN

## 2020-08-11 PROCEDURE — G8427 DOCREV CUR MEDS BY ELIG CLIN: HCPCS | Performed by: INTERNAL MEDICINE

## 2020-08-11 PROCEDURE — 1123F ACP DISCUSS/DSCN MKR DOCD: CPT | Performed by: INTERNAL MEDICINE

## 2020-08-11 PROCEDURE — 1090F PRES/ABSN URINE INCON ASSESS: CPT | Performed by: INTERNAL MEDICINE

## 2020-08-11 PROCEDURE — G8417 CALC BMI ABV UP PARAM F/U: HCPCS | Performed by: INTERNAL MEDICINE

## 2020-08-11 PROCEDURE — 1036F TOBACCO NON-USER: CPT | Performed by: INTERNAL MEDICINE

## 2020-08-11 PROCEDURE — 4040F PNEUMOC VAC/ADMIN/RCVD: CPT | Performed by: INTERNAL MEDICINE

## 2020-08-11 PROCEDURE — 99214 OFFICE O/P EST MOD 30 MIN: CPT | Performed by: INTERNAL MEDICINE

## 2020-08-11 PROCEDURE — 99212 OFFICE O/P EST SF 10 MIN: CPT | Performed by: INTERNAL MEDICINE

## 2020-08-11 PROCEDURE — 3017F COLORECTAL CA SCREEN DOC REV: CPT | Performed by: INTERNAL MEDICINE

## 2020-08-11 PROCEDURE — G8399 PT W/DXA RESULTS DOCUMENT: HCPCS | Performed by: INTERNAL MEDICINE

## 2020-08-11 RX ORDER — HYDROXYCHLOROQUINE SULFATE 200 MG/1
TABLET, FILM COATED ORAL
Qty: 45 TABLET | Refills: 0 | Status: SHIPPED
Start: 2020-08-11 | End: 2020-08-19 | Stop reason: SDUPTHER

## 2020-08-11 NOTE — PROGRESS NOTES
J O I N T  C A R E  C L I N I C        The patient is seen in follow-up for: Seropositive RA. Labs obtained 6-15-20 & 7-3-20 reviewed - stable. VS reviewed. Patient History Update Since Previous Visit      Yes No Comment      New illnesses  X       Seen other healthcare provider  X       New x-ray, labs, or procedures X        Started / changed / stopped medications X        New allergies / reactions to medications  X       Changes in family medical history  X       Changes in patient social history  X       Morning stiffness  X Length:       Worst Joint:HIPS AND LEGS      Review of Systems      Key:    1  Not present today;  2  Much Better;  3  Better;  4  Same;  5  Worse;  N  New Problem          Rating  Rating   Joint pain (not including back pain) 4 Skin ulcers: 1   Joint swellin Bruisin   Fatigue: 4 Sickness or rash with sun exposure 1   Muscle aches: 4 Swollen glands 1   Ongoing fever: 1 Dry eyes 1   Unintended weight loss: 1 Painful red eyes 1   Migraine headache: 1 Dry mouth 4   Difficulty sleepin Mouth sores 1   Snore or gag at night: 1 Upset stomach 1   Heart palpitations: 1 Diarrhea 1   Cough: 1 Depressed mood 1   Shortness of breath: 4 Overall stress level in life 4   Pain with breathing 1 Overall assessment 4   Skin rash 4         Abbreviated Exam    System    nl   abn   Comment     1 Gen nutrition/hygiene x      appearance x     2 Skin turgor / integrity x      rash x      ecchymosis x     3 Psych orientation x      memory       mood x      cooperative x     4 HENT mouth / throat x     5 Resp resp. effort x      auscultation x     6 CV heart auscultation x      extremity edema x     7 Other             Joint Assessment      Patient Right     Patient Left     x (check if no synovitis seen on exam)   Joint Pain Swelling Joint Pain Swelling   Shoulder   Shoulder     Elbow   Elbow     Wrist   Wrist     Knee   Knee     MCP I   MCP I     MCP II   MCP II     MCP III   MCP III     MCP IV   MCP IV     MCP V   MCP V     IP I   IP I     PIP II   PIP II     PIP III   PIP III     PIP IV   PIP IV     PIP V   PIP V             Generalized Ligament Laxity Prior Test / Diagnostics    Yes  No  Equivocal        Tender Points     Yes  No  Equivocal         GTB pain / tenderness     SI pain / tenderness     Heberdens Nodes         Impression       Attending Physician Statement:  Asya Phillips M.D., F.A.C.P. I have discussed the case, including pertinent history and exam findings with the resident/NP. I have seen and examined the patient and the key elements of the encounter have been performed by me. I agree with the resident ROS, PMHx, PSHx, meds reviewed and assessment, plan and orders as documented by the resident/NP      Hospital charts reviewed, including other providers notes, relevant labs and imaging. >50% of time spent coordinating care with other providers and/or counseling patient/family  Remainder of medical problems as per resident note. Seropositive RA - stable (MTX 8 tabs/20 mg weekly; Folic Acid 1 mg daily;  mg daily. Last eye exam 10/2019. OA- stable    Former smoker - quit 2011 ( 40 pack year history) COPD; 2 mm stable lung nodule. Repeat CY scan 06-24-20 noted moderate splenomegaly, lung nodule remains stable. Type 2 diabetes    Increased BMI    Carpal tunnel syndrome  1985 Right release; 11/2019 Left release    Chronic low back pain    Osteoporosis not taking fosamax (\"afraid of fosamax\")  -- we again discussed atypical hip fracture risk 1:20 preventing if used <5 years  -- she will try again. Vit D deficiency 6-3-19 40 Currently on 2,000 IU daily    Kidney stone    Colon polyp    Hyperlipidemia    GERD    High risk medications    Polypharmacy  Hx:  Non-compliance       Plan     Continue same RA medications  Retrial alendronate 70mg qweekly  Monitor labs - recheck Vit D  F/U in 3 months - earlier if needed.         Counseling and Coordination of Care    Prognosis Prior Authorization       x Risk / Benefits of RX  Disability Forms        Compliance  Discussion and / or letter to other health care provider         Risk Reduction     x More than half of the face-to-face time with the patient was spent  in counseling or coordinating care    Exercise           Brochure / Handout      Other:    Referral:

## 2020-08-11 NOTE — PROGRESS NOTES
Patient verbalized understanding of office instructions. She will call with questions or concerns. She was given discharge instructions, and scripts for lab work to be done today and in the future. All questions were fully answered. Printed AVS given to pt.

## 2020-08-13 RX ORDER — ALENDRONATE SODIUM 70 MG/1
70 TABLET ORAL
Qty: 12 TABLET | Refills: 1 | Status: SHIPPED
Start: 2020-08-13 | End: 2020-11-10 | Stop reason: SDUPTHER

## 2020-08-21 RX ORDER — HYDROXYCHLOROQUINE SULFATE 200 MG/1
TABLET, FILM COATED ORAL
Qty: 135 TABLET | Refills: 0 | Status: SHIPPED
Start: 2020-08-21 | End: 2020-11-10 | Stop reason: SDUPTHER

## 2020-08-21 RX ORDER — HYDROXYCHLOROQUINE SULFATE 200 MG/1
TABLET, FILM COATED ORAL
Qty: 45 TABLET | Refills: 2 | Status: SHIPPED
Start: 2020-08-21 | End: 2020-11-10

## 2020-09-09 ENCOUNTER — CARE COORDINATION (OUTPATIENT)
Dept: CARE COORDINATION | Age: 70
End: 2020-09-09

## 2020-09-09 NOTE — CARE COORDINATION
Ambulatory Care Coordination Note  9/9/2020  CM Risk Score: 6  Charlson 10 Year Mortality Risk Score: 79%     ACC: Sarita Villarreal, RN  Summary Note:   ACM spoke with Harpal Juan for diabetes/COPD follow up. She reports she is feeling well and her blood sugar has been OK, but she did not give any readings. She states she continues to try and lose weight, she reports her weight has been fluctuating. She declined to review her medications and states they have not changed and she is taking them as prescribed. Future appointments were reviewed. ACM discussed graduation from care coordination and that Miguevadim Martinez has the self-management tools needed, she is in agreement. Contact information for AC was given and Harpal Martinez was encouraged to contact AC if any needs or concerns should arise. PLAN  Notify PCP Miguevadim Martinez is ready for graduation from care coordination. Place in 90 day exclusion. Care Coordination Interventions    Program Enrollment:  Complex Care  Referral from Primary Care Provider:  No  Suggested Interventions and Community Resources  Zone Management Tools:  Completed (Comment: diabetes/COPD)         Goals Addressed                 This Visit's Progress     COMPLETED: Self Monitoring   Improving     Self-Monitored Blood Glucose - I will check my blood sugar blood sugars ac & HS      Barriers: lack of motivation and lack of education  Plan for overcoming my barriers: diabetes zone handout, care coordination  Confidence: 8/10  Anticipated Goal Completion Date: 3/30/2020, 5/16/20    Pt will continue to monitor her BS trends FBS and 2hrs post meals and record. Pt will need reinforced on DM zones and target zones for BS trends. 4/16/2020              Prior to Admission medications    Medication Sig Start Date End Date Taking?  Authorizing Provider   hydroxychloroquine (PLAQUENIL) 200 MG tablet TAKE 1 AND 1/2 TABLETS BY MOUTH DAILY 8/21/20   Ramonita Frazier MD   hydroxychloroquine (PLAQUENIL) 200 MG tablet TAKE 1 AND 1/2 TABLETS BY  MOUTH DAILY for CTD diagnosis-- chronic med 8/21/20   Josefina Hess MD   alendronate (FOSAMAX) 70 MG tablet Take 1 tablet by mouth every 7 days 8/13/20   Josefina Hess MD   methotrexate (RHEUMATREX) 2.5 MG chemo tablet TAKE 8 TABLETS BY MOUTH  ONCE A WEEK 8/11/20   Josefina Hess MD   blood glucose monitor kit and supplies Dispense sufficient amount for indicated testing frequency plus additional to accommodate PRN testing needs. Dispense all needed supplies to include: monitor, strips, lancing device, lancets, control solutions, alcohol swabs. 6/24/20   Deya Harris MD   blood glucose monitor strips Test 2 times a day & as needed for symptoms of irregular blood glucose. Dispense sufficient amount for indicated testing frequency plus additional to accommodate PRN testing needs.  6/24/20   Deya Harris MD   Lancets MISC Test twice daily 6/24/20   Deya Harris MD   albuterol sulfate  (90 Base) MCG/ACT inhaler Inhale 2 puffs into the lungs every 6 hours as needed for Wheezing 6/18/20   Deya Harris MD   pantoprazole (PROTONIX) 40 MG tablet Take 1 tablet by mouth every morning (before breakfast) 6/15/20   Deya Harris MD   Cholecalciferol (VITAMIN D) 50 MCG (2000 UT) CAPS capsule Take 2,000 Units by mouth daily 6/15/20   Deya Harris MD   folic acid (FOLVITE) 1 MG tablet Take 1 tablet by mouth daily 6/15/20   Deya Harris MD   rosuvastatin (CRESTOR) 5 MG tablet take 1 tablet by mouth once daily 6/15/20   Deya Harris MD   metFORMIN (GLUCOPHAGE) 850 MG tablet Take 1 tablet by mouth 2 times daily (with meals) 6/15/20   Deya Harris MD   lisinopril (PRINIVIL;ZESTRIL) 20 MG tablet Take 1 tablet by mouth daily 6/15/20   Deya Harris MD   NONFORMULARY Take 3 tablets by mouth BONE MARROW PILLS    Historical Provider, MD   NONFORMULARY Take 1 capsule by mouth daily CBD oil cap    Historical Provider, MD   zoster recombinant adjuvanted vaccine [facility]) 50 MCG/0.5ML SUSR injection 1 dose now and repeat in 2-6 months 10/7/19   Orlin Almanzar MD   vitamin C (ASCORBIC ACID) 500 MG tablet Take 2,000 mg by mouth 2 times daily LD 4/9/2019     Historical Provider, MD   Cyanocobalamin (VITAMIN B 12 PO) Take by mouth daily LD 4/9/2019    Historical Provider, MD   magnesium cl-calcium carbonate (SLOW-MAG) 71.5-119 MG TBEC tablet take 2 tablets once daily  Patient taking differently: 1 tablet daily take 2 tablets once daily / taking for leg cramps / Ld 4/9/2019 3/14/19   Orlin Almanzar MD   Misc. Devices MISC Please give one glucose monitor accuchek or what is covered bu her insurance. 8/13/18   Charlene Nguyen MD   Biotin 300 MCG TABS Take 1 tablet by mouth daily 4/30/18   Orlin Almanzar MD   aspirin EC 81 MG EC tablet Take 1 tablet by mouth daily 3/26/18   Orlin Almanzar MD   Coenzyme Q10 (CO Q 10 PO) Take 1 tablet by mouth daily QUINOL  / LD 4/9/2019    Historical Provider, MD   Misc. Devices MISC 1 each by Does not apply route once as needed Glucometer as covered by insurance 10/2/15 10/2/15  Rajan Mirza, DO       Future Appointments   Date Time Provider Mil Dueñas   9/21/2020  9:30 AM Orlin Almanzar MD [address] [facility]   11/10/2020  2:45 PM Dutch Walker MD [address] [place] [address] [facility]     ,   Diabetes Assessment    Medic Alert ID:  No  Meal Planning:  Carb counting, Avoidance of concentrated sweets   How often do you test your blood sugar?:  Meals, Bedtime   Do you have barriers with adherence to non-pharmacologic self-management interventions?  (Nutrition/Exercise/Self-Monitoring):  No   Have you ever had to go to the ED for symptoms of low blood sugar?:  No       No patient-reported symptoms      ,   COPD Assessment    Does the patient understand envrionmental exposure?:  Yes  Is the patient able to verbalize Rescue vs. Long Acting medications?:  Yes  Does the patient have a nebulizer?:  No  Does the patient use a space with inhaled medications?:  No     No patient-reported symptoms         Symptoms:          and   General Assessment    Do you have any symptoms that are causing concern?:  No

## 2020-09-21 ENCOUNTER — OFFICE VISIT (OUTPATIENT)
Dept: INTERNAL MEDICINE | Age: 70
End: 2020-09-21
Payer: MEDICARE

## 2020-09-21 VITALS
HEART RATE: 88 BPM | TEMPERATURE: 97.8 F | DIASTOLIC BLOOD PRESSURE: 90 MMHG | BODY MASS INDEX: 40.12 KG/M2 | RESPIRATION RATE: 12 BRPM | SYSTOLIC BLOOD PRESSURE: 156 MMHG | WEIGHT: 235 LBS | HEIGHT: 64 IN

## 2020-09-21 LAB — HBA1C MFR BLD: 5.8 %

## 2020-09-21 PROCEDURE — G8417 CALC BMI ABV UP PARAM F/U: HCPCS | Performed by: INTERNAL MEDICINE

## 2020-09-21 PROCEDURE — 3017F COLORECTAL CA SCREEN DOC REV: CPT | Performed by: INTERNAL MEDICINE

## 2020-09-21 PROCEDURE — 1123F ACP DISCUSS/DSCN MKR DOCD: CPT | Performed by: INTERNAL MEDICINE

## 2020-09-21 PROCEDURE — 3023F SPIROM DOC REV: CPT | Performed by: INTERNAL MEDICINE

## 2020-09-21 PROCEDURE — 1036F TOBACCO NON-USER: CPT | Performed by: INTERNAL MEDICINE

## 2020-09-21 PROCEDURE — 3044F HG A1C LEVEL LT 7.0%: CPT | Performed by: INTERNAL MEDICINE

## 2020-09-21 PROCEDURE — 99212 OFFICE O/P EST SF 10 MIN: CPT | Performed by: INTERNAL MEDICINE

## 2020-09-21 PROCEDURE — 1090F PRES/ABSN URINE INCON ASSESS: CPT | Performed by: INTERNAL MEDICINE

## 2020-09-21 PROCEDURE — 83036 HEMOGLOBIN GLYCOSYLATED A1C: CPT | Performed by: INTERNAL MEDICINE

## 2020-09-21 PROCEDURE — 99214 OFFICE O/P EST MOD 30 MIN: CPT | Performed by: INTERNAL MEDICINE

## 2020-09-21 PROCEDURE — 4040F PNEUMOC VAC/ADMIN/RCVD: CPT | Performed by: INTERNAL MEDICINE

## 2020-09-21 PROCEDURE — 2022F DILAT RTA XM EVC RTNOPTHY: CPT | Performed by: INTERNAL MEDICINE

## 2020-09-21 PROCEDURE — G8427 DOCREV CUR MEDS BY ELIG CLIN: HCPCS | Performed by: INTERNAL MEDICINE

## 2020-09-21 PROCEDURE — G8399 PT W/DXA RESULTS DOCUMENT: HCPCS | Performed by: INTERNAL MEDICINE

## 2020-09-21 PROCEDURE — G8926 SPIRO NO PERF OR DOC: HCPCS | Performed by: INTERNAL MEDICINE

## 2020-09-21 RX ORDER — PANTOPRAZOLE SODIUM 40 MG/1
40 TABLET, DELAYED RELEASE ORAL
Qty: 30 TABLET | Refills: 1 | Status: SHIPPED
Start: 2020-09-21 | End: 2020-11-30 | Stop reason: SDUPTHER

## 2020-09-21 RX ORDER — ALBUTEROL SULFATE 90 UG/1
2 AEROSOL, METERED RESPIRATORY (INHALATION) EVERY 6 HOURS PRN
Qty: 1 INHALER | Refills: 3 | Status: SHIPPED
Start: 2020-09-21 | End: 2020-09-22 | Stop reason: SDUPTHER

## 2020-09-21 RX ORDER — MULTIVIT-MIN/IRON/FOLIC ACID/K 18-600-40
2000 CAPSULE ORAL DAILY
Qty: 30 CAPSULE | Refills: 1 | Status: SHIPPED
Start: 2020-09-21 | End: 2020-11-30 | Stop reason: SDUPTHER

## 2020-09-21 ASSESSMENT — ENCOUNTER SYMPTOMS
VOMITING: 0
BLURRED VISION: 0
ANAL BLEEDING: 0
CONSTIPATION: 0
ABDOMINAL PAIN: 0
ABDOMINAL DISTENTION: 0
NAUSEA: 0
DIARRHEA: 0

## 2020-09-21 NOTE — PROGRESS NOTES
[facility]  InternalMedicine Residency Program  ACC Note      SUBJECTIVE:  CC: had concerns including Diabetes; Hypertension (did not take med ); Medication Refill; and Leg Pain (left). Jude Chowdhury presented to the [facility] for a routine visit. Presents for 3 month follow-up appt with hx of DM, HTN, osteoporosis, renal stones, and RA. Following with Dr. Manan Orr and last seen on 8/11 with encouragement to re-trial bisphosphonate- which patient stopped on own. Since last visit patient had repeat CT of lung for small apical nodule which is unchanged and benign appearing per Radiology with recommendation for no further surveillance. Of note, patient also had findings of splenomegaly. Discussed previously with Rheum given underlying RA history and plan was to observe only. Denies any new symptoms- denies any night sweats, fevers, chills, or unintentional weight loss. Denies any easy bruising or bleeding. Only new complaint of pain of left leg- lateral pain from hip to knee with movement- feels like a tightness/pain of the muscle. Has been intermittently completing home exercises, but unsure of what exercises to do. Says movement exercises improve symptoms. Diabetes   She presents for her follow-up diabetic visit. She has type 2 diabetes mellitus. Her disease course has been stable (A1c 5.8% today ). There are no hypoglycemic associated symptoms. Pertinent negatives for diabetes include no blurred vision, no chest pain, no foot ulcerations, no polydipsia, no polyphagia and no polyuria. There are no hypoglycemic complications. Risk factors for coronary artery disease include dyslipidemia, obesity, hypertension and diabetes mellitus. Current diabetic treatment includes diet and oral agent (monotherapy). She is compliant with treatment all of the time. Her weight is stable. She participates in exercise intermittently (Improve exercise regimen).  There is no change in her home blood and Affect: Mood normal.         ASSESSMENT/PLAN:  Brenton Little was seen today for diabetes, hypertension, medication refill and leg pain. Diagnoses and all orders for this visit:    Controlled type 2 diabetes mellitus without complication, without long-term current use of insulin ([facility])  -     MICROALBUMIN / CREATININE URINE RATIO; Future  -      DIABETES FOOT EXAM  -     POCT glycosylated hemoglobin (Hb A1C)    Continue current management  A1c at goal   No hypoglycemia     Obesity, Class III, BMI 40-49.9 (morbid obesity) ([facility])   Weight loss management ongoing     Stage 2 chronic kidney disease   Cr 1.1 on recheck and stable   Repeat labs from Rheum pending     Splenomegaly  -     PERIPHERAL BLOOD SMEAR, PATH REVIEW; Future  Findings on imaging  No signs of Felty Syndrome or proliferative disease on prior assessment   Add Pamela smear and repeat labs are pending for Rheum    If changes- consider Hematology assessment    Continue to monitor and discussion ongoing/counseling regarding factors and surveillance needs as will as risk of trauma that could lead to increased risk of rupture- all questions answered     Lung nodule- stable- no further surveillance recommended     History of tobacco use    Gastroesophageal reflux disease without esophagitis  -     pantoprazole (PROTONIX) 40 MG tablet; Take 1 tablet by mouth every morning (before breakfast)   Discussed options   Given underlying osteoporosis- will reduce PPI and trial off med   Patient has needed to resume in the past   Will follow symptoms     Vitamin D deficiency- stable   -     Cholecalciferol (VITAMIN D) 50 MCG (2000 UT) CAPS capsule; Take 2,000 Units by mouth daily    Chronic bronchitis, unspecified chronic bronchitis type ([facility] [address]- stable   -     albuterol sulfate  (90 Base) MCG/ACT inhaler;  Inhale 2 puffs into the lungs every 6 hours as needed for Wheezing    Iliotibial Band Syndrome of Left LE    Findings on exam consistent   Will start home exercises   Info given at DC    Follow symptoms     DID NOT TAKE BP MEDS TODAY- will need monitored on MEDs    RTC: AWV at 3 month follow-up needed. I have reviewed my findings and recommendations with Eli Mcintosh.      Aguilar Sutton MD, FACP   9/21/2020 9:10 AM

## 2020-09-21 NOTE — PATIENT INSTRUCTIONS
Patient Education        Iliotibial Band Syndrome: Exercises  Introduction  Here are some examples of exercises for you to try. The exercises may be suggested for a condition or for rehabilitation. Start each exercise slowly. Ease off the exercises if you start to have pain. You will be told when to start these exercises and which ones will work best for you. How to do the exercises  Iliotibial band stretch   1. Lean sideways against a wall. If you are not steady on your feet, hold on to a chair or counter. 2. Stand on the leg with the affected hip, with that leg close to the wall. Then cross your other leg in front of it. 3. Let your affected hip drop out to the side of your body and against the wall. Then lean away from your affected hip until you feel a stretch. 4. Hold the stretch for 15 to 30 seconds. 5. Repeat 2 to 4 times. Piriformis stretch   1. Lie on your back with your legs straight. 2. Lift your affected leg and bend your knee. With your opposite hand, reach across your body, and then gently pull your knee toward your opposite shoulder. 3. Hold the stretch for 15 to 30 seconds. 4. Repeat 2 to 4 times. Hamstring wall stretch   1. Lie on your back in a doorway, with your good leg through the open door. 2. Slide your affected leg up the wall to straighten your knee. You should feel a gentle stretch down the back of your leg. 3. Hold the stretch for at least 1 minute to begin. Then try to lengthen the time you hold the stretch to as long as 6 minutes. 4. Repeat 2 to 4 times. 5. If you do not have a place to do this exercise in a doorway, there is another way to do it:  6. Lie on your back, and bend the knee of your affected leg. 7. Loop a towel under the ball and toes of that foot, and hold the ends of the towel in your hands. 8. Straighten your knee, and slowly pull back on the towel. You should feel a gentle stretch down the back of your leg.   9. Hold the stretch for 15 to 30 seconds. Or even better, hold the stretch for 1 minute if you can. 10. Repeat 2 to 4 times. 1. Do not arch your back. 2. Do not bend either knee. 3. Keep one heel touching the floor and the other heel touching the wall. Do not point your toes. Follow-up care is a key part of your treatment and safety. Be sure to make and go to all appointments, and call your doctor if you are having problems. It's also a good idea to know your test results and keep a list of the medicines you take. Where can you learn more? Go to https://[email or web address] org and sign in to your [employer] account. Enter P252 in the VTEX box to learn more about \"Iliotibial Band Syndrome: Exercises. \"     If you do not have an account, please click on the \"Sign Up Now\" link. Current as of: March 2, 2020               Content Version: 12.5  © 2006-2020 [employer]. Care instructions adapted under license by [facility] [facility] If you have questions about a medical condition or this instruction, always ask your healthcare professional. [place] [address] any warranty or liability for your use of this information.        Blood work as ordered

## 2020-09-22 RX ORDER — ALBUTEROL SULFATE 90 UG/1
2 AEROSOL, METERED RESPIRATORY (INHALATION) EVERY 6 HOURS PRN
Qty: 3 INHALER | Refills: 0 | Status: SHIPPED
Start: 2020-09-22 | End: 2020-11-30 | Stop reason: SDUPTHER

## 2020-09-22 RX ORDER — PANTOPRAZOLE SODIUM 40 MG/1
40 TABLET, DELAYED RELEASE ORAL
Qty: 90 TABLET | Refills: 0 | OUTPATIENT
Start: 2020-09-22

## 2020-10-29 ENCOUNTER — HOSPITAL ENCOUNTER (OUTPATIENT)
Age: 70
Discharge: HOME | End: 2020-10-29
Payer: MEDICARE

## 2020-10-29 LAB
ALBUMIN SERPL-MCNC: 4.4 G/DL (ref 3.5–5.2)
ALP BLD-CCNC: 47 U/L (ref 35–104)
ALT SERPL-CCNC: 18 U/L (ref 0–32)
ANION GAP SERPL CALCULATED.3IONS-SCNC: 14 MMOL/L (ref 7–16)
AST SERPL-CCNC: 24 U/L (ref 0–31)
BASOPHILS ABSOLUTE: 0.01 E9/L (ref 0–0.2)
BASOPHILS RELATIVE PERCENT: 0.2 % (ref 0–2)
BILIRUB SERPL-MCNC: 0.7 MG/DL (ref 0–1.2)
BUN BLDV-MCNC: 15 MG/DL (ref 8–23)
C-REACTIVE PROTEIN: 0.6 MG/DL (ref 0–0.4)
CALCIUM SERPL-MCNC: 9.5 MG/DL (ref 8.6–10.2)
CHLORIDE BLD-SCNC: 102 MMOL/L (ref 98–107)
CO2: 23 MMOL/L (ref 22–29)
CREAT SERPL-MCNC: 1.2 MG/DL (ref 0.5–1)
CREATININE URINE: 163 MG/DL (ref 29–226)
EOSINOPHILS ABSOLUTE: 0.05 E9/L (ref 0.05–0.5)
EOSINOPHILS RELATIVE PERCENT: 1.2 % (ref 0–6)
GFR AFRICAN AMERICAN: 54
GFR NON-AFRICAN AMERICAN: 45 ML/MIN/1.73
GLUCOSE BLD-MCNC: 138 MG/DL (ref 74–99)
HCT VFR BLD CALC: 33.8 % (ref 34–48)
HEMOGLOBIN: 11.3 G/DL (ref 11.5–15.5)
IMMATURE GRANULOCYTES #: 0.03 E9/L
IMMATURE GRANULOCYTES %: 0.7 % (ref 0–5)
LYMPHOCYTES ABSOLUTE: 0.66 E9/L (ref 1.5–4)
LYMPHOCYTES RELATIVE PERCENT: 16 % (ref 20–42)
MCH RBC QN AUTO: 30 PG (ref 26–35)
MCHC RBC AUTO-ENTMCNC: 33.4 % (ref 32–34.5)
MCV RBC AUTO: 89.7 FL (ref 80–99.9)
MICROALBUMIN UR-MCNC: <12 MG/L
MICROALBUMIN/CREAT UR-RTO: ABNORMAL (ref 0–30)
MONOCYTES ABSOLUTE: 0.67 E9/L (ref 0.1–0.95)
MONOCYTES RELATIVE PERCENT: 16.3 % (ref 2–12)
NEUTROPHILS ABSOLUTE: 2.7 E9/L (ref 1.8–7.3)
NEUTROPHILS RELATIVE PERCENT: 65.6 % (ref 43–80)
PDW BLD-RTO: 17 FL (ref 11.5–15)
PLATELET # BLD: 150 E9/L (ref 130–450)
PMV BLD AUTO: 8.4 FL (ref 7–12)
POTASSIUM SERPL-SCNC: 4.2 MMOL/L (ref 3.5–5)
RBC # BLD: 3.77 E12/L (ref 3.5–5.5)
SEDIMENTATION RATE, ERYTHROCYTE: 13 MM/HR (ref 0–20)
SODIUM BLD-SCNC: 139 MMOL/L (ref 132–146)
TOTAL PROTEIN: 6.6 G/DL (ref 6.4–8.3)
VITAMIN D 25-HYDROXY: 46 NG/ML (ref 30–100)
WBC # BLD: 4.1 E9/L (ref 4.5–11.5)

## 2020-10-29 PROCEDURE — 36415 COLL VENOUS BLD VENIPUNCTURE: CPT

## 2020-10-29 PROCEDURE — 85651 RBC SED RATE NONAUTOMATED: CPT

## 2020-10-29 PROCEDURE — 82570 ASSAY OF URINE CREATININE: CPT

## 2020-10-29 PROCEDURE — 85025 COMPLETE CBC W/AUTO DIFF WBC: CPT

## 2020-10-29 PROCEDURE — 82306 VITAMIN D 25 HYDROXY: CPT

## 2020-10-29 PROCEDURE — 80053 COMPREHEN METABOLIC PANEL: CPT

## 2020-10-29 PROCEDURE — 82044 UR ALBUMIN SEMIQUANTITATIVE: CPT

## 2020-10-29 PROCEDURE — 86140 C-REACTIVE PROTEIN: CPT

## 2020-10-30 LAB — PATHOLOGIST REVIEW: NORMAL

## 2020-11-03 ENCOUNTER — TELEPHONE (OUTPATIENT)
Dept: INTERNAL MEDICINE | Age: 70
End: 2020-11-03

## 2020-11-03 NOTE — TELEPHONE ENCOUNTER
Reviewed recent labs completed by Rheumatology for surveillance. Noting now slight anemia and leukopenia in the presence of previously know splenomegaly. ? Need for Hematology assessment at this time. FYI to provider for review and recommendations.

## 2020-11-04 NOTE — TELEPHONE ENCOUNTER
WBC 4-5 range-- is NOT concerning-- NOT leukopenic enough to worry about    Chronically on MTX and folate    Hemoglobin is only <1 drop from prior hgb    Will check CBC on fu- if worsening further -- might dec MTX at this time  For now no change to meds- will observe

## 2020-11-05 NOTE — TELEPHONE ENCOUNTER
Called patient and discussed results and plan. She notes UTI symptoms right after labs and took TMP-SMX single dosing (not prescribed) and feels much improved on Day # 3. Informed again as discussed in the office- do not take unprescribed atbs or additional meds as this places increase risk of adverse rxn and drug to drug interactions. Verbalized understanding- will stop med given 3 days and follow. Repeat CBC to be ordered at Rheum 11/10 appt. Any new or recurrent symptoms- seek immediate medical attention. All questions answered    Drug to drug interaction reviewed with TMP/SMX and MTX- there is a noted class D risk- advise to stop. If recurrent urinary concerns, repeat U/A and culture needed for appropriate treatment needs.

## 2020-11-05 NOTE — TELEPHONE ENCOUNTER
Left message on voice mail to verify stopping TMP-SMX d/t interaction with MTX.  Instructed to call with any further symptoms or issues

## 2020-11-10 ENCOUNTER — OFFICE VISIT (OUTPATIENT)
Dept: RHEUMATOLOGY | Age: 70
End: 2020-11-10
Payer: MEDICARE

## 2020-11-10 VITALS
SYSTOLIC BLOOD PRESSURE: 124 MMHG | HEIGHT: 64 IN | DIASTOLIC BLOOD PRESSURE: 69 MMHG | WEIGHT: 232.7 LBS | HEART RATE: 96 BPM | RESPIRATION RATE: 18 BRPM | TEMPERATURE: 97.5 F | BODY MASS INDEX: 39.73 KG/M2

## 2020-11-10 PROCEDURE — 99214 OFFICE O/P EST MOD 30 MIN: CPT | Performed by: INTERNAL MEDICINE

## 2020-11-10 PROCEDURE — 1123F ACP DISCUSS/DSCN MKR DOCD: CPT | Performed by: INTERNAL MEDICINE

## 2020-11-10 PROCEDURE — G8399 PT W/DXA RESULTS DOCUMENT: HCPCS | Performed by: INTERNAL MEDICINE

## 2020-11-10 PROCEDURE — G8484 FLU IMMUNIZE NO ADMIN: HCPCS | Performed by: INTERNAL MEDICINE

## 2020-11-10 PROCEDURE — G8417 CALC BMI ABV UP PARAM F/U: HCPCS | Performed by: INTERNAL MEDICINE

## 2020-11-10 PROCEDURE — 3017F COLORECTAL CA SCREEN DOC REV: CPT | Performed by: INTERNAL MEDICINE

## 2020-11-10 PROCEDURE — 99212 OFFICE O/P EST SF 10 MIN: CPT | Performed by: INTERNAL MEDICINE

## 2020-11-10 PROCEDURE — 1090F PRES/ABSN URINE INCON ASSESS: CPT | Performed by: INTERNAL MEDICINE

## 2020-11-10 PROCEDURE — 1036F TOBACCO NON-USER: CPT | Performed by: INTERNAL MEDICINE

## 2020-11-10 PROCEDURE — G8427 DOCREV CUR MEDS BY ELIG CLIN: HCPCS | Performed by: INTERNAL MEDICINE

## 2020-11-10 PROCEDURE — 4040F PNEUMOC VAC/ADMIN/RCVD: CPT | Performed by: INTERNAL MEDICINE

## 2020-11-10 RX ORDER — FOLIC ACID 1 MG/1
1 TABLET ORAL DAILY
Qty: 90 TABLET | Refills: 2 | Status: SHIPPED
Start: 2020-11-10 | End: 2021-03-23 | Stop reason: SDUPTHER

## 2020-11-10 RX ORDER — HYDROXYCHLOROQUINE SULFATE 200 MG/1
TABLET, FILM COATED ORAL
Qty: 135 TABLET | Refills: 0 | Status: ON HOLD | OUTPATIENT
Start: 2020-11-10 | End: 2020-11-19 | Stop reason: HOSPADM

## 2020-11-10 RX ORDER — ALENDRONATE SODIUM 70 MG/1
70 TABLET ORAL
Qty: 12 TABLET | Refills: 2 | Status: SHIPPED
Start: 2020-11-10 | End: 2021-03-23 | Stop reason: SDUPTHER

## 2020-11-10 NOTE — PROGRESS NOTES
J O I N T  C A R E  C L I N I C        The patient is seen in follow-up for:  Seropositive RA. Medical record reviewed. Labs obtained 10- reviewed - CRP 0.6; ALT 18; AST 24; Vit D 46; Sed rate 13; WBC 4.1; Hgb 11.3. Vital Signs reviewed. Currently c/o swelling 4th digit of left hand.            Patient History Update Since Previous Visit      Yes No Comment      New illnesses  x       Seen other healthcare provider x  PCP      New x-ray, labs, or procedures x        Started / changed / stopped medications  x       New allergies / reactions to medications  x       Changes in family medical history  x       Changes in patient social history x  Due to Covid      Morning stiffness x  Length:  15 minutes      Worst Joint:  Bilateral hips and lower back      Review of Systems      Key:    1  Not present today;  2  Much Better;  3  Better;  4  Same;  5  Worse;  N  New Problem          Rating  Rating   Joint pain (not including back pain) 4 Skin ulcers: 1   Joint swellin Bruisin   Fatigue: 4 Sickness or rash with sun exposure 1   Muscle aches: 4 Swollen glands 1   Ongoing fever: 1 Dry eyes 4   Unintended weight loss: 1 Painful red eyes 1   Migraine headache: 1 Dry mouth 4   Difficulty sleepin Mouth sores 1   Snore or gag at night: 1 Upset stomach 1   Heart palpitations: 1 Diarrhea 1   Cough: 4 Depressed mood 4   Shortness of breath: 4 Overall stress level in life 4   Pain with breathing 1 Overall assessment 4   Skin rash 1         Abbreviated Exam    System    nl   abn   Comment     1 Gen nutrition/hygiene x      appearance x     2 Skin turgor / integrity x      rash x      ecchymosis x     3 Psych orientation x      memory       mood x      cooperative x     4 HENT mouth / throat x     5 Resp resp. effort x      auscultation x     6 CV heart auscultation x      extremity edema x     7 Other             Joint Assessment      Patient Right     Patient Left      (check if no synovitis seen on exam) Joint Pain Swelling Joint Pain Swelling   Shoulder   Shoulder     Elbow   Elbow     Wrist   Wrist     Knee   Knee     MCP I   MCP I     MCP II   MCP II     MCP III   MCP III     MCP IV   MCP IV     MCP V   MCP V     IP I   IP I     PIP II   PIP II     PIP III   PIP III     PIP IV   PIP IV x x   PIP V   PIP V             Generalized Ligament Laxity Prior Test / Diagnostics    Yes  No  Equivocal        Tender Points     Yes  No  Equivocal         GTB pain / tenderness     SI pain / tenderness     Heberdens Nodes         Impression     Attending Physician Statement:  Adela Anderson M.D., F.A.C.P. I have discussed the case, including pertinent history and exam findings with the resident/NP. I have seen and examined the patient and the key elements of the encounter have been performed by me. I agree with the resident ROS, PMHx, PSHx, meds reviewed and assessment, plan and orders as documented by the resident/NP      Hospital charts reviewed, including other providers notes, relevant labs and imaging. Seropositive RA - stable (MTX 8 tabs/20 mg weekly; Folic Acid 1 mg daily;  mg daily. Last eye exam 10/2019.     Mild pancytopenia-- ?excessive folate deficiency- provoked by MTX and bactrim  Recent UTI for which she took \"unprescribed TMP/SMX\" while on MTX - See labs above - continue to monitor for myelosuppression. Mild dec cbc- 4.2-- observe for now, if worsenes - may dec MTX  >10min reviewed chart regarding this and communications with dr. Flora Shaw  Former smoker - quit 2011 ( 40 pack year history) COPD; 2 mm stable lung nodule. Repeat CT scan 06-24-20 noted moderate splenomegaly, lung nodule remains stable.     Type 2 diabetes   Increased BMI     Carpal tunnel syndrome  1985 Right release; 11/2019 Left release     Chronic low back pain   Osteoporosis Fosamax reinitiated at previous visit. She is taking this medication now.     Vit D deficiency - Currently resolved - Vit D level now at 55. Currently on 2,000 IU daily     Kidney stone   Colon polyp   Hyperlipidemia   GERD     High risk medications   -- 10/29- labs reviewed OK other than CVC above  Polypharmacy     Hx:  Non-compliance      Plan     Same MTX - for now, emphasize folate repletion  Continue RA medications    Monitor labs    F/U in three months - earlier if needed        Counseling and Coordination of Care    Prognosis  Prior Authorization       x Risk / Benefits of RX  Disability Forms        Compliance  Discussion and / or letter to other health care provider         Risk Reduction     x More than half of the face-to-face time with the patient was spent  in counseling or coordinating care    Exercise           Brochure / Handout      Other:    Referral:

## 2020-11-10 NOTE — PROGRESS NOTES
Patient verbalized understanding of office instructions. She will call with questions or concerns. She was given discharge instructions, and scripts for labs to be done  All questions were fully answered.   Printed AVS given

## 2020-11-16 ENCOUNTER — HOSPITAL ENCOUNTER (INPATIENT)
Age: 70
LOS: 4 days | Discharge: HOME | DRG: 481 | End: 2020-11-20
Attending: EMERGENCY MEDICINE | Admitting: INTERNAL MEDICINE
Payer: MEDICARE

## 2020-11-16 ENCOUNTER — APPOINTMENT (OUTPATIENT)
Dept: GENERAL RADIOLOGY | Age: 70
DRG: 481 | End: 2020-11-16
Payer: MEDICARE

## 2020-11-16 PROBLEM — S72.091A: Status: ACTIVE | Noted: 2020-11-16

## 2020-11-16 LAB
ANION GAP SERPL CALCULATED.3IONS-SCNC: 13 MMOL/L (ref 7–16)
BUN BLDV-MCNC: 19 MG/DL (ref 8–23)
CALCIUM SERPL-MCNC: 9.1 MG/DL (ref 8.6–10.2)
CHLORIDE BLD-SCNC: 105 MMOL/L (ref 98–107)
CO2: 23 MMOL/L (ref 22–29)
CREAT SERPL-MCNC: 0.9 MG/DL (ref 0.5–1)
GFR AFRICAN AMERICAN: >60
GFR NON-AFRICAN AMERICAN: >60 ML/MIN/1.73
GLUCOSE BLD-MCNC: 192 MG/DL (ref 74–99)
HCT VFR BLD CALC: 34 % (ref 34–48)
HEMOGLOBIN: 11.5 G/DL (ref 11.5–15.5)
MCH RBC QN AUTO: 30.1 PG (ref 26–35)
MCHC RBC AUTO-ENTMCNC: 33.8 % (ref 32–34.5)
MCV RBC AUTO: 89 FL (ref 80–99.9)
PDW BLD-RTO: 15.6 FL (ref 11.5–15)
PLATELET # BLD: 188 E9/L (ref 130–450)
PMV BLD AUTO: 8.6 FL (ref 7–12)
POTASSIUM SERPL-SCNC: 4.5 MMOL/L (ref 3.5–5)
RBC # BLD: 3.82 E12/L (ref 3.5–5.5)
SODIUM BLD-SCNC: 141 MMOL/L (ref 132–146)
WBC # BLD: 6.1 E9/L (ref 4.5–11.5)

## 2020-11-16 PROCEDURE — 86900 BLOOD TYPING SEROLOGIC ABO: CPT

## 2020-11-16 PROCEDURE — 1200000000 HC SEMI PRIVATE

## 2020-11-16 PROCEDURE — 85027 COMPLETE CBC AUTOMATED: CPT

## 2020-11-16 PROCEDURE — 6360000002 HC RX W HCPCS

## 2020-11-16 PROCEDURE — 73502 X-RAY EXAM HIP UNI 2-3 VIEWS: CPT

## 2020-11-16 PROCEDURE — 2580000003 HC RX 258: Performed by: EMERGENCY MEDICINE

## 2020-11-16 PROCEDURE — 80048 BASIC METABOLIC PNL TOTAL CA: CPT

## 2020-11-16 PROCEDURE — 85610 PROTHROMBIN TIME: CPT

## 2020-11-16 PROCEDURE — 96375 TX/PRO/DX INJ NEW DRUG ADDON: CPT

## 2020-11-16 PROCEDURE — 96374 THER/PROPH/DIAG INJ IV PUSH: CPT

## 2020-11-16 PROCEDURE — 71045 X-RAY EXAM CHEST 1 VIEW: CPT

## 2020-11-16 PROCEDURE — 99284 EMERGENCY DEPT VISIT MOD MDM: CPT

## 2020-11-16 PROCEDURE — 36415 COLL VENOUS BLD VENIPUNCTURE: CPT

## 2020-11-16 PROCEDURE — 93005 ELECTROCARDIOGRAM TRACING: CPT | Performed by: EMERGENCY MEDICINE

## 2020-11-16 PROCEDURE — 86850 RBC ANTIBODY SCREEN: CPT

## 2020-11-16 PROCEDURE — 86870 RBC ANTIBODY IDENTIFICATION: CPT

## 2020-11-16 PROCEDURE — 99222 1ST HOSP IP/OBS MODERATE 55: CPT | Performed by: ORTHOPAEDIC SURGERY

## 2020-11-16 PROCEDURE — 96376 TX/PRO/DX INJ SAME DRUG ADON: CPT

## 2020-11-16 PROCEDURE — 6360000002 HC RX W HCPCS: Performed by: EMERGENCY MEDICINE

## 2020-11-16 PROCEDURE — 86880 COOMBS TEST DIRECT: CPT

## 2020-11-16 PROCEDURE — 85730 THROMBOPLASTIN TIME PARTIAL: CPT

## 2020-11-16 PROCEDURE — 73552 X-RAY EXAM OF FEMUR 2/>: CPT

## 2020-11-16 PROCEDURE — 86901 BLOOD TYPING SEROLOGIC RH(D): CPT

## 2020-11-16 PROCEDURE — 51702 INSERT TEMP BLADDER CATH: CPT

## 2020-11-16 PROCEDURE — 86905 BLOOD TYPING RBC ANTIGENS: CPT

## 2020-11-16 RX ORDER — SODIUM CHLORIDE 0.9 % (FLUSH) 0.9 %
10 SYRINGE (ML) INJECTION PRN
Status: DISCONTINUED | OUTPATIENT
Start: 2020-11-16 | End: 2020-11-20 | Stop reason: HOSPADM

## 2020-11-16 RX ORDER — POLYETHYLENE GLYCOL 3350 17 G/17G
17 POWDER, FOR SOLUTION ORAL DAILY PRN
Status: DISCONTINUED | OUTPATIENT
Start: 2020-11-16 | End: 2020-11-20 | Stop reason: HOSPADM

## 2020-11-16 RX ORDER — DEXTROSE MONOHYDRATE 50 MG/ML
100 INJECTION, SOLUTION INTRAVENOUS PRN
Status: DISCONTINUED | OUTPATIENT
Start: 2020-11-16 | End: 2020-11-20 | Stop reason: HOSPADM

## 2020-11-16 RX ORDER — SODIUM CHLORIDE 0.9 % (FLUSH) 0.9 %
10 SYRINGE (ML) INJECTION EVERY 12 HOURS SCHEDULED
Status: DISCONTINUED | OUTPATIENT
Start: 2020-11-17 | End: 2020-11-20 | Stop reason: HOSPADM

## 2020-11-16 RX ORDER — NICOTINE POLACRILEX 4 MG
15 LOZENGE BUCCAL PRN
Status: DISCONTINUED | OUTPATIENT
Start: 2020-11-16 | End: 2020-11-20 | Stop reason: HOSPADM

## 2020-11-16 RX ORDER — ONDANSETRON 2 MG/ML
4 INJECTION INTRAMUSCULAR; INTRAVENOUS ONCE
Status: COMPLETED | OUTPATIENT
Start: 2020-11-16 | End: 2020-11-16

## 2020-11-16 RX ORDER — LABETALOL HYDROCHLORIDE 5 MG/ML
20 INJECTION, SOLUTION INTRAVENOUS EVERY 6 HOURS PRN
Status: DISCONTINUED | OUTPATIENT
Start: 2020-11-16 | End: 2020-11-20 | Stop reason: HOSPADM

## 2020-11-16 RX ORDER — DEXTROSE MONOHYDRATE 25 G/50ML
12.5 INJECTION, SOLUTION INTRAVENOUS PRN
Status: DISCONTINUED | OUTPATIENT
Start: 2020-11-16 | End: 2020-11-20 | Stop reason: HOSPADM

## 2020-11-16 RX ORDER — ONDANSETRON 2 MG/ML
4 INJECTION INTRAMUSCULAR; INTRAVENOUS EVERY 6 HOURS PRN
Status: DISCONTINUED | OUTPATIENT
Start: 2020-11-16 | End: 2020-11-20 | Stop reason: HOSPADM

## 2020-11-16 RX ORDER — MORPHINE SULFATE 4 MG/ML
4 INJECTION, SOLUTION INTRAMUSCULAR; INTRAVENOUS ONCE
Status: COMPLETED | OUTPATIENT
Start: 2020-11-16 | End: 2020-11-16

## 2020-11-16 RX ORDER — HYDROXYCHLOROQUINE SULFATE 200 MG/1
300 TABLET, FILM COATED ORAL DAILY
Status: DISCONTINUED | OUTPATIENT
Start: 2020-11-17 | End: 2020-11-20 | Stop reason: HOSPADM

## 2020-11-16 RX ORDER — FOLIC ACID 1 MG/1
1 TABLET ORAL DAILY
Status: DISCONTINUED | OUTPATIENT
Start: 2020-11-17 | End: 2020-11-20 | Stop reason: HOSPADM

## 2020-11-16 RX ORDER — ASPIRIN 81 MG/1
81 TABLET ORAL DAILY
Status: DISCONTINUED | OUTPATIENT
Start: 2020-11-17 | End: 2020-11-17

## 2020-11-16 RX ORDER — ACETAMINOPHEN 650 MG/1
650 SUPPOSITORY RECTAL EVERY 6 HOURS PRN
Status: DISCONTINUED | OUTPATIENT
Start: 2020-11-16 | End: 2020-11-20 | Stop reason: HOSPADM

## 2020-11-16 RX ORDER — PROMETHAZINE HYDROCHLORIDE 25 MG/1
12.5 TABLET ORAL EVERY 6 HOURS PRN
Status: DISCONTINUED | OUTPATIENT
Start: 2020-11-16 | End: 2020-11-20 | Stop reason: HOSPADM

## 2020-11-16 RX ORDER — MORPHINE SULFATE 4 MG/ML
6 INJECTION, SOLUTION INTRAMUSCULAR; INTRAVENOUS ONCE
Status: COMPLETED | OUTPATIENT
Start: 2020-11-16 | End: 2020-11-16

## 2020-11-16 RX ORDER — ACETAMINOPHEN 325 MG/1
650 TABLET ORAL EVERY 6 HOURS PRN
Status: DISCONTINUED | OUTPATIENT
Start: 2020-11-16 | End: 2020-11-20 | Stop reason: HOSPADM

## 2020-11-16 RX ORDER — SODIUM CHLORIDE 9 MG/ML
1000 INJECTION, SOLUTION INTRAVENOUS CONTINUOUS
Status: DISCONTINUED | OUTPATIENT
Start: 2020-11-16 | End: 2020-11-17

## 2020-11-16 RX ADMIN — HYDROMORPHONE HYDROCHLORIDE 0.5 MG: 1 INJECTION, SOLUTION INTRAMUSCULAR; INTRAVENOUS; SUBCUTANEOUS at 21:50

## 2020-11-16 RX ADMIN — MORPHINE SULFATE 6 MG: 4 INJECTION, SOLUTION INTRAMUSCULAR; INTRAVENOUS at 13:12

## 2020-11-16 RX ADMIN — MORPHINE SULFATE 4 MG: 4 INJECTION, SOLUTION INTRAMUSCULAR; INTRAVENOUS at 16:12

## 2020-11-16 RX ADMIN — SODIUM CHLORIDE 1000 ML: 9 INJECTION, SOLUTION INTRAVENOUS at 16:13

## 2020-11-16 RX ADMIN — SODIUM CHLORIDE 1000 ML: 9 INJECTION, SOLUTION INTRAVENOUS at 13:12

## 2020-11-16 RX ADMIN — ONDANSETRON 4 MG: 2 INJECTION INTRAMUSCULAR; INTRAVENOUS at 13:12

## 2020-11-16 RX ADMIN — Medication 0.5 MG: at 21:50

## 2020-11-16 ASSESSMENT — PAIN SCALES - GENERAL
PAINLEVEL_OUTOF10: 9
PAINLEVEL_OUTOF10: 10
PAINLEVEL_OUTOF10: 10
PAINLEVEL_OUTOF10: 6
PAINLEVEL_OUTOF10: 10

## 2020-11-16 ASSESSMENT — PAIN DESCRIPTION - LOCATION: LOCATION: HIP

## 2020-11-16 ASSESSMENT — PAIN DESCRIPTION - ORIENTATION: ORIENTATION: RIGHT

## 2020-11-16 NOTE — ED PROVIDER NOTES
HPI:  20,   Time: 12:44 PM ANGELA Asif is a 72 y.o. female presenting to the ED for GLF onto RT hip , beginning several minutes ago. The complaint has been persistent, severe in severity, and worsened by standing, walking. Patient was text ing and caught her RT foot under a chair and fell onto her RT hip. Unable to stand. NO chest pain or SOB. Did not strike her head. NO Thinners. No fever or chills. NO back pain NVD or abdominal pain. ROS:   Pertinent positives and negatives are stated within HPI, all other systems reviewed and are negative.  --------------------------------------------- PAST HISTORY ---------------------------------------------  Past Medical History:  has a past medical history of COPD (chronic obstructive pulmonary disease) ([facility] [address] Diabetes mellitus type II, controlled ([facility] [address] Diverticulosis, Hyperlipidemia, Hypertension, Kidney stone, Obesity, Class III, BMI 40-49.9 (morbid obesity) ([facility] [address] Osteoarthritis, Osteoporosis, Rheumatoid arthritis involving multiple sites ([address] [address] Sepsis ([address] [address] Tobacco abuse, and Tubular adenoma of colon. Past Surgical History:  has a past surgical history that includes Tonsillectomy (childhood);  section ();  section ();  section (); Tubal ligation (); Cholecystectomy, open (); Breast biopsy (); Carpal tunnel release (); Colonoscopy (2011); Colonoscopy (4/3/2015); CYSTOSCOPY INSERTION / REMOVAL STENT / STONE (Left, 3/27/2019); and Lithotripsy (Left, 4/15/2019). Social History:  reports that she quit smoking about 13 years ago. Her smoking use included cigarettes. She has a 40.00 pack-year smoking history. She has never used smokeless tobacco. She reports current alcohol use. She reports that she does not use drugs.     Family History: family history includes Arthritis in her mother; Cancer in her paternal aunt; Diabetes in her brother, father, and mother; Heart Disease in her mother; High Cholesterol in her brother; Stroke in her father. The patients home medications have been reviewed. Allergies: Rocephin [ceftriaxone]    -------------------------------------------------- RESULTS -------------------------------------------------  All laboratory and radiology results have been personally reviewed by myself   LABS:  Results for orders placed or performed during the hospital encounter of 11/16/20   CBC   Result Value Ref Range    WBC 6.1 4.5 - 11.5 E9/L    RBC 3.82 3.50 - 5.50 E12/L    Hemoglobin 11.5 11.5 - 15.5 g/dL    Hematocrit 34.0 34.0 - 48.0 %    MCV 89.0 80.0 - 99.9 fL    MCH 30.1 26.0 - 35.0 pg    MCHC 33.8 32.0 - 34.5 %    RDW 15.6 (H) 11.5 - 15.0 fL    Platelets 501 294 - 569 E9/L    MPV 8.6 7.0 - 12.0 fL   Basic Metabolic Panel   Result Value Ref Range    Sodium 141 132 - 146 mmol/L    Potassium 4.5 3.5 - 5.0 mmol/L    Chloride 105 98 - 107 mmol/L    CO2 23 22 - 29 mmol/L    Anion Gap 13 7 - 16 mmol/L    Glucose 192 (H) 74 - 99 mg/dL    BUN 19 8 - 23 mg/dL    CREATININE 0.9 0.5 - 1.0 mg/dL    GFR Non-African American >60 >=60 mL/min/1.73    GFR African American >60     Calcium 9.1 8.6 - 10.2 mg/dL       RADIOLOGY:  Interpreted by Radiologist.  XR HIP 2-3 VW W PELVIS RIGHT   Final Result   Comminuted intertrochanteric right hip fracture. XR CHEST PORTABLE   Final Result   No acute cardiopulmonary process. ------------------------- NURSING NOTES AND VITALS REVIEWED ---------------------------   The nursing notes within the ED encounter and vital signs as below have been reviewed.    /64   Pulse 86   Temp 97.1 °F (36.2 °C) (Infrared)   Resp 20   Ht 5' 4\" (1.626 m)   Wt 220 lb (99.8 kg)   SpO2 100%   BMI 37.76 kg/m²   Oxygen Saturation Interpretation: Normal      ---------------------------------------------------PHYSICAL EXAM--------------------------------------      Constitutional/General: Alert and oriented x3, moderate distress non toxic intertrochanteric fracture of right femur, initial encounter ([facility]        DISPOSITION  Disposition: Admit to med/surg floor  Patient condition is serious               Nicole Samuels DO  11/16/20 1924

## 2020-11-16 NOTE — ED NOTES
Bed: 40  Expected date:   Expected time:   Means of arrival:   Comments:  [id number], RN  11/16/20 1664

## 2020-11-17 ENCOUNTER — ANESTHESIA (OUTPATIENT)
Dept: OPERATING ROOM | Age: 70
DRG: 481 | End: 2020-11-17
Payer: MEDICARE

## 2020-11-17 ENCOUNTER — APPOINTMENT (OUTPATIENT)
Dept: GENERAL RADIOLOGY | Age: 70
DRG: 481 | End: 2020-11-17
Payer: MEDICARE

## 2020-11-17 ENCOUNTER — ANESTHESIA EVENT (OUTPATIENT)
Dept: OPERATING ROOM | Age: 70
DRG: 481 | End: 2020-11-17
Payer: MEDICARE

## 2020-11-17 VITALS — DIASTOLIC BLOOD PRESSURE: 57 MMHG | SYSTOLIC BLOOD PRESSURE: 102 MMHG | OXYGEN SATURATION: 93 %

## 2020-11-17 LAB
ABO/RH: NORMAL
ABO/RH: NORMAL
ANION GAP SERPL CALCULATED.3IONS-SCNC: 14 MMOL/L (ref 7–16)
ANTIBODY IDENTIFICATION: NORMAL
ANTIBODY SCREEN: NORMAL
APTT: 24.1 SEC (ref 24.5–35.1)
BASOPHILS ABSOLUTE: 0.01 E9/L (ref 0–0.2)
BASOPHILS RELATIVE PERCENT: 0.2 % (ref 0–2)
BUN BLDV-MCNC: 15 MG/DL (ref 8–23)
C ANTIGEN: NORMAL
CALCIUM SERPL-MCNC: 8.3 MG/DL (ref 8.6–10.2)
CHLORIDE BLD-SCNC: 105 MMOL/L (ref 98–107)
CO2: 22 MMOL/L (ref 22–29)
CREAT SERPL-MCNC: 0.9 MG/DL (ref 0.5–1)
DAT POLYSPECIFIC: NORMAL
DR. NOTIFY: NORMAL
E ANTIGEN: NORMAL
EKG ATRIAL RATE: 82 BPM
EKG P AXIS: 63 DEGREES
EKG P-R INTERVAL: 164 MS
EKG Q-T INTERVAL: 380 MS
EKG QRS DURATION: 66 MS
EKG QTC CALCULATION (BAZETT): 443 MS
EKG R AXIS: -12 DEGREES
EKG T AXIS: 12 DEGREES
EKG VENTRICULAR RATE: 82 BPM
EOSINOPHILS ABSOLUTE: 0.05 E9/L (ref 0.05–0.5)
EOSINOPHILS RELATIVE PERCENT: 0.9 % (ref 0–6)
GFR AFRICAN AMERICAN: >60
GFR NON-AFRICAN AMERICAN: >60 ML/MIN/1.73
GLUCOSE BLD-MCNC: 132 MG/DL (ref 74–99)
HCT VFR BLD CALC: 30.1 % (ref 34–48)
HEMOGLOBIN: 9.8 G/DL (ref 11.5–15.5)
IMMATURE GRANULOCYTES #: 0.03 E9/L
IMMATURE GRANULOCYTES %: 0.6 % (ref 0–5)
INR BLD: 1
LYMPHOCYTES ABSOLUTE: 0.67 E9/L (ref 1.5–4)
LYMPHOCYTES RELATIVE PERCENT: 12.6 % (ref 20–42)
MCH RBC QN AUTO: 29.3 PG (ref 26–35)
MCHC RBC AUTO-ENTMCNC: 32.6 % (ref 32–34.5)
MCV RBC AUTO: 89.9 FL (ref 80–99.9)
METER GLUCOSE: 167 MG/DL (ref 74–99)
METER GLUCOSE: 220 MG/DL (ref 74–99)
METER GLUCOSE: 236 MG/DL (ref 74–99)
MONOCYTES ABSOLUTE: 0.69 E9/L (ref 0.1–0.95)
MONOCYTES RELATIVE PERCENT: 13 % (ref 2–12)
NEUTROPHILS ABSOLUTE: 3.87 E9/L (ref 1.8–7.3)
NEUTROPHILS RELATIVE PERCENT: 72.7 % (ref 43–80)
PDW BLD-RTO: 15.9 FL (ref 11.5–15)
PLATELET # BLD: 178 E9/L (ref 130–450)
PMV BLD AUTO: 8.6 FL (ref 7–12)
POTASSIUM REFLEX MAGNESIUM: 4 MMOL/L (ref 3.5–5)
PROTHROMBIN TIME: 11.6 SEC (ref 9.3–12.4)
RBC # BLD: 3.35 E12/L (ref 3.5–5.5)
SODIUM BLD-SCNC: 141 MMOL/L (ref 132–146)
TROPONIN: <0.01 NG/ML (ref 0–0.03)
WBC # BLD: 5.3 E9/L (ref 4.5–11.5)

## 2020-11-17 PROCEDURE — 0QS604Z REPOSITION RIGHT UPPER FEMUR WITH INTERNAL FIXATION DEVICE, OPEN APPROACH: ICD-10-PCS | Performed by: ORTHOPAEDIC SURGERY

## 2020-11-17 PROCEDURE — 73502 X-RAY EXAM HIP UNI 2-3 VIEWS: CPT

## 2020-11-17 PROCEDURE — 2580000003 HC RX 258

## 2020-11-17 PROCEDURE — 2580000003 HC RX 258: Performed by: STUDENT IN AN ORGANIZED HEALTH CARE EDUCATION/TRAINING PROGRAM

## 2020-11-17 PROCEDURE — 3600000015 HC SURGERY LEVEL 5 ADDTL 15MIN: Performed by: ORTHOPAEDIC SURGERY

## 2020-11-17 PROCEDURE — 27245 TREAT THIGH FRACTURE: CPT | Performed by: ORTHOPAEDIC SURGERY

## 2020-11-17 PROCEDURE — 6360000002 HC RX W HCPCS: Performed by: STUDENT IN AN ORGANIZED HEALTH CARE EDUCATION/TRAINING PROGRAM

## 2020-11-17 PROCEDURE — C1713 ANCHOR/SCREW BN/BN,TIS/BN: HCPCS | Performed by: ORTHOPAEDIC SURGERY

## 2020-11-17 PROCEDURE — 6360000002 HC RX W HCPCS: Performed by: ORTHOPAEDIC SURGERY

## 2020-11-17 PROCEDURE — 80048 BASIC METABOLIC PNL TOTAL CA: CPT

## 2020-11-17 PROCEDURE — 99222 1ST HOSP IP/OBS MODERATE 55: CPT | Performed by: INTERNAL MEDICINE

## 2020-11-17 PROCEDURE — 2580000003 HC RX 258: Performed by: ORTHOPAEDIC SURGERY

## 2020-11-17 PROCEDURE — 6360000002 HC RX W HCPCS: Performed by: ANESTHESIOLOGY

## 2020-11-17 PROCEDURE — C1776 JOINT DEVICE (IMPLANTABLE): HCPCS | Performed by: ORTHOPAEDIC SURGERY

## 2020-11-17 PROCEDURE — 6370000000 HC RX 637 (ALT 250 FOR IP): Performed by: INTERNAL MEDICINE

## 2020-11-17 PROCEDURE — 2720000010 HC SURG SUPPLY STERILE: Performed by: ORTHOPAEDIC SURGERY

## 2020-11-17 PROCEDURE — 6370000000 HC RX 637 (ALT 250 FOR IP): Performed by: ORTHOPAEDIC SURGERY

## 2020-11-17 PROCEDURE — 2709999900 HC NON-CHARGEABLE SUPPLY: Performed by: ORTHOPAEDIC SURGERY

## 2020-11-17 PROCEDURE — 6360000002 HC RX W HCPCS

## 2020-11-17 PROCEDURE — C1769 GUIDE WIRE: HCPCS | Performed by: ORTHOPAEDIC SURGERY

## 2020-11-17 PROCEDURE — 93010 ELECTROCARDIOGRAM REPORT: CPT | Performed by: INTERNAL MEDICINE

## 2020-11-17 PROCEDURE — 3600000005 HC SURGERY LEVEL 5 BASE: Performed by: ORTHOPAEDIC SURGERY

## 2020-11-17 PROCEDURE — 6360000002 HC RX W HCPCS: Performed by: INTERNAL MEDICINE

## 2020-11-17 PROCEDURE — 3209999900 FLUORO FOR SURGICAL PROCEDURES

## 2020-11-17 PROCEDURE — 7100000001 HC PACU RECOVERY - ADDTL 15 MIN: Performed by: ORTHOPAEDIC SURGERY

## 2020-11-17 PROCEDURE — 2500000003 HC RX 250 WO HCPCS: Performed by: ORTHOPAEDIC SURGERY

## 2020-11-17 PROCEDURE — 84484 ASSAY OF TROPONIN QUANT: CPT

## 2020-11-17 PROCEDURE — 85025 COMPLETE CBC W/AUTO DIFF WBC: CPT

## 2020-11-17 PROCEDURE — 2500000003 HC RX 250 WO HCPCS

## 2020-11-17 PROCEDURE — 36415 COLL VENOUS BLD VENIPUNCTURE: CPT

## 2020-11-17 PROCEDURE — 1200000000 HC SEMI PRIVATE

## 2020-11-17 PROCEDURE — 3700000001 HC ADD 15 MINUTES (ANESTHESIA): Performed by: ORTHOPAEDIC SURGERY

## 2020-11-17 PROCEDURE — 82962 GLUCOSE BLOOD TEST: CPT

## 2020-11-17 PROCEDURE — 2580000003 HC RX 258: Performed by: INTERNAL MEDICINE

## 2020-11-17 PROCEDURE — 73552 X-RAY EXAM OF FEMUR 2/>: CPT

## 2020-11-17 PROCEDURE — 2500000003 HC RX 250 WO HCPCS: Performed by: STUDENT IN AN ORGANIZED HEALTH CARE EDUCATION/TRAINING PROGRAM

## 2020-11-17 PROCEDURE — 3700000000 HC ANESTHESIA ATTENDED CARE: Performed by: ORTHOPAEDIC SURGERY

## 2020-11-17 PROCEDURE — 7100000000 HC PACU RECOVERY - FIRST 15 MIN: Performed by: ORTHOPAEDIC SURGERY

## 2020-11-17 DEVICE — SCREW BNE L95MM DIA10.35MM PROX FEM G TI CANN FOR TFN ADV: Type: IMPLANTABLE DEVICE | Site: HIP | Status: FUNCTIONAL

## 2020-11-17 DEVICE — SCREW BNE L42MM DIA5MM TIB LT GRN TI ST CANN LOK FULL THRD: Type: IMPLANTABLE DEVICE | Site: HIP | Status: FUNCTIONAL

## 2020-11-17 DEVICE — NAIL IM L360MM DIA11MM 125DEG LNG GRN R PROX FEM TI: Type: IMPLANTABLE DEVICE | Site: HIP | Status: FUNCTIONAL

## 2020-11-17 DEVICE — SCREW BNE L50MM DIA5MM COR DIA4.3MM TIB LT GRN TI ALLY ST: Type: IMPLANTABLE DEVICE | Site: HIP | Status: FUNCTIONAL

## 2020-11-17 RX ORDER — ASPIRIN 81 MG/1
81 TABLET ORAL 2 TIMES DAILY
Qty: 60 TABLET | Refills: 1 | Status: SHIPPED | OUTPATIENT
Start: 2020-11-17 | End: 2020-11-19 | Stop reason: HOSPADM

## 2020-11-17 RX ORDER — PHENYLEPHRINE HCL IN 0.9% NACL 1 MG/10 ML
SYRINGE (ML) INTRAVENOUS PRN
Status: DISCONTINUED | OUTPATIENT
Start: 2020-11-17 | End: 2020-11-17 | Stop reason: SDUPTHER

## 2020-11-17 RX ORDER — ONDANSETRON 2 MG/ML
4 INJECTION INTRAMUSCULAR; INTRAVENOUS
Status: COMPLETED | OUTPATIENT
Start: 2020-11-17 | End: 2020-11-17

## 2020-11-17 RX ORDER — OXYCODONE HYDROCHLORIDE 10 MG/1
10 TABLET ORAL EVERY 4 HOURS PRN
Status: DISCONTINUED | OUTPATIENT
Start: 2020-11-17 | End: 2020-11-20 | Stop reason: HOSPADM

## 2020-11-17 RX ORDER — OXYCODONE HYDROCHLORIDE 5 MG/1
5 TABLET ORAL EVERY 6 HOURS PRN
Status: DISCONTINUED | OUTPATIENT
Start: 2020-11-17 | End: 2020-11-17

## 2020-11-17 RX ORDER — BUPIVACAINE HYDROCHLORIDE 7.5 MG/ML
INJECTION, SOLUTION INTRASPINAL PRN
Status: DISCONTINUED | OUTPATIENT
Start: 2020-11-17 | End: 2020-11-17 | Stop reason: SDUPTHER

## 2020-11-17 RX ORDER — ASPIRIN 81 MG/1
81 TABLET ORAL 2 TIMES DAILY
Status: DISCONTINUED | OUTPATIENT
Start: 2020-11-18 | End: 2020-11-18

## 2020-11-17 RX ORDER — OXYCODONE HYDROCHLORIDE 5 MG/1
5 TABLET ORAL EVERY 6 HOURS PRN
Qty: 28 TABLET | Refills: 0 | Status: SHIPPED | OUTPATIENT
Start: 2020-11-17 | End: 2020-11-24

## 2020-11-17 RX ORDER — PROPOFOL 10 MG/ML
INJECTION, EMULSION INTRAVENOUS PRN
Status: DISCONTINUED | OUTPATIENT
Start: 2020-11-17 | End: 2020-11-17 | Stop reason: SDUPTHER

## 2020-11-17 RX ORDER — MEPERIDINE HYDROCHLORIDE 25 MG/ML
12.5 INJECTION INTRAMUSCULAR; INTRAVENOUS; SUBCUTANEOUS
Status: COMPLETED | OUTPATIENT
Start: 2020-11-17 | End: 2020-11-17

## 2020-11-17 RX ORDER — SODIUM CHLORIDE 9 MG/ML
INJECTION, SOLUTION INTRAVENOUS CONTINUOUS PRN
Status: DISCONTINUED | OUTPATIENT
Start: 2020-11-17 | End: 2020-11-17 | Stop reason: SDUPTHER

## 2020-11-17 RX ORDER — MORPHINE SULFATE 2 MG/ML
1 INJECTION, SOLUTION INTRAMUSCULAR; INTRAVENOUS EVERY 5 MIN PRN
Status: DISCONTINUED | OUTPATIENT
Start: 2020-11-17 | End: 2020-11-17

## 2020-11-17 RX ORDER — OXYCODONE HYDROCHLORIDE 5 MG/1
5 TABLET ORAL EVERY 4 HOURS PRN
Status: DISCONTINUED | OUTPATIENT
Start: 2020-11-17 | End: 2020-11-20 | Stop reason: HOSPADM

## 2020-11-17 RX ORDER — OXYCODONE HYDROCHLORIDE AND ACETAMINOPHEN 5; 325 MG/1; MG/1
2 TABLET ORAL PRN
Status: DISCONTINUED | OUTPATIENT
Start: 2020-11-17 | End: 2020-11-17

## 2020-11-17 RX ORDER — MORPHINE SULFATE 2 MG/ML
2 INJECTION, SOLUTION INTRAMUSCULAR; INTRAVENOUS EVERY 5 MIN PRN
Status: DISCONTINUED | OUTPATIENT
Start: 2020-11-17 | End: 2020-11-17

## 2020-11-17 RX ORDER — OXYCODONE HYDROCHLORIDE AND ACETAMINOPHEN 5; 325 MG/1; MG/1
1 TABLET ORAL PRN
Status: DISCONTINUED | OUTPATIENT
Start: 2020-11-17 | End: 2020-11-17

## 2020-11-17 RX ORDER — PROPOFOL 10 MG/ML
INJECTION, EMULSION INTRAVENOUS CONTINUOUS PRN
Status: DISCONTINUED | OUTPATIENT
Start: 2020-11-17 | End: 2020-11-17 | Stop reason: SDUPTHER

## 2020-11-17 RX ADMIN — HYDROMORPHONE HYDROCHLORIDE 0.5 MG: 1 INJECTION, SOLUTION INTRAMUSCULAR; INTRAVENOUS; SUBCUTANEOUS at 22:37

## 2020-11-17 RX ADMIN — Medication 200 MCG: at 18:06

## 2020-11-17 RX ADMIN — INSULIN LISPRO 2 UNITS: 100 INJECTION, SOLUTION INTRAVENOUS; SUBCUTANEOUS at 12:52

## 2020-11-17 RX ADMIN — PROPOFOL 60 MG: 10 INJECTION, EMULSION INTRAVENOUS at 17:17

## 2020-11-17 RX ADMIN — SODIUM CHLORIDE: 9 INJECTION, SOLUTION INTRAVENOUS at 18:48

## 2020-11-17 RX ADMIN — Medication 200 MCG: at 18:11

## 2020-11-17 RX ADMIN — PHENYLEPHRINE HYDROCHLORIDE: 10 INJECTION INTRAVENOUS at 18:26

## 2020-11-17 RX ADMIN — MEPERIDINE HYDROCHLORIDE 12.5 MG: 25 INJECTION, SOLUTION INTRAMUSCULAR; INTRAVENOUS; SUBCUTANEOUS at 20:45

## 2020-11-17 RX ADMIN — BUPIVACAINE HYDROCHLORIDE IN DEXTROSE 2 ML: 7.5 INJECTION, SOLUTION SUBARACHNOID at 17:31

## 2020-11-17 RX ADMIN — HYDROMORPHONE HYDROCHLORIDE 0.5 MG: 1 INJECTION, SOLUTION INTRAMUSCULAR; INTRAVENOUS; SUBCUTANEOUS at 00:58

## 2020-11-17 RX ADMIN — ONDANSETRON 4 MG: 2 INJECTION INTRAMUSCULAR; INTRAVENOUS at 11:52

## 2020-11-17 RX ADMIN — TRANEXAMIC ACID 1000 MG: 1 INJECTION, SOLUTION INTRAVENOUS at 17:45

## 2020-11-17 RX ADMIN — PROPOFOL 20 MG: 10 INJECTION, EMULSION INTRAVENOUS at 17:23

## 2020-11-17 RX ADMIN — ONDANSETRON 4 MG: 2 INJECTION INTRAMUSCULAR; INTRAVENOUS at 20:48

## 2020-11-17 RX ADMIN — HYDROMORPHONE HYDROCHLORIDE 0.5 MG: 1 INJECTION, SOLUTION INTRAMUSCULAR; INTRAVENOUS; SUBCUTANEOUS at 14:23

## 2020-11-17 RX ADMIN — SODIUM CHLORIDE, PRESERVATIVE FREE 10 ML: 5 INJECTION INTRAVENOUS at 09:23

## 2020-11-17 RX ADMIN — Medication 200 MCG: at 18:01

## 2020-11-17 RX ADMIN — Medication 200 MCG: at 18:23

## 2020-11-17 RX ADMIN — Medication 200 MCG: at 17:49

## 2020-11-17 RX ADMIN — INSULIN LISPRO 2 UNITS: 100 INJECTION, SOLUTION INTRAVENOUS; SUBCUTANEOUS at 09:25

## 2020-11-17 RX ADMIN — Medication 100 MCG: at 17:41

## 2020-11-17 RX ADMIN — Medication 2 G: at 17:38

## 2020-11-17 RX ADMIN — TRANEXAMIC ACID 1000 MG: 1 INJECTION, SOLUTION INTRAVENOUS at 20:41

## 2020-11-17 RX ADMIN — Medication 100 MCG: at 17:55

## 2020-11-17 RX ADMIN — PROPOFOL 20 MG: 10 INJECTION, EMULSION INTRAVENOUS at 17:28

## 2020-11-17 RX ADMIN — OXYCODONE HYDROCHLORIDE 5 MG: 5 TABLET ORAL at 11:52

## 2020-11-17 RX ADMIN — HYDROMORPHONE HYDROCHLORIDE 0.5 MG: 1 INJECTION, SOLUTION INTRAMUSCULAR; INTRAVENOUS; SUBCUTANEOUS at 05:02

## 2020-11-17 RX ADMIN — Medication 100 MCG: at 17:45

## 2020-11-17 RX ADMIN — Medication 200 MCG: at 18:17

## 2020-11-17 RX ADMIN — SODIUM CHLORIDE: 9 INJECTION, SOLUTION INTRAVENOUS at 17:11

## 2020-11-17 RX ADMIN — SODIUM CHLORIDE, PRESERVATIVE FREE 10 ML: 5 INJECTION INTRAVENOUS at 05:03

## 2020-11-17 RX ADMIN — SODIUM CHLORIDE, PRESERVATIVE FREE 10 ML: 5 INJECTION INTRAVENOUS at 00:59

## 2020-11-17 RX ADMIN — MEPERIDINE HYDROCHLORIDE 12.5 MG: 25 INJECTION, SOLUTION INTRAMUSCULAR; INTRAVENOUS; SUBCUTANEOUS at 20:56

## 2020-11-17 RX ADMIN — HYDROMORPHONE HYDROCHLORIDE 0.5 MG: 1 INJECTION, SOLUTION INTRAMUSCULAR; INTRAVENOUS; SUBCUTANEOUS at 09:21

## 2020-11-17 RX ADMIN — PROPOFOL 100 MCG/KG/MIN: 10 INJECTION, EMULSION INTRAVENOUS at 17:38

## 2020-11-17 ASSESSMENT — PAIN DESCRIPTION - PAIN TYPE
TYPE: SURGICAL PAIN
TYPE: ACUTE PAIN
TYPE: ACUTE PAIN
TYPE: SURGICAL PAIN
TYPE: ACUTE PAIN
TYPE: ACUTE PAIN

## 2020-11-17 ASSESSMENT — PAIN DESCRIPTION - PROGRESSION
CLINICAL_PROGRESSION: NOT CHANGED
CLINICAL_PROGRESSION: NOT CHANGED

## 2020-11-17 ASSESSMENT — PAIN DESCRIPTION - FREQUENCY
FREQUENCY: CONTINUOUS

## 2020-11-17 ASSESSMENT — PULMONARY FUNCTION TESTS
PIF_VALUE: 0

## 2020-11-17 ASSESSMENT — PAIN SCALES - GENERAL
PAINLEVEL_OUTOF10: 9
PAINLEVEL_OUTOF10: 0
PAINLEVEL_OUTOF10: 5
PAINLEVEL_OUTOF10: 6
PAINLEVEL_OUTOF10: 0
PAINLEVEL_OUTOF10: 4
PAINLEVEL_OUTOF10: 0
PAINLEVEL_OUTOF10: 6
PAINLEVEL_OUTOF10: 7
PAINLEVEL_OUTOF10: 6
PAINLEVEL_OUTOF10: 0
PAINLEVEL_OUTOF10: 9
PAINLEVEL_OUTOF10: 3
PAINLEVEL_OUTOF10: 9
PAINLEVEL_OUTOF10: 0
PAINLEVEL_OUTOF10: 4
PAINLEVEL_OUTOF10: 10

## 2020-11-17 ASSESSMENT — PAIN DESCRIPTION - ONSET
ONSET: ON-GOING

## 2020-11-17 ASSESSMENT — PAIN DESCRIPTION - DESCRIPTORS
DESCRIPTORS: ACHING;DISCOMFORT;SORE
DESCRIPTORS: ACHING;CONSTANT;DISCOMFORT;SORE
DESCRIPTORS: ACHING;CONSTANT
DESCRIPTORS: ACHING;DISCOMFORT;SORE
DESCRIPTORS: ACHING;CONSTANT

## 2020-11-17 ASSESSMENT — PAIN DESCRIPTION - LOCATION
LOCATION: HIP
LOCATION: HIP;BACK
LOCATION: HIP
LOCATION: HIP;BACK

## 2020-11-17 ASSESSMENT — PAIN DESCRIPTION - ORIENTATION
ORIENTATION: RIGHT
ORIENTATION: LOWER;RIGHT
ORIENTATION: LOWER;RIGHT
ORIENTATION: RIGHT

## 2020-11-17 ASSESSMENT — PAIN - FUNCTIONAL ASSESSMENT
PAIN_FUNCTIONAL_ASSESSMENT: INTOLERABLE, UNABLE TO DO ANY ACTIVE OR PASSIVE ACTIVITIES
PAIN_FUNCTIONAL_ASSESSMENT: INTOLERABLE, UNABLE TO DO ANY ACTIVE OR PASSIVE ACTIVITIES
PAIN_FUNCTIONAL_ASSESSMENT: PREVENTS OR INTERFERES WITH ALL ACTIVE AND SOME PASSIVE ACTIVITIES
PAIN_FUNCTIONAL_ASSESSMENT: PREVENTS OR INTERFERES WITH ALL ACTIVE AND SOME PASSIVE ACTIVITIES

## 2020-11-17 NOTE — PROGRESS NOTES
Message sent to Dr. Piter Dailey regarding patient. Patient is in an extreme amount of pain. Patient has dilaudid 0.5mg IV Q4 and Tylenol PRN. Patient is NPO sips with meds. Patient audibly screaming in pain in the room. Patient has no PO pain medications on. Asked if something can be added on for patient.   Electronically signed by Brittaney Arthur RN on 11/17/2020 at 8:49 AM

## 2020-11-17 NOTE — PLAN OF CARE
Problem: Falls - Risk of:  Goal: Will remain free from falls  Description: Will remain free from falls  11/17/2020 1349 by Naima Caraballo RN  Outcome: Met This Shift  11/17/2020 0114 by Celine Fisher RN  Outcome: Met This Shift  Goal: Absence of physical injury  Description: Absence of physical injury  Outcome: Met This Shift     Problem: Skin Integrity:  Goal: Will show no infection signs and symptoms  Description: Will show no infection signs and symptoms  Outcome: Met This Shift  Goal: Absence of new skin breakdown  Description: Absence of new skin breakdown  Outcome: Met This Shift     Problem: Pain:  Goal: Pain level will decrease  Description: Pain level will decrease  11/17/2020 1349 by Naima Caraballo RN  Outcome: Ongoing  11/17/2020 0114 by Celine Fisher RN  Outcome: Ongoing  Goal: Control of acute pain  Description: Control of acute pain  11/17/2020 1349 by Naima Caraballo RN  Outcome: Ongoing  11/17/2020 0114 by Celine Fisher RN  Outcome: Met This Shift  Goal: Control of chronic pain  Description: Control of chronic pain  11/17/2020 1349 by Naima Caraballo RN  Outcome: Ongoing  11/17/2020 0114 by Celine Fisher RN  Outcome: Met This Shift

## 2020-11-17 NOTE — PLAN OF CARE
Problem: Pain:  Goal: Control of acute pain  Description: Control of acute pain  Outcome: Met This Shift  Goal: Control of chronic pain  Description: Control of chronic pain  Outcome: Met This Shift     Problem: Falls - Risk of:  Goal: Will remain free from falls  Description: Will remain free from falls  Outcome: Met This Shift

## 2020-11-17 NOTE — PLAN OF CARE
Problem: Pain:  Goal: Pain level will decrease  Description: Pain level will decrease  11/17/2020 1847 by Yolanda Boyd RN  Outcome: Met This Shift  11/17/2020 1349 by Brittaney Arthur RN  Outcome: Ongoing  Goal: Control of acute pain  Description: Control of acute pain  11/17/2020 1847 by Yolanda Boyd RN  Outcome: Met This Shift  11/17/2020 1349 by Brittaney Arthur RN  Outcome: Ongoing  Goal: Control of chronic pain  Description: Control of chronic pain  11/17/2020 1847 by Yolanda Boyd RN  Outcome: Met This Shift  11/17/2020 1349 by Brittaney Arthur RN  Outcome: Ongoing     Problem: Falls - Risk of:  Goal: Will remain free from falls  Description: Will remain free from falls  11/17/2020 1847 by Yolanda Boyd RN  Outcome: Met This Shift  11/17/2020 1349 by Brittaney Arthur RN  Outcome: Met This Shift  Goal: Absence of physical injury  Description: Absence of physical injury  11/17/2020 1847 by Yolanda Boyd RN  Outcome: Met This Shift  11/17/2020 1349 by Brittaney Arthur RN  Outcome: Met This Shift     Problem: Skin Integrity:  Goal: Will show no infection signs and symptoms  Description: Will show no infection signs and symptoms  11/17/2020 1847 by Yolanda Boyd RN  Outcome: Met This Shift  11/17/2020 1349 by Brittaney Arthur RN  Outcome: Met This Shift  Goal: Absence of new skin breakdown  Description: Absence of new skin breakdown  11/17/2020 1847 by Yolanda Boyd RN  Outcome: Met This Shift  11/17/2020 1349 by Brittaney Arthur RN  Outcome: Met This Shift

## 2020-11-17 NOTE — PROGRESS NOTES
Message sent to Dr. Eliana Miles with internal med team 2. Patient is NPO for ORIF later with Dr. Grace Daniel. Patient's morning blood glucose is 236. Patient does not take insulin at home and only takes metformin. Patient would receive 2 units of Humalog this morning; however, she is very concerned and wants to know if she needs to take this or if 1 unit can be given instead. Awaiting response.    Electronically signed by Charli Vincent RN on 11/17/2020 at 8:52 AM

## 2020-11-17 NOTE — PROGRESS NOTES
Department of Orthopedic Surgery  Resident Progress Note    Patient seen and examined. Pain moderately controlled. No new complaints. Denies knee pain  Denies chest pain, shortness of breath, dizziness/lightheadedness.  Discussed plan for surgery today    VITALS:  /85   Pulse 93   Temp 98.8 °F (37.1 °C) (Temporal)   Resp 16   Ht 5' 4\" (1.626 m)   Wt 220 lb (99.8 kg)   SpO2 98%   BMI 37.76 kg/m²     General: alert and oriented to person, place and time, well-developed and well-nourished, in no acute distress    MUSCULOSKELETAL:   right lower extremity:  · TTP to groin  · No pain to knee, tibia, or ankle  · Compartments soft and compressible  · +PF/DF/EHL  · +2/4 DP & PT pulses, Brisk Cap refill, Toes warm and perfused  · Distal sensation grossly intact to Peroneals, Sural, Saphenous, and tibial nrs    CBC:   Lab Results   Component Value Date    WBC 5.3 11/17/2020    HGB 9.8 11/17/2020    HCT 30.1 11/17/2020     11/17/2020     PT/INR:    Lab Results   Component Value Date    PROTIME 11.6 11/16/2020    INR 1.0 11/16/2020       ASSESSMENT  · Right peritroch femur fracture    PLAN      · Continue physical therapy and protocol: [facility]  · Pre-op abx  · NPO  · Hold abx  · PT/OT  · Pain Control: IV and PO  · Monitor H&H  Plan for OR today with Dr. Chante Catherine

## 2020-11-17 NOTE — CARE COORDINATION
11/17, SW met with patient at bedside. Patient is NPO for ORIF today with Dr. Cory Lyles. SW introduced self and explained SW role-discussing transition of care/discharge plan. Patient lives with her boyfriend in a 2 story home. Bedroom is on main floor with a ramp to enter the home. Adult son lives next door to patient. DME owned is none. PCP is Dr Beto Radford and Pharmacy is [facility]. Ideally, patient would like to go home with [facility] . Patient has no hx with C or SU[facility]. Discussed [facility]U-[facility]. Will follow patient to see how she does once surgery is done and patient has PT/OTevals for further discharge planning.     Derek Rendon, [facility]

## 2020-11-17 NOTE — CONSULTS
4/3/2015    submucosal lipoma distal tranverse colon biopsied, diverticulosis, Dr. Michelle Hastings, [address] / [place] / STONE Left 3/27/2019    CYSTOSCOPY RETROGRADE PYELOGRAM STENT INSERTION performed by Leatha Cotter MD at [facility] LITHOTRIPSY Left 4/15/2019    CYSTOSCOPY RETROGRADE URETEROSCOPY, stone basket extraction, LEFT STENT INSERTION performed by Leatha Cotter MD at [address] [place] [address]  childhood    TUBAL LIGATION  1979     Current Medications:   Current Facility-Administered Medications: 0.9 % sodium chloride infusion, 1,000 mL, Intravenous, Continuous  [START ON 11/17/2020] tranexamic acid (CYKLOKAPRON) 1,000 mg in dextrose 5 % 100 mL IVPB, 1,000 mg, Intravenous, Once  Allergies:  Rocephin [ceftriaxone]    Social History:   TOBACCO:   reports that she quit smoking about 13 years ago. Her smoking use included cigarettes. She has a 40.00 pack-year smoking history. She has never used smokeless tobacco.  ETOH:   reports current alcohol use. DRUGS:   reports no history of drug use.   ACTIVITIES OF DAILY LIVING:    OCCUPATION:    Family History:       Problem Relation Age of Onset    Diabetes Mother     Heart Disease Mother     Arthritis Mother     Diabetes Father     Stroke Father     Diabetes Brother     High Cholesterol Brother     Cancer Paternal Aunt        REVIEW OF SYSTEMS:  CONSTITUTIONAL:  negative for  fevers, chills  EYES:  negative for blurred vision, visual disturbance  HEENT:  negative for  hearing loss, voice change  RESPIRATORY:  negative for  dyspnea, wheezing  CARDIOVASCULAR:  negative for  chest pain, palpitations  GASTROINTESTINAL:  negative for nausea, vomiting  GENITOURINARY:  negative for frequency, urinary incontinence  HEMATOLOGIC/LYMPHATIC:  negative for bleeding and petechiae  MUSCULOSKELETAL:  positive for right hip pain   NEUROLOGICAL:  negative for headaches, dizziness  BEHAVIOR/PSYCH:  negative for increased agitation and anxiety    PHYSICAL EXAM:    VITALS:  BP (!) 163/69   Pulse 93   Temp 98.4 °F (36.9 °C) (Temporal)   Resp 17   Ht 5' 4\" (1.626 m)   Wt 220 lb (99.8 kg)   SpO2 98%   BMI 37.76 kg/m²   CONSTITUTIONAL:  awake, alert, cooperative, in moderate discomfort, and appears stated age  MUSCULOSKELETAL:  Right lower Extremity:  Right leg is shortened and externally rotated  Compartments soft and compressible  Tenderness about the proximal anterior lateral thigh  No obvious ecchymosis or edema noted  Nontender about the knee or leg distally  Positive logroll  +PF/DF/EHL  +2/4 DP & PT pulses, Brisk Cap refill, Toes warm and perfused  Distal sensation grossly intact to Peroneals, Sural, Saphenous, and tibial nrs      DATA:    CBC:   Lab Results   Component Value Date    WBC 6.1 11/16/2020    RBC 3.82 11/16/2020    HGB 11.5 11/16/2020    HCT 34.0 11/16/2020    MCV 89.0 11/16/2020    MCH 30.1 11/16/2020    MCHC 33.8 11/16/2020    RDW 15.6 11/16/2020     11/16/2020    MPV 8.6 11/16/2020     PT/INR:    Lab Results   Component Value Date    PROTIME 10.9 10/27/2017    INR 1.0 10/27/2017       Radiology Review:  X-ray right hip: Comminuted displaced peritrochanteric femur fracture. Short oblique fracture propagating just distal to the intertrochanteric region. Additional vertical fracture exiting the lateral cortex distal to the greater trochanter    IMPRESSION:  · Right peritrochanteric femur fracture  · RA  · COPD  · HTN  · Obesity    PLAN:  · Nonweightbearing right lower extremity  · Pain control  · Preoperative labs and imaging  · N.p.o. after midnight  · Treatment consent  · Plan for right hip ORIF with Dr. Cory Lyles on 11/17  · Ice as needed  · Medical optimization  · Hold anticoagulation  · Discussed with Dr. Cory Lylse      I have seen and evaluated the patient and agree with the above assessment and plan on today's visit.  I have performed the key components of the history and physical examination with significant findings of right comminuted hip fracture. Plan for ORIF. I concur with the findings and plan as documented. Patient understands increased risk of perioperative morbidity mortality given her medical comorbidities. All questions answered    I explained the risks, benefits, alternatives and complications of surgery with the patient including but not limited to the risks of death, possible damage to nerves, vessels, or tendons, possible infection, possible nonunion, possible malunion, leg length discrepancy and malrotation,  Deep Venous Thrombosis (DVT), Pulmonary Emboli (PE), post-operative DVT prophylaxis, possible hardware failure, possible need for hardware removal, stiffness, as well as the possible need further surgery and unanticipated complications. The patient voiced understanding and all questions were answered. The patient elected to proceed with surgical intervention.        Faustino Graham MD  11/17/2020

## 2020-11-17 NOTE — ANESTHESIA PROCEDURE NOTES
Spinal Block    Patient location during procedure: OR  Reason for block: primary anesthetic and at surgeon's request  Staffing  Anesthesiologist: Ysabel Rascon DO  Performed: anesthesiologist   Preanesthetic Checklist  Completed: patient identified, site marked, surgical consent, pre-op evaluation, timeout performed, IV checked, risks and benefits discussed, monitors and equipment checked, anesthesia consent given, oxygen available and patient being monitored  Spinal Block  Patient position: sitting  Prep: ChloraPrep  Patient monitoring: cardiac monitor, continuous pulse ox, continuous capnometry and frequent blood pressure checks  Approach: midline  Location: L3/L4  Provider prep: mask, sterile gloves and sterile gown  Local infiltration: lidocaine  Agent: bupivacaine  Dose: 2  Dose: 2  Needle  Needle type: Sprotte Tip   Needle gauge: 25 G  Needle length: 6 in  Assessment  Sensory level: T6  Swirl obtained: Yes  CSF: clear  Attempts: 1  Hemodynamics: stable  Additional Notes  Timeout performed.

## 2020-11-17 NOTE — H&P
[facility]  Internal Medicine Residency Program  History and Physical    Patient:  Chrissie Gallagher 72 y.o. female MRN: [id number]     Date of Service: 11/16/2020    Hospital Day: 1      Chief complaint: had concerns including Fall (fall in kitchen, complains of right hip pain). History of Present Illness   The patient is a 72 y.o. female with a PMH of COPD, NIDDM-2, HTN, HLD, osteoporosis, RA on methotrexate, and GERD who presented to the ED today after tripping and falling onto her R hip. She had been texting and tripped and had severe pain in her hip right after the incident making it difficult to walk. Of note, she is on bisphosphonate therapy. Patient did hit her head but was not bleeding and not endorsing pain at the site. She also endorses some mild, non-radiating chest tightness on deep inspiration since being moved up to the floor and receiving dilaudid. She denies any palpitations, nausea, or diaphoresis. She denies symptoms such as LOC, SOB, vision changes, light headedness, fever or chills. ED Course: On arrival, patient had stable vital signs. BMP and CBC were WNL. CXR was without acute pathology. Right hip XR showing closed, comminuted, intertrochanteric fracture. ED Meds: Patient was given total of 10mg morphine in the ED, 0.5mg Dilaudid, as well as zofran. ED Fluids: Patient was given no fluids.     Past Medical History:      Diagnosis Date    COPD (chronic obstructive pulmonary disease) (HCC)     mild    Diabetes mellitus type II, controlled ([facility] [address]     Diverticulosis     Hyperlipidemia     Hypertension     Kidney stone     Obesity, Class III, BMI 40-49.9 (morbid obesity) (HCC)     Osteoarthritis     Osteoporosis     Rheumatoid arthritis involving multiple sites [facility])     follows with Dr. Mely Milton [facility])     on Levaquin per Dr. Paula Stoner since dischaarge from [place] [address] 3/30/2019    Tobacco abuse began age 16    past hx    Tubular adenoma of colon 7/2011 Roro Posey MD   rosuvastatin (CRESTOR) 5 MG tablet take 1 tablet by mouth once daily 6/15/20  Yes Keri Childs MD   metFORMIN (GLUCOPHAGE) 850 MG tablet Take 1 tablet by mouth 2 times daily (with meals) 6/15/20  Yes Keri Childs MD   lisinopril (PRINIVIL;ZESTRIL) 20 MG tablet Take 1 tablet by mouth daily 6/15/20  Yes Keri Childs MD   vitamin C (ASCORBIC ACID) 500 MG tablet Take 2,000 mg by mouth 2 times daily    Yes Historical Provider, MD   Cyanocobalamin (VITAMIN B 12 PO) Take by mouth daily    Yes Historical Provider, MD   magnesium cl-calcium carbonate (SLOW-MAG) 71.5-119 MG TBEC tablet take 2 tablets once daily 3/14/19  Yes Keri Childs MD   Biotin 300 MCG TABS Take 1 tablet by mouth daily 4/30/18  Yes Keri Childs MD   Coenzyme Q10 (CO Q 10 PO) Take 1 tablet by mouth daily QUINOL  / LD 4/9/2019   Yes Historical Provider, MD   blood glucose monitor kit and supplies Dispense sufficient amount for indicated testing frequency plus additional to accommodate PRN testing needs. Dispense all needed supplies to include: monitor, strips, lancing device, lancets, control solutions, alcohol swabs. 6/24/20   Keri Childs MD   blood glucose monitor strips Test 2 times a day & as needed for symptoms of irregular blood glucose. Dispense sufficient amount for indicated testing frequency plus additional to accommodate PRN testing needs. 6/24/20   Keri Childs MD   Lancets MISC Test twice daily 6/24/20   Keri Childs MD   aspirin EC 81 MG EC tablet Take 1 tablet by mouth daily  Patient taking differently: Take 81 mg by mouth every other day  3/26/18   Keri Childs MD       Allergies:  Rocephin [ceftriaxone]    Social History:   TOBACCO:   reports that she quit smoking about 13 years ago. Her smoking use included cigarettes. She has a 40.00 pack-year smoking history. She has never used smokeless tobacco.  ETOH:   reports current alcohol use.   OCCUPATION: unknown     Family History:       Problem Relation Age of Onset    Diabetes Mother     Heart Disease Mother     Arthritis Mother     Diabetes Father     Stroke Father     Diabetes Brother     High Cholesterol Brother     Cancer Paternal Aunt        REVIEW OF SYSTEMS:    · Constitutional: No fever, no chills, no change in weight; good appetite  · HEENT: No blurred vision, no ear problems, no sore throat, no rhinorrhea. · Respiratory: No cough, no sputum production, no pleuritic chest pain, no shortness of breath  · Cardiology: No angina, no dyspnea on exertion, no paroxysmal nocturnal dyspnea, no orthopnea, no palpitation, no leg swelling. · Gastroenterology: No dysphagia, no reflux; no abdominal pain, no nausea or vomiting; no constipation or diarrhea.  No hematochezia   · Genitourinary: No dysuria, no frequency, hesitancy; no hematuria  · Musculoskeletal: Severe R hip pain with limited ROM, no other injury   · Neurology: no focal weakness in extremities, no slurred speech, no double vision, no tingling or numbness sensation  · Endocrinology: no temperature intolerance, no polyphagia, polydipsia or polyuria  · Hematology: no increased bleeding, no bruising, no lymphadenopathy  · Skin: no skin changes noticed by patient  · Psychology: no depressed mood, no suicidal ideation    Physical Exam   · Vitals: /70   Pulse 82   Temp 97.1 °F (36.2 °C) (Infrared)   Resp 20   Ht 5' 4\" (1.626 m)   Wt 220 lb (99.8 kg)   SpO2 100%   BMI 37.76 kg/m²     · General Appearance: alert and oriented to person, place and time, well-developed and well-nourished, in no acute distress  · Skin: warm and dry, no rash or erythema  · Head: normocephalic and atraumatic  · Eyes: pupils equal, round, and reactive to light, extraocular eye movements intact, conjunctivae normal  · Pulmonary/Chest: clear to auscultation bilaterally- no wheezes, rales or rhonchi, normal air movement, no respiratory distress  · Cardiovascular: normal rate, normal S1 and S2, no gallops and intact distal pulses  · Abdomen: soft, non-tender, non-distended, normal bowel sounds, no masses or organomegaly  · Extremities: no cyanosis and no clubbing  · Musculoskeletal: joint deformity present-  right hip, leg is shortened and adducted and pain elicited with movement of  right leg/hip  · Neurologic: speech normal and sensation to light touch intact   Labs and Imaging Studies   Basic Labs  Recent Labs     11/16/20  1302      K 4.5      CO2 23   BUN 19   CREATININE 0.9   GLUCOSE 192*   CALCIUM 9.1       Recent Labs     11/16/20  1302   WBC 6.1   RBC 3.82   HGB 11.5   HCT 34.0   MCV 89.0   MCH 30.1   MCHC 33.8   RDW 15.6*      MPV 8.6       Imaging Studies:     Xr Chest Portable    Result Date: 11/16/2020  EXAMINATION: ONE XRAY VIEW OF THE CHEST 11/16/2020 2:44 pm COMPARISON: March 27, 2019 HISTORY: ORDERING SYSTEM PROVIDED HISTORY: fall TECHNOLOGIST PROVIDED HISTORY: Reason for exam:->fall Reason for exam:->Trauma What reading provider will be dictating this exam?->CRC FINDINGS: Lungs are clear, no infiltrates, consolidates or pleural effusions are seen. The heart is normal size, mediastinum appear unremarkable. There is no perihilar vascular congestion. No acute cardiopulmonary process. Xr Hip 2-3 Vw W Pelvis Right    Result Date: 11/16/2020  EXAMINATION: ONE XRAY VIEW OF THE PELVIS AND TWO XRAY VIEWS RIGHT HIP 11/16/2020 2:45 pm COMPARISON: October 2, 2010 HISTORY: ORDERING SYSTEM PROVIDED HISTORY: pain TECHNOLOGIST PROVIDED HISTORY: Reason for exam:->pain Reason for exam:->fall What reading provider will be dictating this exam?->CRC FINDINGS: Comminuted fracture involving intratrochanteric region of right hip with mild displacement of fracture fragments. Femoral head maintains articulation with acetabulum. Comminuted intertrochanteric right hip fracture.        EKG: none    Resident's Assessment and Plan     Jenaro Jordan is a all of their chronic medication unless otherwise directed. Pooja Joyner MD     Pre-Operative Risk assessment using 2014 ACC/AHA guidelines     Emergent procedure Yes  Active Cardiac Condition No (decompensated HF, Arrhythmia, MI <3 weeks, severe valve disease)  Risk Level of Procedure Intermediate Risk (intraperitoneal, intrathoracic, HENT, orthopedic, or carotid endarterectomy, etc.)  Revised Cardiac Risk Index Risk factors: None  Measurement of Exercise Tolerance before Surgery >4 No    According to the 2014 ACC/AHA pre-operative risk assessment guidelines Leif Daniels is a low risk for major cardiac complications during a intermediate risk procedure and may continue as planned. Specific medication recommendations are listed below. Medications recommended to continue should be taken with a sip of water even when NPO.      Further recommendations from consultants: None    Medication Recommendations:  ASA to be held today      PT/OT evaluation: will order post ortho consult/intervention  DVT prophylaxis/ GI prophylaxis: patient is on ASA, hold for surgery  Disposition: admit to Willi Lebron MD, PGY-1   Attending physician: Dr. Sherwin Almazan

## 2020-11-17 NOTE — PROGRESS NOTES
Spoke with internal med team 2 and they stated to give patient the 2 units of humalog this morning.   Electronically signed by Ceasar Calderón RN on 11/17/2020 at 9:11 AM

## 2020-11-17 NOTE — ANESTHESIA PRE PROCEDURE
Department of Anesthesiology  Preprocedure Note       Name:  Radha Goins   Age:  72 y.o.  :  1950                                          MRN:  [id number]         Date:  2020      Surgeon: Yael Buck):  Jeanmarie Allen MD    Procedure: Procedure(s):  RIGHT HIP OPEN REDUCTION INTERNAL FIXATION- REQUESTING 4 PM    Medications prior to admission:   Prior to Admission medications    Medication Sig Start Date End Date Taking?  Authorizing Provider   alendronate (FOSAMAX) 70 MG tablet Take 1 tablet by mouth every 7 days 11/10/20  Yes Marshall Cranker, MD   methotrexate (RHEUMATREX) 2.5 MG chemo tablet TAKE 8 TABLETS BY MOUTH  ONCE A WEEK 11/10/20  Yes Marshall Cranker, MD   folic acid (FOLVITE) 1 MG tablet Take 1 tablet by mouth daily  Patient taking differently: Take 1 mg by mouth daily Every day except Wednesday 11/10/20  Yes Marshall Cranker, MD   hydroxychloroquine (PLAQUENIL) 200 MG tablet TAKE 1 AND 1/2 TABLETS BY MOUTH DAILY 11/10/20  Yes Marshall Cranker, MD   albuterol sulfate  (90 Base) MCG/ACT inhaler Inhale 2 puffs into the lungs every 6 hours as needed for Wheezing 20  Yes Margo Stanley MD   pantoprazole (PROTONIX) 40 MG tablet Take 1 tablet by mouth every morning (before breakfast) 20  Yes Margo Stanley MD   Cholecalciferol (VITAMIN D) 50 MCG (2000 UT) CAPS capsule Take 2,000 Units by mouth daily 20  Yes Margo Stanley MD   rosuvastatin (CRESTOR) 5 MG tablet take 1 tablet by mouth once daily 6/15/20  Yes Margo Stanley MD   metFORMIN (GLUCOPHAGE) 850 MG tablet Take 1 tablet by mouth 2 times daily (with meals) 6/15/20  Yes Margo Stanley MD   lisinopril (PRINIVIL;ZESTRIL) 20 MG tablet Take 1 tablet by mouth daily 6/15/20  Yes Margo Stanley MD   vitamin C (ASCORBIC ACID) 500 MG tablet Take 2,000 mg by mouth 2 times daily    Yes Historical Provider, MD   Cyanocobalamin (VITAMIN B 12 PO) Take by mouth daily    Yes Historical Provider, MD Shashi Lott MD        promethazine [facility]) tablet 12.5 mg  12.5 mg Oral Q6H PRN Shashi Lott MD        Or    ondansetron [facility]) injection 4 mg  4 mg Intravenous Q6H PRN Shashi Lott MD        insulin lispro (HUMALOG) injection vial 0-6 Units  0-6 Units Subcutaneous TID WC Shashi Lott MD        insulin lispro (HUMALOG) injection vial 0-3 Units  0-3 Units Subcutaneous Nightly Shashi Lott MD        glucose (GLUTOSE) 40 % oral gel 15 g  15 g Oral PRN Shashi Lott MD        dextrose 50 % IV solution  12.5 g Intravenous PRN Shashi Lott MD        glucagon (rDNA) injection 1 mg  1 mg Intramuscular PRN Shashi Lott MD        dextrose 5 % solution  100 mL/hr Intravenous PRN Shashi Lott MD        folic acid (FOLVITE) tablet 1 mg  1 mg Oral Daily Shashi Lott MD        hydroxychloroquine (PLAQUENIL) tablet 300 mg  300 mg Oral Daily Shashi Lott MD        HYDROmorphone (DILAUDID) injection 0.5 mg  0.5 mg Intravenous Q4H PRN Shashi Lott MD   0.5 mg at 11/17/20 0502    labetalol (NORMODYNE;TRANDATE) injection 20 mg  20 mg Intravenous Q6H PRN Shashi Lott MD        [Held by provider] aspirin EC tablet 81 mg  81 mg Oral Daily Shashi Lott MD           Allergies:     Allergies   Allergen Reactions    Rocephin [Ceftriaxone] Shortness Of Breath     Bronchospasm 3/27 and hypotension immediately after ceftriaxone       Problem List:    Patient Active Problem List   Diagnosis Code    Type 2 diabetes mellitus with hyperglycemia, without long-term current use of insulin (HCC) E11.65    Tobacco abuse, 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    GERD (gastroesophageal reflux disease) K21.9    Vitamin D deficiency E55.9    Osteoporosis M81.0    Rheumat[facility] sites ([facility] M06.9    History of colon polyps Z86.010    Lipoma of colon D17.5    Diverticulosis of colon K57.30    Obesity, Class III, BMI 40-49.9 (morbid obesity) ([facility] [address] E66.01  Chronic bronchitis ([facility] J42    History of renal calculi Z87.442    Essential hypertension I10    Mixed hyperlipidemia E78.2    Comminuted fracture of right hip, closed, initial encounter ([place] [address] S72.981H       Past Medical History:        Diagnosis Date    COPD (chronic obstructive pulmonary disease) (HCC)     mild    Diabetes mellitus type II, controlled ([facility]     Diverticulosis     Hyperlipidemia     Hypertension     Kidney stone     Obesity, Class III, BMI 40-49.9 (morbid obesity) ([place] [address]     Osteoarthritis     Osteoporosis     Rheumatoid arthritis involving multiple sites ([place] [address]     follows with Dr. Jose Roberto [facility])     on Levaquin per Dr. Dennie Hand since dischaarge from [facility] 3/30/2019    Tobacco abuse began age 16    past hx    Tubular adenoma of colon 2011    colonoscopy       Past Surgical History:        Procedure Laterality Date    BREAST BIOPSY      benign, pt cannot remember which side    CARPAL TUNNEL RELEASE      right wrist     [facility]     SECTION      CHOLECYSTECTOMY, OPEN      COLONOSCOPY  2011    screening, sigmoid polyp bx (tubular adenoma), Dr. George Lenz, [address] [place] [address] COLONOSCOPY  4/3/2015    submucosal lipoma distal tranverse colon biopsied, diverticulosis, Dr. George Lenz, [facility] / REMOVAL STENT / STONE Left 3/27/2019    CYSTOSCOPY RETROGRADE PYELOGRAM STENT INSERTION performed by Neda Farias MD at [facility] LITHOTRIPSY Left 4/15/2019    CYSTOSCOPY RETROGRADE URETEROSCOPY, stone basket extraction, LEFT STENT INSERTION performed by Neda Farias MD at [place] [address]  childhood    TUBAL LIGATION         Social History:    Social History     Tobacco Use    Smoking status: Former Smoker     Packs/day: 1.00     Years: 40.00     Pack years: 40.00     Types: Cigarettes     Last attempt to quit: 2007     Years since quittin.3    Smokeless tobacco: Never Used   Substance Use Topics    Alcohol use: Yes     Alcohol/week: 0.0 standard drinks     Frequency: Monthly or less     Drinks per session: 1 or 2     Binge frequency: Never     Comment: rarely                                Counseling given: Not Answered      Vital Signs (Current):   Vitals:    11/16/20 2000 11/16/20 2150 11/16/20 2241 11/17/20 0445   BP: 138/70 (!) 127/58 (!) 163/69 132/85   Pulse: 82 87 93 93   Resp: 20 18 17 16   Temp:   36.9 °C (98.4 °F) 37.1 °C (98.8 °F)   TempSrc:   Temporal Temporal   SpO2:  98% 98%    Weight:       Height:                                                  BP Readings from Last 3 Encounters:   11/17/20 132/85   11/10/20 124/69   09/21/20 (!) 156/90       NPO Status: Time of last liquid consumption: 2345                        Time of last solid consumption: 2000                        Date of last liquid consumption: 11/16/20                        Date of last solid food consumption: 11/16/20    BMI:   Wt Readings from Last 3 Encounters:   11/16/20 220 lb (99.8 kg)   11/10/20 232 lb 11.2 oz (105.6 kg)   09/21/20 235 lb (106.6 kg)     Body mass index is 37.76 kg/m². CBC:   Lab Results   Component Value Date    WBC 5.3 11/17/2020    RBC 3.35 11/17/2020    HGB 9.8 11/17/2020    HCT 30.1 11/17/2020    MCV 89.9 11/17/2020    RDW 15.9 11/17/2020     11/17/2020       CMP:   Lab Results   Component Value Date     11/17/2020    K 4.0 11/17/2020     11/17/2020    CO2 22 11/17/2020    BUN 15 11/17/2020    CREATININE 0.9 11/17/2020    GFRAA >60 11/17/2020    LABGLOM >60 11/17/2020    GLUCOSE 132 11/17/2020    GLUCOSE 125 04/05/2012    PROT 6.6 10/29/2020    CALCIUM 8.3 11/17/2020    BILITOT 0.7 10/29/2020    ALKPHOS 47 10/29/2020    AST 24 10/29/2020    ALT 18 10/29/2020       POC Tests: No results for input(s): POCGLU, POCNA, POCK, POCCL, POCBUN, POCHEMO, POCHCT in the last 72 hours.     Coags:   Lab Results   Component Value Date    PROTIME 11.6 11/16/2020 INR 1.0 11/16/2020    APTT 24.1 11/16/2020       HCG (If Applicable): No results found for: PREGTESTUR, PREGSERUM, HCG, HCGQUANT     ABGs: No results found for: PHART, PO2ART, DZU1BKT, DLR3TAD, BEART, J8MOTWOM     Type & Screen (If Applicable):  No results found for: LABABO, LABRH    Drug/Infectious Status (If Applicable):  No results found for: HIV, HEPCAB    COVID-19 Screening (If Applicable): No results found for: COVID19    EKG 3/28/19  Narrative & Impression     Normal sinus rhythm  Low voltage QRS        CXR 11/16/20  Impression    No acute cardiopulmonary process     XR Hip 11/16/20  Impression    Comminuted intertrochanteric right hip fracture.             Anesthesia Evaluation  Patient summary reviewed and Nursing notes reviewed no history of anesthetic complications:   Airway: Mallampati: III  TM distance: <3 FB   Neck ROM: full  Mouth opening: > = 3 FB Dental: normal exam         Pulmonary:   (+) COPD:  decreased breath sounds,                             Cardiovascular:    (+) hypertension:, hyperlipidemia      ECG reviewed  Rhythm: regular  Rate: normal           Beta Blocker:  Not on Beta Blocker         Neuro/Psych:   Negative Neuro/Psych ROS              GI/Hepatic/Renal:   (+) GERD:, renal disease: kidney stones, morbid obesity          Endo/Other:    (+) DiabetesType II DM, well controlled, , : arthritis: rheumatoid and OA., .                 Abdominal:   (+) obese,         Vascular: negative vascular ROS. Anesthesia Plan      general     ASA 3     (#18 R fa)  Induction: intravenous. BIS  MIPS: Postoperative opioids intended and Prophylactic antiemetics administered. Anesthetic plan and risks discussed with patient. Use of blood products discussed with patient whom consented to blood products. Plan discussed with CRNA and attending.                 Sebastián Gay RN   11/17/2020

## 2020-11-18 LAB
ANION GAP SERPL CALCULATED.3IONS-SCNC: 7 MMOL/L (ref 7–16)
ANISOCYTOSIS: ABNORMAL
BASOPHILS ABSOLUTE: 0 E9/L (ref 0–0.2)
BASOPHILS RELATIVE PERCENT: 0.2 % (ref 0–2)
BUN BLDV-MCNC: 10 MG/DL (ref 8–23)
CALCIUM SERPL-MCNC: 7 MG/DL (ref 8.6–10.2)
CHLORIDE BLD-SCNC: 105 MMOL/L (ref 98–107)
CO2: 23 MMOL/L (ref 22–29)
CREAT SERPL-MCNC: 0.9 MG/DL (ref 0.5–1)
EOSINOPHILS ABSOLUTE: 0.07 E9/L (ref 0.05–0.5)
EOSINOPHILS RELATIVE PERCENT: 1.7 % (ref 0–6)
GFR AFRICAN AMERICAN: >60
GFR NON-AFRICAN AMERICAN: >60 ML/MIN/1.73
GLUCOSE BLD-MCNC: 209 MG/DL (ref 74–99)
HCT VFR BLD CALC: 24.4 % (ref 34–48)
HCT VFR BLD CALC: 27 % (ref 34–48)
HEMOGLOBIN: 8.2 G/DL (ref 11.5–15.5)
HEMOGLOBIN: 8.9 G/DL (ref 11.5–15.5)
LYMPHOCYTES ABSOLUTE: 0.44 E9/L (ref 1.5–4)
LYMPHOCYTES RELATIVE PERCENT: 10.4 % (ref 20–42)
MAGNESIUM: 1.5 MG/DL (ref 1.6–2.6)
MCH RBC QN AUTO: 30 PG (ref 26–35)
MCHC RBC AUTO-ENTMCNC: 33.6 % (ref 32–34.5)
MCV RBC AUTO: 89.4 FL (ref 80–99.9)
METER GLUCOSE: 191 MG/DL (ref 74–99)
METER GLUCOSE: 203 MG/DL (ref 74–99)
METER GLUCOSE: 235 MG/DL (ref 74–99)
METER GLUCOSE: 304 MG/DL (ref 74–99)
MONOCYTES ABSOLUTE: 0.48 E9/L (ref 0.1–0.95)
MONOCYTES RELATIVE PERCENT: 11.3 % (ref 2–12)
NEUTROPHILS ABSOLUTE: 3.39 E9/L (ref 1.8–7.3)
NEUTROPHILS RELATIVE PERCENT: 76.5 % (ref 43–80)
NUCLEATED RED BLOOD CELLS: 0.9 /100 WBC
OVALOCYTES: ABNORMAL
PDW BLD-RTO: 16.1 FL (ref 11.5–15)
PLATELET # BLD: 133 E9/L (ref 130–450)
PMV BLD AUTO: 8.5 FL (ref 7–12)
POIKILOCYTES: ABNORMAL
POLYCHROMASIA: ABNORMAL
POTASSIUM REFLEX MAGNESIUM: 3.3 MMOL/L (ref 3.5–5)
RBC # BLD: 2.73 E12/L (ref 3.5–5.5)
SODIUM BLD-SCNC: 135 MMOL/L (ref 132–146)
WBC # BLD: 4.4 E9/L (ref 4.5–11.5)

## 2020-11-18 PROCEDURE — 97530 THERAPEUTIC ACTIVITIES: CPT

## 2020-11-18 PROCEDURE — 2580000003 HC RX 258: Performed by: STUDENT IN AN ORGANIZED HEALTH CARE EDUCATION/TRAINING PROGRAM

## 2020-11-18 PROCEDURE — 6370000000 HC RX 637 (ALT 250 FOR IP): Performed by: STUDENT IN AN ORGANIZED HEALTH CARE EDUCATION/TRAINING PROGRAM

## 2020-11-18 PROCEDURE — 36415 COLL VENOUS BLD VENIPUNCTURE: CPT

## 2020-11-18 PROCEDURE — 1200000000 HC SEMI PRIVATE

## 2020-11-18 PROCEDURE — 6360000002 HC RX W HCPCS: Performed by: STUDENT IN AN ORGANIZED HEALTH CARE EDUCATION/TRAINING PROGRAM

## 2020-11-18 PROCEDURE — 80048 BASIC METABOLIC PNL TOTAL CA: CPT

## 2020-11-18 PROCEDURE — 99232 SBSQ HOSP IP/OBS MODERATE 35: CPT | Performed by: INTERNAL MEDICINE

## 2020-11-18 PROCEDURE — 83735 ASSAY OF MAGNESIUM: CPT

## 2020-11-18 PROCEDURE — 6360000002 HC RX W HCPCS: Performed by: INTERNAL MEDICINE

## 2020-11-18 PROCEDURE — 2580000003 HC RX 258: Performed by: INTERNAL MEDICINE

## 2020-11-18 PROCEDURE — 97166 OT EVAL MOD COMPLEX 45 MIN: CPT

## 2020-11-18 PROCEDURE — 85025 COMPLETE CBC W/AUTO DIFF WBC: CPT

## 2020-11-18 PROCEDURE — 6370000000 HC RX 637 (ALT 250 FOR IP): Performed by: INTERNAL MEDICINE

## 2020-11-18 PROCEDURE — 85018 HEMOGLOBIN: CPT

## 2020-11-18 PROCEDURE — 97161 PT EVAL LOW COMPLEX 20 MIN: CPT

## 2020-11-18 PROCEDURE — 82962 GLUCOSE BLOOD TEST: CPT

## 2020-11-18 PROCEDURE — 85014 HEMATOCRIT: CPT

## 2020-11-18 RX ORDER — SODIUM CHLORIDE 9 MG/ML
INJECTION, SOLUTION INTRAVENOUS CONTINUOUS
Status: ACTIVE | OUTPATIENT
Start: 2020-11-18 | End: 2020-11-18

## 2020-11-18 RX ORDER — POTASSIUM CHLORIDE 20 MEQ/1
40 TABLET, EXTENDED RELEASE ORAL 2 TIMES DAILY WITH MEALS
Status: COMPLETED | OUTPATIENT
Start: 2020-11-18 | End: 2020-11-19

## 2020-11-18 RX ORDER — POTASSIUM CHLORIDE 20 MEQ/1
40 TABLET, EXTENDED RELEASE ORAL 2 TIMES DAILY WITH MEALS
Status: DISCONTINUED | OUTPATIENT
Start: 2020-11-18 | End: 2020-11-18

## 2020-11-18 RX ORDER — MAGNESIUM SULFATE IN WATER 40 MG/ML
2 INJECTION, SOLUTION INTRAVENOUS ONCE
Status: COMPLETED | OUTPATIENT
Start: 2020-11-18 | End: 2020-11-18

## 2020-11-18 RX ORDER — SENNA AND DOCUSATE SODIUM 50; 8.6 MG/1; MG/1
2 TABLET, FILM COATED ORAL DAILY PRN
Status: DISCONTINUED | OUTPATIENT
Start: 2020-11-18 | End: 2020-11-20 | Stop reason: HOSPADM

## 2020-11-18 RX ADMIN — OXYCODONE HYDROCHLORIDE 10 MG: 10 TABLET ORAL at 19:20

## 2020-11-18 RX ADMIN — SODIUM CHLORIDE: 9 INJECTION, SOLUTION INTRAVENOUS at 01:14

## 2020-11-18 RX ADMIN — ACETAMINOPHEN 650 MG: 325 TABLET ORAL at 14:49

## 2020-11-18 RX ADMIN — ASPIRIN 81 MG: 81 TABLET, COATED ORAL at 07:56

## 2020-11-18 RX ADMIN — HYDROMORPHONE HYDROCHLORIDE 0.5 MG: 1 INJECTION, SOLUTION INTRAMUSCULAR; INTRAVENOUS; SUBCUTANEOUS at 08:58

## 2020-11-18 RX ADMIN — SODIUM CHLORIDE, PRESERVATIVE FREE 10 ML: 5 INJECTION INTRAVENOUS at 08:58

## 2020-11-18 RX ADMIN — SODIUM CHLORIDE, PRESERVATIVE FREE 10 ML: 5 INJECTION INTRAVENOUS at 16:37

## 2020-11-18 RX ADMIN — HYDROXYCHLOROQUINE SULFATE 300 MG: 200 TABLET, FILM COATED ORAL at 08:56

## 2020-11-18 RX ADMIN — MAGNESIUM SULFATE HEPTAHYDRATE 2 G: 40 INJECTION, SOLUTION INTRAVENOUS at 08:55

## 2020-11-18 RX ADMIN — ACETAMINOPHEN 650 MG: 325 TABLET ORAL at 00:35

## 2020-11-18 RX ADMIN — INSULIN LISPRO 8 UNITS: 100 INJECTION, SOLUTION INTRAVENOUS; SUBCUTANEOUS at 12:23

## 2020-11-18 RX ADMIN — ACETAMINOPHEN 650 MG: 325 TABLET ORAL at 08:56

## 2020-11-18 RX ADMIN — CALCIUM GLUCONATE 1 G: 98 INJECTION, SOLUTION INTRAVENOUS at 10:50

## 2020-11-18 RX ADMIN — INSULIN LISPRO 4 UNITS: 100 INJECTION, SOLUTION INTRAVENOUS; SUBCUTANEOUS at 18:07

## 2020-11-18 RX ADMIN — DOCUSATE SODIUM 50 MG AND SENNOSIDES 8.6 MG 2 TABLET: 8.6; 5 TABLET, FILM COATED ORAL at 08:55

## 2020-11-18 RX ADMIN — SODIUM CHLORIDE, PRESERVATIVE FREE 10 ML: 5 INJECTION INTRAVENOUS at 21:36

## 2020-11-18 RX ADMIN — Medication 2 G: at 09:10

## 2020-11-18 RX ADMIN — POLYETHYLENE GLYCOL 3350 17 G: 17 POWDER, FOR SOLUTION ORAL at 08:55

## 2020-11-18 RX ADMIN — HYDROMORPHONE HYDROCHLORIDE 0.5 MG: 1 INJECTION, SOLUTION INTRAMUSCULAR; INTRAVENOUS; SUBCUTANEOUS at 16:37

## 2020-11-18 RX ADMIN — ENOXAPARIN SODIUM 40 MG: 40 INJECTION SUBCUTANEOUS at 08:55

## 2020-11-18 RX ADMIN — Medication 2 G: at 00:35

## 2020-11-18 RX ADMIN — OXYCODONE HYDROCHLORIDE 10 MG: 10 TABLET ORAL at 05:58

## 2020-11-18 RX ADMIN — FOLIC ACID 1 MG: 1 TABLET ORAL at 08:56

## 2020-11-18 RX ADMIN — OXYCODONE HYDROCHLORIDE 10 MG: 10 TABLET ORAL at 00:14

## 2020-11-18 RX ADMIN — INSULIN LISPRO 1 UNITS: 100 INJECTION, SOLUTION INTRAVENOUS; SUBCUTANEOUS at 21:28

## 2020-11-18 RX ADMIN — INSULIN LISPRO 2 UNITS: 100 INJECTION, SOLUTION INTRAVENOUS; SUBCUTANEOUS at 09:06

## 2020-11-18 RX ADMIN — OXYCODONE HYDROCHLORIDE 10 MG: 10 TABLET ORAL at 12:51

## 2020-11-18 RX ADMIN — POTASSIUM CHLORIDE 40 MEQ: 1500 TABLET, EXTENDED RELEASE ORAL at 18:06

## 2020-11-18 ASSESSMENT — PAIN DESCRIPTION - PROGRESSION
CLINICAL_PROGRESSION: NOT CHANGED

## 2020-11-18 ASSESSMENT — PAIN SCALES - GENERAL
PAINLEVEL_OUTOF10: 8
PAINLEVEL_OUTOF10: 5
PAINLEVEL_OUTOF10: 8
PAINLEVEL_OUTOF10: 5
PAINLEVEL_OUTOF10: 8
PAINLEVEL_OUTOF10: 0
PAINLEVEL_OUTOF10: 5
PAINLEVEL_OUTOF10: 0
PAINLEVEL_OUTOF10: 5
PAINLEVEL_OUTOF10: 5
PAINLEVEL_OUTOF10: 7
PAINLEVEL_OUTOF10: 8
PAINLEVEL_OUTOF10: 3
PAINLEVEL_OUTOF10: 9
PAINLEVEL_OUTOF10: 9

## 2020-11-18 ASSESSMENT — PAIN - FUNCTIONAL ASSESSMENT: PAIN_FUNCTIONAL_ASSESSMENT: PREVENTS OR INTERFERES WITH MANY ACTIVE NOT PASSIVE ACTIVITIES

## 2020-11-18 ASSESSMENT — PAIN DESCRIPTION - ORIENTATION
ORIENTATION: RIGHT

## 2020-11-18 ASSESSMENT — PAIN DESCRIPTION - PAIN TYPE
TYPE: SURGICAL PAIN

## 2020-11-18 ASSESSMENT — PAIN DESCRIPTION - DESCRIPTORS
DESCRIPTORS: ACHING;DISCOMFORT;SORE
DESCRIPTORS: ACHING;CONSTANT;SORE

## 2020-11-18 ASSESSMENT — PAIN DESCRIPTION - ONSET
ONSET: ON-GOING

## 2020-11-18 ASSESSMENT — PAIN DESCRIPTION - LOCATION
LOCATION: HIP

## 2020-11-18 ASSESSMENT — PAIN DESCRIPTION - FREQUENCY
FREQUENCY: CONTINUOUS

## 2020-11-18 NOTE — ANESTHESIA POSTPROCEDURE EVALUATION
Department of Anesthesiology  Postprocedure Note    Patient: Nilo Andrea  MRN: [id number]  [place]: 1950  Date of evaluation: 11/17/2020  Time:  7:56 PM     Procedure Summary     Date:  11/17/20 Room / Location:  [facility] OR 08 / [facility]    Anesthesia Start:  6484 Anesthesia Stop:  1943    Procedure:  RIGHT HIP OPEN REDUCTION INTERNAL FIXATION- REQUESTING 4 PM (Right Hip) Diagnosis:  (FRACTURED HIP)    Surgeon:  Dorenda Bumpers, MD Responsible Provider:  Gui Fallon DO    Anesthesia Type:  general ASA Status:  3          Anesthesia Type: general    Dominguez Phase I: Dominguez Score: 9    Dominguez Phase II:      Last vitals: Reviewed and per EMR flowsheets.        Anesthesia Post Evaluation    Patient location during evaluation: bedside  Patient participation: complete - patient cannot participate  Level of consciousness: awake and alert  Airway patency: patent  Nausea & Vomiting: no nausea and no vomiting  Complications: no  Cardiovascular status: blood pressure returned to baseline  Respiratory status: acceptable  Hydration status: euvolemic

## 2020-11-18 NOTE — PLAN OF CARE
Problem: Pain:  Goal: Pain level will decrease  Description: Pain level will decrease  11/18/2020 0202 by Diane Gutierrez RN  Outcome: Met This Shift  11/18/2020 0200 by Diane Gutierrez RN  Outcome: Met This Shift  Goal: Control of acute pain  Description: Control of acute pain  11/18/2020 1410 by Rony Cerda RN  Outcome: Met This Shift  11/18/2020 0202 by Diane Gutierrez RN  Outcome: Met This Shift  11/18/2020 0200 by Diane Gutierrez RN  Outcome: Met This Shift

## 2020-11-18 NOTE — PROGRESS NOTES
[facility]  Internal Medicine Residency / [address]    Attending Physician Statement  I have discussed the case, including pertinent history and exam findings with the resident and the team.  I have seen and examined the patient and the key elements of the encounter have been performed by me. I have also personally reviewed imaging studies and labs. I agree with the assessment, plan and orders as documented by the resident. POD#1  Mild post op pain. She denies shortness of breath, chest pain, palpitations. She is using her incentive spirometry regularly    Normal cardiac rate and rhythm  Clear breath sounds  Dressing over L hip is clean and dry  Strong distal pulses    Assessment:  1. R intertrochanteric R hip fracture, POD #1 cephalomedullary nail fixation  2. RA, on HCQ and methotrexate  3. COPD, stable  4. Essential HTN  5. DM, non-insulin requiring    Plan:  DVT prophylaxis with LMWH x 35 days. Home dose of ASA was for primary prevention, okay to hold while on DVT prophylaxis. PT/OT  Resume mtx once with evidence of good healing, about 1 - 2 weeks. Continue hcq and folic acid. Increase shortacting insulin for better blood sugar control  Continue incentive spiromentry      Remainder of medical problems as per resident note. Desi Morales MD  Internal Medicine Residency Faculty

## 2020-11-18 NOTE — PROGRESS NOTES
OCCUPATIONAL THERAPY INITIAL EVALUATION      Date:2020  Patient Name: Jen Barry  MRN: [id number]  : 1950  Room: [address] [place], [place] [address]    Evaluating OT: MAYELIN Goodman, OTR/L   License #[id number]    Referring physician: Son Shirley DO     AM-PAC Daily Activity Raw Score:     Recommended Adaptive Equipment: BSC, Sock aide      Diagnosis: Comminuted fracture of R hip      Surgery: S/P ORIF R LE 20     Pertinent Medical History: COPD, Diabetes mellitus, hypertension, kidney stone, osteoarthritis     Precautions:  Falls, Safety, WBAT R LE, catheter, [place]     Home Living: Pt lives with S.O in a 2 story home with o SHYLA and no hand rails. Bathroom setup: walk in shower with a 3-4 inch step, regular height commode    Equipment owned: Long handled shoe horn, reacher    Prior Level of Function:   Independent with ADLs ,  Independent with IADLs; using no AD for mobility. Driving: Yes                           Medication Management Self  Occupation: Retired   Leisure:likes pet birds    Pain Level: 0/10 when supine in bed, severe pain in R hip with movement. R LE elevated prior to ending treatment. RN notified. Cognition: A&O: 4/4; Follows multi-step step directions   Memory:  Good-    Sequencing:  Fair +    Problem solving:  Fair+    Judgement/safety:  Fair           Functional Assessment:    Initial Eval Status  Date: 20 Treatment Status  Date: STGs = LTGs  Time frame: 2-4 days    Feeding Independent  Independent   Grooming SBA  Pt. Able to to comb hair at EOB   Mod I  when seated   UB Dressing SBA    Independent   LB Dressing Dependent  Pt. Unable to complete figure 4 technique to simulate donning/doffing B/L socks at EOB  Independent   Bathing Mod A with simulated tasks   Mod I seated    Toileting Dependent   Pt. Had a catheter   Independent   Bed Mobility  Supine to sit: Max A   Sit to supine: Max A     Log Rolling: NT  Supine to sit:  Independent  Sit to supine: Independent Functional Transfers   Sit > Stand: Max A with ww   Stand > Sit: Max A with ww   SPT: NT   Mod I with ww for all functional transfers. Functional Mobility Max A with ww to take 3 side steps to EOB   Cues for sequencing with ww  Mod I with ww  for all functional distances. Balance Sitting:       Static:  SBA     Dynamic:SBA     Standing: Max A with ww  Sitting:       Static:  Independent    Dynamic:Independent     Standing: Mod I with ww   Activity Tolerance Poor with light activity. Pt. Was limited by pain in her R LE, nausea and required encouragement to complete tasks during evaluation. 99 % Sp O2 on RA  97 bpm      Good with moderate activity   Visual/  Perceptual Glasses: Yes    Vision: [employer]        Safety Fair with mod verbal cues for hand placement,  Sequencing and safety with ww  Good with no verbal cues        Hand Dominance Right      Strength ROM Additional Info:    RUE  4+/5  WFL       : Good     Fine Motor Coordination: Good    Gross Motor Coordination:Good   LUE 4+/5  WFL   : Good    Fine Motor Coordination: Good    Gross Motor Coordination:Good       Hearing: [place]  Sensation:  No c/o numbness or tingling  Tone:  WFL  Edema: none noted                               Comments:  Upon arrival, patient was supine in bed with HOB slightly elevated and agreeable to evaluation. . At end of session, patient was supine in bed with HOB slightly elevated R LE elevated with call light and phone within reach, all lines and tubes intact. RN notified. Pt required cues and education as noted above for safe facilitation and completion of tasks. Prior to and at the end of session, environmental modifications/line management completed for patients safety and efficiency of treatment session. OT treatment: OT for functional assessment of  ADL, Functional Transfer/Mobility Training, Equipment Needs, Pt/Family Education, OT role and POC reviewed.     Skilled occupational therapy services provided include instruction/training on safety and adapted techniques for completion of therapeutic activities, ADLs/IADLs, and deep breathing techniques.  Therapist facilitated bed mobility, functional transfers, graded functional activities and functional mobility.  Therapist facilitated self-care: grooming  Tasks.  Therapist simulated- LB dressing- min/modcuing on body mechanics, posture, compensatory strategies, and safety.  Therapist educated pt on compensatory strategies techniques to safely complete ADLs/IADLs.  Patient demonstrated fair understanding of compensatory strategies to complete ADLs.  Skilled monitoring of O2 sats, HR, and pt response throughout treatment. Patient would benefit from continued skilled OT to increase functional independence and quality of life. Eval Complexity: mod    · Mod Complexity  · History: Expanded chart review of medical records and additional review of physical, cognitive, or psychosocial history related to current functional performance  · Exam: 3+ performance deficits  · Assistance/Modification: Mod/max assistance or modifications required to perform tasks. May have comorbidities that affect occupational performance.     Assessment of current deficits   Functional mobility [x]  ADLs [x] Strength [x]  Cognition []  Functional transfers  [x] IADLs [x] Safety Awareness [x]  Endurance [x]  Fine Motor Coordination [] Balance [x] Vision/perception [] Sensation []   Gross Motor Coordination [] ROM [] Delirium []                  Motor Control []    Plan of Care: 2-4 days/week for 1-2 weeks PRN     Instruction/training on adapted ADL techniques and AE recommendations to increase functional independence within precautions  Training on energy conservation strategies/techniques to improve independence/tolerance for self-care routine  Functional transfer/mobility training/DME recommendations for increased independence, safety, and fall prevention  Patient/Family education

## 2020-11-18 NOTE — PROGRESS NOTES
[facility]  Internal Medicine Residency Program  Progress Note - House Team 1    Patient:  Sally Gallagher 72 y.o. female MRN: [id number]     Date of Service: 11/18/2020     CC: Fall in kitchen    Overnight events: Drop in BP during the night likely due to spinal nerve block -> placed on neosynephrine drip and then weaned off    Days since admission: 1    Subjective     Patient underwent surgery yesterday evening for rt hip fracture. Today she is awake, alert and following commands. Complains of moderate pain in right hip. Has started eating food today . Denies SOB, chest pain, fevers, cough, palpitations. Objective     Physical Exam:  Vitals: BP (!) 124/53   Pulse 95   Temp 98.1 °F (36.7 °C) (Oral)   Resp 16   Ht 5' 4\" (1.626 m)   Wt 220 lb (99.8 kg)   SpO2 96%   BMI 37.76 kg/m²     I & O - 24hr:     Intake/Output Summary (Last 24 hours) at 11/18/2020 1130  Last data filed at 11/18/2020 5260  Gross per 24 hour   Intake 1569.38 ml   Output 1670 ml   Net -100.62 ml      General Appearance: alert, appears stated age and cooperative  HEENT:  Head: Normal, normocephalic, atraumatic. Neck: no adenopathy, no carotid bruit, no JVD, supple, symmetrical, trachea midline and thyroid not enlarged, symmetric, no tenderness/mass/nodules  Lung: clear to auscultation bilaterally  Heart: regular rate and rhythm, S1, S2 normal, no murmur, click, rub or gallop  Abdomen: soft, non-tender; bowel sounds normal; no masses,  no organomegaly  Extremities:  extremities normal, atraumatic, no cyanosis or edema and dressings present over lateral aspect of right thigh  Musculokeletal: No joint swelling, no muscle tenderness. ROM normal in all joints of extremities.    Neurologic: Mental status: Alert, oriented, thought content appropriate  Subject  Pertinent Information & Imaging Studies, Consults   anthony  CBC with Differential:    Lab Results   Component Value Date    WBC 4.4 11/18/2020    RBC 2.73 11/18/2020    HGB 8.2 11/18/2020    HCT 24.4 11/18/2020     11/18/2020    MCV 89.4 11/18/2020    MCH 30.0 11/18/2020    MCHC 33.6 11/18/2020    RDW 16.1 11/18/2020    NRBC 0.9 11/18/2020    SEGSPCT 55 02/03/2012    SEGSPCT 69 10/22/2010    METASPCT 0.9 04/04/2019    LYMPHOPCT 10.4 11/18/2020    MONOPCT 11.3 11/18/2020    MYELOPCT 0.9 03/30/2019    EOSPCT 1 10/22/2010    BASOPCT 0.2 11/18/2020    MONOSABS 0.48 11/18/2020    LYMPHSABS 0.44 11/18/2020    EOSABS 0.07 11/18/2020    BASOSABS 0.00 11/18/2020     CMP:    Lab Results   Component Value Date     11/18/2020    K 3.3 11/18/2020     11/18/2020    CO2 23 11/18/2020    BUN 10 11/18/2020    CREATININE 0.9 11/18/2020    GFRAA >60 11/18/2020    LABGLOM >60 11/18/2020    GLUCOSE 209 11/18/2020    GLUCOSE 125 04/05/2012    PROT 6.6 10/29/2020    LABALBU 4.4 10/29/2020    LABALBU 4.3 03/25/2011    CALCIUM 7.0 11/18/2020    BILITOT 0.7 10/29/2020    ALKPHOS 47 10/29/2020    AST 24 10/29/2020    ALT 18 10/29/2020       IMAGING:   XR Right hip on admission 11/16     XR s/p surgery 11/17      PROCEDURES:Left cephalomedullary nail fixation of left intertrochanteric femur fracture - 11/17    FLUIDS: NaCl was on 100ml/hr - > reduced to 50ml/ hr today     ANTIBIOTICS:  Received cefazolin 3 doses pre-op     OXYGENATION: Maintaining on room air     DIET: General    CONSULTS:   Orthopedic surgery     Resident's Assessment and Plan     Radha Goins is a 72 y.o. female with  has a past medical history of COPD (chronic obstructive pulmonary disease) ([facility] [address] Diabetes mellitus type II, controlled ([facility] [address] Diverticulosis, Hyperlipidemia, Hypertension, Kidney stone, Obesity, Class III, BMI 40-49.9 (morbid obesity) ([address] Osteoarthritis, Osteoporosis, Rheumatoid arthritis involving multiple sites ([address] Sepsis ([facility] [address] Tobacco abuse, and Tubular adenoma of colon.   came here with CC   Chief Complaint   Patient presents with    Fall     fall in kitchen, complains of right held to allow good healing of fracture, can resume after discharge  - Continue daily HCQ    GERD  - Continue protonix 40mg     HLD  - Continue crestor 5 mg       Problems Resolved during this stay:   -Acute intertrochanteric fracture    PT/OT evaluation: 8/24  DVT prophylaxis/ GI prophylaxis: LMWH+ intermittent PCD/ Protonix  Discharge plan: home with [facility]    Final Note :  Continue PT. Pain control and fracture management as per Ortho. Continue to monitor. LMWH to continue for 35 days-> Resume aspirin for primary prevention after that.     Lilly James MD, PGY-1  Attending physician: Dr. Rosalinda Nava

## 2020-11-18 NOTE — OP NOTE
[address]                  [address] [facility],  [facility]                                OPERATIVE REPORT    PATIENT NAME: Elizabeth Lieberman                   :        1950  MED REC NO:   [id number]                            ROOM:       5209  ACCOUNT NO:   [de-identified]                           ADMIT DATE: 2020  PROVIDER:     Ganesh Skaggs MD      DATE OF PROCEDURE:  2020    PREOPERATIVE DIAGNOSIS:  Comminuted intertrochanteric hip fracture,  right. POSTOPERATIVE DIAGNOSIS:  Comminuted intertrochanteric hip fracture,  right. OPERATION PERFORMED:  Right trochanteric fracture open reduction and  internal fixation using Synthes long size 11 mm x 125 degree angled, 360  mm long trochanteric femoral nail. SURGEON:  Ganesh Skaggs MD    ASSISTANT:  Dr. Concha Louis, Orthopedic Surgery resident. ANESTHESIA:  Spinal with sedation. ESTIMATED BLOOD LOSS:  Approximately 50 mL. FINDINGS:  1. Comminuted intertrochanteric hip fracture was stabilized and reduced  with the dictated components. 2.  Status post fixation, there was stable range of motion of the hip  without mechanical impingement. COMPLICATIONS:  None. DISPOSITION:  The patient remained stable throughout the procedure. OPERATIVE INDICATIONS:  The patient is a 67-year-old female who fell,  sustained a comminuted intertrochanteric hip fracture, was admitted to  the medical service, cleared for surgical intervention.   The risks,  benefits,  alternatives and complications of surgery were explained to  include, but not limited to the risks of infection; damage to  nerves,  vessels, tendons; malunion; nonunion; symptomatic hardware; possible  need for hardware removal; risk of deep venous thrombosis; pulmonary  emboli; leg length discrepancy; malrotation of the lower extremity;  possible need for additional surgery; as well as increased perioperative  morbidity and in the  femoral head. Traction was then taken off the leg. Leg was abducted  and perfect Zuni techniques were used to place a distal femoral  cross-_____ pin. This was done under perfect Zuni technique and the  long oblong hole through a nick incision in the skin. During insertion,  the screw skived and fractured the medial cortex. Two, one proximal and one distal screw were then inserted under similar  techniques using the perfect Zuni technique with a small Perclose  placed. These screws had excellent purchase after drilling, measuring  and insertion. Final images confirmed nice reduction of the fracture as  well as stable distal fixation. Wounds were thoroughly and copiously  irrigated out. Deep tissues closed with 0 Vicryl, followed by 2-0  Vicryl, and skin  staples. Sterile dressing was applied. Patient  remained stable throughout the procedure.         Valentino Bras, MD    D: 11/17/2020 19:05:14       T: 11/18/2020 2:39:46     [email or web address]  Job#: [id number]     Doc#: 1950    CC:

## 2020-11-18 NOTE — PROGRESS NOTES
Physical Therapy    Physical Therapy Initial Assessment     Name: Jareth Bower  : 1950  MRN: [id number]    Referring Provider:  Bing Price DO    Date of Service: 2020    Evaluating PT:  Jamir Sterling PT, DPT PT [id number]    Room #:  1453/1205-A  Diagnosis:  Comminuted intertrochanteric hip fracture, right. PMHx/PSHx:  Obesity, HTN, HLD, DM II, COPD, RA  Procedure/Surgery:  R Hip ORIF 2020  Precautions:  WBAT RLE, Falls  Equipment Needs:  TBD    SUBJECTIVE:    Pt lives with spouse in a 2 story home with no stairs to enter. Bed is on first floor and bath is on first floor. Pt has flight of stairs to 2nd floor. Pt ambulated with no device independently PTA. Equipment Owned: None    OBJECTIVE:   Initial Evaluation  Date: 2020 Treatment Short Term/ Long Term   Goals   AM-PAC 6 Clicks 0/63     Was pt agreeable to Eval/treatment? Yes     Does pt have pain?  Severe pain R Hip (agreeable yet, screams out during mobility)     Bed Mobility  Rolling: MaxA  Supine to sit: ModA x 2  Sit to supine: MaxA x 2  Scooting: MaxA  Rolling: SBA  Supine to sit: Tiny  Sit to supine: Tiny  Scooting: Tiny   Transfers Sit to stand: MaxA x 2  Stand to sit: MaxA x 2  Stand pivot: NT  Sit to stand: Tiny  Stand to sit: Tiny  Stand pivot: Tiny   Ambulation    NT  >25 feet with [employer] plus [employer]   Stair negotiation: ascended and descended  NT  >4 steps with single rail Tiny   ROM BUE:  See OT Note  BLE:  WFL     Strength BUE:  See OT Note  RLE: 1/5 hip, knee  3/5 ankle (limited by pain)  LLE: 4/5  Improve Strength 1/3 MMT Grade   Balance Sitting EOB:  Tiny  Dynamic Standing:  MaxA [employer]  Sitting EOB: Supervision  Dynamic Standing:  Tiny [employer]     Pt is A & O x 4  Sensation:  Pt denies numbness and tingling to extremities  Edema:  WNL      Patient education  Pt educated on role of PT    Patient response to education:   Pt verbalized understanding Pt demonstrated skill Pt requires further education in this area   x x x ASSESSMENT:    Comments:  Pt received in supine agreeable to PT evaluation. Pt educated on WBAT precaution. Pt requiring max cues for facilitation of mobility. Pt distracted by pain in R hip, compromising carryover of cues. Pt required assistance of trunk and BLES for supine to sit transfer. Pt requires assistance to steady statically at EOB. Pt performed 1x sit to stand and tolerated ~ 15 seconds at [facility] [place] with assist. Pt unable to perform side steps at EOB due to pain. Patient would benefit from continued skilled PT to maximize functional mobility independence. Treatment:  Patient practiced and was instructed in the following treatment:     Bed mobility- verbal cues to facilitate independence   Functional transfers-Verbal cues for proper positioning and sequencing to perform transfers safely with maximum independence. Pt's/ family goals   1. Get better    Patient and or family understand(s) diagnosis, prognosis, and plan of care. yes    PLAN:      PLAN OF CARE:    Current Treatment Recommendations     [x] Strengthening     [x] ROM   [x] Balance Training   [x] Endurance Training   [x] Transfer Training   [x] Gait Training   [x] Stair Training   [x] Positioning   [x] Safety and Education Training   [x] Patient/Caregiver Education   [x] HEP  [] Other       PT care will be provided in accordance with the objectives noted above. Exercises and functional mobility practice will be used as well as appropriate assistive devices or modalities to obtain goals. Patient and family education will also be administered as needed. Frequency of treatments: 5-7x/week x 1-2 weeks.     Time in  1115  Time out  1145    Total Treatment Time  23 minutes     Evaluation Time includes thorough review of current medical information, gathering information on past medical history/social history and prior level of function, completion of standardized testing/informal observation of tasks, assessment of data and education on

## 2020-11-18 NOTE — PLAN OF CARE
Problem: Pain:  Goal: Pain level will decrease  Description: Pain level will decrease  11/18/2020 0202 by Regine Taveras RN  Outcome: Met This Shift  11/18/2020 0200 by Regine Taveras RN  Outcome: Met This Shift  11/17/2020 1847 by Jessica Martinez RN  Outcome: Met This Shift  11/17/2020 1349 by Albin Espinoza RN  Outcome: Ongoing  Goal: Control of acute pain  Description: Control of acute pain  11/18/2020 0202 by Regine Taveras RN  Outcome: Met This Shift  11/18/2020 0200 by Regine Taveras RN  Outcome: Met This Shift  11/17/2020 1847 by Jessica Martinez RN  Outcome: Met This Shift  11/17/2020 1349 by Albin Espinoza RN  Outcome: Ongoing  Goal: Control of chronic pain  Description: Control of chronic pain  11/17/2020 1847 by Jessica Martinez RN  Outcome: Met This Shift  11/17/2020 1349 by Albin Espinoza RN  Outcome: Ongoing     Problem: Falls - Risk of:  Goal: Will remain free from falls  Description: Will remain free from falls  11/18/2020 0202 by Regine Taveras RN  Outcome: Met This Shift  11/18/2020 0200 by Regine Taveras RN  Outcome: Met This Shift  11/17/2020 1847 by Jessica Martinez RN  Outcome: Met This Shift  11/17/2020 1349 by Albin Espinoza RN  Outcome: Met This Shift  Goal: Absence of physical injury  Description: Absence of physical injury  11/18/2020 0202 by Regine Taveras RN  Outcome: Met This Shift  11/18/2020 0200 by Regine Taveras RN  Outcome: Met This Shift  11/17/2020 1847 by Jessica Martinez RN  Outcome: Met This Shift  11/17/2020 1349 by Albin Espinoza RN  Outcome: Met This Shift     Problem: Skin Integrity:  Goal: Will show no infection signs and symptoms  Description: Will show no infection signs and symptoms  11/18/2020 0202 by Regine Taveras RN  Outcome: Met This Shift  11/18/2020 0200 by Regine Taveras RN  Outcome: Met This Shift  11/17/2020 1847 by Jessica Martinez RN  Outcome: Met This Shift  11/17/2020 1349 by Albin Espinoza RN  Outcome: Met This Shift  Goal: Absence of new skin breakdown  Description: Absence of new skin breakdown  11/18/2020 0202 by Kapil Baez RN  Outcome: Met This Shift  11/18/2020 0200 by Kapil Baez RN  Outcome: Met This Shift  11/17/2020 1847 by Siri Brown RN  Outcome: Met This Shift  11/17/2020 1349 by Blaine Ribeiro RN  Outcome: Met This Shift

## 2020-11-18 NOTE — PLAN OF CARE
Problem: Pain:  Goal: Pain level will decrease  Description: Pain level will decrease  11/18/2020 0200 by Regine Taveras RN  Outcome: Met This Shift  11/17/2020 1847 by Jessica Martinez RN  Outcome: Met This Shift  11/17/2020 1349 by Albin Espinoza RN  Outcome: Ongoing  Goal: Control of acute pain  Description: Control of acute pain  11/18/2020 0200 by Regine Taveras RN  Outcome: Met This Shift  11/17/2020 1847 by Jessica Martinez RN  Outcome: Met This Shift  11/17/2020 1349 by Albin Espinoza RN  Outcome: Ongoing  Goal: Control of chronic pain  Description: Control of chronic pain  11/17/2020 1847 by Jessica Martinez RN  Outcome: Met This Shift  11/17/2020 1349 by Albin Espinoza RN  Outcome: Ongoing     Problem: Falls - Risk of:  Goal: Will remain free from falls  Description: Will remain free from falls  11/18/2020 0200 by Regine Taveras RN  Outcome: Met This Shift  11/17/2020 1847 by Jessica Martinez RN  Outcome: Met This Shift  11/17/2020 1349 by Albin Espinoza RN  Outcome: Met This Shift  Goal: Absence of physical injury  Description: Absence of physical injury  11/18/2020 0200 by Regine Taveras RN  Outcome: Met This Shift  11/17/2020 1847 by Jessica Martinez RN  Outcome: Met This Shift  11/17/2020 1349 by Albin Espinoza RN  Outcome: Met This Shift     Problem: Skin Integrity:  Goal: Will show no infection signs and symptoms  Description: Will show no infection signs and symptoms  11/18/2020 0200 by Regine Taveras RN  Outcome: Met This Shift  11/17/2020 1847 by Jessica Martinez RN  Outcome: Met This Shift  11/17/2020 1349 by Albin Espinoza RN  Outcome: Met This Shift  Goal: Absence of new skin breakdown  Description: Absence of new skin breakdown  11/18/2020 0200 by Regine Taveras RN  Outcome: Met This Shift  11/17/2020 1847 by Jessica Martinez RN  Outcome: Met This Shift  11/17/2020 1349 by Albin Espinoza RN  Outcome: Met This Shift

## 2020-11-18 NOTE — BRIEF OP NOTE
Brief Postoperative Note      Patient: Kassy Gallagher  YOB: 1950  MRN: [id number]    Date of Procedure: 11/17/2020    Pre-Op Diagnosis: FRACTURED HIP    Post-Op Diagnosis: Same       Procedure(s):  RIGHT HIP OPEN REDUCTION INTERNAL FIXATION- REQUESTING 4 PM - Cephalomedullary nail    Surgeon(s):  Dorenda Bumpers, MD    Assistant:  Resident: Fernando Garcia DO    Anesthesia: General    Estimated Blood Loss (mL): less than 249     Complications: None    Specimens:   * No specimens in log *    Implants:  Implant Name Type Inv. Item Serial No.  Lot No. LRB No. Used Action   NAIL IM L360MM [id number] 125DEG LNG GRN R PROX FEM TI  NAIL IM L360MM [id number] 125DEG LNG GRN R PROX FEM TI  DEPUY [employer] [id number] Right 1 Implanted   SCREW BNE L95MM DIA10.35MM PROX FEM G TI NAS FOR TFN ADV  SCREW BNE L95MM DIA10.35MM PROX FEM G TI NAS FOR TFN ADV  DEPUY [employer] [id number] Right 1 Implanted   SCREW BNE L50MM DIA5MM COR DIA4. 3MM TIB LT GRN TI ALLY ST  SCREW BNE L50MM DIA5MM COR DIA4. 3MM TIB LT GRN TI ALLY ST  DEPUY SY[employer]THES [employer]-WD  Right 1 Implanted   SCREW BNE L42MM DIA5MM TIB LT GRN TI ST NAS GIANNA FULL THRD  SCREW BNE L42MM DIA5MM TIB LT GRN TI ST NAS GIANNA FULL THRD  DEPUY [employer]-WD  Right 1 Implanted         Drains:   Urethral Catheter Straight-tip 16 fr (Active)   Catheter Indications Perioperative use in selected surgeries including but not limited to urologic, pelvic or need for intraoperative monitoring of urinary output due to prolonged surgery, large volume infusion or need for diuretic therapy in surgery 11/17/20 1515   Urine Color Yellow 11/17/20 1515   Urine Appearance Clear 11/17/20 1515   Output (mL) 600 mL 11/17/20 1421         Electronically signed by Hussain Araujo DO on 11/17/2020 at 7:22 PM

## 2020-11-18 NOTE — PROGRESS NOTES
Department of Orthopedic Surgery  Resident Progress Note    Patient seen and examined. Pain controlled. No new complaints. States her hip is feeling better. Denies chest pain, shortness of breath, dizziness/lightheadedness. Some nausea overnight but no vomiting.   flatulence, - BM    VITALS:  BP (!) 145/66   Pulse 97   Temp 98.3 °F (36.8 °C) (Temporal)   Resp 18   Ht 5' 4\" (1.626 m)   Wt 220 lb (99.8 kg)   SpO2 94%   BMI 37.76 kg/m²     General: alert and oriented to person, place and time, well-developed and well-nourished, in no acute distress    MUSCULOSKELETAL:   right lower extremity:  · Dressing C/D/I - aquacel  · Compartments soft and compressible  · +PF/DF/EHL  · +2/4 DP & PT pulses, Brisk Cap refill, Toes warm and perfused  · Distal sensation grossly intact to Peroneals, Sural, Saphenous, and tibial nrs    CBC:   Lab Results   Component Value Date    WBC 5.3 11/17/2020    HGB 9.8 11/17/2020    HCT 30.1 11/17/2020     11/17/2020     PT/INR:    Lab Results   Component Value Date    PROTIME 11.6 11/16/2020    INR 1.0 11/16/2020       ASSESSMENT  · S/P Left cephalomedullary nail fixation of left intertrochanteric femur fracture - 11/17    PLAN      · Continue physical therapy and protocol: WBAT - LLE  · 24 hour abx coverage  · Deep venous thrombosis prophylaxis - aspirin or per medicine, early mobilization  · PT/OT  · Pain Control: IV and PO  · Monitor H&H  · CHALINO sims  · D/C Plan:  Pending  · Discuss with Dr. Cory Lyles

## 2020-11-18 NOTE — CARE COORDINATION
Transition of care :   Post op day #1  ORIF rt hip. PT [facility] 8/24 max assist x 2. OT [facility] 14/24. She spoke to her   And now the plan is home and she wants [facility].referral made .  I will follow

## 2020-11-19 ENCOUNTER — TELEPHONE (OUTPATIENT)
Dept: INTERNAL MEDICINE | Age: 70
End: 2020-11-19

## 2020-11-19 LAB
ANION GAP SERPL CALCULATED.3IONS-SCNC: 11 MMOL/L (ref 7–16)
BASOPHILS ABSOLUTE: 0 E9/L (ref 0–0.2)
BASOPHILS RELATIVE PERCENT: 0.2 % (ref 0–2)
BUN BLDV-MCNC: 13 MG/DL (ref 8–23)
CALCIUM IONIZED: 1.18 MMOL/L (ref 1.15–1.33)
CALCIUM SERPL-MCNC: 8.1 MG/DL (ref 8.6–10.2)
CHLORIDE BLD-SCNC: 98 MMOL/L (ref 98–107)
CO2: 23 MMOL/L (ref 22–29)
CREAT SERPL-MCNC: 1 MG/DL (ref 0.5–1)
EOSINOPHILS ABSOLUTE: 0 E9/L (ref 0.05–0.5)
EOSINOPHILS RELATIVE PERCENT: 0.9 % (ref 0–6)
GFR AFRICAN AMERICAN: >60
GFR NON-AFRICAN AMERICAN: 55 ML/MIN/1.73
GLUCOSE BLD-MCNC: 201 MG/DL (ref 74–99)
HCT VFR BLD CALC: 26.6 % (ref 34–48)
HEMOGLOBIN: 8.7 G/DL (ref 11.5–15.5)
LYMPHOCYTES ABSOLUTE: 0.41 E9/L (ref 1.5–4)
LYMPHOCYTES RELATIVE PERCENT: 8.7 % (ref 20–42)
MCH RBC QN AUTO: 29.2 PG (ref 26–35)
MCHC RBC AUTO-ENTMCNC: 32.7 % (ref 32–34.5)
MCV RBC AUTO: 89.3 FL (ref 80–99.9)
METAMYELOCYTES RELATIVE PERCENT: 0.9 % (ref 0–1)
METER GLUCOSE: 217 MG/DL (ref 74–99)
METER GLUCOSE: 218 MG/DL (ref 74–99)
METER GLUCOSE: 251 MG/DL (ref 74–99)
METER GLUCOSE: 262 MG/DL (ref 74–99)
MONOCYTES ABSOLUTE: 0.5 E9/L (ref 0.1–0.95)
MONOCYTES RELATIVE PERCENT: 11.3 % (ref 2–12)
NEUTROPHILS ABSOLUTE: 3.6 E9/L (ref 1.8–7.3)
NEUTROPHILS RELATIVE PERCENT: 79.1 % (ref 43–80)
PDW BLD-RTO: 16.3 FL (ref 11.5–15)
PLATELET # BLD: 138 E9/L (ref 130–450)
PMV BLD AUTO: 8.6 FL (ref 7–12)
POLYCHROMASIA: ABNORMAL
POTASSIUM REFLEX MAGNESIUM: 4.5 MMOL/L (ref 3.5–5)
RBC # BLD: 2.98 E12/L (ref 3.5–5.5)
SODIUM BLD-SCNC: 132 MMOL/L (ref 132–146)
WBC # BLD: 4.5 E9/L (ref 4.5–11.5)

## 2020-11-19 PROCEDURE — 97530 THERAPEUTIC ACTIVITIES: CPT

## 2020-11-19 PROCEDURE — 6370000000 HC RX 637 (ALT 250 FOR IP): Performed by: INTERNAL MEDICINE

## 2020-11-19 PROCEDURE — 82330 ASSAY OF CALCIUM: CPT

## 2020-11-19 PROCEDURE — 99231 SBSQ HOSP IP/OBS SF/LOW 25: CPT | Performed by: INTERNAL MEDICINE

## 2020-11-19 PROCEDURE — 6370000000 HC RX 637 (ALT 250 FOR IP): Performed by: STUDENT IN AN ORGANIZED HEALTH CARE EDUCATION/TRAINING PROGRAM

## 2020-11-19 PROCEDURE — 2580000003 HC RX 258: Performed by: STUDENT IN AN ORGANIZED HEALTH CARE EDUCATION/TRAINING PROGRAM

## 2020-11-19 PROCEDURE — 6360000002 HC RX W HCPCS: Performed by: INTERNAL MEDICINE

## 2020-11-19 PROCEDURE — 36415 COLL VENOUS BLD VENIPUNCTURE: CPT

## 2020-11-19 PROCEDURE — 82962 GLUCOSE BLOOD TEST: CPT

## 2020-11-19 PROCEDURE — 1200000000 HC SEMI PRIVATE

## 2020-11-19 PROCEDURE — 85025 COMPLETE CBC W/AUTO DIFF WBC: CPT

## 2020-11-19 PROCEDURE — 6360000002 HC RX W HCPCS: Performed by: STUDENT IN AN ORGANIZED HEALTH CARE EDUCATION/TRAINING PROGRAM

## 2020-11-19 PROCEDURE — 80048 BASIC METABOLIC PNL TOTAL CA: CPT

## 2020-11-19 PROCEDURE — 97535 SELF CARE MNGMENT TRAINING: CPT

## 2020-11-19 RX ORDER — MULTIVIT WITH MINERALS/LUTEIN
1000 TABLET ORAL DAILY
Qty: 30 TABLET | Refills: 1 | Status: SHIPPED
Start: 2020-11-19 | End: 2021-03-08 | Stop reason: SDUPTHER

## 2020-11-19 RX ORDER — HYDROXYCHLOROQUINE SULFATE 200 MG/1
300 TABLET, FILM COATED ORAL DAILY
Qty: 60 TABLET | Refills: 1 | Status: SHIPPED
Start: 2020-11-20 | End: 2021-03-23 | Stop reason: SDUPTHER

## 2020-11-19 RX ADMIN — FOLIC ACID 1 MG: 1 TABLET ORAL at 09:17

## 2020-11-19 RX ADMIN — OXYCODONE HYDROCHLORIDE 10 MG: 10 TABLET ORAL at 09:27

## 2020-11-19 RX ADMIN — INSULIN LISPRO 4 UNITS: 100 INJECTION, SOLUTION INTRAVENOUS; SUBCUTANEOUS at 17:38

## 2020-11-19 RX ADMIN — INSULIN LISPRO 3 UNITS: 100 INJECTION, SOLUTION INTRAVENOUS; SUBCUTANEOUS at 20:06

## 2020-11-19 RX ADMIN — INSULIN LISPRO 6 UNITS: 100 INJECTION, SOLUTION INTRAVENOUS; SUBCUTANEOUS at 12:19

## 2020-11-19 RX ADMIN — OXYCODONE HYDROCHLORIDE 10 MG: 10 TABLET ORAL at 20:23

## 2020-11-19 RX ADMIN — OXYCODONE HYDROCHLORIDE 10 MG: 10 TABLET ORAL at 00:16

## 2020-11-19 RX ADMIN — INSULIN LISPRO 4 UNITS: 100 INJECTION, SOLUTION INTRAVENOUS; SUBCUTANEOUS at 09:24

## 2020-11-19 RX ADMIN — DOCUSATE SODIUM 50 MG AND SENNOSIDES 8.6 MG 2 TABLET: 8.6; 5 TABLET, FILM COATED ORAL at 09:18

## 2020-11-19 RX ADMIN — SODIUM CHLORIDE, PRESERVATIVE FREE 10 ML: 5 INJECTION INTRAVENOUS at 20:06

## 2020-11-19 RX ADMIN — POTASSIUM CHLORIDE 40 MEQ: 1500 TABLET, EXTENDED RELEASE ORAL at 09:33

## 2020-11-19 RX ADMIN — HYDROXYCHLOROQUINE SULFATE 300 MG: 200 TABLET, FILM COATED ORAL at 09:17

## 2020-11-19 RX ADMIN — OXYCODONE HYDROCHLORIDE 10 MG: 10 TABLET ORAL at 13:56

## 2020-11-19 RX ADMIN — ACETAMINOPHEN 650 MG: 325 TABLET ORAL at 00:15

## 2020-11-19 RX ADMIN — ONDANSETRON 4 MG: 2 INJECTION INTRAMUSCULAR; INTRAVENOUS at 11:52

## 2020-11-19 RX ADMIN — SODIUM CHLORIDE, PRESERVATIVE FREE 10 ML: 5 INJECTION INTRAVENOUS at 09:17

## 2020-11-19 RX ADMIN — ACETAMINOPHEN 650 MG: 325 TABLET ORAL at 09:17

## 2020-11-19 RX ADMIN — ENOXAPARIN SODIUM 40 MG: 40 INJECTION SUBCUTANEOUS at 09:18

## 2020-11-19 ASSESSMENT — PAIN DESCRIPTION - LOCATION
LOCATION: HIP
LOCATION: HIP

## 2020-11-19 ASSESSMENT — PAIN DESCRIPTION - DESCRIPTORS: DESCRIPTORS: ACHING;DISCOMFORT;SORE

## 2020-11-19 ASSESSMENT — PAIN SCALES - GENERAL
PAINLEVEL_OUTOF10: 7
PAINLEVEL_OUTOF10: 7
PAINLEVEL_OUTOF10: 6
PAINLEVEL_OUTOF10: 7
PAINLEVEL_OUTOF10: 8
PAINLEVEL_OUTOF10: 0

## 2020-11-19 ASSESSMENT — PAIN DESCRIPTION - FREQUENCY: FREQUENCY: CONTINUOUS

## 2020-11-19 ASSESSMENT — PAIN DESCRIPTION - PAIN TYPE
TYPE: SURGICAL PAIN
TYPE: SURGICAL PAIN

## 2020-11-19 ASSESSMENT — PAIN DESCRIPTION - ORIENTATION: ORIENTATION: RIGHT

## 2020-11-19 ASSESSMENT — PAIN DESCRIPTION - ONSET: ONSET: ON-GOING

## 2020-11-19 NOTE — PLAN OF CARE
Problem: Pain:  Goal: Pain level will decrease  Description: Pain level will decrease  Outcome: Ongoing  Goal: Control of acute pain  Description: Control of acute pain  11/19/2020 0153 by Behzad Belle RN  Outcome: Ongoing  11/18/2020 1410 by Ant Cruz RN  Outcome: Met This Shift  Goal: Control of chronic pain  Description: Control of chronic pain  Outcome: Ongoing     Problem: Falls - Risk of:  Goal: Will remain free from falls  Description: Will remain free from falls  11/19/2020 0153 by Behzad Belle RN  Outcome: Ongoing  11/18/2020 1410 by Ant Cruz RN  Outcome: Met This Shift  Goal: Absence of physical injury  Description: Absence of physical injury  11/19/2020 0153 by Behzad Belle RN  Outcome: Ongoing  11/18/2020 1410 by Ant Cruz RN  Outcome: Met This Shift     Problem: Skin Integrity:  Goal: Will show no infection signs and symptoms  Description: Will show no infection signs and symptoms  11/19/2020 0153 by Behzad Belle RN  Outcome: Ongoing  11/18/2020 1410 by Ant Cruz RN  Outcome: Met This Shift  Goal: Absence of new skin breakdown  Description: Absence of new skin breakdown  Outcome: Ongoing     Problem: Musculor/Skeletal Functional Status  Goal: Highest potential functional level  Outcome: Ongoing  Goal: Absence of falls  11/19/2020 0153 by Behzad Belle RN  Outcome: Ongoing  11/18/2020 1410 by Ant Cruz RN  Outcome: Met This Shift

## 2020-11-19 NOTE — PROGRESS NOTES
Physical Therapy  Facility/Department: [address] NEURO SPINE  Daily Treatment Note  NAME: Jen Barry  : 1950  MRN: [id number]    Date of Service: 2020     Referring Provider:  Carlos Barraza DO     Evaluating PT:  Jayashree Blackwood PT, DPT PT [id number]     Room #:  9096/4937-G  Diagnosis:  Comminuted intertrochanteric hip fracture, right. PMHx/PSHx:  Obesity, HTN, HLD, DM II, COPD, RA  Procedure/Surgery:  R Hip ORIF 2020  Precautions:  WBAT RLE, Falls  Equipment Needs:  [employer]     SUBJECTIVE:     Pt lives with spouse in a 2 story home with no stairs to enter. Bed is on first floor and bath is on first floor. Pt has flight of stairs to 2nd floor. Pt ambulated with no device independently PTA. Equipment Owned: None     OBJECTIVE:    Initial Evaluation  Date: 2020 Treatment Date: 2020 Short Term/ Long Term   Goals   AM-PAC 6 Clicks   41/     Was pt agreeable to Eval/treatment? Yes  yes     Does pt have pain?  Severe pain R Hip (agreeable yet, screams out during mobility) Severe pain R hip      Bed Mobility  Rolling: MaxA  Supine to sit: ModA x 2  Sit to supine: MaxA x 2  Scooting: MaxA Rolling: MaxA  Supine to sit: MaxA   Sit to supine: NT  Scooting: MaxA  Rolling: SBA  Supine to sit: Tiny  Sit to supine: Tiny  Scooting: Tiny   Transfers Sit to stand: MaxA x 2  Stand to sit: MaxA x 2  Stand pivot: NT  Sit to stand: Tiny  Stand to sit: ModA  Stand pivot: ModA Sit to stand: Tiny  Stand to sit: Tiny  Stand pivot: Tiny   Ambulation    NT 3' ModA WW  >25 feet with Tiny [employer]   Stair negotiation: ascended and descended  NT   >4 steps with single rail Tiny   ROM BUE:  See OT Note  BLE:  WFL       Strength BUE:  See OT Note  RLE: 1/5 hip, knee  3/5 ankle (limited by pain)  LLE: 4/5   Improve Strength 1/3 MMT Grade   Balance Sitting EOB:  Tiny  Dynamic Standing:  MaxA [employer]  Sitting EOB:  Tiny  Dynamic Standing:  MaxA [employer] Sitting EOB: Supervision  Dynamic Standing:  Tiny [employer]      Pt is A & O x 4  Sensation:  Pt denies numbness and tingling to extremities  Edema: WNL     Vitals:    Spo2 94%  PRE    Spo2 94% /76 POST      Patient education  Pt educated on role of PT    Patient response to education:   Pt verbalized understanding Pt demonstrated skill Pt requires further education in this area   x x x     ASSESSMENT:    Comments:   Pt received in supine agreeable to PT. Pt required assist of trunk and cues. Pt ambulating with step to, antalgic gait. Pt required cues to manipulate [employer]. Poor control during stand to sit transfer due to pain and weakness. Vitals monitored throughout. Patient would benefit from continued skilled PT to maximize functional mobility independence. Treatment:  Patient practiced and was instructed in the following treatment:     Bed mobility- verbal cues to facilitate independence   Functional transfers-Verbal cues for proper positioning and sequencing to perform transfers safely with maximum independence.  Gait training-Verbal cues for proper positioning and sequencing using assistive device to maximize functional mobility independence. PLAN:      PLAN OF CARE:    Current Treatment Recommendations     [x] Strengthening     [x] ROM   [x] Balance Training   [x] Endurance Training   [x] Transfer Training   [x] Gait Training   [x] Stair Training   [x] Positioning   [x] Safety and Education Training   [x] Patient/Caregiver Education   [x] HEP  [] Other       Patient is making good progress towards established goals. Will continue with current POC.       Time in  0940  Time out  1005    Total Treatment Time  23 minutes     CPT codes:  [] Gait training 02296 0 minutes  [] Manual therapy 00576 0 minutes  [x] Therapeutic activities 25910 23 minutes  [] Therapeutic exercises 94328 0 minutes  [] Neuromuscular reeducation 04141 0 minutes    Imtiaz Stone PT, DPT  [id number]

## 2020-11-19 NOTE — CARE COORDINATION
Transition of care : The patient is post op day #2 ORIF right hip and PT [facility] 11/24 and she ambulated 3 feed mod assist with a wheeled walker. Plan is for the patient to be discharged home today . She has a wheeled walker from [address] and a bsc already delivered to the room per her request today . [address] [place] [address] is following for home care. WE STILL NEED ORDERS .  I will follow

## 2020-11-19 NOTE — PROGRESS NOTES
Occupational Therapy  OT BEDSIDE TREATMENT NOTE      Date:2020  Patient Name: Radha Goins  MRN: [id number]  : 1950  Room: [address] [place] [address]     Evaluating OT: MAYELIN Zapata, OTR/L   License #[id number]     Referring physician: Zoran Cano DO      AM-PAC Daily Activity Raw Score:      Recommended Adaptive Equipment: BSC, shower chair       Diagnosis: Comminuted fracture of R hip       Surgery: S/P ORIF R LE 20      Pertinent Medical History: COPD, Diabetes mellitus, hypertension, kidney stone, osteoarthritis      Precautions:  Falls, Safety, WBAT R LE, catheter, [place]      Home Living: Pt lives with S.O in a 2 story home with o SHYLA and no hand rails. Bathroom setup: walk in shower with a 3-4 inch step, regular height commode    Equipment owned: Long handled shoe horn, reacher     Prior Level of Function:   Independent with ADLs ,  Independent with IADLs; using no AD for mobility.      Driving: Yes                           Medication Management Self  Occupation: Retired   Leisure:likes pet [employer]     Pain Level: Pt reports Mod RLE pain, no numeric value given.     Cognition: A&O: 4/4; Follows multi-step step directions              Memory:  Good-               Sequencing:  Fair +               Problem solving:  Fair+               Judgement/safety:  Fair                      Functional Assessment:     Initial Eval Status  Date: 20 Treatment Status  Date: 20 STGs = LTGs  Time frame: 2-4 days    Feeding Independent  Independent Independent   Grooming SBA  Pt. Able to to comb hair at EOB  Set up  Seated   Mod I  when seated   UB Dressing SBA   SBA  To don/doff gown seated EOB  Independent   LB Dressing Dependent  Pt. Unable to complete figure 4 technique to simulate donning/doffing B/L socks at EOB  Dependent  To don/doff socks seated EOB. Pt reports will have assist at home. Independent   Bathing Mod A with simulated tasks  Mod A  Simulated seated.  Educated pt on shower transfer technique  Mod I seated    Toileting Dependent   Pt. Had a catheter  Dependent  Recommend bedside commode  Independent   Bed Mobility  Supine to sit: Max A   Sit to supine: Max A      Log Rolling: NT  Max A- supine to sit  Educated pt on technique to increase independence. Supine to sit: Independent  Sit to supine: Independent       Functional Transfers    Sit > Stand: Max A with ww   Stand > Sit: Max A with ww   SPT: NT  Min A- sit<->stand  Cuing for hand placement and body mechanics  Mod I with ww for all functional transfers. Functional Mobility Max A with ww to take 3 side steps to EOB   Cues for sequencing with ww  Mod A- stand pivot transfer  Using w/w Mod I with ww  for all functional distances. Balance Sitting:        Static:  SBA     Dynamic:SBA      Standing: Max A with ww Sitting:        Static:  SBA     Dynamic: CGA      Standing: Min A with ww  Sitting:        Static:  Independent    Dynamic:Independent      Standing: Mod I with ww   Activity Tolerance Poor with light activity. Pt. Was limited by pain in her R LE, nausea and required encouragement to complete tasks during evaluation.      99 % Sp O2 on RA  97 bpm        Fair Good with moderate activity   Visual/  Perceptual Glasses: Yes     [facility]          Safety Fair with mod verbal cues for hand placement,  Sequencing and safety with ww  Fair Good with no verbal cues        Comments: Upon arrival pt supine in bed. Pt educated on techniques to increase independence and safety during ADL's, bed mobility, and functional transfers. Discussed home set up with pt, giving suggestions to increase safety at discharge. At end of session pt left seated in bedside chair, call light within reach. · Pt has made fair progress towards set goals.      · Continue with current plan of care    Treatment Time In: 9:51            Treatment Time Out: 10:15             Treatment Charges: Mins Units   Ther Ex  02863     Manual Therapy [address] [facility] Activities 21104 14 1   ADL/Home t 59052 10 1   Neuro Re-ed 70717     Group Therapy      Orthotic manage/training  72473     Non-Billable Time     Total Timed Treatment 24 [id number] [place] [address]

## 2020-11-19 NOTE — PROGRESS NOTES
[facility]  Internal Medicine Residency / [address]    Attending Physician Statement  I have discussed the case, including pertinent history and exam findings with the resident and the team.  I have seen and examined the patient and the key elements of the encounter have been performed by me. I have also personally reviewed imaging studies and labs. I agree with the assessment, plan and orders as documented by the resident. POD#2, doing well today. Pain is controlled. She denies chest pain, shortness of breath. She was able to sit on a chair but still had difficulty with ambulation. Dressing are clean, thigh compartment soft, distal pulses are strong. Assessment:  1. R intertrochanteric R hip fracture, POD #1 cephalomedullary nail fixation  2. RA, on HCQ and methotrexate  3. COPD, stable  4. Essential HTN  5. DM, non-insulin requiring    Plan  Plan for discharge tomorrow after 1 more day of PT/OT  Continue LMWH for DVT prophylaxis to complete 35 days  Resume mtx once with evidence of good healing, about 1 - 2 weeks. Continue hcq and folic acid. Adjust shortacting insulin for better blood sugar control  Continue incentive spiromentry        Remainder of medical problems as per resident note. Fernando Shabazz MD  Internal Medicine Residency Faculty

## 2020-11-20 VITALS
WEIGHT: 220 LBS | HEIGHT: 64 IN | TEMPERATURE: 99.1 F | BODY MASS INDEX: 37.56 KG/M2 | OXYGEN SATURATION: 96 % | SYSTOLIC BLOOD PRESSURE: 141 MMHG | HEART RATE: 95 BPM | DIASTOLIC BLOOD PRESSURE: 60 MMHG | RESPIRATION RATE: 16 BRPM

## 2020-11-20 LAB
ANION GAP SERPL CALCULATED.3IONS-SCNC: 12 MMOL/L (ref 7–16)
ANISOCYTOSIS: ABNORMAL
BASOPHILIC STIPPLING: ABNORMAL
BASOPHILS ABSOLUTE: 0.01 E9/L (ref 0–0.2)
BASOPHILS RELATIVE PERCENT: 0.3 % (ref 0–2)
BUN BLDV-MCNC: 16 MG/DL (ref 8–23)
CALCIUM SERPL-MCNC: 8.4 MG/DL (ref 8.6–10.2)
CHLORIDE BLD-SCNC: 96 MMOL/L (ref 98–107)
CO2: 23 MMOL/L (ref 22–29)
CREAT SERPL-MCNC: 1 MG/DL (ref 0.5–1)
EOSINOPHILS ABSOLUTE: 0.05 E9/L (ref 0.05–0.5)
EOSINOPHILS RELATIVE PERCENT: 1.3 % (ref 0–6)
GFR AFRICAN AMERICAN: >60
GFR NON-AFRICAN AMERICAN: 55 ML/MIN/1.73
GLUCOSE BLD-MCNC: 185 MG/DL (ref 74–99)
HCT VFR BLD CALC: 24.5 % (ref 34–48)
HEMOGLOBIN: 8.2 G/DL (ref 11.5–15.5)
IMMATURE GRANULOCYTES #: 0.05 E9/L
IMMATURE GRANULOCYTES %: 1.3 % (ref 0–5)
LYMPHOCYTES ABSOLUTE: 0.44 E9/L (ref 1.5–4)
LYMPHOCYTES RELATIVE PERCENT: 11.3 % (ref 20–42)
MCH RBC QN AUTO: 29.7 PG (ref 26–35)
MCHC RBC AUTO-ENTMCNC: 33.5 % (ref 32–34.5)
MCV RBC AUTO: 88.8 FL (ref 80–99.9)
METER GLUCOSE: 202 MG/DL (ref 74–99)
METER GLUCOSE: 318 MG/DL (ref 74–99)
MONOCYTES ABSOLUTE: 0.89 E9/L (ref 0.1–0.95)
MONOCYTES RELATIVE PERCENT: 22.9 % (ref 2–12)
NEUTROPHILS ABSOLUTE: 2.44 E9/L (ref 1.8–7.3)
NEUTROPHILS RELATIVE PERCENT: 62.9 % (ref 43–80)
OVALOCYTES: ABNORMAL
PDW BLD-RTO: 16.6 FL (ref 11.5–15)
PLATELET # BLD: 133 E9/L (ref 130–450)
PMV BLD AUTO: 8.7 FL (ref 7–12)
POIKILOCYTES: ABNORMAL
POLYCHROMASIA: ABNORMAL
POTASSIUM REFLEX MAGNESIUM: 4.6 MMOL/L (ref 3.5–5)
RBC # BLD: 2.76 E12/L (ref 3.5–5.5)
SODIUM BLD-SCNC: 131 MMOL/L (ref 132–146)
WBC # BLD: 3.9 E9/L (ref 4.5–11.5)

## 2020-11-20 PROCEDURE — 85025 COMPLETE CBC W/AUTO DIFF WBC: CPT

## 2020-11-20 PROCEDURE — 2580000003 HC RX 258: Performed by: STUDENT IN AN ORGANIZED HEALTH CARE EDUCATION/TRAINING PROGRAM

## 2020-11-20 PROCEDURE — 97530 THERAPEUTIC ACTIVITIES: CPT

## 2020-11-20 PROCEDURE — 36415 COLL VENOUS BLD VENIPUNCTURE: CPT

## 2020-11-20 PROCEDURE — 6370000000 HC RX 637 (ALT 250 FOR IP): Performed by: STUDENT IN AN ORGANIZED HEALTH CARE EDUCATION/TRAINING PROGRAM

## 2020-11-20 PROCEDURE — 6370000000 HC RX 637 (ALT 250 FOR IP): Performed by: INTERNAL MEDICINE

## 2020-11-20 PROCEDURE — 6360000002 HC RX W HCPCS: Performed by: STUDENT IN AN ORGANIZED HEALTH CARE EDUCATION/TRAINING PROGRAM

## 2020-11-20 PROCEDURE — 80048 BASIC METABOLIC PNL TOTAL CA: CPT

## 2020-11-20 PROCEDURE — 82962 GLUCOSE BLOOD TEST: CPT

## 2020-11-20 PROCEDURE — 99231 SBSQ HOSP IP/OBS SF/LOW 25: CPT | Performed by: INTERNAL MEDICINE

## 2020-11-20 PROCEDURE — 97535 SELF CARE MNGMENT TRAINING: CPT

## 2020-11-20 PROCEDURE — 6360000002 HC RX W HCPCS: Performed by: INTERNAL MEDICINE

## 2020-11-20 RX ORDER — ASPIRIN 81 MG/1
81 TABLET ORAL DAILY
Qty: 30 TABLET | Refills: 1 | Status: SHIPPED
Start: 2020-12-22 | End: 2021-03-08 | Stop reason: SDUPTHER

## 2020-11-20 RX ORDER — AMOXICILLIN 250 MG
2 CAPSULE ORAL DAILY PRN
Qty: 14 TABLET | Refills: 0 | Status: SHIPPED | OUTPATIENT
Start: 2020-11-20 | End: 2020-11-27

## 2020-11-20 RX ADMIN — INSULIN LISPRO 12 UNITS: 100 INJECTION, SOLUTION INTRAVENOUS; SUBCUTANEOUS at 09:39

## 2020-11-20 RX ADMIN — ONDANSETRON 4 MG: 2 INJECTION INTRAMUSCULAR; INTRAVENOUS at 09:37

## 2020-11-20 RX ADMIN — HYDROXYCHLOROQUINE SULFATE 300 MG: 200 TABLET, FILM COATED ORAL at 09:36

## 2020-11-20 RX ADMIN — OXYCODONE HYDROCHLORIDE 10 MG: 10 TABLET ORAL at 00:30

## 2020-11-20 RX ADMIN — INSULIN LISPRO 6 UNITS: 100 INJECTION, SOLUTION INTRAVENOUS; SUBCUTANEOUS at 12:54

## 2020-11-20 RX ADMIN — FOLIC ACID 1 MG: 1 TABLET ORAL at 09:37

## 2020-11-20 RX ADMIN — OXYCODONE HYDROCHLORIDE 10 MG: 10 TABLET ORAL at 14:46

## 2020-11-20 RX ADMIN — OXYCODONE HYDROCHLORIDE 10 MG: 10 TABLET ORAL at 09:37

## 2020-11-20 RX ADMIN — SODIUM CHLORIDE, PRESERVATIVE FREE 10 ML: 5 INJECTION INTRAVENOUS at 09:37

## 2020-11-20 RX ADMIN — ENOXAPARIN SODIUM 40 MG: 40 INJECTION SUBCUTANEOUS at 09:37

## 2020-11-20 ASSESSMENT — PAIN SCALES - GENERAL
PAINLEVEL_OUTOF10: 8
PAINLEVEL_OUTOF10: 7

## 2020-11-20 ASSESSMENT — PAIN DESCRIPTION - DESCRIPTORS
DESCRIPTORS: ACHING;THROBBING;TENDER
DESCRIPTORS: ACHING;DISCOMFORT;DULL;SORE;TENDER

## 2020-11-20 ASSESSMENT — PAIN - FUNCTIONAL ASSESSMENT
PAIN_FUNCTIONAL_ASSESSMENT: PREVENTS OR INTERFERES SOME ACTIVE ACTIVITIES AND ADLS
PAIN_FUNCTIONAL_ASSESSMENT: PREVENTS OR INTERFERES SOME ACTIVE ACTIVITIES AND ADLS

## 2020-11-20 ASSESSMENT — PAIN DESCRIPTION - FREQUENCY
FREQUENCY: CONTINUOUS
FREQUENCY: CONTINUOUS

## 2020-11-20 ASSESSMENT — PAIN DESCRIPTION - ONSET
ONSET: ON-GOING
ONSET: ON-GOING

## 2020-11-20 ASSESSMENT — PAIN DESCRIPTION - PAIN TYPE
TYPE: SURGICAL PAIN
TYPE: SURGICAL PAIN

## 2020-11-20 ASSESSMENT — PAIN DESCRIPTION - PROGRESSION
CLINICAL_PROGRESSION: NOT CHANGED
CLINICAL_PROGRESSION: NOT CHANGED

## 2020-11-20 ASSESSMENT — PAIN DESCRIPTION - LOCATION
LOCATION: HIP
LOCATION: HIP

## 2020-11-20 ASSESSMENT — PAIN DESCRIPTION - ORIENTATION
ORIENTATION: RIGHT
ORIENTATION: RIGHT

## 2020-11-20 NOTE — PROGRESS NOTES
Physical Therapy  Facility/Department: [address] NEURO SPINE  Daily Treatment Note  NAME: Herbie Vaughn  : 1950  MRN: [id number]    Date of Service: 2020   Referring Provider:  DO Shu Lopez PT: Toby Hamilton PT, DPT PT [id number]     Room #:  5209/5209-A  Diagnosis:  Comminuted intertrochanteric hip fracture, right. PMHx/PSHx:  Obesity, HTN, HLD, DM II, COPD, RA  Procedure/Surgery:  R Hip ORIF 2020  Precautions:  WBAT RLE, Falls  Equipment Needs:  WW     SUBJECTIVE:     Pt lives with spouse in a 2 story home with no stairs to enter.  Bed is on first floor and bath is on first floor.  Pt has flight of stairs to 2nd floor.  Pt ambulated with no device independently PTA. Equipment Owned: States  that she has a manual w/c and is working on obtaining a hospital bed.     OBJECTIVE:    Initial Evaluation  Date: 2020 Treatment Date: 2020 Short Term/ Long Term   Goals   AM-PAC 6 Clicks 3/36  17/74     Was pt agreeable to Eval/treatment? Yes  yes     Does pt have pain?  Severe pain R Hip (agreeable yet, screams out during mobility) Severe pain R hip      Bed Mobility  Rolling: MaxA  Supine to sit: ModA x 2  Sit to supine: MaxA x 2  Scooting: MaxA Rolling: SBA  Supine to sit: SBA from bedrail and HOB elevated   ModA flat with no rail  Sit to supine: ModA  Scooting: SBA Rolling: SBA  Supine to sit: SBA  Sit to supine: SBA  Scooting: SBA   Transfers Sit to stand: MaxA x 2  Stand to sit: MaxA x 2  Stand pivot: NT  Sit to stand: Tiny [employer]  Stand to sit: Tiny  Stand pivot: Tiny Sit to stand: SBA  Stand to sit: SBA  Stand pivot: SBA   Ambulation    NT 3', 5' Tiny [employer] >25 feet with [employer]   Stair negotiation: ascended and descended  NT NT  >4 steps with single rail Tiny   ROM BUE:  See OT Note  BLE:  WFL       Strength BUE:  See OT Note  RLE: 1/5 hip, knee  3/5 ankle (limited by pain)  LLE: 4/5   Improve Strength 1/3 MMT Grade   Balance Sitting EOB:  Tiny  Dynamic Standing:  MaxA [employer]  Sitting EOB:  SBA  Dynamic Standing:  MinAWW Sitting EOB: Supervision  Dynamic Standing:  Tiny [employer]      Pt is A & O x 4  Sensation:  Pt denies numbness and tingling to extremities  Edema:  WNL    Patient education  Pt educated on role of PT    Patient response to education:   Pt verbalized understanding Pt demonstrated skill Pt requires further education in this area   x x x     ASSESSMENT:    Comments:  Pt received in supine agreeable to PT. Pt performed bed mobility without assistance with HOB elevated using bedside rail as she was adamant that she had a hospital bed at home. Pt later stating she is now unsure if she will be able to obtain hospital bed and bed mobility was performed from a flat surface. Pt requires assistance and cues for functional transfers. Pt ambulating with decreased speed, antalgic, step to pattern. Pt distance limited by pain. Pt ambulated to [facility] to void, and then ambulated 2nd bout. Patient would benefit from continued skilled PT to maximize functional mobility independence. Treatment:  Patient practiced and was instructed in the following treatment:     Bed mobility- verbal cues to facilitate independence   Functional transfers-Verbal cues for proper positioning and sequencing to perform transfers safely with maximum independence.  Gait training-Verbal cues for proper positioning and sequencing using assistive device to maximize functional mobility independence. PLAN:      PLAN OF CARE:    Current Treatment Recommendations     [x] Strengthening     [x] ROM   [x] Balance Training   [x] Endurance Training   [x] Transfer Training   [x] Gait Training   [x] Stair Training   [x] Positioning   [x] Safety and Education Training   [x] Patient/Caregiver Education   [x] HEP  [] Other       Patient is making good progress towards established goals. Will continue with current POC.       Time in  0830  Time out  0900    Total Treatment Time  23 minutes     CPT codes:  [] Gait training

## 2020-11-20 NOTE — CARE COORDINATION
Transition of care : I set her up to leave with [facility] ambulance however the only time they have is 10 pm today I said that will not work please [phone]. . The patient was outsourced to [employer] and they will be here at 3 pm. Plan is to home today.

## 2020-11-20 NOTE — PROGRESS NOTES
Sent a message to Dr. Pelon Good via perfect serve. Can you please put [facility] orders in for this patient. She is being D/C today.

## 2020-11-20 NOTE — PROGRESS NOTES
CLINICAL PHARMACY NOTE: MEDS TO [address] Select Patient?: Yes  Total # of Prescriptions Filled: 2   The following medications were delivered to the patient:  · Enoxaparin  0.4 ml  · Methotrexate 2.5 mg  Total # of Interventions Completed: 4  Time Spent (min): 15    Additional Documentation:  The plaquenil doctor ordered, patient did not want.  She has plenty at home

## 2020-11-20 NOTE — PROGRESS NOTES
[facility]  Internal Medicine Residency Program  Progress Note - House Team 1    Patient:  Skinny Gallagher 72 y.o. female MRN: [id number]     Date of Service: 11/19/2020     CC: Fall in kitchen    Overnight events: None    Days since admission: 2    Subjective     11/18: Patient underwent surgery yesterday evening for rt hip fracture. Today she is awake, alert and following commands. Complains of moderate pain in right hip. Has started eating food today . Denies SOB, chest pain, fevers, cough, palpitations. 11/19: Patient stable and eating better. No new complaits. Following physical therapy . Denies fever, chest pain, SOB,paresthesias. Can move both feet . Objective     Physical Exam:  Vitals: /62   Pulse 100   Temp 99.8 °F (37.7 °C) (Temporal)   Resp 18   Ht 5' 4\" (1.626 m)   Wt 220 lb (99.8 kg)   SpO2 95%   BMI 37.76 kg/m²     I & O - 24hr:     Intake/Output Summary (Last 24 hours) at 11/19/2020 2029  Last data filed at 11/19/2020 2008  Gross per 24 hour   Intake 180 ml   Output 1400 ml   Net -1220 ml      General Appearance: alert, appears stated age and cooperative  HEENT:  Head: Normal, normocephalic, atraumatic. Neck: no adenopathy, no carotid bruit, no JVD, supple, symmetrical, trachea midline and thyroid not enlarged, symmetric, no tenderness/mass/nodules  Lung: clear to auscultation bilaterally  Heart: regular rate and rhythm, S1, S2 normal, no murmur, click, rub or gallop  Abdomen: soft, non-tender; bowel sounds normal; no masses,  no organomegaly  Extremities:  extremities normal, atraumatic, no cyanosis or edema and dressings present over lateral aspect of right thigh peripheral pulses intact and no swelling/redness/paresthesias/pallor/ excessive pain out of proportion   Musculokeletal: No joint swelling, no muscle tenderness. ROM normal in all joints of extremities.    Neurologic: Mental status: Alert, oriented, thought content appropriate  Subject  Pertinent Information & Imaging Studies, Consults   anthony  CBC with Differential:    Lab Results   Component Value Date    WBC 4.5 11/19/2020    RBC 2.98 11/19/2020    HGB 8.7 11/19/2020    HCT 26.6 11/19/2020     11/19/2020    MCV 89.3 11/19/2020    MCH 29.2 11/19/2020    MCHC 32.7 11/19/2020    RDW 16.3 11/19/2020    NRBC 0.9 11/18/2020    SEGSPCT 55 02/03/2012    SEGSPCT 69 10/22/2010    METASPCT 0.9 11/19/2020    LYMPHOPCT 8.7 11/19/2020    MONOPCT 11.3 11/19/2020    MYELOPCT 0.9 03/30/2019    EOSPCT 1 10/22/2010    BASOPCT 0.2 11/19/2020    MONOSABS 0.50 11/19/2020    LYMPHSABS 0.41 11/19/2020    EOSABS 0.00 11/19/2020    BASOSABS 0.00 11/19/2020     CMP:    Lab Results   Component Value Date     11/19/2020    K 4.5 11/19/2020    CL 98 11/19/2020    CO2 23 11/19/2020    BUN 13 11/19/2020    CREATININE 1.0 11/19/2020    GFRAA >60 11/19/2020    LABGLOM 55 11/19/2020    GLUCOSE 201 11/19/2020    GLUCOSE 125 04/05/2012    PROT 6.6 10/29/2020    LABALBU 4.4 10/29/2020    LABALBU 4.3 03/25/2011    CALCIUM 8.1 11/19/2020    BILITOT 0.7 10/29/2020    ALKPHOS 47 10/29/2020    AST 24 10/29/2020    ALT 18 10/29/2020       IMAGING:   XR Right hip on admission 11/16     XR s/p surgery 11/17      PROCEDURES:Left cephalomedullary nail fixation of left intertrochanteric femur fracture - 11/17    FLUIDS: None    ANTIBIOTICS:  Received cefazolin 3 doses pre-op     OXYGENATION: Maintaining on room air     DIET: General    CONSULTS:   Orthopedic surgery     Resident's Assessment and Plan     Charla Wood is a 72 y.o. female with  has a past medical history of COPD (chronic obstructive pulmonary disease) ([address] Diabetes mellitus type II, controlled ([facility] [address] Diverticulosis, Hyperlipidemia, Hypertension, Kidney stone, Obesity, Class III, BMI 40-49.9 (morbid obesity) ([facility] [address] Osteoarthritis, Osteoporosis, Rheumatoid arthritis involving multiple sites ([facility] [address] Sepsis ([facility] [address] Tobacco abuse, and Tubular 140-180    COPD  - Currently stable, continue incentive spirometry     HTN  On lisinopril at home, currently held  - Giving labetalol PRN   - Continue to monitor BP     Rheumatoid arthritis  - On weekly MTX+ daily HCQ+ folic acid  - MTX held to allow good healing of fracture, can resume 1-2 weeks after discharge  - Continue daily HCQ, folic acid    GERD  - Continue protonix 40mg     HLD  - on rosuvastatin at home, held currently, will resume as outpatient      Problems Resolved during this stay:   -Acute intertrochanteric fracture    PT/OT evaluation: 8/24  DVT prophylaxis/ GI prophylaxis: LMWH+ intermittent PCD/ Protonix  Discharge plan: home with [facility]    Final Note :  Continue PT. Pain control and fracture management as per Ortho. Continue to monitor. LMWH to continue for 35 days-> Resume aspirin for primary prevention after that.  Plan to discharge tomorrow    Mili Mixon MD, PGY-1  Attending physician: Dr. Maritza Morales

## 2020-11-20 NOTE — PLAN OF CARE
Problem: Pain:  Goal: Pain level will decrease  Description: Pain level will decrease  Outcome: Ongoing  Goal: Control of acute pain  Description: Control of acute pain  Outcome: Ongoing  Goal: Control of chronic pain  Description: Control of chronic pain  Outcome: Ongoing     Problem: Falls - Risk of:  Goal: Will remain free from falls  Description: Will remain free from falls  Outcome: Ongoing  Goal: Absence of physical injury  Description: Absence of physical injury  Outcome: Ongoing     Problem: Skin Integrity:  Goal: Will show no infection signs and symptoms  Description: Will show no infection signs and symptoms  Outcome: Ongoing  Goal: Absence of new skin breakdown  Description: Absence of new skin breakdown  Outcome: Ongoing     Problem: Musculor/Skeletal Functional Status  Goal: Highest potential functional level  Outcome: Ongoing  Goal: Absence of falls  Outcome: Ongoing

## 2020-11-20 NOTE — PLAN OF CARE
Problem: Falls - Risk of:  Goal: Will remain free from falls  Description: Will remain free from falls  11/20/2020 1523 by Aguilar Srinivasan RN  Outcome: Met This Shift  11/20/2020 0157 by Ervin Edwards RN  Outcome: Ongoing  Goal: Absence of physical injury  Description: Absence of physical injury  11/20/2020 1523 by Aguilar Srinivasan RN  Outcome: Met This Shift  11/20/2020 0157 by Ervin Edwards RN  Outcome: Ongoing     Problem: Skin Integrity:  Goal: Will show no infection signs and symptoms  Description: Will show no infection signs and symptoms  11/20/2020 1523 by Aguilar Srinivasan RN  Outcome: Met This Shift  11/20/2020 0157 by rEvin Edwards RN  Outcome: Ongoing  Goal: Absence of new skin breakdown  Description: Absence of new skin breakdown  11/20/2020 1523 by Aguilar Srinivasan RN  Outcome: Met This Shift  11/20/2020 0157 by Ervin Edwards RN  Outcome: Ongoing     Problem: Musculor/Skeletal Functional Status  Goal: Absence of falls  11/20/2020 1523 by Aguilar Srinivasan RN  Outcome: Met This Shift  11/20/2020 0157 by Ervin Edwards RN  Outcome: Ongoing     Problem: Pain:  Goal: Pain level will decrease  Description: Pain level will decrease  11/20/2020 1523 by Aguilar Srinivasan RN  Outcome: Ongoing  11/20/2020 0157 by Ervin Edwards RN  Outcome: Ongoing  Goal: Control of acute pain  Description: Control of acute pain  11/20/2020 1523 by Aguilar Srinivasan RN  Outcome: Ongoing  11/20/2020 0157 by Ervin Edwards RN  Outcome: Ongoing  Goal: Control of chronic pain  Description: Control of chronic pain  11/20/2020 1523 by Aguilar Srinivasan RN  Outcome: Ongoing  11/20/2020 0157 by Ervin Edwards RN  Outcome: Ongoing     Problem: Musculor/Skeletal Functional Status  Goal: Highest potential functional level  11/20/2020 1523 by Aguilar Srinivasan RN  Outcome: Ongoing  11/20/2020 0157 by Ervin Edwards RN  Outcome: Ongoing

## 2020-11-20 NOTE — CARE COORDINATION
Transition of care : the plan is for the patient to return home today and she has all her dme needed. Still awaiting [place] [address] orders . [facility] is already following the patient.  I will follow

## 2020-11-20 NOTE — PROGRESS NOTES
Occupational Therapy  OT BEDSIDE TREATMENT NOTE      Date:2020  Patient Name: Jia Lemos  MRN: [id number]  : 1950  Room: [address] [place] [address]     Evaluating OT: MAYELIN Lara, OTR/L   License #[id number]     Referring physician: Gabriel Seymour DO      AM-PAC Daily Activity Raw Score:      Recommended Adaptive Equipment: BSC, shower chair       Diagnosis: Comminuted fracture of R hip       Surgery: S/P ORIF R LE 20      Pertinent Medical History: COPD, Diabetes mellitus, hypertension, kidney stone, osteoarthritis      Precautions:  Falls, Safety, WBAT R LE, catheter, [place]      Home Living: Pt lives with S.O in a 2 story home with o SHYLA and no hand rails. Bathroom setup: walk in shower with a 3-4 inch step, regular height commode    Equipment owned: Long handled shoe horn, reacher     Prior Level of Function:   Independent with ADLs ,  Independent with IADLs; using no AD for mobility.      Driving: Yes                           Medication Management Self  Occupation: Retired   Leisure:likes pet birds     Pain Level: Pt reports Mod RLE pain, increases with movement.     Cognition: A&O: 4/4; Follows multi-step step directions              Memory:  Good-               Sequencing:  Fair +               Problem solving:  Fair+               Judgement/safety:  Fair                      Functional Assessment:     Initial Eval Status  Date: 20 Treatment Status  Date: 20 STGs = LTGs  Time frame: 2-4 days    Feeding Independent  Independent Independent   Grooming SBA  Pt. Able to to comb hair at EOB  Set up  For simple grooming task seated Mod I  when seated   UB Dressing SBA   SBA  To don/doff gown seated  Independent   LB Dressing Dependent  Pt. Unable to complete figure 4 technique to simulate donning/doffing B/L socks at EOB  Max A  To don/doff socks seated EOB. Pt reports will have assist at home. Educated on technique to don pants.  Independent   Bathing Mod A with simulated tasks  Mod A  Simulated seated. Recommend shower chair Mod I seated    Toileting Dependent   Pt. Had a catheter  Mod A  For hygiene when standing. Using bedside commode  Independent   Bed Mobility  Supine to sit: Max A   Sit to supine: Max A      Log Rolling: NT  Max A- supine to sit  Per last tx   Supine to sit: Independent  Sit to supine: Independent       Functional Transfers    Sit > Stand: Max A with ww   Stand > Sit: Max A with ww   SPT: NT  Min A- sit<->stand  Cuing for hand placement and body mechanics  Mod I with ww for all functional transfers. Functional Mobility Max A with ww to take 3 side steps to EOB   Cues for sequencing with ww  Min A  Less than home distance  Using w/w  Min A- toilet transfer (bedside commode)   Mod I with ww  for all functional distances. Balance Sitting:        Static:  SBA     Dynamic:SBA      Standing: Max A with ww Sitting:        Static:  Ind     Dynamic: SBA      Standing: Min A with ww  Sitting:        Static:  Independent    Dynamic:Independent      Standing: Mod I with ww   Activity Tolerance Poor with light activity. Pt. Was limited by pain in her R LE, nausea and required encouragement to complete tasks during evaluation.      99 % Sp O2 on RA  97 bpm        Fair Good with moderate activity   Visual/  Perceptual Glasses: Yes     Vision: [facility]          Safety Fair with mod verbal cues for hand placement,  Sequencing and safety with ww  Fair Good with no verbal cues        Comments: Upon arrival pt seated on bedside commode. Pt educated on techniques to increase independence and safety during ADL's, and functional transfers. Discussed home set up with pt, giving suggestions to increase safety at discharge. At end of session pt left seated in bedside chair, call light within reach. · Pt has made fair progress towards set goals.      · Continue with current plan of care    Treatment Time In: 9:20            Treatment Time Out: 9:44            Treatment Charges: Mins Units Ther Ex  22558     Manual Therapy Marla Villalobos [phone] [id number] 1   ADL/Home Mgt 85040 10 1   Neuro Re-ed 81225     Group Therapy      Orthotic manage/training  74064     Non-Billable Time     Total Timed Treatment 24 2       Timur Fields 86

## 2020-11-20 NOTE — PROGRESS NOTES
EOB:  Tiny  Dynamic Standing:  MaxA [employer]  Sitting EOB:  SBA  Dynamic Standing:  MinAWW Sitting EOB: Supervision  Dynamic Standing:  Tiny [employer]      Pt is A & O x 4  Sensation:  Pt denies numbness and tingling to extremities  Edema:  WNL     Patient education  Pt educated on role of PT    Patient response to education:   Pt verbalized understanding Pt demonstrated skill Pt requires further education in this area   x x x     ASSESSMENT:    Comments:  Pt received in bedside chair agreeable to PT. Pt requires cues and assistance for functional transfers and ambulation. Pt ambulated to [facility] to void initially, then performed additional ambulation bout to bed. Pt continues to require steadying assistance and cues for sequencing. Patient would benefit from continued skilled PT to maximize functional mobility independence. Treatment:  Patient practiced and was instructed in the following treatment:     Bed mobility- verbal cues to facilitate independence   Functional transfers-Verbal cues for proper positioning and sequencing to perform transfers safely with maximum independence.  Gait training-Verbal cues for proper positioning and sequencing using assistive device to maximize functional mobility independence. PLAN:      PLAN OF CARE:    Current Treatment Recommendations     [x] Strengthening     [x] ROM   [x] Balance Training   [x] Endurance Training   [x] Transfer Training   [x] Gait Training   [x] Stair Training   [x] Positioning   [x] Safety and Education Training   [x] Patient/Caregiver Education   [x] HEP  [] Other       Patient is making good progress towards established goals. Will continue with current POC.       Time in  1052  Time out  1116    Total Treatment Time  24 minutes     CPT codes:  [] Gait training 30730 0 minutes  [] Manual therapy 83550 0 minutes  [x] Therapeutic activities 16036 24 minutes  [] Therapeutic exercises 40358 0 minutes  [] Neuromuscular reeducation 33794 0 minutes    Andriette Fothergill Artem Cordova, DPT  [id number]

## 2020-11-21 ENCOUNTER — CARE COORDINATION (OUTPATIENT)
Dept: CASE MANAGEMENT | Age: 70
End: 2020-11-21

## 2020-11-21 PROCEDURE — 1111F DSCHRG MED/CURRENT MED MERGE: CPT | Performed by: INTERNAL MEDICINE

## 2020-11-21 NOTE — CARE COORDINATION
[facility] Transitions Initial Follow Up Call    Call within 2 business days of discharge: Yes    Patient: Be Gallagher Patient : 1950   MRN: [id number]  Reason for Admission: Comminuted fracture of right hip and s/p right hip ORIF  Discharge Date: 20 RARS: Readmission Risk Score: 16      Last Discharge [facility]       Complaint Diagnosis Description Type Department Provider    20 Fall Closed comminuted intertrochanteric fracture of right femur, initial encounter ([facility] [address] . .. ED to Hosp-Admission (Discharged) (ADMITTED) [facility] 5S NS Marek Alex MD; Bridget Gutierrez... Challenges to be reviewed by the provider   Additional needs identified to be addressed with provider No  none    Discussed COVID-19 related testing which was not done at this time. Test results were not done. Patient informed of results, if available? No noted COVID testing done with recent admission. Method of communication with provider : none    Advance Care Planning:   Does patient have an Advance Directive:  decision maker updated. Was this a readmission? No  Patient stated reason for admission: fall resulting in broken hip  Patients top risk factors for readmission: functional physical ability, level of motivation, medical condition and polypharmacy    Care Transition Nurse (CTN) contacted the patient by telephone to perform post hospital discharge assessment. Verified name and  with patient as identifiers. Provided introduction to self, and explanation of the CTN role. CTN reviewed discharge instructions, medical action plan and red flags with patient who verbalized understanding. Patient given an opportunity to ask questions and does not have any further questions or concerns at this time. Were discharge instructions available to patient? Yes.  Reviewed appropriate site of care based on symptoms and resources available to patient including: PCP, Specialist, Home health and When to call 911. The patient agrees to contact the PCP office for questions related to their healthcare. Medication reconciliation was performed with patient, who verbalizes understanding of administration of home medications. Advised obtaining a 90-day supply of all daily and as-needed medications. Covid Risk Education    Patient has following risk factors of: COPD and diabetes. Education provided regarding infection prevention, and signs and symptoms of COVID-19 and when to seek medical attention with patient who verbalized understanding. Discussed exposure protocols and quarantine From CDC: Are you at higher risk for severe illness?   and given an opportunity for questions and concerns. The patient agrees to contact the COVID-19 hotline [phone] or PCP office for questions related to COVID-19. For more information on steps you can take to protect yourself, see CDC's How to Protect Yourself     Patient/family/caregiver given information for [employer] and agrees to enroll no  Patient's preferred e-mail: declines  Patient's preferred phone number: declines    Discussed follow-up appointments. If no appointment was previously scheduled, appointment scheduling offered: Yes. Is follow up appointment scheduled within 7 days of discharge? No, CTN confirmed with patient she will follow up with [facility]CP [facility] and inquire about a telephone or vv option    Plan for follow-up call in 5-7 days based on severity of symptoms and risk factors. Plan for next call: symptom management-Has pain and nausea improved? and follow up appointment-Did patient get scheduled for PCP and ortho follow up appts?     Non-face-to-face services provided:  Scheduled appointment with PCP-CTN confirmed with patient she will follow up with PCP at [facility] and inquire about a telephone/vv option  Scheduled appointment with Specialist-CTN confirmed [facility] patient she will follow up with Dr. Grace Daniel (ortho)  Obtained and reviewed discharge summary and/or continuity of care documents  Education of patient/family/caregiver/guardian to support self-management-CTN advised to rotate injection sites while she's on :ovenox therapy  Assessment and support for treatment adherence and medication management-CTN advised of importance to obtain Pericolace to help with constipation. Care Transitions 24 Hour Call    Schedule Follow Up Appointment with PCP:  Khoi  you have any ongoing symptoms?:  Yes  Patient-reported symptoms:  Pain, Nausea  Do you have a copy of your discharge instructions?:  Yes  Do you have all of your prescriptions and are they filled?:  No  Have you been contacted by a [address]?:  No  Have you scheduled your follow up appointment?:  No  Were you discharged with any Home Care or Post Acute Services:  Yes  Post Acute Services:  Home Health (Comment: [facility])  Do you have support at home?:  Partner/Spouse/SO  Do you feel like you have everything you need to keep you well at home?:  No  Are you an active caregiver in your home?:  No  Care Transitions Interventions       Spoke with patient today 11/21/20 for initial TCM/hospital discharge follow up. Confirmed patient underwent right hip ORIF after falling and sustaining a right hip fracture. Patient complains of right hip pain which she rates 7/10 in severity on pain scale. States she is getting relief from prescribed Oxycodone. States dressing to right hip is dry. Confirmed with patient she has family assistance and obtained hospital bed, walker and wheelchair. Denies any shortness of breath, chest pain or calf pain. States having some nausea when standing up but no vomiting. Denies any chills or fever. Noted no COVID testing done during IP admission. Patient states she has not moved bowels but is passing gas.  Advised of the importance to obtain Pericolace ordered on dsicharge which she intends on having transferred from [facility] to local pharmacy. CTN provided OP pharmacy number per patient request to have transferred. Pt aware that oxycodone can cause constipation. Patient states she monitors BS once a day. Pt checked BS while on phone with this CTN and advises BS reading this morning is 248 which is a spot check. Pt admits to drinking coffee with cream/sugar in it. CTN emphasized to follow low sugar/low carbohydrate diet as part of DM management. Denies any s/s of hyperglycemia. CTN confirmed with patient she will follow up with PCP at [address] inquiring about telephone/vv option and will call Dr. Supa Daly (ortho). Denies any other complaints or concerns at this time. CTN will continue to follow for Care Transition. Follow Up  Future Appointments   Date Time Provider Mil Dueñas   2020  9:30 AM Aissatou Schwartz MD [facility]   2021  1:45 PM Lary Fortune MD [facility]     CTN provided contact information for future needs.     NHAN Melo

## 2020-11-25 NOTE — PROGRESS NOTES
Physician Progress Note      PATIENTDawrene GIVENS #:                  [id number]  :                       1950  ADMIT DATE:       2020 12:33 PM  [address] DATE:        2020 3:15 PM  RESPONDING  PROVIDER #:        Rhys Medina MD          QUERY TEXT:    Pt admitted with right intertrochanteric hip fracture. Pt noted to have a 3   gram drop in hemoglobin If possible, please document in the progress notes and   discharge summary if you are evaluating and/or treating any of the following: The medical record reflects the following:  Risk Factors: Right intertrochanteric hip fracture  Clinical Indicators: Right trochanteric fracture open reduction and internal   fixation, 50 ml blood loss during surgery, Hgb 11.5-8.2, Hct 34- 24.4, Sinus   tachycardia  Treatment: Routine labs, monitor H&H    Thank you,  Marisa Kaiser RN, BSN  Options provided:  -- Acute blood loss anemia  -- Postoperative acute blood loss anemia  -- Dilutional anemia  -- Other - I will add my own diagnosis  -- Disagree - Not applicable / Not valid  -- Disagree - Clinically unable to determine / Unknown  -- Refer to Clinical Documentation Reviewer    PROVIDER RESPONSE TEXT:    This patient has postoperative acute blood loss anemia.     Query created by: Brigid Hamilton on 2020 2:55 PM      Electronically signed by:  Rhys Medina MD 2020 7:24 AM

## 2020-11-30 ENCOUNTER — VIRTUAL VISIT (OUTPATIENT)
Dept: INTERNAL MEDICINE | Age: 70
End: 2020-11-30
Payer: MEDICARE

## 2020-11-30 ENCOUNTER — OFFICE VISIT (OUTPATIENT)
Dept: ORTHOPEDIC SURGERY | Age: 70
End: 2020-11-30

## 2020-11-30 ENCOUNTER — CARE COORDINATION (OUTPATIENT)
Dept: CASE MANAGEMENT | Age: 70
End: 2020-11-30

## 2020-11-30 VITALS — HEIGHT: 64 IN | TEMPERATURE: 97.2 F | WEIGHT: 220 LBS | BODY MASS INDEX: 37.56 KG/M2

## 2020-11-30 PROCEDURE — G2012 BRIEF CHECK IN BY MD/QHP: HCPCS | Performed by: INTERNAL MEDICINE

## 2020-11-30 PROCEDURE — 99024 POSTOP FOLLOW-UP VISIT: CPT | Performed by: ORTHOPAEDIC SURGERY

## 2020-11-30 RX ORDER — PANTOPRAZOLE SODIUM 40 MG/1
40 TABLET, DELAYED RELEASE ORAL
Qty: 30 TABLET | Refills: 1 | Status: SHIPPED
Start: 2020-11-30 | End: 2021-03-08 | Stop reason: SDUPTHER

## 2020-11-30 RX ORDER — HYDROCODONE BITARTRATE AND ACETAMINOPHEN 5; 325 MG/1; MG/1
1 TABLET ORAL EVERY 6 HOURS PRN
Qty: 28 TABLET | Refills: 0 | Status: SHIPPED
Start: 2020-11-30 | End: 2020-12-18 | Stop reason: SDUPTHER

## 2020-11-30 RX ORDER — MULTIVIT-MIN/IRON/FOLIC ACID/K 18-600-40
2000 CAPSULE ORAL DAILY
Qty: 30 CAPSULE | Refills: 1 | Status: SHIPPED
Start: 2020-11-30 | End: 2021-03-08 | Stop reason: SDUPTHER

## 2020-11-30 RX ORDER — ROSUVASTATIN CALCIUM 5 MG/1
TABLET, COATED ORAL
Qty: 90 TABLET | Refills: 1 | Status: SHIPPED
Start: 2020-11-30 | End: 2021-06-01 | Stop reason: SDUPTHER

## 2020-11-30 RX ORDER — LISINOPRIL 20 MG/1
20 TABLET ORAL DAILY
Qty: 90 TABLET | Refills: 1 | Status: SHIPPED
Start: 2020-11-30 | End: 2021-06-14 | Stop reason: SDUPTHER

## 2020-11-30 RX ORDER — ALBUTEROL SULFATE 90 UG/1
2 AEROSOL, METERED RESPIRATORY (INHALATION) EVERY 6 HOURS PRN
Qty: 3 INHALER | Refills: 0 | Status: SHIPPED | OUTPATIENT
Start: 2020-11-30

## 2020-11-30 NOTE — PROGRESS NOTES
HPI:   Patient follows up today post op 12 days s/p Right cephalomedullary nail fixation to right reverse obliquity intertrochanteric femur fracture. No complaints. Pain controlled. Pt at home. Minimal ambulation    Physical Exam:  right incision clean, dry, and intact - staples intact  No signs of infection  ROM appropriate  NVI: +EHL/PF/DF  Sensation intact to S/S/SP/DP/T nerves  Brisk capillary refill    XR Right Hip (3v) AP, oblique and lateral demonstrating right intertrochanteric femur fracture with cephalomedullary nail fixation. No signs of failure  Impression: Right intertrochanteric femur fracture stabilized with a long cephalomedullary nail. XR right Femur: (2v) AP and lateral views demonstrating right intertrochanteric femur fracture with cephalomedullary nail fixation. There is some disruption of medial cortex of distal femur which is spanned by nail and screw fixation  Impression: Right intertrochanteric femur fracture stabilized with a long cephalomedullary nail. Assessment:  Post-op ay 12 s/p right cephalomedullary nail fixation of right intertrochanteric femur fracture    Plan:  Staples removed  WBAT  Continue therapy and strengthening  Transition to [employer] pain mediation  Discussed with patient the need for hospital bed secondary to her minimal ambulation and the need to be helped and frequently moved along with her pain she is experiencing. Prescription for hospital bed was provided. XR reviewed with patient  Follow up 4  weeks with x-rays      I have seen and evaluated the patient and agree with the above assessment and plan on today's visit. I have performed the key components of the history and physical examination with significant findings of 2 weeks postop. I concur with the findings and plan as documented.     Annalise Retana MD  11/30/2020

## 2020-11-30 NOTE — CARE COORDINATION
[facility] Transitions Follow Up Call    2020    Patient: Dyan Gallagher  Patient : 1950   MRN: [id number]  Reason for Admission: Comminuted fracture of right hip and s/p right hip ORIF  Discharge Date: 20 RARS: Readmission Risk Score: 16    -First attempt to reach the patient for sub Care Transition call post hospital discharge. Message left with CTN's contact information requesting return phone call.         Follow Up  Future Appointments   Date Time Provider Mil Dueñas   2020  1:40 PM Abida Mcdaniels MD [facility]   2021  1:45 PM Maryanne Cole MD [facility]   3/8/2021  9:00 AM Abelino Rutledge MD [facility]  Marquis Fitzgerald RN

## 2020-11-30 NOTE — PROGRESS NOTES
Consent:  The Patient and/or health care decision maker is aware that that he or she may receive a bill for this telephone service, depending on his insurance coverage, and has provided verbal consent to proceed: Yes      Documentation:  I communicated with the patient and/or health care decision maker about admission for acute hip fracture  Details of this discussion including any medical advice provided:       I affirm this is a Patient Initiated Episode with a Patient who has not had a related appointment within my department in the past 7 days or scheduled within the next 24 hours. Patient identification was verified at the start of the visit: Yes     TCM visit today. Discharged on 11/20 s/p right hip fracture with intervention by Orthopedics. Currently completing home health care. Support at home with family. Incremental improvement in symptoms including pain- reduced use of pain med to 1/2 tablet 2 times daily. Planned Ortho appt this afternoon with Dr. Kike Perez. Tolerating all meds. MTX on hold due to concern with bone healing. No additional complaints. Did note some loss of weight s/p intervention. Need to monitor. Tolerating lovenox injections for DVT prophylaxis. Follow-up needed in future as discussed. MED REC COMPLETED appropriately and verified. Non-face to face telephone encounter was completed within 2 business day. Review of Systems   Constitutional: Negative for activity change, chills and fever. Respiratory: Negative for shortness of breath and wheezing. Cardiovascular: Negative for chest pain, palpitations and leg swelling. Musculoskeletal: Positive for arthralgias and gait problem. Patient's location: home address in [facility]  Physician  location other address in [facility] other people involved in call- none. Gael Craven was seen today for medication refill.     Diagnoses and all orders for this visit:    Closed fracture of right hip with routine healing, subsequent encounter    Postoperative anemia  -     CBC; Future    Hyponatremia  -     BASIC METABOLIC PANEL; Future    Chronic bronchitis, unspecified chronic bronchitis type (HCC)  -     albuterol sulfate  (90 Base) MCG/ACT inhaler; Inhale 2 puffs into the lungs every 6 hours as needed for Wheezing    Gastroesophageal reflux disease without esophagitis  -     pantoprazole (PROTONIX) 40 MG tablet; Take 1 tablet by mouth every morning (before breakfast)    Vitamin D deficiency  -     Cholecalciferol (VITAMIN D) 50 MCG (2000 UT) CAPS capsule; Take 2,000 Units by mouth daily    Mixed hyperlipidemia  -     rosuvastatin (CRESTOR) 5 MG tablet; take 1 tablet by mouth once daily    Controlled type 2 diabetes mellitus without complication, without long-term current use of insulin (HCC)  -     metFORMIN (GLUCOPHAGE) 850 MG tablet; Take 1 tablet by mouth 2 times daily (with meals)    Essential hypertension  -     lisinopril (PRINIVIL;ZESTRIL) 20 MG tablet; Take 1 tablet by mouth daily      1) Follow-up with Orthopedics    2) ? When to resume MTX- defer to Rheum/Ortho for safety- told to hold 3 weeks from intervention upon DC. Still taking hydroxychloroquine. 3) Repeat CBC and BMP in 4 weeks- postop anemia and mild hyponatremia need to followed up. 4) Med refill completed. Encouraged as discussed during hospitalization to improve compliance/adherence to osteoporosis management. Osteoporosis at right hip on DEXA in March 2019. Previously stopped bisphosphonate.      Total Time: minutes: 5-10 minutes     Rusty Zamora MD, Huy Broderick

## 2020-12-04 ENCOUNTER — CARE COORDINATION (OUTPATIENT)
Dept: CASE MANAGEMENT | Age: 70
End: 2020-12-04

## 2020-12-08 ENCOUNTER — CARE COORDINATION (OUTPATIENT)
Dept: CASE MANAGEMENT | Age: 70
End: 2020-12-08

## 2020-12-08 NOTE — CARE COORDINATION
[facility] Transitions Follow Up Call    2020    Patient: Sally Gallagher  Patient : 1950   MRN: [id number]  Reason for Admission: Comminuted fracture of right hip and s/p right hip ORIF  Discharge Date: 20 RARS: Readmission Risk Score: 16      Needs to be reviewed by the provider   Additional needs identified to be addressed with provider No  none  Discussed COVID-19 related testing which was not done at this time. Test results were not done. Method of communication with provider : none    Care Transition Nurse (CTN) contacted the patient by telephone to follow up after admission on 20. Verified name and  with patient as identifiers. Addressed changes since last contact: symptom management-not doing as well \"as they think I should be\"  Discharged needs reviewed: none  Follow up appointment completed? Yes    Advance Care Planning:   Does patient have an Advance Directive:  reviewed and current. CTN reviewed discharge instructions, medical action plan and red flags with patient and discussed any barriers to care and/or understanding of plan of care after discharge. Discussed appropriate site of care based on symptoms and resources available to patient including: PCP and Specialist. The patient agrees to contact the PCP office for questions related to their healthcare. Patients top risk factors for readmission: functional physical ability, falls, ineffective coping and medical condition  Interventions to address risk factors: continue exercies as dir, use the walker     Plan for follow-up call in 7-10 days based on severity of symptoms and risk factors. Plan for next call: symptom management-ambulation improving? pain? CTN provided contact information for future needs.     Care Transitions Subsequent and Final Call    Subsequent and Final Calls  Do you have any ongoing symptoms?:  Yes  Patient-reported symptoms:  Pain  Have your medications changed?:  No  Do you have any questions related to your medications?:  No  Do you currently have any active services?:  Yes  Are you currently active with any services?:  Home Health  Do you have any needs or concerns that I can assist you with?:  No  Care Transitions Interventions  No Identified Needs  Other Interventions:        Called pt for the ELA sub call. Pt stated she has PT coming to her house. \"they tell me I am too slow, not doing as well as I should\"  Pt stated she uses a walker & is afraid of falling. Pt also stated she has arthritis in other area which cause pain. Pt stated she took a pain pill about 8 hr ago, plans to take one now. The pain meds do help. Pt stated she is voiding & having BM without any issues. Pt stated the staples are out & incision is healing. Pt denied any needs or concerns. CTN will continue to follow.     Follow Up  Future Appointments   Date Time Provider Mil Leana   12/29/2020  1:40 PM Lalo Stein MD [facility] ORTHO [facility]   2/9/2021  1:45 PM Luis Bates MD [facility]   3/8/2021  9:00 AM Latisha Thompson MD [facility] RN  Care Transition Nurse  [phone]

## 2020-12-15 ENCOUNTER — CARE COORDINATION (OUTPATIENT)
Dept: CASE MANAGEMENT | Age: 70
End: 2020-12-15

## 2020-12-15 NOTE — CARE COORDINATION
[facility] Transitions Follow Up Call    12/15/2020    Patient: Be Gallagher  Patient : 1950   MRN: <[id number]>  Reason for Admission: Comminuted fracture of right hip and s/p right hip ORIF  Discharge Date: 20 RARS: Readmission Risk Score: 16      Needs to be reviewed by the provider   Additional needs identified to be addressed with provider No  none         Method of communication with provider : none    Care Transition Nurse (CTN) contacted the patient by telephone to follow up after admission on 20. Addressed changes since last contact: symptom management-patient continues with therapy and pain medication as prescribed  Discharged needs reviewed: none  Follow up appointment completed? Yes    CTN reviewed discharge instructions, medical action plan and red flags with patient and discussed any barriers to care and/or understanding of plan of care after discharge. Discussed appropriate site of care based on symptoms and resources available to patient including: PCP, Specialist and Home health. The patient agrees to contact the PCP office for questions related to their healthcare. Patients top risk factors for readmission: functional physical ability, falls, ineffective coping and medical condition  Interventions to address risk factors: Scheduled appointment with Specialist-follow up with ortho on 20 and continue therapy, pain medication as needed    Plan for follow-up call in 7-10 days based on severity of symptoms and risk factors. Plan for next call: assess for any ongoing needs  CTN provided contact information for future needs.         Care Transitions Subsequent and Final Call    Subsequent and Final Calls  Do you have any questions related to your medications?: No  Do you currently have any active services?: Yes  Are you currently active with any services?: Home Health  Do you have any needs or concerns that I can assist you with?: No  Identified Barriers: None  Care Transitions Interventions  No Identified Needs  Other Interventions:         -Spoke with patient for follow up care transition call.  -Patient states she continues to progress slowly, OT visited yesterday and PT to visit 12/17/20. Any discomfort continues to be lessened with pain medication.  -Patient states she continues to have swelling of right ankle and thigh when she is up for too long relieved with ice and elevation.  -Patient denies any needs, questions, or concerns at this time and is agreeable to final call  from CTN next week.     Follow Up  Future Appointments   Date Time Provider Mil Christopheri   12/29/2020  1:40 PM Shyanne Clark MD [facility]   2/9/2021  1:45 PM Jyoti Radford MD [facility]   3/8/2021  9:00 AM Courtney Prater MD [facility] RN

## 2020-12-18 ENCOUNTER — TELEPHONE (OUTPATIENT)
Dept: ORTHOPEDIC SURGERY | Age: 70
End: 2020-12-18

## 2020-12-18 RX ORDER — HYDROCODONE BITARTRATE AND ACETAMINOPHEN 5; 325 MG/1; MG/1
1 TABLET ORAL EVERY 6 HOURS PRN
Qty: 28 TABLET | Refills: 0 | Status: SHIPPED | OUTPATIENT
Start: 2020-12-18 | End: 2020-12-25

## 2020-12-18 NOTE — TELEPHONE ENCOUNTER
Patient is requesting a medication refill, OARRS complete. Patient is 4 weeks out Right trochanteric fracture ORIF. She was last seen on 11/30. He next scheduled appointment is 12/29. Norco 5 mg every 6 hours was given on 11/30.

## 2020-12-22 ENCOUNTER — CARE COORDINATION (OUTPATIENT)
Dept: CASE MANAGEMENT | Age: 70
End: 2020-12-22

## 2020-12-22 NOTE — CARE COORDINATION
[facility] Transitions Follow Up Call    2020    Patient: Darwin Gallagher  Patient : 1950   MRN: [id number]  Reason for Admission: Comminuted fracture of right hip and s/p right hip ORIF  Discharge Date: 20 RARS: Readmission Risk Score: 16    -Attempt made to reach the patient for final Care Transition call post hospital discharge. Message left with CTN's contact information requesting return phone call.  -If no response by end of the day CTN to sign off.     Follow Up  Future Appointments   Date Time Provider Mil Dueñas   2020  1:40 PM Faustino Graham MD [facility]   2021  1:45 PM Jomar Landis MD [facility]   3/8/2021  9:00 AM Jay Jay Olivier MD [facility] RN

## 2020-12-29 ENCOUNTER — OFFICE VISIT (OUTPATIENT)
Dept: ORTHOPEDIC SURGERY | Age: 70
End: 2020-12-29

## 2020-12-29 VITALS — HEIGHT: 64 IN | WEIGHT: 220 LBS | TEMPERATURE: 97.4 F | BODY MASS INDEX: 37.56 KG/M2

## 2020-12-29 PROCEDURE — 99024 POSTOP FOLLOW-UP VISIT: CPT | Performed by: ORTHOPAEDIC SURGERY

## 2020-12-29 NOTE — PROGRESS NOTES
HPI:   Patient follows up today post op 6 weeks s/p Right cephalomedullary nail fixation to right reverse obliquity intertrochanteric femur fracture. No complaints. Pain controlled. Physical Exam:  right incision healing well, one vicryl suture removed today. No signs of infection. No signs of infection  ROM appropriate  NVI: +EHL/PF/DF  Sensation intact to S/S/SP/DP/T nerves  Brisk capillary refill    XR Right Hip (3v) AP, lateral, pelvis demonstrating right intertrochanteric femur fracture with cephalomedullary nail fixation. No signs of failure. Impression: Right intertrochanteric femur fracture stabilized with a long cephalomedullary nail. XR right Femur: (2v) AP and lateral views demonstrating right intertrochanteric femur fracture with cephalomedullary nail fixation. Impression: Right intertrochanteric femur fracture stabilized with a long cephalomedullary nail. XR reviewed with the patient    Assessment:  Post-op 6 weeks s/p right cephalomedullary nail fixation of right intertrochanteric femur fracture    Plan:  WBAT  Continue therapy and strengthening  Follow up 6 weeks with x-rays    I have seen and evaluated the patient and agree with the above assessment and plan on today's visit. I have performed the key components of the history and physical examination with significant findings of 6 weeks postop intertrochanteric hip fracture ORIF doing well. . I concur with the findings and plan as documented.     Des Rush MD  12/29/2020

## 2021-02-09 ENCOUNTER — OFFICE VISIT (OUTPATIENT)
Dept: ORTHOPEDIC SURGERY | Age: 71
End: 2021-02-09

## 2021-02-09 VITALS — TEMPERATURE: 97.2 F | WEIGHT: 200 LBS | RESPIRATION RATE: 22 BRPM | HEIGHT: 64 IN | BODY MASS INDEX: 34.15 KG/M2

## 2021-02-09 DIAGNOSIS — S72.141A CLOSED COMMINUTED INTERTROCHANTERIC FRACTURE OF RIGHT FEMUR, INITIAL ENCOUNTER (HCC): Primary | ICD-10-CM

## 2021-02-09 PROCEDURE — 99024 POSTOP FOLLOW-UP VISIT: CPT | Performed by: ORTHOPAEDIC SURGERY

## 2021-02-09 NOTE — PROGRESS NOTES
Just under 3 months out right hip intertrochanteric fracture open to external fixation with cephalomedullary nail. Overall she is doing well. She reports increasing stamina and function. She does have some continued pain laterally but otherwise is improving. Physical exam: Nontender about the fracture site. She does have some mild trochanteric tenderness. She does ambulate with a mild antalgic gait and still uses a cane. She remains distally neurovascular intact. Nontender of her calf. X-rays in the office today:       XR Right Hip (3v) AP, lateral, pelvis demonstrating right intertrochanteric femur fracture with cephalomedullary nail fixation. When compared to images from December 29, 2020 there has been interval healing at the fracture site. No signs of failure. Impression: Right intertrochanteric femur fracture stabilized with a long cephalomedullary nail.     XR right Femur: (2v) AP and lateral views demonstrating right intertrochanteric femur fracture with cephalomedullary nail fixation. Impression: Right intertrochanteric femur fracture stabilized with a long cephalomedullary nail. Assessment: Just under 3 months out right intertrochanteric fracture reduction fixation doing well    Plan    Increase activities and endurance as tolerated. She has no restrictions. Follow-up 3 months for x-rays.

## 2021-02-22 ENCOUNTER — TELEPHONE (OUTPATIENT)
Dept: INTERNAL MEDICINE | Age: 71
End: 2021-02-22

## 2021-02-22 ENCOUNTER — HOSPITAL ENCOUNTER (OUTPATIENT)
Age: 71
Discharge: HOME | End: 2021-02-22
Payer: COMMERCIAL

## 2021-02-22 DIAGNOSIS — E87.1 HYPONATREMIA: ICD-10-CM

## 2021-02-22 DIAGNOSIS — D64.9 POSTOPERATIVE ANEMIA: ICD-10-CM

## 2021-02-22 LAB
ANION GAP SERPL CALCULATED.3IONS-SCNC: 7 MMOL/L (ref 7–16)
BUN BLDV-MCNC: 19 MG/DL (ref 8–23)
CALCIUM SERPL-MCNC: 9.2 MG/DL (ref 8.6–10.2)
CHLORIDE BLD-SCNC: 107 MMOL/L (ref 98–107)
CO2: 27 MMOL/L (ref 22–29)
CREAT SERPL-MCNC: 1.2 MG/DL (ref 0.5–1)
GFR AFRICAN AMERICAN: 54
GFR NON-AFRICAN AMERICAN: 45 ML/MIN/1.73
GLUCOSE BLD-MCNC: 117 MG/DL (ref 74–99)
HCT VFR BLD CALC: 34.6 % (ref 34–48)
HEMOGLOBIN: 11.4 G/DL (ref 11.5–15.5)
MCH RBC QN AUTO: 29.5 PG (ref 26–35)
MCHC RBC AUTO-ENTMCNC: 32.9 % (ref 32–34.5)
MCV RBC AUTO: 89.4 FL (ref 80–99.9)
PDW BLD-RTO: 15.9 FL (ref 11.5–15)
PLATELET # BLD: 150 E9/L (ref 130–450)
PMV BLD AUTO: 8.3 FL (ref 7–12)
POTASSIUM SERPL-SCNC: 4.4 MMOL/L (ref 3.5–5)
RBC # BLD: 3.87 E12/L (ref 3.5–5.5)
SODIUM BLD-SCNC: 141 MMOL/L (ref 132–146)
WBC # BLD: 4.1 E9/L (ref 4.5–11.5)

## 2021-02-22 PROCEDURE — 36415 COLL VENOUS BLD VENIPUNCTURE: CPT

## 2021-02-22 PROCEDURE — 85027 COMPLETE CBC AUTOMATED: CPT

## 2021-02-22 PROCEDURE — 80048 BASIC METABOLIC PNL TOTAL CA: CPT

## 2021-02-22 NOTE — TELEPHONE ENCOUNTER
Labs completed. Appt is scheduled for 3/8. Please call patient with results. Encourage to increase hydration appropriate. H/H has improved with mild anemia and leukopenia- this has been noted prior and being monitored with Rheumatology given hx of RA. With hx of splenomegaly noted on prior imaging, consider Hematology assessment as discussed prior. FYI to Dr. Trosten Paul to review as well- appt also in March.

## 2021-02-23 NOTE — TELEPHONE ENCOUNTER
Discussed lab results and instructed to maintain hydration. Improved anemia.  She verbalized understanding

## 2021-03-08 ENCOUNTER — OFFICE VISIT (OUTPATIENT)
Dept: INTERNAL MEDICINE | Age: 71
End: 2021-03-08
Payer: COMMERCIAL

## 2021-03-08 VITALS
HEART RATE: 96 BPM | RESPIRATION RATE: 12 BRPM | DIASTOLIC BLOOD PRESSURE: 65 MMHG | SYSTOLIC BLOOD PRESSURE: 104 MMHG | HEIGHT: 64 IN | WEIGHT: 212 LBS | TEMPERATURE: 97.1 F | BODY MASS INDEX: 36.19 KG/M2

## 2021-03-08 DIAGNOSIS — M05.79 RHEUMATOID ARTHRITIS INVOLVING MULTIPLE SITES WITH POSITIVE RHEUMATOID FACTOR (HCC): ICD-10-CM

## 2021-03-08 DIAGNOSIS — E66.01 OBESITY, CLASS III, BMI 40-49.9 (MORBID OBESITY) (HCC): ICD-10-CM

## 2021-03-08 DIAGNOSIS — L65.9 HAIR LOSS: ICD-10-CM

## 2021-03-08 DIAGNOSIS — K21.9 GASTROESOPHAGEAL REFLUX DISEASE WITHOUT ESOPHAGITIS: ICD-10-CM

## 2021-03-08 DIAGNOSIS — Z79.899 HIGH RISK MEDICATIONS (NOT ANTICOAGULANTS) LONG-TERM USE: ICD-10-CM

## 2021-03-08 DIAGNOSIS — E78.2 MIXED HYPERLIPIDEMIA: ICD-10-CM

## 2021-03-08 DIAGNOSIS — R16.1 SPLENOMEGALY: ICD-10-CM

## 2021-03-08 DIAGNOSIS — I10 ESSENTIAL HYPERTENSION: ICD-10-CM

## 2021-03-08 DIAGNOSIS — N18.2 STAGE 2 CHRONIC KIDNEY DISEASE: ICD-10-CM

## 2021-03-08 DIAGNOSIS — Z12.31 ENCOUNTER FOR SCREENING MAMMOGRAM FOR BREAST CANCER: ICD-10-CM

## 2021-03-08 DIAGNOSIS — E55.9 VITAMIN D DEFICIENCY: ICD-10-CM

## 2021-03-08 DIAGNOSIS — E11.9 TYPE 2 DIABETES MELLITUS WITHOUT COMPLICATION, WITHOUT LONG-TERM CURRENT USE OF INSULIN (HCC): Primary | ICD-10-CM

## 2021-03-08 PROCEDURE — 4040F PNEUMOC VAC/ADMIN/RCVD: CPT | Performed by: INTERNAL MEDICINE

## 2021-03-08 PROCEDURE — 3017F COLORECTAL CA SCREEN DOC REV: CPT | Performed by: INTERNAL MEDICINE

## 2021-03-08 PROCEDURE — G8484 FLU IMMUNIZE NO ADMIN: HCPCS | Performed by: INTERNAL MEDICINE

## 2021-03-08 PROCEDURE — 2022F DILAT RTA XM EVC RTNOPTHY: CPT | Performed by: INTERNAL MEDICINE

## 2021-03-08 PROCEDURE — 1123F ACP DISCUSS/DSCN MKR DOCD: CPT | Performed by: INTERNAL MEDICINE

## 2021-03-08 PROCEDURE — 1036F TOBACCO NON-USER: CPT | Performed by: INTERNAL MEDICINE

## 2021-03-08 PROCEDURE — 99214 OFFICE O/P EST MOD 30 MIN: CPT | Performed by: INTERNAL MEDICINE

## 2021-03-08 PROCEDURE — 99212 OFFICE O/P EST SF 10 MIN: CPT | Performed by: INTERNAL MEDICINE

## 2021-03-08 PROCEDURE — 1090F PRES/ABSN URINE INCON ASSESS: CPT | Performed by: INTERNAL MEDICINE

## 2021-03-08 PROCEDURE — 3046F HEMOGLOBIN A1C LEVEL >9.0%: CPT | Performed by: INTERNAL MEDICINE

## 2021-03-08 PROCEDURE — G8417 CALC BMI ABV UP PARAM F/U: HCPCS | Performed by: INTERNAL MEDICINE

## 2021-03-08 PROCEDURE — G8427 DOCREV CUR MEDS BY ELIG CLIN: HCPCS | Performed by: INTERNAL MEDICINE

## 2021-03-08 PROCEDURE — G8399 PT W/DXA RESULTS DOCUMENT: HCPCS | Performed by: INTERNAL MEDICINE

## 2021-03-08 RX ORDER — ASPIRIN 81 MG/1
81 TABLET ORAL DAILY
Qty: 30 TABLET | Refills: 1 | Status: SHIPPED | OUTPATIENT
Start: 2021-03-08

## 2021-03-08 RX ORDER — PANTOPRAZOLE SODIUM 40 MG/1
40 TABLET, DELAYED RELEASE ORAL
Qty: 30 TABLET | Refills: 1 | Status: SHIPPED
Start: 2021-03-08 | End: 2021-09-16 | Stop reason: SDUPTHER

## 2021-03-08 RX ORDER — MULTIVIT WITH MINERALS/LUTEIN
1000 TABLET ORAL DAILY
Qty: 30 TABLET | Refills: 1 | Status: SHIPPED
Start: 2021-03-08 | End: 2021-06-14 | Stop reason: ALTCHOICE

## 2021-03-08 RX ORDER — MULTIVIT-MIN/IRON/FOLIC ACID/K 18-600-40
2000 CAPSULE ORAL DAILY
Qty: 30 CAPSULE | Refills: 1 | Status: SHIPPED | OUTPATIENT
Start: 2021-03-08

## 2021-03-08 RX ORDER — LANOLIN ALCOHOL/MO/W.PET/CERES
1 CREAM (GRAM) TOPICAL DAILY
Qty: 30 TABLET | Refills: 2 | Status: SHIPPED
Start: 2021-03-08 | End: 2021-06-14

## 2021-03-08 ASSESSMENT — ENCOUNTER SYMPTOMS
BLURRED VISION: 0
CONSTIPATION: 0
BLOOD IN STOOL: 0
ABDOMINAL PAIN: 0
SHORTNESS OF BREATH: 0
DIARRHEA: 0
COUGH: 0
WHEEZING: 0
NAUSEA: 1
ORTHOPNEA: 0
VOMITING: 0

## 2021-03-08 ASSESSMENT — PATIENT HEALTH QUESTIONNAIRE - PHQ9
2. FEELING DOWN, DEPRESSED OR HOPELESS: 0
SUM OF ALL RESPONSES TO PHQ QUESTIONS 1-9: 0
SUM OF ALL RESPONSES TO PHQ9 QUESTIONS 1 & 2: 0

## 2021-03-08 NOTE — ASSESSMENT & PLAN NOTE
A1c previously at goal.  On metformin  Repeat A1c needed and follow results  Active weight loss- > 15 lbs   Continue current management

## 2021-03-08 NOTE — PROGRESS NOTES
[facility] Internal Medicine      SUBJECTIVE:  Luz Maria Gallagher (:  1950) is a 72 y.o. female here for evaluation of the following chief complaint(s):  Diabetes, Hypertension, Medication Refill, and Hip Pain    Patient presents for follow-up appointment. States hip pain is persistent but improving s/p hip fracture and intervention- following with Orthopedics- follow-up appt scheduled for May 2021 if continued symptoms. PT also continued. Using 4 point Cane with stability. No noted falls. She notes that she has increased her hydroxychloroquine on her own. Recommended to reduce back to scheduled Rheum dosing. She notes hair loss recently. She is also noting some increase in sexual interest/desire with her partner which she states is new compared to prior. Denies any additional findings. Diabetes  She presents for her follow-up diabetic visit. She has type 2 diabetes mellitus. There are no hypoglycemic associated symptoms. Pertinent negatives for hypoglycemia include no headaches. Associated symptoms include weight loss (intentional). Pertinent negatives for diabetes include no blurred vision, no chest pain, no fatigue, no polydipsia, no polyphagia and no polyuria. Current diabetic treatment includes diet and oral agent (monotherapy). She is compliant with treatment most of the time. An ACE inhibitor/angiotensin II receptor blocker is being taken. Hypertension  This is a chronic problem. The problem is controlled. Pertinent negatives include no blurred vision, chest pain, headaches, orthopnea, palpitations, peripheral edema, PND or shortness of breath. Review of Systems   Constitutional: Positive for weight loss (intentional). Negative for chills, fatigue and fever. Eyes: Negative for blurred vision. Respiratory: Negative for cough, shortness of breath and wheezing.     Cardiovascular: Negative for chest pain, palpitations, orthopnea, leg swelling and PND. Gastrointestinal: Positive for nausea (intermittent, but not persistent- thinks it could be related to herbal/supplements). Negative for abdominal pain, blood in stool, constipation, diarrhea and vomiting. Endocrine: Negative for polydipsia, polyphagia and polyuria. Genitourinary: Negative for difficulty urinating and dysuria. Neurological: Negative for headaches. Current Outpatient Medications on File Prior to Visit   Medication Sig Dispense Refill    albuterol sulfate  (90 Base) MCG/ACT inhaler Inhale 2 puffs into the lungs every 6 hours as needed for Wheezing 3 Inhaler 0    pantoprazole (PROTONIX) 40 MG tablet Take 1 tablet by mouth every morning (before breakfast) 30 tablet 1    Cholecalciferol (VITAMIN D) 50 MCG (2000 UT) CAPS capsule Take 2,000 Units by mouth daily 30 capsule 1    rosuvastatin (CRESTOR) 5 MG tablet take 1 tablet by mouth once daily 90 tablet 1    metFORMIN (GLUCOPHAGE) 850 MG tablet Take 1 tablet by mouth 2 times daily (with meals) 180 tablet 1    lisinopril (PRINIVIL;ZESTRIL) 20 MG tablet Take 1 tablet by mouth daily 90 tablet 1    aspirin EC 81 MG EC tablet Take 1 tablet by mouth daily Start taking aspirin AFTER completion of all doses of lovenox only 30 tablet 1    hydroxychloroquine (PLAQUENIL) 200 MG tablet Take 1.5 tablets by mouth daily 60 tablet 1    Ascorbic Acid (VITAMIN C) 1000 MG tablet Take 1 tablet by mouth daily 30 tablet 1    alendronate (FOSAMAX) 70 MG tablet Take 1 tablet by mouth every 7 days 12 tablet 2    folic acid (FOLVITE) 1 MG tablet Take 1 tablet by mouth daily (Patient taking differently: Take 1 mg by mouth daily Every day except Wednesday) 90 tablet 2    blood glucose monitor kit and supplies Dispense sufficient amount for indicated testing frequency plus additional to accommodate PRN testing needs. Dispense all needed supplies to include: monitor, strips, lancing device, lancets, control solutions, alcohol swabs.  1 kit 0  blood glucose monitor strips Test 2 times a day & as needed for symptoms of irregular blood glucose. Dispense sufficient amount for indicated testing frequency plus additional to accommodate PRN testing needs. 100 strip 2    Lancets MISC Test twice daily 100 each 3    Cyanocobalamin (VITAMIN B 12 PO) Take by mouth daily       magnesium cl-calcium carbonate (SLOW-MAG) 71.5-119 MG TBEC tablet take 2 tablets once daily 60 tablet 1    Biotin 300 MCG TABS Take 1 tablet by mouth daily 30 tablet 2    Coenzyme Q10 (CO Q 10 PO) Take 1 tablet by mouth daily QUINOL  / LD 4/9/2019       No current facility-administered medications on file prior to visit. OBJECTIVE:    VS:   Vitals:    03/08/21 0914 03/08/21 0924   BP: (!) 140/66 104/65   Pulse: 96    Resp: 12    Temp: 97.1 °F (36.2 °C)    TempSrc: Oral    Weight: 212 lb (96.2 kg)    Height: 5' 4\" (1.626 m)      Physical Exam  Constitutional:       Appearance: She is obese. She is not diaphoretic. HENT:      Head: Normocephalic and atraumatic. Comments: No central pattern hair loss noted on exam today   Eyes:      General: No scleral icterus. Cardiovascular:      Rate and Rhythm: Normal rate and regular rhythm. Pulses: Normal pulses. Heart sounds: No murmur. Pulmonary:      Effort: Pulmonary effort is normal.      Breath sounds: Normal breath sounds. No wheezing, rhonchi or rales. Abdominal:      General: There is no distension. Palpations: Abdomen is soft. There is no hepatomegaly, splenomegaly (none appreciated) or mass (limited due to obesity). Tenderness: There is no abdominal tenderness. Musculoskeletal:      Right hip: She exhibits decreased range of motion (s/p hip fracture). Right lower leg: No edema. Left lower leg: No edema. Neurological:      Mental Status: She is alert. Gait: Gait abnormal (use of 4 point cane for stability).    Psychiatric:         Mood and Affect: Mood normal.         Speech: Speech normal.         Behavior: Behavior normal.         Thought Content: Thought content normal.         Cognition and Memory: Cognition normal.            ASSESSMENT/PLAN:  1. Hair loss  -     Biotin 300 MCG TABS; Take 1 tablet by mouth daily, Disp-30 tablet, R-2Normal  -     TSH; Future  ? Med related- discussed do not take hydroxychloroquine other than prescribed- reduce to normal dosing. Hydroxychloroquine has been related to hair loss/alopecia   Check TSH  Resume Biotin  Follow- do not feel increased libido would have association at this time- but will follow to determine if hormonal assessment including testosterone assessment may be warranted- no central pattern findings or additional hair growth noted. 2. Gastroesophageal reflux disease without esophagitis  -     pantoprazole (PROTONIX) 40 MG tablet; Take 1 tablet by mouth every morning (before breakfast), Disp-30 tablet, R-1Normal  Stable   3. Vitamin D deficiency-stable   -     Cholecalciferol (VITAMIN D) 50 MCG (2000 UT) CAPS capsule; Take 2,000 Units by mouth daily, Disp-30 capsule, R-1Normal  4. High risk medications (not anticoagulants) long-term use  -     Biotin 300 MCG TABS; Take 1 tablet by mouth daily, Disp-30 tablet, R-2Normal  5. Splenomegaly  -     US LIVER SPLEEN; Future  In presence of relative leukopenia and mild anemia- improving post-op  Unlikely FELTY Syndrome as noted by Rheum- but continue to monitor for any additional findings  No unintentional weight loss, diaphoresis, fevers, chills, night sweats, or new onset lymphadenopathy  Repeat Rheum appt later this month- maintain   6. Essential hypertension  -     HEPATIC FUNCTION PANEL; Future  7. Stage 2 chronic kidney disease  -     Ascorbic Acid (VITAMIN C) 1000 MG tablet; Take 1 tablet by mouth daily, Disp-30 tablet, R-1Normal  8. Obesity, Class III, BMI 40-49.9 (morbid obesity) ([facility] [address]  9. Mixed hyperlipidemia  -     Lipid Panel; Future  10.  Encounter for screening mammogram for breast

## 2021-03-08 NOTE — PATIENT INSTRUCTIONS
Patient Education        Preventing Falls: Care Instructions  Your Care Instructions     Getting around your home safely can be a challenge if you have injuries or health problems that make it easy for you to fall. Loose rugs and furniture in walkways are among the dangers for many older people who have problems walking or who have poor eyesight. People who have conditions such as arthritis, osteoporosis, or dementia also have to be careful not to fall. You can make your home safer with a few simple measures. Follow-up care is a key part of your treatment and safety. Be sure to make and go to all appointments, and call your doctor if you are having problems. It's also a good idea to know your test results and keep a list of the medicines you take. How can you care for yourself at home? Taking care of yourself  · You may get dizzy if you do not drink enough water. To prevent dehydration, drink plenty of fluids, enough so that your urine is light yellow or clear like water. Choose water and other caffeine-free clear liquids. If you have kidney, heart, or liver disease and have to limit fluids, talk with your doctor before you increase the amount of fluids you drink. · Exercise regularly to improve your strength, muscle tone, and balance. Walk if you can. Swimming may be a good choice if you cannot walk easily. · Have your vision and hearing checked each year or any time you notice a change. If you have trouble seeing and hearing, you might not be able to avoid objects and could lose your balance. · Know the side effects of the medicines you take. Ask your doctor or pharmacist whether the medicines you take can affect your balance. Sleeping pills or sedatives can affect your balance. · Limit the amount of alcohol you drink. Alcohol can impair your balance and other senses. · Ask your doctor whether calluses or corns on your feet need to be removed.  If you wear loose-fitting shoes because of calluses or corns, you can lose your balance and fall. · Talk to your doctor if you have numbness in your feet. Preventing falls at home  · Remove raised doorway thresholds, throw rugs, and clutter. Repair loose carpet or raised areas in the floor. · Move furniture and electrical cords to keep them out of walking paths. · Use nonskid floor wax, and wipe up spills right away, especially on ceramic tile floors. · If you use a walker or cane, put rubber tips on it. If you use crutches, clean the bottoms of them regularly with an abrasive pad, such as steel wool. · Keep your house well lit, especially [place], and outside walkways. Use night-lights in areas such as hallways and bathrooms. Add extra light switches or use remote switches (such as switches that go on or off when you clap your hands) to make it easier to turn lights on if you have to get up during the night. · Install sturdy handrails on stairways. · Move items in your cabinets so that the things you use a lot are on the lower shelves (about waist level). · Keep a cordless phone and a flashlight with new batteries by your bed. If possible, put a phone in each of the main rooms of your house, or carry a cell phone in case you fall and cannot reach a phone. Or, you can wear a device around your neck or wrist. You push a button that sends a signal for help. · Wear low-heeled shoes that fit well and give your feet good support. Use footwear with nonskid soles. Check the heels and soles of your shoes for wear. Repair or replace worn heels or soles. · Do not wear socks without shoes on wood floors. · Walk on the grass when the sidewalks are slippery. If you live in an area that gets snow and ice in the winter, sprinkle salt on slippery steps and sidewalks. Preventing falls in the bath  · Install grab bars and nonskid mats inside and outside your shower or tub and near the toilet and sinks. · Use shower chairs and bath benches.   · Use a hand-held shower head leg straight up behind you (toward the ceiling). 2. Lift your toes about 6 inches off the floor, hold for about 6 seconds, then lower slowly. 3. Do 8 to 12 repetitions. Straight-leg raises to the inside   1. Lie on the side of your body with the injured leg. 2. You can either prop your other (good) leg up on a chair, or you can bend your good knee and put that foot in front of your injured knee. Do not drop your hip back. 3. Tighten the muscles on the front of your thigh to straighten your injured knee. 4. Keep your kneecap pointing forward, and lift your whole leg up toward the ceiling about 6 inches. Hold for about 6 seconds, then lower slowly. 5. Do 8 to 12 repetitions. Heel dig bridging   1. Lie on your back with both knees bent and your ankles bent so that only your heels are digging into the floor. Your knees should be bent about 90 degrees. 2. Then push your heels into the floor, squeeze your buttocks, and lift your hips off the floor until your shoulders, hips, and knees are all in a straight line. 3. Hold for about 6 seconds as you continue to breathe normally, and then slowly lower your hips back down to the floor and rest for up to 10 seconds. 4. Do 8 to 12 repetitions. Hamstring curls   1. Lie on your stomach with your knees straight. If your kneecap is uncomfortable, roll up a washcloth and put it under your leg just above your kneecap. 2. Lift the foot of your injured leg by bending the knee so that you bring the foot up toward your buttock. If this motion hurts, try it without bending your knee quite as far. This may help you avoid any painful motion. 3. Slowly lower your leg back to the floor. 4. Do 8 to 12 repetitions. 5. With permission from your doctor or physical therapist, you may also want to add a cuff weight to your ankle (not more than 5 pounds). With weight, you do not have to lift your leg more than 12 inches to get a hamstring workout.     Shallow standing knee bends Do this exercise only if you have very little pain; if you have no clicking, locking, or giving way if you have an injured knee; and if it does not hurt while you are doing 8 to 12 repetitions. 1. Stand with your hands lightly resting on a counter or chair in front of you. Put your feet shoulder-width apart. 2. Slowly bend your knees so that you squat down like you are going to sit in a chair. Make sure your knees do not go in front of your toes. 3. Lower yourself about 6 inches. Your heels should remain on the floor at all times. 4. Rise slowly to a standing position. Heel raises   1. Stand with your feet a few inches apart, with your hands lightly resting on a counter or chair in front of you. 2. Slowly raise your heels off the floor while keeping your knees straight. 3. Hold for about 6 seconds, then slowly lower your heels to the floor. 4. Do 8 to 12 repetitions several times during the day. Follow-up care is a key part of your treatment and safety. Be sure to make and go to all appointments, and call your doctor if you are having problems. It's also a good idea to know your test results and keep a list of the medicines you take. Where can you learn more? Go to https://SmartZip Analytics.PaperFlies. org and sign in to your [employer] account. Enter N538 in the [facility] box to learn more about \"Knee: Exercises. \"     If you do not have an account, please click on the \"Sign Up Now\" link. Current as of: March 2, 2020               Content Version: 12.6  © 9844-3949 [employer]. Care instructions adapted under license by [facility] [place] ([facility]). If you have questions about a medical condition or this instruction, always ask your healthcare professional. [address] any warranty or liability for your use of this information.

## 2021-03-17 ENCOUNTER — HOSPITAL ENCOUNTER (OUTPATIENT)
Age: 71
Discharge: HOME | End: 2021-03-17
Payer: COMMERCIAL

## 2021-03-17 DIAGNOSIS — E11.9 TYPE 2 DIABETES MELLITUS WITHOUT COMPLICATION, WITHOUT LONG-TERM CURRENT USE OF INSULIN (HCC): ICD-10-CM

## 2021-03-17 DIAGNOSIS — E78.2 MIXED HYPERLIPIDEMIA: ICD-10-CM

## 2021-03-17 DIAGNOSIS — L65.9 HAIR LOSS: ICD-10-CM

## 2021-03-17 DIAGNOSIS — I10 ESSENTIAL HYPERTENSION: ICD-10-CM

## 2021-03-17 LAB
ALBUMIN SERPL-MCNC: 4.3 G/DL (ref 3.5–5.2)
ALP BLD-CCNC: 39 U/L (ref 35–104)
ALT SERPL-CCNC: 13 U/L (ref 0–32)
AST SERPL-CCNC: 22 U/L (ref 0–31)
BILIRUB SERPL-MCNC: 0.7 MG/DL (ref 0–1.2)
BILIRUBIN DIRECT: <0.2 MG/DL (ref 0–0.3)
BILIRUBIN, INDIRECT: NORMAL MG/DL (ref 0–1)
CHOLESTEROL, TOTAL: 102 MG/DL (ref 0–199)
HBA1C MFR BLD: 4.4 % (ref 4–5.6)
HDLC SERPL-MCNC: 31 MG/DL
LDL CHOLESTEROL CALCULATED: 43 MG/DL (ref 0–99)
TOTAL PROTEIN: 6.6 G/DL (ref 6.4–8.3)
TRIGL SERPL-MCNC: 139 MG/DL (ref 0–149)
TSH SERPL DL<=0.05 MIU/L-ACNC: 2.75 UIU/ML (ref 0.27–4.2)
VLDLC SERPL CALC-MCNC: 28 MG/DL

## 2021-03-17 PROCEDURE — 80076 HEPATIC FUNCTION PANEL: CPT

## 2021-03-17 PROCEDURE — 36415 COLL VENOUS BLD VENIPUNCTURE: CPT

## 2021-03-17 PROCEDURE — 80061 LIPID PANEL: CPT

## 2021-03-17 PROCEDURE — 84443 ASSAY THYROID STIM HORMONE: CPT

## 2021-03-17 PROCEDURE — 83036 HEMOGLOBIN GLYCOSYLATED A1C: CPT

## 2021-03-22 ENCOUNTER — HOSPITAL ENCOUNTER (OUTPATIENT)
Dept: ULTRASOUND IMAGING | Age: 71
Discharge: HOME | End: 2021-03-24
Payer: COMMERCIAL

## 2021-03-22 ENCOUNTER — TELEPHONE (OUTPATIENT)
Dept: INTERNAL MEDICINE | Age: 71
End: 2021-03-22

## 2021-03-22 DIAGNOSIS — R16.1 SPLENOMEGALY: ICD-10-CM

## 2021-03-22 DIAGNOSIS — K76.0 FATTY LIVER: ICD-10-CM

## 2021-03-22 DIAGNOSIS — R16.1 SPLENOMEGALY: Primary | ICD-10-CM

## 2021-03-22 PROCEDURE — 76700 US EXAM ABDOM COMPLETE: CPT

## 2021-03-23 ENCOUNTER — OFFICE VISIT (OUTPATIENT)
Dept: RHEUMATOLOGY | Age: 71
End: 2021-03-23
Payer: COMMERCIAL

## 2021-03-23 VITALS
SYSTOLIC BLOOD PRESSURE: 107 MMHG | OXYGEN SATURATION: 98 % | BODY MASS INDEX: 36.62 KG/M2 | TEMPERATURE: 96.6 F | HEIGHT: 64 IN | HEART RATE: 89 BPM | RESPIRATION RATE: 16 BRPM | DIASTOLIC BLOOD PRESSURE: 64 MMHG | WEIGHT: 214.5 LBS

## 2021-03-23 DIAGNOSIS — M05.79 RHEUMATOID ARTHRITIS INVOLVING MULTIPLE SITES WITH POSITIVE RHEUMATOID FACTOR (HCC): ICD-10-CM

## 2021-03-23 DIAGNOSIS — R25.2 LEG CRAMPS: ICD-10-CM

## 2021-03-23 DIAGNOSIS — D72.819 LEUKOPENIA, UNSPECIFIED TYPE: Primary | ICD-10-CM

## 2021-03-23 DIAGNOSIS — M81.0 OSTEOPOROSIS, UNSPECIFIED OSTEOPOROSIS TYPE, UNSPECIFIED PATHOLOGICAL FRACTURE PRESENCE: ICD-10-CM

## 2021-03-23 DIAGNOSIS — Z79.899 HIGH RISK MEDICATIONS (NOT ANTICOAGULANTS) LONG-TERM USE: ICD-10-CM

## 2021-03-23 PROCEDURE — 3017F COLORECTAL CA SCREEN DOC REV: CPT | Performed by: INTERNAL MEDICINE

## 2021-03-23 PROCEDURE — 1123F ACP DISCUSS/DSCN MKR DOCD: CPT | Performed by: INTERNAL MEDICINE

## 2021-03-23 PROCEDURE — G8484 FLU IMMUNIZE NO ADMIN: HCPCS | Performed by: INTERNAL MEDICINE

## 2021-03-23 PROCEDURE — 1036F TOBACCO NON-USER: CPT | Performed by: INTERNAL MEDICINE

## 2021-03-23 PROCEDURE — G8417 CALC BMI ABV UP PARAM F/U: HCPCS | Performed by: INTERNAL MEDICINE

## 2021-03-23 PROCEDURE — 99212 OFFICE O/P EST SF 10 MIN: CPT | Performed by: INTERNAL MEDICINE

## 2021-03-23 PROCEDURE — G8399 PT W/DXA RESULTS DOCUMENT: HCPCS | Performed by: INTERNAL MEDICINE

## 2021-03-23 PROCEDURE — G8427 DOCREV CUR MEDS BY ELIG CLIN: HCPCS | Performed by: INTERNAL MEDICINE

## 2021-03-23 PROCEDURE — 4040F PNEUMOC VAC/ADMIN/RCVD: CPT | Performed by: INTERNAL MEDICINE

## 2021-03-23 PROCEDURE — 99214 OFFICE O/P EST MOD 30 MIN: CPT | Performed by: INTERNAL MEDICINE

## 2021-03-23 PROCEDURE — 1090F PRES/ABSN URINE INCON ASSESS: CPT | Performed by: INTERNAL MEDICINE

## 2021-03-23 RX ORDER — MAGNESIUM CHLORIDE 71.5 G/G
TABLET ORAL
Qty: 60 TABLET | Refills: 2 | Status: SHIPPED
Start: 2021-03-23 | End: 2021-06-15 | Stop reason: SDUPTHER

## 2021-03-23 RX ORDER — HYDROXYCHLOROQUINE SULFATE 200 MG/1
300 TABLET, FILM COATED ORAL DAILY
Qty: 60 TABLET | Refills: 2 | Status: SHIPPED
Start: 2021-03-23 | End: 2021-06-15 | Stop reason: SDUPTHER

## 2021-03-23 RX ORDER — ALENDRONATE SODIUM 70 MG/1
70 TABLET ORAL
Qty: 12 TABLET | Refills: 0 | Status: SHIPPED
Start: 2021-03-23 | End: 2021-06-15 | Stop reason: SDUPTHER

## 2021-03-23 RX ORDER — FOLIC ACID 1 MG/1
1 TABLET ORAL DAILY
Qty: 30 TABLET | Refills: 3 | Status: SHIPPED
Start: 2021-03-23 | End: 2021-06-15 | Stop reason: ALTCHOICE

## 2021-03-23 NOTE — PROGRESS NOTES
J O I N T  C A R E  C L I N I C        The patient is seen in follow-up for:  Rheumatoid  Arthritis. Medical record reviewed. Labs ordered at previous visit not done, other available labs obtained 21 & 21 reviewed - Creatinine 1.2; CBC stable; ALT 13; AST 22. VS reviewed. 2020 -2020 ED & Hospital admission:  Tripped and fell onto her right hip sustaining acute, comminuted, closed intertrochanteric fracture of RT femur with subsequent cephalomedullary nail fixation on 2020. Recent Abdominal US. She is very concerned. RA symptoms stable.            Patient History Update Since Previous Visit      Yes No Comment      New illnesses x  Enlarge spleen and nodules present to liver      Seen other healthcare provider x  Dr. Gerber Corbin x-ray, labs, or procedures x  Blood test sonogram spleen and liver      Started / changed / stopped medications  x       New allergies / reactions to medications  x       Changes in family medical history  x       Changes in patient social history x  Feels sexier changes in hormones      Morning stiffness x  Length:  15 minutes      Worst Joint: right hip      Review of Systems      Key:    1  Not present today;  2  Much Better;  3  Better;  4  Same;  5  Worse;  N  New Problem          Rating  Rating   Joint pain (not including back pain) 4 Skin ulcers: 1   Joint swellin Bruisin   Fatigue: 4 Sickness or rash with sun exposure 1   Muscle aches: 4 Swollen glands 1   Ongoing fever: 1 Dry eyes 1   Unintended weight loss: 1 Painful red eyes 1   Migraine headache: 1 Dry mouth 4   Difficulty sleepin Mouth sores 1   Snore or gag at night: 1 Upset stomach 1   Heart palpitations: 1 Diarrhea 1   Cough: 1 Depressed mood 1   Shortness of breath: 1 Overall stress level in life 4   Pain with breathing 1 Overall assessment 4   Skin rash 1         Abbreviated Exam    System    nl   abn   Comment     1 Gen nutrition/hygiene x      appearance x     2 syndrome  8506 Right release; 11/2019 Left release   Osteoporosis Fosamax reinitiated at previous visit. She is taking this medication now.      Vit D deficiency - Currently resolved - Vit D level now at 46. Currently on 2,000 IU daily     Kidney stone  GERD     High risk medications   +Polypharmacy     Hx:  Non-compliance    Very concerned about abd US:  Splenomegaly.       1.5 cm cyst inferior pole right kidney.       Hepatomegaly at 17.8 cm with coarse echotexture.          Noted dr. Martinez Olmstead sent me message  Sending her to GI ?fibrosure blood testing  ?fatty liver,  ?early cirrhosis changes   Plan     Continue RA medications- ONLY on low dose hcq ONCE daily   TSH normal- hair loss stabilizting      currently staying off methotrexate- stable RA  Doubt feltys  Ok for now to keep taking folate  She started also zinc and biotin/ B complex vitamin     Monitor labs   F/U in three months - earlier if needed          Counseling and Coordination of Care    Prognosis  Prior Authorization       x Risk / Benefits of RX  Disability Forms        Compliance  Discussion and / or letter to other health care provider         Risk Reduction     x More than half of the face-to-face time with the patient was spent  in counseling or coordinating care    Exercise           Brochure / Handout      Other:    Referral:

## 2021-03-23 NOTE — PATIENT INSTRUCTIONS
Continue RA medications     Monitor labs prior      F/U in three months - June 22  earlier if needed

## 2021-03-24 ENCOUNTER — TELEPHONE (OUTPATIENT)
Dept: INTERNAL MEDICINE | Age: 71
End: 2021-03-24

## 2021-03-24 NOTE — TELEPHONE ENCOUNTER
Calling patient back regarding questions regarding U/S RUQ. Discussed all implications and follow-up. Verbalized understanding and will proceed with GI referral. Notes understanding and lifestyle modifications continued. Patient has initiated milk thistle.

## 2021-05-11 ENCOUNTER — OFFICE VISIT (OUTPATIENT)
Dept: ORTHOPEDIC SURGERY | Age: 71
End: 2021-05-11
Payer: COMMERCIAL

## 2021-05-11 VITALS — HEIGHT: 64 IN | WEIGHT: 210 LBS | BODY MASS INDEX: 35.85 KG/M2 | RESPIRATION RATE: 18 BRPM

## 2021-05-11 DIAGNOSIS — S72.141A CLOSED COMMINUTED INTERTROCHANTERIC FRACTURE OF RIGHT FEMUR, INITIAL ENCOUNTER (HCC): Primary | ICD-10-CM

## 2021-05-11 PROCEDURE — 3017F COLORECTAL CA SCREEN DOC REV: CPT | Performed by: ORTHOPAEDIC SURGERY

## 2021-05-11 PROCEDURE — 1036F TOBACCO NON-USER: CPT | Performed by: ORTHOPAEDIC SURGERY

## 2021-05-11 PROCEDURE — G8427 DOCREV CUR MEDS BY ELIG CLIN: HCPCS | Performed by: ORTHOPAEDIC SURGERY

## 2021-05-11 PROCEDURE — 1123F ACP DISCUSS/DSCN MKR DOCD: CPT | Performed by: ORTHOPAEDIC SURGERY

## 2021-05-11 PROCEDURE — G8417 CALC BMI ABV UP PARAM F/U: HCPCS | Performed by: ORTHOPAEDIC SURGERY

## 2021-05-11 PROCEDURE — 99213 OFFICE O/P EST LOW 20 MIN: CPT | Performed by: ORTHOPAEDIC SURGERY

## 2021-05-11 PROCEDURE — G8399 PT W/DXA RESULTS DOCUMENT: HCPCS | Performed by: ORTHOPAEDIC SURGERY

## 2021-05-11 PROCEDURE — 4040F PNEUMOC VAC/ADMIN/RCVD: CPT | Performed by: ORTHOPAEDIC SURGERY

## 2021-05-11 PROCEDURE — 1090F PRES/ABSN URINE INCON ASSESS: CPT | Performed by: ORTHOPAEDIC SURGERY

## 2021-05-11 RX ORDER — VIT C/B6/B5/MAGNESIUM/HERB 173 50-5-6-5MG
2 CAPSULE ORAL 3 TIMES DAILY
COMMUNITY

## 2021-05-11 NOTE — PROGRESS NOTES
Chief Complaint   Patient presents with    Follow Up After Procedure     6 months out, Right trochanteric fracture ORIF         Anh Mendoza is a 72y.o. year old  who presents for follow up of right hip intertrochanteric fracture. Overall she is 6 months out. She is experiencing some continued lateral hip pain. She is still ambulating with a lurch. She does feel that things are slowly getting better.       Past Medical History:   Diagnosis Date    COPD (chronic obstructive pulmonary disease) (HCC)     mild    Diabetes mellitus type II, controlled ([facility]     Diverticulosis     Hyperlipidemia     Hypertension     Kidney stone     Obesity, Class III, BMI 40-49.9 (morbid obesity) (HCC)     Osteoarthritis     Osteoporosis     Rheumatoid arthritis involving multiple sites ([facility]     follows with Dr. Tacho Hyatt [facility])     on [address] per Dr. Jackie Childress since dis[facility]arge from [facility] 3/30/2019    Tobacco abuse began age 16    past hx    Tubular adenoma of colon 2011    colonoscopy     Past Surgical History:   Procedure Laterality Date    BREAST BIOPSY      benign, pt cannot remember which side    [address]    right wrist     [address]    CHOLECYSTECTOMY, OPEN      COLONOSCOPY  2011    screening, sigmoid polyp bx (tubular adenoma), Dr. Dannielle Reina, [facility] COLONOSCOPY  4/3/2015    submucosal lipoma distal tranverse colon biopsied, diverticulosis, Dr. Sherry Nava / REMOVAL STENT / STONE Left 3/27/2019    CYSTOSCOPY RETROGRADE PYELOGRAM STENT INSERTION performed by Ze Deleon MD at [facility] Right 2020    RIGHT HIP OPEN REDUCTION INTERNAL FIXATION- REQUESTING 4 PM performed by Mack Curtis MD at [facility] LITHOTRIPSY Left 4/15/2019    CYSTOSCOPY RETROGRADE URETEROSCOPY, stone basket extraction, LEFT STENT INSERTION performed by Eric Mathews strips, Test 2 times a day & as needed for symptoms of irregular blood glucose. Dispense sufficient amount for indicated testing frequency plus additional to accommodate PRN testing needs. , Disp: 100 strip, Rfl: 2    Lancets MISC, Test twice daily, Disp: 100 each, Rfl: 3    Cyanocobalamin (VITAMIN B 12 PO), Take by mouth daily , Disp: , Rfl:     Coenzyme Q10 (CO Q 10 PO), Take 1 tablet by mouth daily QUINOL  / LD 2019, Disp: , Rfl:     hydroxychloroquine (PLAQUENIL) 200 MG tablet, Take 1.5 tablets by mouth daily (Patient not taking: Reported on 2021), Disp: 60 tablet, Rfl: 2  Allergies   Allergen Reactions    Rocephin [Ceftriaxone] Shortness Of Breath     Bronchospasm 3/27 and hypotension immediately after ceftriaxone     Social History     Socioeconomic History    Marital status:      Spouse name: Not on file    Number of children: 3    Years of education: Not on file    Highest education level: Not on file   Occupational History    Not on file   Social Needs    Financial resource strain: Somewhat hard    Food insecurity     Worry: Never true     Inability: Never true    Transportation needs     Medical: No     Non-medical: No   Tobacco Use    Smoking status: Former Smoker     Packs/day: 1.00     Years: 40.00     Pack years: 40.00     Types: Cigarettes     Quit date: 2007     Years since quittin.8    Smokeless tobacco: Never Used   Substance and Sexual Activity    Alcohol use:  Yes     Alcohol/week: 0.0 standard drinks     Frequency: Monthly or less     Drinks per session: 1 or 2     Binge frequency: Never     Comment: rarely    Drug use: No    Sexual activity: Not on file   Lifestyle    Physical activity     Days per week: 2 days     Minutes per session: 20 min    Stress: Not at all   Relationships    Social connections     Talks on phone: More than three times a week     Gets together: Once a week     Attends Sikhism service: More than 4 times per year     Active member of club or organization: No     Attends meetings of clubs or organizations: Never     Relationship status:     Intimate partner violence     Fear of current or ex partner: Not on file     Emotionally abused: Not on file     Physically abused: Not on file     Forced sexual activity: Not on file   Other Topics Concern    Not on file   Social History Narrative    Not on file     Family History   Problem Relation Age of Onset    Diabetes Mother     Heart Disease Mother     Arthritis Mother     Diabetes Father     Stroke Father     Diabetes Brother     High Cholesterol Brother     Cancer Paternal Aunt        Skin: (-) rash,(-) psoriasis,(-) eczema, (-)skin cancer. Musculoskeletal: Continued lateral hip pain and difficulty with ambulation   neurologic: (-) epilepsy, (-)seizures,(-) brain tumor,(-) TIA, (-)stroke, (-)headaches, (-)Parkinson disease,(-) memory loss, (-) LOC. Cardiovascular: (-) Chest pain, (-) swelling in legs/feet, (-) SOB, (-) cramping in legs/feet with walking. SUBJECTIVE:      Constitutional:    The patient is alert and oriented x 3, appears to be stated age and in no distress. Right lower extremity: Scar mature. No pain in the groin region with hip flexion and internal and external rotation. Positive tenderness of the lateral trochanteric region. Patient does ambulate with a gluteal lurch. Full knee flexion extension. Gastrocsoleus tibialis anterior and EHL function 5/5. L2-S1 sensation intact to light touch. Xrays: X-rays of the right hip AP and lateral views as well as single AP view of the pelvis as well as AP and lateral views of the right femur obtained today in the office. This demonstrates continued healing of the right intertrochanteric hip fracture. Is been no further displacement of the fracture. There is prominence of the lateral hip screw.   Impression office x-rays: Healing intertrochanteric hip fracture with intramedullary alanna fixation  Radiographic findings reviewed with patient      Impression:   Encounter Diagnosis   Name Primary?  Closed comminuted intertrochanteric fracture of right femur, initial encounter ([facility] Yes   RA, COPD, diabetes, osteoporosis, hypertension    Plan: Today's findings were explained the patient. She like to try a course of formal therapy. Discussed possible referral to one of my trauma partners for further evaluation for possible hardware removal if needed and/or arthroplasty.

## 2021-06-01 DIAGNOSIS — E78.2 MIXED HYPERLIPIDEMIA: ICD-10-CM

## 2021-06-01 RX ORDER — ROSUVASTATIN CALCIUM 5 MG/1
TABLET, COATED ORAL
Qty: 90 TABLET | Refills: 1 | Status: SHIPPED
Start: 2021-06-01 | End: 2022-04-05

## 2021-06-02 DIAGNOSIS — E11.9 CONTROLLED TYPE 2 DIABETES MELLITUS WITHOUT COMPLICATION, WITHOUT LONG-TERM CURRENT USE OF INSULIN (HCC): ICD-10-CM

## 2021-06-02 NOTE — TELEPHONE ENCOUNTER
Last Appointment:  3/8/2021  Future Appointments   Date Time Provider Mil Leana   6/14/2021  9:30 AM Dangelo Marroquin MD [facility]   6/22/2021  1:45 PM Sylvia Nava MD [facility]

## 2021-06-04 ENCOUNTER — HOSPITAL ENCOUNTER (OUTPATIENT)
Age: 71
Discharge: HOME | End: 2021-06-04
Payer: COMMERCIAL

## 2021-06-04 DIAGNOSIS — M05.79 RHEUMATOID ARTHRITIS INVOLVING MULTIPLE SITES WITH POSITIVE RHEUMATOID FACTOR (HCC): ICD-10-CM

## 2021-06-04 DIAGNOSIS — D72.819 LEUKOPENIA, UNSPECIFIED TYPE: ICD-10-CM

## 2021-06-04 DIAGNOSIS — Z79.899 HIGH RISK MEDICATIONS (NOT ANTICOAGULANTS) LONG-TERM USE: ICD-10-CM

## 2021-06-04 LAB
ALBUMIN SERPL-MCNC: 4.6 G/DL (ref 3.5–5.2)
ALP BLD-CCNC: 39 U/L (ref 35–104)
ALT SERPL-CCNC: 16 U/L (ref 0–32)
ANION GAP SERPL CALCULATED.3IONS-SCNC: 14 MMOL/L (ref 7–16)
AST SERPL-CCNC: 22 U/L (ref 0–31)
BASOPHILS ABSOLUTE: 0.02 E9/L (ref 0–0.2)
BASOPHILS RELATIVE PERCENT: 0.5 % (ref 0–2)
BILIRUB SERPL-MCNC: 0.5 MG/DL (ref 0–1.2)
BUN BLDV-MCNC: 19 MG/DL (ref 6–23)
C-REACTIVE PROTEIN: 0.3 MG/DL (ref 0–0.4)
CALCIUM SERPL-MCNC: 9.4 MG/DL (ref 8.6–10.2)
CHLORIDE BLD-SCNC: 106 MMOL/L (ref 98–107)
CO2: 23 MMOL/L (ref 22–29)
CREAT SERPL-MCNC: 1.1 MG/DL (ref 0.5–1)
EOSINOPHILS ABSOLUTE: 0.06 E9/L (ref 0.05–0.5)
EOSINOPHILS RELATIVE PERCENT: 1.5 % (ref 0–6)
GFR AFRICAN AMERICAN: 59
GFR NON-AFRICAN AMERICAN: 49 ML/MIN/1.73
GLUCOSE BLD-MCNC: 155 MG/DL (ref 74–99)
HCT VFR BLD CALC: 38.9 % (ref 34–48)
HEMOGLOBIN: 12.9 G/DL (ref 11.5–15.5)
IMMATURE GRANULOCYTES #: 0.02 E9/L
IMMATURE GRANULOCYTES %: 0.5 % (ref 0–5)
LYMPHOCYTES ABSOLUTE: 0.68 E9/L (ref 1.5–4)
LYMPHOCYTES RELATIVE PERCENT: 17.5 % (ref 20–42)
MCH RBC QN AUTO: 28 PG (ref 26–35)
MCHC RBC AUTO-ENTMCNC: 33.2 % (ref 32–34.5)
MCV RBC AUTO: 84.6 FL (ref 80–99.9)
MONOCYTES ABSOLUTE: 0.63 E9/L (ref 0.1–0.95)
MONOCYTES RELATIVE PERCENT: 16.2 % (ref 2–12)
NEUTROPHILS ABSOLUTE: 2.47 E9/L (ref 1.8–7.3)
NEUTROPHILS RELATIVE PERCENT: 63.8 % (ref 43–80)
PDW BLD-RTO: 14.8 FL (ref 11.5–15)
PLATELET # BLD: 161 E9/L (ref 130–450)
PMV BLD AUTO: 8.1 FL (ref 7–12)
POTASSIUM SERPL-SCNC: 4.4 MMOL/L (ref 3.5–5)
RBC # BLD: 4.6 E12/L (ref 3.5–5.5)
SODIUM BLD-SCNC: 143 MMOL/L (ref 132–146)
TOTAL PROTEIN: 6.7 G/DL (ref 6.4–8.3)
WBC # BLD: 3.9 E9/L (ref 4.5–11.5)

## 2021-06-04 PROCEDURE — 85025 COMPLETE CBC W/AUTO DIFF WBC: CPT

## 2021-06-04 PROCEDURE — 36415 COLL VENOUS BLD VENIPUNCTURE: CPT

## 2021-06-04 PROCEDURE — 86140 C-REACTIVE PROTEIN: CPT

## 2021-06-04 PROCEDURE — 80053 COMPREHEN METABOLIC PANEL: CPT

## 2021-06-13 PROBLEM — R16.1 SPLENOMEGALY: Status: ACTIVE | Noted: 2021-06-13

## 2021-06-13 NOTE — PROGRESS NOTES
hydroxychloroquine (PLAQUENIL) 200 MG tablet Take 1.5 tablets by mouth daily (Patient not taking: Reported on 5/11/2021) 60 tablet 2    alendronate (FOSAMAX) 70 MG tablet Take 1 tablet by mouth every 7 days 12 tablet 0    folic acid (FOLVITE) 1 MG tablet Take 1 tablet by mouth daily Every day except Wednesday 30 tablet 3    magnesium cl-calcium carbonate (SLOW-MAG) 71.5-119 MG TBEC tablet take 2 tablets once daily 60 tablet 2    pantoprazole (PROTONIX) 40 MG tablet Take 1 tablet by mouth every morning (before breakfast) 30 tablet 1    Cholecalciferol (VITAMIN D) 50 MCG (2000 UT) CAPS capsule Take 2,000 Units by mouth daily 30 capsule 1    aspirin EC 81 MG EC tablet Take 1 tablet by mouth daily Start taking aspirin AFTER completion of all doses of lovenox only 30 tablet 1    Ascorbic Acid (VITAMIN C) 1000 MG tablet Take 1 tablet by mouth daily (Patient taking differently: Take 1,000 mg by mouth 3 times daily ) 30 tablet 1    Biotin 300 MCG TABS Take 1 tablet by mouth daily 30 tablet 2    albuterol sulfate  (90 Base) MCG/ACT inhaler Inhale 2 puffs into the lungs every 6 hours as needed for Wheezing 3 Inhaler 0    lisinopril (PRINIVIL;ZESTRIL) 20 MG tablet Take 1 tablet by mouth daily 90 tablet 1    blood glucose monitor kit and supplies Dispense sufficient amount for indicated testing frequency plus additional to accommodate PRN testing needs. Dispense all needed supplies to include: monitor, strips, lancing device, lancets, control solutions, alcohol swabs. 1 kit 0    blood glucose monitor strips Test 2 times a day & as needed for symptoms of irregular blood glucose. Dispense sufficient amount for indicated testing frequency plus additional to accommodate PRN testing needs.  100 strip 2    Lancets MISC Test twice daily 100 each 3    Cyanocobalamin (VITAMIN B 12 PO) Take by mouth daily       Coenzyme Q10 (CO Q 10 PO) Take 1 tablet by mouth daily QUINOL  / LD 4/9/2019       No current labs  Follow-up with Rheum this month   4. Osteoporosis, unspecified osteoporosis type, unspecified pathological fracture presence  Assessment & Plan:   Previously resumed management after hip fracture  Following with Rheum and Orthopedics   5. High risk medications (not anticoagulants) long-term use  6. Vitamin D deficiency  7. Type 2 diabetes mellitus without complication, without long-term current use of insulin ([facility])  Assessment & Plan:  Last A1c 4.4%  Hx of NAFLD likely following with GI   Repeat A1c will be needed- discussed trial of metformin- patient would like to maintain for now  No noted hypoglycemia   Orders:  -     blood glucose monitor strips; Test 2 times a day & as needed for symptoms of irregular blood glucose. Dispense sufficient amount for indicated testing frequency plus additional to accommodate PRN testing needs. , Disp-100 strip, R-5, Normal  8. Essential hypertension  -     lisinopril (PRINIVIL;ZESTRIL) 20 MG tablet; Take 1 tablet by mouth daily, Disp-90 tablet, R-1Normal    Hx of Tubular Adenoma   Last [facility]OPE in 2015 by Dr. Kaylen Costa- no noted adenoma at that time      Hx of Pulmonary Nodule- small apical- last CT chest 2020 per Radiology read and recs:      Stable tiny pleural-based right apical nodule, looks benign. Does   not need additional follow-up. No new nodules       RTC: 3 months     I have reviewed my findings and recommendations with Lakia Cormier.      Mustapha Felix MD, Estela Damon   6/13/2021 10:52 AM

## 2021-06-13 NOTE — ASSESSMENT & PLAN NOTE
Following with Rheumatology   Now off all DMARDs  Stopped Hydroxychloroquine recently  CRP is within normal limits from 6/4 labs  Follow-up with Rheum this month

## 2021-06-14 ENCOUNTER — OFFICE VISIT (OUTPATIENT)
Dept: INTERNAL MEDICINE | Age: 71
End: 2021-06-14
Payer: COMMERCIAL

## 2021-06-14 VITALS
SYSTOLIC BLOOD PRESSURE: 120 MMHG | HEART RATE: 96 BPM | DIASTOLIC BLOOD PRESSURE: 70 MMHG | TEMPERATURE: 97.2 F | BODY MASS INDEX: 36.54 KG/M2 | HEIGHT: 64 IN | WEIGHT: 214 LBS | RESPIRATION RATE: 16 BRPM

## 2021-06-14 DIAGNOSIS — M81.0 AGE-RELATED OSTEOPOROSIS WITHOUT CURRENT PATHOLOGICAL FRACTURE: ICD-10-CM

## 2021-06-14 DIAGNOSIS — I10 ESSENTIAL HYPERTENSION: ICD-10-CM

## 2021-06-14 DIAGNOSIS — M05.79 RHEUMATOID ARTHRITIS INVOLVING MULTIPLE SITES WITH POSITIVE RHEUMATOID FACTOR (HCC): ICD-10-CM

## 2021-06-14 DIAGNOSIS — Z79.899 HIGH RISK MEDICATIONS (NOT ANTICOAGULANTS) LONG-TERM USE: ICD-10-CM

## 2021-06-14 DIAGNOSIS — M81.0 OSTEOPOROSIS, UNSPECIFIED OSTEOPOROSIS TYPE, UNSPECIFIED PATHOLOGICAL FRACTURE PRESENCE: ICD-10-CM

## 2021-06-14 DIAGNOSIS — E11.9 TYPE 2 DIABETES MELLITUS WITHOUT COMPLICATION, WITHOUT LONG-TERM CURRENT USE OF INSULIN (HCC): ICD-10-CM

## 2021-06-14 DIAGNOSIS — E55.9 VITAMIN D DEFICIENCY: ICD-10-CM

## 2021-06-14 DIAGNOSIS — R16.1 SPLENOMEGALY: ICD-10-CM

## 2021-06-14 PROCEDURE — 99212 OFFICE O/P EST SF 10 MIN: CPT | Performed by: INTERNAL MEDICINE

## 2021-06-14 PROCEDURE — 1123F ACP DISCUSS/DSCN MKR DOCD: CPT | Performed by: INTERNAL MEDICINE

## 2021-06-14 PROCEDURE — 4040F PNEUMOC VAC/ADMIN/RCVD: CPT | Performed by: INTERNAL MEDICINE

## 2021-06-14 PROCEDURE — 1036F TOBACCO NON-USER: CPT | Performed by: INTERNAL MEDICINE

## 2021-06-14 PROCEDURE — 3017F COLORECTAL CA SCREEN DOC REV: CPT | Performed by: INTERNAL MEDICINE

## 2021-06-14 PROCEDURE — 3044F HG A1C LEVEL LT 7.0%: CPT | Performed by: INTERNAL MEDICINE

## 2021-06-14 PROCEDURE — G8399 PT W/DXA RESULTS DOCUMENT: HCPCS | Performed by: INTERNAL MEDICINE

## 2021-06-14 PROCEDURE — 1090F PRES/ABSN URINE INCON ASSESS: CPT | Performed by: INTERNAL MEDICINE

## 2021-06-14 PROCEDURE — 2022F DILAT RTA XM EVC RTNOPTHY: CPT | Performed by: INTERNAL MEDICINE

## 2021-06-14 PROCEDURE — 99214 OFFICE O/P EST MOD 30 MIN: CPT | Performed by: INTERNAL MEDICINE

## 2021-06-14 PROCEDURE — G8427 DOCREV CUR MEDS BY ELIG CLIN: HCPCS | Performed by: INTERNAL MEDICINE

## 2021-06-14 PROCEDURE — G8417 CALC BMI ABV UP PARAM F/U: HCPCS | Performed by: INTERNAL MEDICINE

## 2021-06-14 RX ORDER — LISINOPRIL 20 MG/1
20 TABLET ORAL DAILY
Qty: 90 TABLET | Refills: 1 | Status: SHIPPED
Start: 2021-06-14 | End: 2022-09-30

## 2021-06-14 RX ORDER — GLUCOSAMINE HCL/CHONDROITIN SU 500-400 MG
CAPSULE ORAL
Qty: 100 STRIP | Refills: 5 | Status: SHIPPED
Start: 2021-06-14 | End: 2022-09-22 | Stop reason: SDUPTHER

## 2021-06-14 SDOH — ECONOMIC STABILITY: FOOD INSECURITY: WITHIN THE PAST 12 MONTHS, YOU WORRIED THAT YOUR FOOD WOULD RUN OUT BEFORE YOU GOT MONEY TO BUY MORE.: NEVER TRUE

## 2021-06-14 SDOH — ECONOMIC STABILITY: FOOD INSECURITY: WITHIN THE PAST 12 MONTHS, THE FOOD YOU BOUGHT JUST DIDN'T LAST AND YOU DIDN'T HAVE MONEY TO GET MORE.: NEVER TRUE

## 2021-06-14 ASSESSMENT — PATIENT HEALTH QUESTIONNAIRE - PHQ9
SUM OF ALL RESPONSES TO PHQ QUESTIONS 1-9: 0
SUM OF ALL RESPONSES TO PHQ QUESTIONS 1-9: 0
1. LITTLE INTEREST OR PLEASURE IN DOING THINGS: 0
SUM OF ALL RESPONSES TO PHQ9 QUESTIONS 1 & 2: 0
SUM OF ALL RESPONSES TO PHQ QUESTIONS 1-9: 0
2. FEELING DOWN, DEPRESSED OR HOPELESS: 0

## 2021-06-14 ASSESSMENT — ENCOUNTER SYMPTOMS
COUGH: 0
SHORTNESS OF BREATH: 0
VOMITING: 0
NAUSEA: 0
CONSTIPATION: 0
ABDOMINAL PAIN: 0
WHEEZING: 0
DIARRHEA: 0

## 2021-06-14 NOTE — PATIENT INSTRUCTIONS
Talk to your doctor or dietitian about how much protein you need and the best way to get it. ? Increase how much fiber you eat. Fiber includes oat bran, beans, whole wheat breads, wheat cereals, cabbage, and carrots. ? Avoid grapefruit juice. ? Drink lemonade made from real veronika (not lemon flavoring). It is high in citrate, which may help prevent kidney stones. ? Talk to your doctor if you take vitamins or supplements. Your doctor may want you to limit how much fish liver oil or calcium supplements you take. Also, do not take more than the recommended daily dose of vitamins C and D. Follow-up care is a key part of your treatment and safety. Be sure to make and go to all appointments, and call your doctor if you are having problems. It's also a good idea to know your test results and keep a list of the medicines you take. Where can you learn more? Go to https://Enviable Abode.Urbantech. org and sign in to your [employer] account. Enter C138 in the [employer] box to learn more about \"Learning About Diet for Kidney Stone Prevention. \"     If you do not have an account, please click on the \"Sign Up Now\" link. Current as of: December 17, 2020               Content Version: 12.8  © 3032-9250 [employer], Incorporated. Care instructions adapted under license by [facility]. If you have questions about a medical condition or this instruction, always ask your healthcare professional. [place] [address] any warranty or liability for your use of this information. 1) Calcium oxalate kidney stone identified previously. 2) Weight loss goals- 7% of total body weight will improve liver function overall. 3) maintain Rheumatology and Gastroenterology appointments. 4) We will order repeat labs at next visit. 5) Please call with any questions.

## 2021-06-15 ENCOUNTER — OFFICE VISIT (OUTPATIENT)
Dept: RHEUMATOLOGY | Age: 71
End: 2021-06-15
Payer: COMMERCIAL

## 2021-06-15 VITALS
WEIGHT: 214.6 LBS | BODY MASS INDEX: 36.64 KG/M2 | SYSTOLIC BLOOD PRESSURE: 133 MMHG | RESPIRATION RATE: 18 BRPM | DIASTOLIC BLOOD PRESSURE: 68 MMHG | TEMPERATURE: 98.1 F | HEIGHT: 64 IN | HEART RATE: 88 BPM

## 2021-06-15 DIAGNOSIS — M05.79 RHEUMATOID ARTHRITIS INVOLVING MULTIPLE SITES WITH POSITIVE RHEUMATOID FACTOR (HCC): ICD-10-CM

## 2021-06-15 DIAGNOSIS — M81.0 OSTEOPOROSIS, UNSPECIFIED OSTEOPOROSIS TYPE, UNSPECIFIED PATHOLOGICAL FRACTURE PRESENCE: ICD-10-CM

## 2021-06-15 DIAGNOSIS — R74.8 ELEVATED LIVER ENZYMES: Primary | ICD-10-CM

## 2021-06-15 DIAGNOSIS — Z92.29 HISTORY OF HIGH RISK MEDICATION TREATMENT: ICD-10-CM

## 2021-06-15 DIAGNOSIS — R25.2 LEG CRAMPS: ICD-10-CM

## 2021-06-15 PROCEDURE — G8399 PT W/DXA RESULTS DOCUMENT: HCPCS | Performed by: INTERNAL MEDICINE

## 2021-06-15 PROCEDURE — 4040F PNEUMOC VAC/ADMIN/RCVD: CPT | Performed by: INTERNAL MEDICINE

## 2021-06-15 PROCEDURE — 1036F TOBACCO NON-USER: CPT | Performed by: INTERNAL MEDICINE

## 2021-06-15 PROCEDURE — G8427 DOCREV CUR MEDS BY ELIG CLIN: HCPCS | Performed by: INTERNAL MEDICINE

## 2021-06-15 PROCEDURE — 99214 OFFICE O/P EST MOD 30 MIN: CPT | Performed by: INTERNAL MEDICINE

## 2021-06-15 PROCEDURE — 1090F PRES/ABSN URINE INCON ASSESS: CPT | Performed by: INTERNAL MEDICINE

## 2021-06-15 PROCEDURE — 3017F COLORECTAL CA SCREEN DOC REV: CPT | Performed by: INTERNAL MEDICINE

## 2021-06-15 PROCEDURE — G8417 CALC BMI ABV UP PARAM F/U: HCPCS | Performed by: INTERNAL MEDICINE

## 2021-06-15 PROCEDURE — 99212 OFFICE O/P EST SF 10 MIN: CPT | Performed by: INTERNAL MEDICINE

## 2021-06-15 PROCEDURE — 1123F ACP DISCUSS/DSCN MKR DOCD: CPT | Performed by: INTERNAL MEDICINE

## 2021-06-15 RX ORDER — MAGNESIUM CHLORIDE 71.5 G/G
TABLET ORAL
Qty: 60 TABLET | Refills: 2 | Status: SHIPPED
Start: 2021-06-15 | End: 2021-09-16 | Stop reason: SDUPTHER

## 2021-06-15 RX ORDER — ALENDRONATE SODIUM 70 MG/1
70 TABLET ORAL
Qty: 12 TABLET | Refills: 0 | Status: SHIPPED
Start: 2021-06-15 | End: 2021-09-16 | Stop reason: SDUPTHER

## 2021-06-15 RX ORDER — HYDROXYCHLOROQUINE SULFATE 200 MG/1
200 TABLET, FILM COATED ORAL DAILY
Qty: 30 TABLET | Refills: 3 | Status: SHIPPED
Start: 2021-06-15 | End: 2021-09-28 | Stop reason: SDUPTHER

## 2021-06-15 NOTE — PROGRESS NOTES
Printed lab scripts given to pt with instructions  Medication changes reviewed with patient  Patient given instructions. Understanding verbalized.  Fu appointment scheduled and reviewed with pt

## 2021-06-15 NOTE — PROGRESS NOTES
J O I N T  C A R E  C L I N I C        The patient is seen in follow-up for:  RA.  Medical record reviewed. Labs obtained 21 reviewed:  CRP 0.3; ALT 16; AST 22; CBC stable. VS reviewed    RA symptoms well controlled at this time. Patient History Update Since Previous Visit      Yes No Comment      New illnesses  x       Seen other healthcare provider x        New x-ray, labs, or procedures  x       Started / changed / stopped medications x  Stopped MTX and HCQ      New allergies / reactions to medications x        Changes in family medical history x        Changes in patient social history x        Morning stiffness  x Length:       Worst Joint:  Bilateral knees      Review of Systems      Key:    1  Not present today;  2  Much Better;  3  Better;  4  Same;  5  Worse;  N  New Problem          Rating  Rating   Joint pain (not including back pain) 3 Skin ulcers: 4   Joint swellin Bruisin   Fatigue:  Sickness or rash with sun exposure 4   Muscle aches: 4- hip tender5 Swollen glands 3   Ongoing fever: 4 Dry eyes 4   Unintended weight loss: 3  Painful red eyes 4   Migraine headache: 3 Dry mouth 4   Difficulty sleepin Mouth sores 4   Snore or gag at night: 4 Upset stomach 3   Heart palpitations: 4 Diarrhea 3   Cough: 4 Depressed mood 3   Shortness of breath: 4 Overall stress level in life 4   Pain with breathing 3 Overall assessment    Skin rash 3         Abbreviated Exam    System    nl   abn   Comment     1 Gen nutrition/hygiene x      appearance x     2 Skin turgor / integrity x      rash x      ecchymosis x     3 Psych orientation x      memory       mood x      cooperative x     4 HENT mouth / throat x     5 Resp resp. effort x      auscultation x     6 CV heart auscultation x      extremity edema x     7 Other             Joint Assessment      Patient Right     Patient Left     x (check if no synovitis seen on exam)   Joint Pain Swelling Joint Pain Swelling   Shoulder   Shoulder Elbow   Elbow     Wrist   Wrist     Knee x  Knee x    MCP I   MCP I     MCP II   MCP II     MCP III   MCP III     MCP IV   MCP IV     MCP V   MCP V     IP I   IP I     PIP II   PIP II     PIP III   PIP III     PIP IV   PIP IV     PIP V   PIP V             Generalized Ligament Laxity Prior Test / Diagnostics    Yes  No  Equivocal        Tender Points     Yes  No  Equivocal         GTB pain / tenderness     SI pain / tenderness     Heberdens Nodes         Impression     +CCP  +RF  Seropositive RA - stable. Currently off all RA medications.   Taken off per GI physician-- liver enzymes  +fatty liver on US       Losing weight dieting  She is currently on Turmeric 3 x daily prescribed by the GI physician.      hepato/Splenomegaly - follows with GI specialist (glutathione supplement--milk thistle)    11-16-20 Tripped and fell onto right hip sustaining acute, comminuted, closed, intertrochanteric  fx of rt femur with subsequent cephalomedullar nail fixation on 11-.   -- ongoing PT  OA     Former smoker - quit 2011 ( 40 pack year history) COPD; 2 mm stable lung nodule.  Repeat CT scan 06-24-20 noted moderate splenomegaly, lung nodule remains stable.     Type 2 diabetes   Increased BMI-- lost 30 lbs     Carpal tunnel syndrome  1985 Right release; 11/2019 Left release     Chronic low back pain     Osteoporosis Fosamax reinitiated at previous visit.  She is taking this medication now.      Continue mag - helps muscle cramps  Vit D deficiency - Currently resolved - Vit D level now at 46. Currently on 2,000 IU daily     Kidney stone   GERD     High risk medications   -- June labs normal     concerns about snapping IT band at lateral femoral condyle  - L knee-- discussed wrap/velcro brace (defer injection to this area for this)  Fu PT for this       Plan     Continue  F/u with GI specialist.  Continue fosamax +vit D 2000 daily  continue mag - helps muscle cramps  Restart hcq 200mg once daily  Labs lizzy to fu  F/U in 3.5

## 2021-06-15 NOTE — PATIENT INSTRUCTIONS
Continue  F/u with GI specialist.  Continue fosamax +vit D 2000 daily  continue mag - helps muscle cramps    Restart hcq 200mg once daily  Labs lizzy to fu  F/U in 3.5 months - earlier if needed.     Return sept 28  Labs prior

## 2021-07-06 ENCOUNTER — HOSPITAL ENCOUNTER (OUTPATIENT)
Age: 71
Discharge: HOME | End: 2021-07-06
Payer: COMMERCIAL

## 2021-07-06 LAB
ALBUMIN SERPL-MCNC: 4.7 G/DL (ref 3.5–5.2)
ALP BLD-CCNC: 41 U/L (ref 35–104)
ALT SERPL-CCNC: 16 U/L (ref 0–32)
ANION GAP SERPL CALCULATED.3IONS-SCNC: 14 MMOL/L (ref 7–16)
AST SERPL-CCNC: 20 U/L (ref 0–31)
BILIRUB SERPL-MCNC: 0.5 MG/DL (ref 0–1.2)
BUN BLDV-MCNC: 20 MG/DL (ref 6–23)
CALCIUM SERPL-MCNC: 9.5 MG/DL (ref 8.6–10.2)
CHLORIDE BLD-SCNC: 103 MMOL/L (ref 98–107)
CO2: 22 MMOL/L (ref 22–29)
CREAT SERPL-MCNC: 1 MG/DL (ref 0.5–1)
GFR AFRICAN AMERICAN: >60
GFR NON-AFRICAN AMERICAN: 55 ML/MIN/1.73
GLUCOSE BLD-MCNC: 158 MG/DL (ref 74–99)
POTASSIUM SERPL-SCNC: 4.5 MMOL/L (ref 3.5–5)
SODIUM BLD-SCNC: 139 MMOL/L (ref 132–146)
TOTAL PROTEIN: 7.2 G/DL (ref 6.4–8.3)

## 2021-07-06 PROCEDURE — 86803 HEPATITIS C AB TEST: CPT

## 2021-07-06 PROCEDURE — 36415 COLL VENOUS BLD VENIPUNCTURE: CPT

## 2021-07-06 PROCEDURE — 86704 HEP B CORE ANTIBODY TOTAL: CPT

## 2021-07-06 PROCEDURE — 80053 COMPREHEN METABOLIC PANEL: CPT

## 2021-07-06 PROCEDURE — 87340 HEPATITIS B SURFACE AG IA: CPT

## 2021-07-06 PROCEDURE — 81596 NFCT DS CHRNC HCV 6 ASSAYS: CPT

## 2021-07-06 PROCEDURE — 86706 HEP B SURFACE ANTIBODY: CPT

## 2021-07-07 LAB
HBV SURFACE AB TITR SER: NORMAL {TITER}
HEPATITIS B SURFACE ANTIGEN INTERPRETATION: NORMAL
HEPATITIS C ANTIBODY INTERPRETATION: NORMAL

## 2021-07-08 LAB — HEPATITIS B CORE TOTAL ANTIBODY: NONREACTIVE

## 2021-07-09 LAB
Lab: NORMAL
REPORT: NORMAL
THIS TEST SENT TO: NORMAL

## 2021-09-02 DIAGNOSIS — E78.2 MIXED HYPERLIPIDEMIA: ICD-10-CM

## 2021-09-02 RX ORDER — ROSUVASTATIN CALCIUM 5 MG/1
TABLET, COATED ORAL
Qty: 90 TABLET | Refills: 1 | OUTPATIENT
Start: 2021-09-02

## 2021-09-02 NOTE — TELEPHONE ENCOUNTER
Last Appointment:  6/14/2021  Future Appointments   Date Time Provider Mil Christopheri   9/16/2021  1:15 PM Zhang Pyle MD [facility]   9/28/2021  3:00 PM Marissa Yates MD [facility]

## 2021-09-16 ENCOUNTER — OFFICE VISIT (OUTPATIENT)
Dept: INTERNAL MEDICINE | Age: 71
End: 2021-09-16
Payer: COMMERCIAL

## 2021-09-16 VITALS
OXYGEN SATURATION: 96 % | SYSTOLIC BLOOD PRESSURE: 114 MMHG | BODY MASS INDEX: 36.7 KG/M2 | RESPIRATION RATE: 16 BRPM | HEIGHT: 64 IN | DIASTOLIC BLOOD PRESSURE: 67 MMHG | TEMPERATURE: 98.1 F | HEART RATE: 75 BPM | WEIGHT: 215 LBS

## 2021-09-16 DIAGNOSIS — M81.0 OSTEOPOROSIS, UNSPECIFIED OSTEOPOROSIS TYPE, UNSPECIFIED PATHOLOGICAL FRACTURE PRESENCE: ICD-10-CM

## 2021-09-16 DIAGNOSIS — K21.9 GASTROESOPHAGEAL REFLUX DISEASE WITHOUT ESOPHAGITIS: ICD-10-CM

## 2021-09-16 DIAGNOSIS — F17.211 CIGARETTE NICOTINE DEPENDENCE IN REMISSION: ICD-10-CM

## 2021-09-16 DIAGNOSIS — M05.79 RHEUMATOID ARTHRITIS INVOLVING MULTIPLE SITES WITH POSITIVE RHEUMATOID FACTOR (HCC): ICD-10-CM

## 2021-09-16 DIAGNOSIS — Z91.81 AT HIGH RISK FOR FALLS: Primary | ICD-10-CM

## 2021-09-16 DIAGNOSIS — R25.2 LEG CRAMPS: ICD-10-CM

## 2021-09-16 DIAGNOSIS — Z87.891 PERSONAL HISTORY OF TOBACCO USE: ICD-10-CM

## 2021-09-16 PROCEDURE — G8417 CALC BMI ABV UP PARAM F/U: HCPCS | Performed by: INTERNAL MEDICINE

## 2021-09-16 PROCEDURE — G8399 PT W/DXA RESULTS DOCUMENT: HCPCS | Performed by: INTERNAL MEDICINE

## 2021-09-16 PROCEDURE — 99213 OFFICE O/P EST LOW 20 MIN: CPT | Performed by: INTERNAL MEDICINE

## 2021-09-16 PROCEDURE — 1090F PRES/ABSN URINE INCON ASSESS: CPT | Performed by: INTERNAL MEDICINE

## 2021-09-16 PROCEDURE — G8427 DOCREV CUR MEDS BY ELIG CLIN: HCPCS | Performed by: INTERNAL MEDICINE

## 2021-09-16 PROCEDURE — 3017F COLORECTAL CA SCREEN DOC REV: CPT | Performed by: INTERNAL MEDICINE

## 2021-09-16 PROCEDURE — 1036F TOBACCO NON-USER: CPT | Performed by: INTERNAL MEDICINE

## 2021-09-16 PROCEDURE — 4040F PNEUMOC VAC/ADMIN/RCVD: CPT | Performed by: INTERNAL MEDICINE

## 2021-09-16 PROCEDURE — 1123F ACP DISCUSS/DSCN MKR DOCD: CPT | Performed by: INTERNAL MEDICINE

## 2021-09-16 PROCEDURE — G0296 VISIT TO DETERM LDCT ELIG: HCPCS | Performed by: INTERNAL MEDICINE

## 2021-09-16 RX ORDER — PANTOPRAZOLE SODIUM 40 MG/1
40 TABLET, DELAYED RELEASE ORAL DAILY PRN
Qty: 30 TABLET | Refills: 2 | Status: SHIPPED
Start: 2021-09-16 | End: 2021-09-16

## 2021-09-16 RX ORDER — MAGNESIUM CHLORIDE 71.5 G/G
TABLET ORAL
Qty: 60 TABLET | Refills: 2 | Status: SHIPPED | OUTPATIENT
Start: 2021-09-16

## 2021-09-16 RX ORDER — PANTOPRAZOLE SODIUM 40 MG/1
TABLET, DELAYED RELEASE ORAL
Qty: 90 TABLET | Refills: 0 | Status: SHIPPED | OUTPATIENT
Start: 2021-09-16

## 2021-09-16 RX ORDER — ALENDRONATE SODIUM 70 MG/1
70 TABLET ORAL
Qty: 12 TABLET | Refills: 1 | Status: SHIPPED
Start: 2021-09-16 | End: 2022-01-18 | Stop reason: SDUPTHER

## 2021-09-16 ASSESSMENT — ENCOUNTER SYMPTOMS
NAUSEA: 0
BLOOD IN STOOL: 0
WHEEZING: 0
DIARRHEA: 0
SHORTNESS OF BREATH: 0
CONSTIPATION: 0
ABDOMINAL DISTENTION: 0
COUGH: 0
VOMITING: 0
ABDOMINAL PAIN: 0

## 2021-09-16 NOTE — PROGRESS NOTES
Discharge instructions reviewed with patient per Dr Chandler Chase and this nurse. AVS given to patient.

## 2021-09-16 NOTE — PATIENT INSTRUCTIONS
You will be notified of the date and time of your CT appt. Have lab work done as ordered. Follow up as scheduled. Please call with any questions or concerns. Rachelle Guerrier RN      1) COVID-19 vaccine appointments are not available through our practice. As you're eligible to receive the COVID-19 vaccine, as determined by your state's department of health, you will be able to schedule an appointment through [address] or by calling [phone]. Appointments are required to receive the COVID-19 vaccine. As vaccine supply continues to be limited, we anticipate open appointments to fill up quickly and appreciate your patience as we work through the process of providing vaccines to those in our communities. Please re-consider vaccination. Information above is available to schedule appt. Continue protection with social distancing- masks universally indoor and outdoors, and good hand hygiene. 2)   What is lung cancer screening? Lung cancer screening is a way in which doctors check the lungs for early signs of cancer in people who have no symptoms of lung cancer. A low-dose CT scan uses much less radiation than a normal CT scan and shows a more detailed image of the lungs than a standard X-ray. The goal of lung cancer screening is to find cancer early, before it has a chance to grow, spread, or cause problems. One large study found that smokers who were screened with low-dose CT scans were less likely to die of lung cancer than those who were screened with standard X-ray. Below is a summary of the things you need to know regarding screening for lung cancer with low-dose computed tomography (LDCT). This is a screening program that involves routine annual screening with LDCT studies of the lung. The LDCTs are done using low-dose radiation that is not thought to increase your cancer risk.   If you have other serious medical conditions (other cancers, congestive heart failure) that limit your life expectancy to less than 10 years, you should not undergo lung cancer screening with LDCT. The chance is 20%-60% that the LDCT result will show abnormalities. This would require additional testing which could include repeat imaging or even invasive procedures. Most (about 95%) of \"abnormal\" LDCT results are false in the sense that no lung cancer is ultimately found. Additionally, some (about 10%) of the cancers found would not affect your life expectancy, even if undetected and untreated. If you are still smoking, the single most important thing that you can do to reduce your risk of dying of lung cancer is to quit. For this screening to be covered by Medicare and most other insurers, strict criteria must be met. If you do not meet these criteria, but still wish to undergo LDCT testing, you will be required to sign a waiver indicating your willingness to pay for the scan.

## 2021-09-16 NOTE — TELEPHONE ENCOUNTER
Last Appointment:  9/16/2021  Future Appointments   Date Time Provider Mil Meadisti   9/28/2021  3:00 PM Jak Murguia MD [facility]   12/16/2021 12:45 PM Caleb Altamirano MD [facility]      9/16/21 #30 x2

## 2021-09-16 NOTE — PROGRESS NOTES
[facility]  InternalMedicine Residency Program  ACC Note      SUBJECTIVE:  CC: had concerns including Hypertension and Diabetes. Adeola Whitley presented to the [facility] for a routine visit. HPI    Presents for follow-up appointment without any acute complaints. Doing well overall. Tolerating medication regimen. Refills needed. Overdue for repeat LDCT as last CT scan noted stable small nodules- statistically benign with need to resume LDCT annual surveillance. Last labs stable and repeat labs scheduled prior to next Rheumatology follow-up. Hip continues to improve with continued physical therapy. Gait improving- no noted falls and resumed bisphosphonate therapy. Review of Systems   Respiratory: Negative for cough, shortness of breath and wheezing. Cardiovascular: Negative for chest pain, palpitations and leg swelling. Gastrointestinal: Negative for abdominal distention, abdominal pain, blood in stool, constipation, diarrhea, nausea and vomiting. Genitourinary: Negative for difficulty urinating and hematuria. Musculoskeletal: Positive for joint swelling (hands bothersome at this time). Neurological: Negative for dizziness, syncope and light-headedness.        Current Outpatient Medications on File Prior to Visit   Medication Sig Dispense Refill    Homeopathic Products (LIVER SUPPORT SL) Take 1 capsule by mouth daily      hydroxychloroquine (PLAQUENIL) 200 MG tablet Take 1 tablet by mouth daily 30 tablet 3    magnesium cl-calcium carbonate (SLOW-MAG) 71.5-119 MG TBEC tablet take 2 tablets once daily 60 tablet 2    alendronate (FOSAMAX) 70 MG tablet Take 1 tablet by mouth every 7 days 12 tablet 0    Bioflavonoid Products (SHERIF C PO) Take 2 tablets by mouth 2 times daily      GLUTATHIONE PO Take 500 mg by mouth daily      lisinopril (PRINIVIL;ZESTRIL) 20 MG tablet Take 1 tablet by mouth daily 90 tablet 1    blood glucose monitor strips Test 2 times a day & as needed for symptoms of irregular blood glucose. Dispense sufficient amount for indicated testing frequency plus additional to accommodate PRN testing needs. 100 strip 5    metFORMIN (GLUCOPHAGE) 850 MG tablet Take 1 tablet by mouth 2 times daily (with meals) 180 tablet 1    rosuvastatin (CRESTOR) 5 MG tablet take 1 tablet by mouth once daily 90 tablet 1    Turmeric 500 MG CAPS Take 2 tablets by mouth 3 times daily       pantoprazole (PROTONIX) 40 MG tablet Take 1 tablet by mouth every morning (before breakfast) (Patient taking differently: Take 40 mg by mouth daily as needed ) 30 tablet 1    Cholecalciferol (VITAMIN D) 50 MCG (2000 UT) CAPS capsule Take 2,000 Units by mouth daily 30 capsule 1    Lancets MISC Test twice daily 100 each 3    Cyanocobalamin (VITAMIN B 12 PO) Take by mouth daily       Coenzyme Q10 (CO Q 10 PO) Take 1 tablet by mouth daily QUINOL  / LD 4/9/2019      aspirin EC 81 MG EC tablet Take 1 tablet by mouth daily Start taking aspirin AFTER completion of all doses of lovenox only (Patient not taking: Reported on 6/15/2021) 30 tablet 1    albuterol sulfate  (90 Base) MCG/ACT inhaler Inhale 2 puffs into the lungs every 6 hours as needed for Wheezing (Patient not taking: Reported on 6/14/2021) 3 Inhaler 0    blood glucose monitor kit and supplies Dispense sufficient amount for indicated testing frequency plus additional to accommodate PRN testing needs. Dispense all needed supplies to include: monitor, strips, lancing device, lancets, control solutions, alcohol swabs. 1 kit 0     No current facility-administered medications on file prior to visit. I have reviewed all pertinent PMHx, PSHx, FamHx, SocialHx,medications, and allergies and updated history as appropriate.     OBJECTIVE:    VS: /67 (Site: Left Upper Arm, Position: Sitting, Cuff Size: Medium Adult)   Pulse 75   Temp 98.1 °F (36.7 °C) (Oral)   Resp 16   Ht 5' 4\" (1.626 m)   Wt 215 lb (97.5 kg)   SpO2 96% Comment: at rest on room air  BMI 36.90 kg/m²   Physical Exam  Constitutional:       Appearance: Normal appearance. She is obese. HENT:      Head: Normocephalic and atraumatic. Eyes:      General: No scleral icterus. Cardiovascular:      Rate and Rhythm: Normal rate and regular rhythm. Pulses: Normal pulses. Heart sounds: No murmur heard. Pulmonary:      Effort: Pulmonary effort is normal.      Breath sounds: No wheezing, rhonchi or rales. Abdominal:      General: Bowel sounds are normal.      Palpations: Abdomen is soft. Tenderness: There is no abdominal tenderness. There is no guarding. Musculoskeletal:      Right lower leg: No edema. Left lower leg: No edema. Neurological:      Mental Status: She is alert. ASSESSMENT/PLAN:  Rebecca Gupta was seen today for hypertension and diabetes. Diagnoses and all orders for this visit:    At high risk for falls    Rheumatoid arthritis involving multiple sites with positive rheumatoid factor (HCC)  -     magnesium cl-calcium carbonate (SLOW-MAG) 71.5-119 MG TBEC tablet; take 2 tablets once daily    Leg cramps  -     magnesium cl-calcium carbonate (SLOW-MAG) 71.5-119 MG TBEC tablet; take 2 tablets once daily    Osteoporosis, unspecified osteoporosis type, unspecified pathological fracture presence  -     alendronate (FOSAMAX) 70 MG tablet; Take 1 tablet by mouth every 7 days    Gastroesophageal reflux disease without esophagitis  -     Discontinue: pantoprazole (PROTONIX) 40 MG tablet; Take 1 tablet by mouth daily as needed (gerd)    Personal history of tobacco use  -     [place] VISIT TO DISCUSS LUNG CA SCREEN W LDCT  -     CT Lung Screen (Annual); Future    Cigarette nicotine dependence in remission    COVID vaccination counseled at length based on CDC recommendations and high risk co-morbidities. Discussed at length.       9/16/2021 2:16 PM On the basis of positive falls risk screening, assessment and plan is as follows: home safety tips provided, patient remains in physical therapy s/p hip fracture and stability improving- no recent falls. LDCT ordered    Labs to be completed- previously ordered by Rheumatology    Med management continued     RTC: 3 months     I have reviewed my findings and recommendations with Concetta Jackson. Kathrine Miller MD, MD, FACP     Low Dose CT (LDCT) Lung Screening criteria met   Age 50-69   Pack year smoking >30   Still smoking or less than 15 year since quit   No sign or symptoms of lung cancer   > 11 months since last LDCT     Risks and benefits of lung cancer screening with LDCT scans discussed:    Significance of positive screen - False-positive LDCT results often occur. 95% of all positive results do not lead to a diagnosis of cancer. Usually further imaging can resolve most false-positive results; however, some patients may require invasive procedures. Over diagnosis risk - 10% to 12% of screen-detected lung cancer cases are over diagnosedthat is, the cancer would not have been detected in the patient's lifetime without the screening. Need for follow up screens annually to continue lung cancer screening effectiveness     Risks associated with radiation from annual LDCT- Radiation exposure is about the same as for a mammogram, which is about 1/3 of the annual background radiation exposure from everyday life. Starting screening at age 54 is not likely to increase cancer risk from radiation exposure. Patients with comorbidities resulting in life expectancy of < 10 years, or that would preclude treatment of an abnormality identified on CT, should not be screened due to lack of benefit.     To obtain maximal benefit from this screening, smoking cessation and long-term abstinence from smoking is critical

## 2021-09-24 ENCOUNTER — HOSPITAL ENCOUNTER (OUTPATIENT)
Age: 71
Discharge: HOME | End: 2021-09-24
Payer: COMMERCIAL

## 2021-09-24 DIAGNOSIS — R74.8 ELEVATED LIVER ENZYMES: ICD-10-CM

## 2021-09-24 DIAGNOSIS — Z92.29 HISTORY OF HIGH RISK MEDICATION TREATMENT: ICD-10-CM

## 2021-09-24 DIAGNOSIS — M05.79 RHEUMATOID ARTHRITIS INVOLVING MULTIPLE SITES WITH POSITIVE RHEUMATOID FACTOR (HCC): ICD-10-CM

## 2021-09-24 LAB
ALBUMIN SERPL-MCNC: 4.6 G/DL (ref 3.5–5.2)
ALP BLD-CCNC: 42 U/L (ref 35–104)
ALT SERPL-CCNC: 17 U/L (ref 0–32)
ANION GAP SERPL CALCULATED.3IONS-SCNC: 12 MMOL/L (ref 7–16)
AST SERPL-CCNC: 23 U/L (ref 0–31)
BILIRUB SERPL-MCNC: 0.4 MG/DL (ref 0–1.2)
BUN BLDV-MCNC: 16 MG/DL (ref 6–23)
C-REACTIVE PROTEIN: 0.3 MG/DL (ref 0–0.4)
CALCIUM SERPL-MCNC: 9.4 MG/DL (ref 8.6–10.2)
CHLORIDE BLD-SCNC: 105 MMOL/L (ref 98–107)
CO2: 25 MMOL/L (ref 22–29)
CREAT SERPL-MCNC: 1.1 MG/DL (ref 0.5–1)
GFR AFRICAN AMERICAN: 59
GFR NON-AFRICAN AMERICAN: 49 ML/MIN/1.73
GLUCOSE BLD-MCNC: 118 MG/DL (ref 74–99)
POTASSIUM SERPL-SCNC: 4.6 MMOL/L (ref 3.5–5)
RHEUMATOID FACTOR: 35 IU/ML (ref 0–13)
SODIUM BLD-SCNC: 142 MMOL/L (ref 132–146)
TOTAL PROTEIN: 6.9 G/DL (ref 6.4–8.3)

## 2021-09-24 PROCEDURE — 80053 COMPREHEN METABOLIC PANEL: CPT

## 2021-09-24 PROCEDURE — 86200 CCP ANTIBODY: CPT

## 2021-09-24 PROCEDURE — 86431 RHEUMATOID FACTOR QUANT: CPT

## 2021-09-24 PROCEDURE — 36415 COLL VENOUS BLD VENIPUNCTURE: CPT

## 2021-09-24 PROCEDURE — 86140 C-REACTIVE PROTEIN: CPT

## 2021-09-27 ENCOUNTER — HOSPITAL ENCOUNTER (OUTPATIENT)
Age: 71
Discharge: HOME | End: 2021-09-27
Payer: COMMERCIAL

## 2021-09-27 LAB — SEDIMENTATION RATE, ERYTHROCYTE: 4 MM/HR (ref 0–20)

## 2021-09-27 PROCEDURE — 36415 COLL VENOUS BLD VENIPUNCTURE: CPT

## 2021-09-27 PROCEDURE — 85651 RBC SED RATE NONAUTOMATED: CPT

## 2021-09-28 ENCOUNTER — OFFICE VISIT (OUTPATIENT)
Dept: RHEUMATOLOGY | Age: 71
End: 2021-09-28
Payer: COMMERCIAL

## 2021-09-28 VITALS
OXYGEN SATURATION: 98 % | RESPIRATION RATE: 18 BRPM | HEIGHT: 64 IN | TEMPERATURE: 97.9 F | HEART RATE: 78 BPM | SYSTOLIC BLOOD PRESSURE: 108 MMHG | BODY MASS INDEX: 36.89 KG/M2 | DIASTOLIC BLOOD PRESSURE: 72 MMHG | WEIGHT: 216.1 LBS

## 2021-09-28 DIAGNOSIS — R74.8 ELEVATED LIVER ENZYMES: ICD-10-CM

## 2021-09-28 DIAGNOSIS — M81.0 OSTEOPOROSIS, UNSPECIFIED OSTEOPOROSIS TYPE, UNSPECIFIED PATHOLOGICAL FRACTURE PRESENCE: ICD-10-CM

## 2021-09-28 DIAGNOSIS — R25.2 LEG CRAMPS: ICD-10-CM

## 2021-09-28 DIAGNOSIS — Z92.29 HISTORY OF HIGH RISK MEDICATION TREATMENT: Primary | ICD-10-CM

## 2021-09-28 DIAGNOSIS — M05.79 RHEUMATOID ARTHRITIS INVOLVING MULTIPLE SITES WITH POSITIVE RHEUMATOID FACTOR (HCC): ICD-10-CM

## 2021-09-28 PROCEDURE — 1036F TOBACCO NON-USER: CPT | Performed by: INTERNAL MEDICINE

## 2021-09-28 PROCEDURE — G8399 PT W/DXA RESULTS DOCUMENT: HCPCS | Performed by: INTERNAL MEDICINE

## 2021-09-28 PROCEDURE — G8427 DOCREV CUR MEDS BY ELIG CLIN: HCPCS | Performed by: INTERNAL MEDICINE

## 2021-09-28 PROCEDURE — 99214 OFFICE O/P EST MOD 30 MIN: CPT | Performed by: INTERNAL MEDICINE

## 2021-09-28 PROCEDURE — 3017F COLORECTAL CA SCREEN DOC REV: CPT | Performed by: INTERNAL MEDICINE

## 2021-09-28 PROCEDURE — 1123F ACP DISCUSS/DSCN MKR DOCD: CPT | Performed by: INTERNAL MEDICINE

## 2021-09-28 PROCEDURE — 4040F PNEUMOC VAC/ADMIN/RCVD: CPT | Performed by: INTERNAL MEDICINE

## 2021-09-28 PROCEDURE — G8417 CALC BMI ABV UP PARAM F/U: HCPCS | Performed by: INTERNAL MEDICINE

## 2021-09-28 PROCEDURE — 1090F PRES/ABSN URINE INCON ASSESS: CPT | Performed by: INTERNAL MEDICINE

## 2021-09-28 PROCEDURE — 99212 OFFICE O/P EST SF 10 MIN: CPT | Performed by: INTERNAL MEDICINE

## 2021-09-28 RX ORDER — HYDROXYCHLOROQUINE SULFATE 200 MG/1
200 TABLET, FILM COATED ORAL DAILY
Qty: 90 TABLET | Refills: 1 | Status: SHIPPED
Start: 2021-09-28 | End: 2022-01-18 | Stop reason: SDUPTHER

## 2021-09-28 NOTE — PROGRESS NOTES
Patient verbalized understanding of office instructions. She will call with questions or concerns. She was given discharge instructions, and scripts for lab work to be done. All questions were fully answered.   Printed AVS was given

## 2021-09-28 NOTE — PROGRESS NOTES
J O I N T  C A R E  C L I N I C        The patient is seen in follow-up for: Rheumatoid arthritis. Labs obtained 09-24-21 reviewed:  CRP 0.3; Sed rate 4; ALT 17; AST 23; RF 35; Anti-CCP pending. Patient History Update Since Previous Visit      Yes No Comment      New illnesses  x       Seen other healthcare provider x   Dr. Anupam Magaña x-ray, labs, or procedures x  Seen GI Dr. Dane Gaitan / changed / stopped medications  x       New allergies / reactions to medications  x       Changes in family medical history  x       Changes in patient social history  x       Morning stiffness x  Length:  15-20 minutes          Review of Systems        Check if Reviewed Comment if new or change  Check if Reviewed Comment if new or change   x Constitutional   Cardiovascular    x Musculoskeletal   Pulmonary     Eyes, Ears, Nose   Psychiatric    x Mouth, Throat   Neurological     Skin & Hair   GI    x Immunologic        Allergic   Endocrine     Hematologic   Lymphatic             Medical Record Review        Check if Reviewed   x Vital Signs & Weight x Other Providers Consults & Notes   x Laboratory Results  Imaging Results          Abbreviated Exam          System    nl   abn   Comment     1 Gen nutrition/hygiene x      xappearance x     2 Skin turgor / integrity x      rash x      ecchymosis  x From blood draws   3 Psych orientation x      memory       mood x      cooperative x     4 HENT mouth / throat x     5 Resp resp. effort x      auscultation x     6 CV heart auscultation x      extremity edema x          Tender Points           Yes   No  Equivocal x Not Assessed   7 Other Difficulty making a fist with right hand but states it is improving.                   Joint Assessment          Patient Right     Patient Left      (check if no synovitis seen on exam)   Joint Pain Swelling Joint Pain Swelling   Shoulder   Shoulder     Elbow   Elbow     Wrist   Wrist     Knee   Knee     MCP I   MCP I     MCP II   MCP II MCP III   MCP III     MCP IV  x MCP IV     MCP V   MCP V     IP I   IP I     PIP II   PIP II     PIP III   PIP III     PIP IV   PIP IV     PIP V   PIP V              Impression          Seropositive RA - stable. Currently on  mg daily (previously on MTX 8 tabs weekly +  mg daily but taken off these medications per GI secondary to elevated liver enzymes).  mg daily restarted at previous visit on 06-15-21. She is currently on Turmeric 3 x daily prescribed by the GI physician.       Hepato / Splenomegaly - follows with GI specialist currently on Turmeric,glutathione, milk thistle.     11-16-20 Tripped and fell onto right hip sustaining acute, comminuted, closed, intertrochanteric  fx of rt femur with subsequent cephalomedullar nail fixation on 11-.     OA     Former smoker - quit 2011 ( 40 pack year history) COPD; 2 mm stable lung nodule.  Repeat CT scan 06-24-20 noted moderate splenomegaly, lung nodule remains stable.     Type 2 diabetes     Increased BMI     Carpal tunnel syndrome  1985 Right release; 11/2019 Left release     Chronic low back pain    Tripped and fell sustaining acute, comminuted, closed, intertrochanteric fx of Rt femur with subsequent cephalomedullary nail fixation on 11-17-20.    Osteoporosis Fosamax reinitiated at previous visit.  She is taking this medication now.      Vit D deficiency - Currently resolved - Vit D level on 10-29-20 was 46. Currently on 2,000 IU daily     Kidney stone   Colon polyp     Hyperlipidemia   GERD   High risk medications     Polypharmacy   Hx:  Non-compliance  Abnormal labs improved  Labs good fu         Plan          Continue  F/u with GI specialist.   - liver enzymes nomral htough, fibrosure results reviewed  +fatty liver, not much inflammation    Same HCQ.   Overdue for eye exam.  F/U in 3.5 months - earlier if needed              Counseling and Coordination of Care      Prognosis  Prior Authorization        Risk / Benefits of RX Disability Forms        Compliance  Discussion and / or letter to other health care provider         Risk Reduction      More than half of the face-to-face time with the patient was spent in counseling or coordinating care    Exercise           Brochure / Handout      Visit Duration (including medical record review):    Minutes:    Referral:

## 2021-09-30 LAB — CYCLIC CITRULLINATED PEPTIDE ANTIBODY IGG: 25 U/ML (ref 0–7)

## 2021-11-03 ENCOUNTER — TELEPHONE (OUTPATIENT)
Dept: CASE MANAGEMENT | Age: 71
End: 2021-11-03

## 2021-11-04 ENCOUNTER — HOSPITAL ENCOUNTER (OUTPATIENT)
Dept: ULTRASOUND IMAGING | Age: 71
Discharge: HOME | End: 2021-11-06
Payer: COMMERCIAL

## 2021-11-04 ENCOUNTER — HOSPITAL ENCOUNTER (OUTPATIENT)
Dept: CT IMAGING | Age: 71
Discharge: HOME | End: 2021-11-06
Payer: COMMERCIAL

## 2021-11-04 ENCOUNTER — TELEPHONE (OUTPATIENT)
Dept: INTERNAL MEDICINE | Age: 71
End: 2021-11-04

## 2021-11-04 DIAGNOSIS — K76.0 FATTY LIVER: ICD-10-CM

## 2021-11-04 DIAGNOSIS — Z87.891 PERSONAL HISTORY OF TOBACCO USE: ICD-10-CM

## 2021-11-04 PROCEDURE — 76705 ECHO EXAM OF ABDOMEN: CPT

## 2021-11-04 PROCEDURE — 71271 CT THORAX LUNG CANCER SCR C-: CPT

## 2021-11-04 NOTE — TELEPHONE ENCOUNTER
Pt notified of note per Dr. Magdalena Aldana. Requesting wait to discuss further at her appt 12/16/21 unless Dr feels this is something that needs to be addressed urgently. Also asking about US results. Notified US ordered not ordered by Dr. Magdalena Aldana so other Dr was sent results.

## 2021-11-04 NOTE — TELEPHONE ENCOUNTER
Patient's low dose CT lung screen completed. Unchanged findings- repeat LDCT at 12 months. Of note- findings of significant coronary calcification which leads to possible underlying CAD in the presence of RA, DM, Hyperlipidemia- on Crestor. Last lipid panel at goal.     Given findings- can consider Cardiac Evaluation given coronary calcification findings for further risk stratification. Please advise of results.

## 2021-11-05 ENCOUNTER — TELEPHONE (OUTPATIENT)
Dept: CASE MANAGEMENT | Age: 71
End: 2021-11-05

## 2021-11-05 NOTE — TELEPHONE ENCOUNTER
Contacted patient and discussed results, relevance of Coronary calcifications and recommendations. NO new symptoms. No change in exercise tolerance. At this time deferring cardiology assessment. Discussed anticipatory guidance and should symptoms change- evaluation is recommended and stress testing would be warranted if any of the above symptoms are noted. All questions answered and follow-up planned.

## 2021-11-05 NOTE — TELEPHONE ENCOUNTER
No call, encounter opened to process CT Lung Screening. CT Lung Screen: 11/4/2021    Impression   1. Stable 1.5 mm benign nodule within the right lung apex laterally. 2. Significant calcified plaque within the coronary arteries.  Further   evaluation is recommended. 3. Mild emphysematous changes. 4. Stable splenomegaly.       LUNG RADS:   Per ACR Lung-RADS Version 1.1       Category 2, Benign appearance or behavior.       Management:  Continue annual lung screening with LDCT in 12 months.       RECOMMENDATIONS:   If you would like to [facility] your [facility], please contact the Nurse Navigator at   [phone]. Pack years: 36    Social History     Tobacco Use  Smoking Status: Former Smoker   Start Date:    Quit Date: 07/07/2007   Types: Cigarettes   Packs/Day: 1   Years: 36   Pack Years: 36   Smokeless Tobacco: Never Used        Results letter sent to patient via my chart or mailed.      [facility]

## 2021-11-05 NOTE — TELEPHONE ENCOUNTER
Called and spoke with pt via phone. Pt requesting  call her. She would like further clarification of CT CAD finding.

## 2021-11-05 NOTE — TELEPHONE ENCOUNTER
Can discuss at upcoming appt. Any acute complaints of reduced exercise tolerance, dyspnea, or chest pain- necessitates assessment. Please advise.

## 2021-12-16 ENCOUNTER — HOSPITAL ENCOUNTER (OUTPATIENT)
Age: 71
Discharge: HOME | End: 2021-12-16
Payer: COMMERCIAL

## 2021-12-16 ENCOUNTER — OFFICE VISIT (OUTPATIENT)
Dept: INTERNAL MEDICINE | Age: 71
End: 2021-12-16
Payer: COMMERCIAL

## 2021-12-16 VITALS
OXYGEN SATURATION: 98 % | DIASTOLIC BLOOD PRESSURE: 73 MMHG | BODY MASS INDEX: 39.78 KG/M2 | RESPIRATION RATE: 16 BRPM | TEMPERATURE: 97.7 F | WEIGHT: 233 LBS | SYSTOLIC BLOOD PRESSURE: 129 MMHG | HEART RATE: 78 BPM | HEIGHT: 64 IN

## 2021-12-16 DIAGNOSIS — R74.8 ELEVATED LIVER ENZYMES: ICD-10-CM

## 2021-12-16 DIAGNOSIS — I25.84 CORONARY ARTERY CALCIFICATION OF NATIVE ARTERY: ICD-10-CM

## 2021-12-16 DIAGNOSIS — E78.2 MIXED HYPERLIPIDEMIA: ICD-10-CM

## 2021-12-16 DIAGNOSIS — E11.9 TYPE 2 DIABETES MELLITUS WITHOUT COMPLICATION, WITHOUT LONG-TERM CURRENT USE OF INSULIN (HCC): Primary | ICD-10-CM

## 2021-12-16 DIAGNOSIS — M81.0 OSTEOPOROSIS, UNSPECIFIED OSTEOPOROSIS TYPE, UNSPECIFIED PATHOLOGICAL FRACTURE PRESENCE: ICD-10-CM

## 2021-12-16 DIAGNOSIS — M05.79 RHEUMATOID ARTHRITIS INVOLVING MULTIPLE SITES WITH POSITIVE RHEUMATOID FACTOR (HCC): ICD-10-CM

## 2021-12-16 DIAGNOSIS — R23.3 EASY BRUISING: ICD-10-CM

## 2021-12-16 DIAGNOSIS — I25.10 CORONARY ARTERY CALCIFICATION OF NATIVE ARTERY: ICD-10-CM

## 2021-12-16 DIAGNOSIS — Z92.29 HISTORY OF HIGH RISK MEDICATION TREATMENT: ICD-10-CM

## 2021-12-16 LAB
ALBUMIN SERPL-MCNC: 4.6 G/DL (ref 3.5–5.2)
ALP BLD-CCNC: 43 U/L (ref 35–104)
ALT SERPL-CCNC: 23 U/L (ref 0–32)
ANION GAP SERPL CALCULATED.3IONS-SCNC: 12 MMOL/L (ref 7–16)
AST SERPL-CCNC: 24 U/L (ref 0–31)
BILIRUB SERPL-MCNC: 0.4 MG/DL (ref 0–1.2)
BUN BLDV-MCNC: 15 MG/DL (ref 6–23)
C-REACTIVE PROTEIN: 0.3 MG/DL (ref 0–0.4)
CALCIUM SERPL-MCNC: 9.6 MG/DL (ref 8.6–10.2)
CHLORIDE BLD-SCNC: 103 MMOL/L (ref 98–107)
CO2: 24 MMOL/L (ref 22–29)
CREAT SERPL-MCNC: 0.9 MG/DL (ref 0.5–1)
GFR AFRICAN AMERICAN: >60
GFR NON-AFRICAN AMERICAN: >60 ML/MIN/1.73
GLUCOSE BLD-MCNC: 137 MG/DL (ref 74–99)
HBA1C MFR BLD: 6.4 %
POTASSIUM SERPL-SCNC: 4.9 MMOL/L (ref 3.5–5)
RHEUMATOID FACTOR: 33 IU/ML (ref 0–13)
SEDIMENTATION RATE, ERYTHROCYTE: 1 MM/HR (ref 0–20)
SODIUM BLD-SCNC: 139 MMOL/L (ref 132–146)
TOTAL PROTEIN: 7.1 G/DL (ref 6.4–8.3)

## 2021-12-16 PROCEDURE — G8484 FLU IMMUNIZE NO ADMIN: HCPCS | Performed by: INTERNAL MEDICINE

## 2021-12-16 PROCEDURE — 99213 OFFICE O/P EST LOW 20 MIN: CPT | Performed by: INTERNAL MEDICINE

## 2021-12-16 PROCEDURE — 86431 RHEUMATOID FACTOR QUANT: CPT

## 2021-12-16 PROCEDURE — 80053 COMPREHEN METABOLIC PANEL: CPT

## 2021-12-16 PROCEDURE — 3017F COLORECTAL CA SCREEN DOC REV: CPT | Performed by: INTERNAL MEDICINE

## 2021-12-16 PROCEDURE — 99212 OFFICE O/P EST SF 10 MIN: CPT | Performed by: INTERNAL MEDICINE

## 2021-12-16 PROCEDURE — G8417 CALC BMI ABV UP PARAM F/U: HCPCS | Performed by: INTERNAL MEDICINE

## 2021-12-16 PROCEDURE — 2022F DILAT RTA XM EVC RTNOPTHY: CPT | Performed by: INTERNAL MEDICINE

## 2021-12-16 PROCEDURE — 36415 COLL VENOUS BLD VENIPUNCTURE: CPT

## 2021-12-16 PROCEDURE — 86140 C-REACTIVE PROTEIN: CPT

## 2021-12-16 PROCEDURE — 83036 HEMOGLOBIN GLYCOSYLATED A1C: CPT | Performed by: INTERNAL MEDICINE

## 2021-12-16 PROCEDURE — 3044F HG A1C LEVEL LT 7.0%: CPT | Performed by: INTERNAL MEDICINE

## 2021-12-16 PROCEDURE — 86200 CCP ANTIBODY: CPT

## 2021-12-16 PROCEDURE — G8427 DOCREV CUR MEDS BY ELIG CLIN: HCPCS | Performed by: INTERNAL MEDICINE

## 2021-12-16 PROCEDURE — 1123F ACP DISCUSS/DSCN MKR DOCD: CPT | Performed by: INTERNAL MEDICINE

## 2021-12-16 PROCEDURE — G8399 PT W/DXA RESULTS DOCUMENT: HCPCS | Performed by: INTERNAL MEDICINE

## 2021-12-16 PROCEDURE — 1036F TOBACCO NON-USER: CPT | Performed by: INTERNAL MEDICINE

## 2021-12-16 PROCEDURE — 4040F PNEUMOC VAC/ADMIN/RCVD: CPT | Performed by: INTERNAL MEDICINE

## 2021-12-16 PROCEDURE — 1090F PRES/ABSN URINE INCON ASSESS: CPT | Performed by: INTERNAL MEDICINE

## 2021-12-16 PROCEDURE — 85651 RBC SED RATE NONAUTOMATED: CPT

## 2021-12-16 RX ORDER — MULTIVIT WITH MINERALS/LUTEIN
1000 TABLET ORAL DAILY
COMMUNITY

## 2021-12-16 RX ORDER — ZINC SULFATE 50(220)MG
50 CAPSULE ORAL DAILY
COMMUNITY

## 2021-12-16 ASSESSMENT — ENCOUNTER SYMPTOMS
NAUSEA: 0
DIARRHEA: 0
SHORTNESS OF BREATH: 0
CONSTIPATION: 0
TROUBLE SWALLOWING: 0
BLOOD IN STOOL: 0
WHEEZING: 0
VOMITING: 0
ABDOMINAL PAIN: 0

## 2021-12-16 NOTE — PROGRESS NOTES
Discharge instructions reviewed with patient. Patient verbalizes understanding. AVS given.    Lab work mailed to patient

## 2021-12-16 NOTE — PATIENT INSTRUCTIONS
1) Will need to schedule stress test    2) Please obtain in the next few weeks. 3) Should any new symptoms- please contact office or seek medical management. 4) Please call with any questions.

## 2021-12-16 NOTE — PROGRESS NOTES
Tanner Chicas (:  1950) is a 80 y.o. female,Established patient, here for evaluation of the following chief complaint(s):  Results       ASSESSMENT/PLAN:  1. Type 2 diabetes mellitus without complication, without long-term current use of insulin (HCC)  -     MICROALBUMIN / CREATININE URINE RATIO; Future  -     POCT glycosylated hemoglobin (Hb A1C)  -     Lipid Panel; Future  A1c is increasing  Weight gain noted  Patient to improve lifestyle changes  A1c still less than 6.5%  Weight assessment next visit  Improve dietary intervention  2. Easy bruising  -     CBC WITH AUTO DIFFERENTIAL; Future  No bruising on exam  Stopped ASA on own due to concerns of bruising  Check CBC- previous PLTs at goal   3. Mixed hyperlipidemia  -     Cardiac Stress Test - w/Pharm; Future  4. Coronary artery calcification of native artery  -     Cardiac Stress Test - w/Pharm; Future  No new symptoms  Discussed management options at length  Resume ASA at this time  Continue statin  Last lipid panel notes LDL less than 70  Weight management  Given calcification burden- discussed stress testing  Pharm stress test ordered due to hx of Hip fracture      COVID Vaccination counseling completed at length- continue to strongly encourage initiation of vaccination process per CDC guidelines- refer to . [facility] website for additional counseling. LDCT Fall            Subjective   SUBJECTIVE/OBJECTIVE:  HPI  Presents for follow-up appointment. No acute complaints. Continued Lower extremity pain. Using walker for stability- hx of hip fracture. Denies any new symptoms today. Notes gain of weight since last visit- 16 lbs noted since last weight. States diet has changed and she noticed differences related. Tolerating all medications. LDCT was previously discussed including small stable apical nodule with repeat assessment needed in 12 months.  Significant coronary calcifications noted- Patient stopped ASA on her own recently, but has continued statin. Denies any new onset chest pain, worsening dyspnea, exertional dyspnea, or fatigue. Review of Systems   Constitutional: Negative for chills and fever. HENT: Negative for trouble swallowing. Respiratory: Negative for shortness of breath and wheezing. Cardiovascular: Negative for chest pain, palpitations and leg swelling. Gastrointestinal: Negative for abdominal pain, blood in stool, constipation, diarrhea, nausea and vomiting. Genitourinary: Negative for dysuria. Neurological: Negative for dizziness, syncope and light-headedness. Objective    Vitals:    12/16/21 1253   BP: 129/73   Site: Right Upper Arm   Position: Sitting   Cuff Size: Medium Adult   Pulse: 78   Resp: 16   Temp: 97.7 °F (36.5 °C)   TempSrc: Oral   SpO2: 98%   Weight: 233 lb (105.7 kg)   Height: 5' 4\" (1.626 m)     Physical Exam  Constitutional:       Appearance: She is obese. She is not ill-appearing. HENT:      Head: Normocephalic and atraumatic. Cardiovascular:      Rate and Rhythm: Normal rate and regular rhythm. Pulses: Normal pulses. Heart sounds: Normal heart sounds. No murmur heard. Pulmonary:      Effort: Pulmonary effort is normal.      Breath sounds: Normal breath sounds. No wheezing, rhonchi or rales. Abdominal:      Palpations: Abdomen is soft. There is no hepatomegaly (limited due to obesity) or splenomegaly (limited due to obesity). Tenderness: There is no abdominal tenderness. Musculoskeletal:      Right lower leg: No edema. Left lower leg: No edema. Skin:     General: Skin is warm and dry. Neurological:      Mental Status: She is alert. An electronic signature was used to authenticate this note.     --Carlos Alberto Bertrand MD, FACP

## 2021-12-17 LAB — CYCLIC CITRULLINATED PEPTIDE ANTIBODY IGG: 10 U/ML (ref 0–7)

## 2022-01-05 ENCOUNTER — TELEPHONE (OUTPATIENT)
Dept: INTERNAL MEDICINE | Age: 72
End: 2022-01-05

## 2022-01-05 NOTE — TELEPHONE ENCOUNTER
Patient should monitor symptoms closely- patient is at high risk for decompensation given unvaccinated status. Patient may benefit from COVID antibody infusion which is now available again in our market. If to be considered, please notify the office immediately for referral to be sent to infusion center. Otherwise- please see below guidelines and monitor closely- any dyspnea, low oxygen sat below 92% (if able to monitor via pulse ox), or worsening symptoms- go directly to the ED. New CDC guidelines are noted with current link- please refer to CDC:   [employer]    If You Test Positive for COVID-19 (Isolate)  Everyone, regardless of vaccination status. Stay home for 5 days. If you have no symptoms or your symptoms are resolving after 5 days, you can leave your house. Continue to wear a mask around others for 5 additional days. If you have a fever, continue to stay home until your fever resolves. If You Were Exposed to Someone with COVID-19 (Quarantine)  If you:    Have been boosted  OR  Completed the primary series of Pfizer or Moderna vaccine within the last 6 months  OR  Completed the primary series of J&J vaccine within the last 2 months    Wear a mask around others for 10 days. Test on day 5, if possible. If you develop symptoms get a test and stay home. If you:    Completed the primary series of Pfizer or Moderna vaccine over 6 months ago and are not boosted  OR  Completed the primary series of J&J over 2 months ago and are not boosted  OR  Are unvaccinated    Stay home for 5 days. After that continue to wear a mask around others for 5 additional days. If you cant quarantine you must wear a mask for 10 days. Test on day 5 if possible.   If you develop symptoms get a test and stay home

## 2022-01-05 NOTE — TELEPHONE ENCOUNTER
Pt called to report she starting getting sick on 1/3/22. Sore throat, coughing all night, fever  degrees. ( was sick for 3 days and he started feeling better 12/31/22). Pt states she gargled with Listerine about every 2 hrs during the night. States she started feeling better within 24 hrs. Today just c/o fatigue. .    Both her and her  did self test for Covid at home and both were positive. Her  tested yesterday and she tested today and was positive. Also lady working in her home also tested positive. Pt is scheduled for stress test 1/11/22. Pt calling to see how long she is to self quarantine. Is it 5 days or 10 days. Notified previous CDC guidelines was 10 days from onset of symptoms but would send message to Dr. Naz Doll for further clarification. Also may depend on stress lab and what their criteria is. Pt notified she should be ok for appt with Dr. Hollis Jaime as it is 1/18/22. Please advise.

## 2022-01-05 NOTE — TELEPHONE ENCOUNTER
Spoke with pt via phone. Notified information to be sent to her via My Chart.     VM message left with stress lab to notify of need to reschedule stress test.

## 2022-01-06 ENCOUNTER — TELEPHONE (OUTPATIENT)
Dept: INTERNAL MEDICINE | Age: 72
End: 2022-01-06

## 2022-01-06 NOTE — TELEPHONE ENCOUNTER
Called patient and left message for call back. Patient informed interested in COVID AB infusion. Positive home testing. Left voicemail after multiple attempts. Patient is within timeline of symptoms with high risk co-morbidities. Completed certification form to be sent by staff. Await call back for instructions. Patient has previously been counseled regarding anticipatory guidance and additional needs. Patient is unvaccinated.

## 2022-01-06 NOTE — TELEPHONE ENCOUNTER
Called and spoke with  in stress lab.  States she received message and will call patient to reschedule her stress test.

## 2022-01-07 NOTE — TELEPHONE ENCOUNTER
Pt returned call and was notified of form faxed to infusion center. Notified she should receive call from them to see if able to get infusion.

## 2022-01-10 ENCOUNTER — TELEPHONE (OUTPATIENT)
Dept: INTERNAL MEDICINE | Age: 72
End: 2022-01-10

## 2022-01-10 NOTE — TELEPHONE ENCOUNTER
Per [facility], patient is unable to receive antibody therapy at this time; the [facility] doesn't have the antibody infusions at this time.  Please Advise

## 2022-01-10 NOTE — TELEPHONE ENCOUNTER
Patient feeling improved today. No noted dyspnea. Eating and drinking ok. No noted severe myalgias. No documented fevers currently. Overall feeling much improved- no signs of complications by symptoms. Informed of unavailable monoclonal antibody at this time. Anticipatory guidance given and discuss plan for future vaccination- strongly encourage.     All questions answered    Continue supportive care

## 2022-01-10 NOTE — TELEPHONE ENCOUNTER
Called and spoke with pt via phone. Pt states she is starting to feel a little better. Wanted to make sure Dr. Fernando Hamilton was aware.

## 2022-01-17 DIAGNOSIS — E11.9 CONTROLLED TYPE 2 DIABETES MELLITUS WITHOUT COMPLICATION, WITHOUT LONG-TERM CURRENT USE OF INSULIN (HCC): ICD-10-CM

## 2022-01-18 ENCOUNTER — OFFICE VISIT (OUTPATIENT)
Dept: RHEUMATOLOGY | Age: 72
End: 2022-01-18
Payer: COMMERCIAL

## 2022-01-18 VITALS
BODY MASS INDEX: 38.77 KG/M2 | HEART RATE: 86 BPM | OXYGEN SATURATION: 100 % | DIASTOLIC BLOOD PRESSURE: 57 MMHG | WEIGHT: 227.1 LBS | HEIGHT: 64 IN | RESPIRATION RATE: 18 BRPM | TEMPERATURE: 97 F | SYSTOLIC BLOOD PRESSURE: 112 MMHG

## 2022-01-18 DIAGNOSIS — M81.0 OSTEOPOROSIS, UNSPECIFIED OSTEOPOROSIS TYPE, UNSPECIFIED PATHOLOGICAL FRACTURE PRESENCE: ICD-10-CM

## 2022-01-18 DIAGNOSIS — E55.9 VITAMIN D DEFICIENCY: Primary | ICD-10-CM

## 2022-01-18 DIAGNOSIS — R63.8 INCREASED BMI: ICD-10-CM

## 2022-01-18 DIAGNOSIS — Z79.899 HIGH RISK MEDICATIONS (NOT ANTICOAGULANTS) LONG-TERM USE: ICD-10-CM

## 2022-01-18 DIAGNOSIS — K76.0 FATTY LIVER DISEASE, NONALCOHOLIC: ICD-10-CM

## 2022-01-18 DIAGNOSIS — M05.79 RHEUMATOID ARTHRITIS INVOLVING MULTIPLE SITES WITH POSITIVE RHEUMATOID FACTOR (HCC): ICD-10-CM

## 2022-01-18 DIAGNOSIS — U07.1 COVID-19 VIRUS INFECTION: ICD-10-CM

## 2022-01-18 PROCEDURE — 99214 OFFICE O/P EST MOD 30 MIN: CPT | Performed by: INTERNAL MEDICINE

## 2022-01-18 PROCEDURE — 1090F PRES/ABSN URINE INCON ASSESS: CPT | Performed by: INTERNAL MEDICINE

## 2022-01-18 PROCEDURE — 4040F PNEUMOC VAC/ADMIN/RCVD: CPT | Performed by: INTERNAL MEDICINE

## 2022-01-18 PROCEDURE — G8427 DOCREV CUR MEDS BY ELIG CLIN: HCPCS | Performed by: INTERNAL MEDICINE

## 2022-01-18 PROCEDURE — 1036F TOBACCO NON-USER: CPT | Performed by: INTERNAL MEDICINE

## 2022-01-18 PROCEDURE — G8484 FLU IMMUNIZE NO ADMIN: HCPCS | Performed by: INTERNAL MEDICINE

## 2022-01-18 PROCEDURE — G8417 CALC BMI ABV UP PARAM F/U: HCPCS | Performed by: INTERNAL MEDICINE

## 2022-01-18 PROCEDURE — 3017F COLORECTAL CA SCREEN DOC REV: CPT | Performed by: INTERNAL MEDICINE

## 2022-01-18 PROCEDURE — G8399 PT W/DXA RESULTS DOCUMENT: HCPCS | Performed by: INTERNAL MEDICINE

## 2022-01-18 PROCEDURE — 99212 OFFICE O/P EST SF 10 MIN: CPT | Performed by: INTERNAL MEDICINE

## 2022-01-18 PROCEDURE — 1123F ACP DISCUSS/DSCN MKR DOCD: CPT | Performed by: INTERNAL MEDICINE

## 2022-01-18 RX ORDER — HYDROXYCHLOROQUINE SULFATE 200 MG/1
200 TABLET, FILM COATED ORAL DAILY
Qty: 90 TABLET | Refills: 1 | Status: SHIPPED
Start: 2022-01-18 | End: 2022-05-03 | Stop reason: SDUPTHER

## 2022-01-18 RX ORDER — ALENDRONATE SODIUM 70 MG/1
70 TABLET ORAL
Qty: 12 TABLET | Refills: 1 | Status: SHIPPED
Start: 2022-01-18 | End: 2022-05-03 | Stop reason: SDUPTHER

## 2022-01-18 NOTE — PROGRESS NOTES
Patient verbalized understanding of office instructions. She will call with questions or concerns. She was given discharge instructions and script for lab work to be done. All questions were fully answered.    Printed AVS given

## 2022-01-18 NOTE — PATIENT INSTRUCTIONS
Same HCQ.   Overdue for eye exam.  +alendronate each week  Labs prior  F/U in 3.5 months - earlier if needed-- may 3rd

## 2022-01-18 NOTE — PROGRESS NOTES
J O I N T  C A R E  C L I N I C        The patient is seen in follow-up for:  Arthritis, pain both hips and low back , states right hand better is moving fingers better      Sp covid home test jan 4t- sick since cheikh 2  Improved now - expect mild cough/chest congestion  Resolved chills, chest congestion/sinuc congestion, myalgias etc.  She couuldn't get AB infusion, never hypoxic, not really sob    RA symptoms stable    DDD/mechanical back pain, moderate pain if on feet         Patient History Update Since Previous Visit      Yes No Comment      New illnesses x  Had Covid      Seen other healthcare provider x  Dr. Wilson Rom x-ray, labs, or procedures  x       Started / changed / stopped medications  x       New allergies / reactions to medications  x       Changes in family medical history  x       Changes in patient social history  x       Morning stiffness x  Length:  15 minutes          Review of Systems        Check if Reviewed Comment if new or change  Check if Reviewed Comment if new or change   x Constitutional  x Cardiovascular    xx Musculoskeletal   Pulmonary xcovid    Eyes, Ears, Nose  x Psychiatric    x Mouth, Throat  x Neurological    x Skin & Hair  x GI    xx Immunologic  x     x Allergic   Endocrine    x Hematologic   Lymphatic             Medical Record Review        Check if Reviewed   x Vital Signs & Weight x Other Providers Consults & Notes   x Laboratory Results x Imaging Results          Abbreviated Exam          System    nl   abn   Comment     1 Gen nutrition/hygiene xx      appearance x     2 Skin turgor / integrity x      rash x      ecchymosis x     3 Psych orientation x      memory x      mood x      cooperative x     4 HENT mouth / throat x     5 Resp resp. effort x      auscultation x     6 CV heart auscultation x      extremity edema x          Tender Points           Yes   xNo  Equivocal  Not Assessed   7 Other                   Joint Assessment          Patient Right Patient Left     x (check if no synovitis seen on exam)   Joint Pain Swelling Joint Pain Swelling   Shoulder   Shoulder     Elbow   Elbow     Wrist   Wrist     Knee   Knee     MCP I   MCP I     MCP II   MCP II     MCP III   MCP III     MCP IV   MCP IV     MCP V   MCP V     IP I   IP I     PIP II   PIP II     PIP III   PIP III     PIP IV   PIP IV     PIP V   PIP V                      Impression          Seropositive RA - stable.    Currently on  mg daily   previously on MTX 8 tabs weekly--+leukopenia hx -- taken off MTX  medicationsconcerns? secondary to elevated liver enzymes).  mg daily restartedt on 06-15-21.       Hepato / Splenomegaly - follows with GI specialist currently on Turmeric,glutathione, milk thistle.   Continue  F/u with GI specialist.   - liver enzymes nomral htough, fibrosure results reviewed  +fatty liver, not much inflammation         OA   DDD-chronic  Mechanical back pain  Former smoker - quit 2011 ( 40 pack year history) COPD; 2 mm stable lung nodule.  Repeat CT scan 06-24-20 noted moderate splenomegaly, lung nodule remains stable.     Type 2 diabetes   aic <6.5   Carpal tunnel syndrome  2169 Right release; 11/2019 Left release     Osteoporosis Fosamax reinitiated at previous visit.  She is taking this medication now.    DEXA  Normal bone mineral density values in the lumbar spine with no   statistically significant change.       2. Findings of osteopenia in the left hip with no statistically   significant change.       3. Findings of osteoporosis in the right hip with no statistically   significant change.      Holding MTX for now  Reassess vit D, cbc (leukopenia in past), cmp  11-16-20 Tripped and fell onto right hip sustaining acute, comminuted, closed, intertrochanteric  fx of rt femur with subsequent cephalomedullar nail fixation on 11-.       Vit D deficiency - Currently resolved - Vit D level on 10-29-20 was 46. Currently on 2,000 IU daily     Kidney stone   Colon polyp     Hyperlipidemia   GERD   High risk medications     Polypharmacy   Hx:  Non-compliance  Abnormal labs improved  Labs good fu         Plan          Same HCQ.   Overdue for eye exam.  F/U in 3.5 months - earlier if needed- ealry May  Same alendronate 70Qweekly  Holding MTX for now  Reassess vit D, cbc (leukopenia in past), cmp    covid AB infusion deferred, improved symptoms quickly noted (vaccination booster  ?>3months from now  Sp covid recommending ASA 81x2 BID for 2 more weeks then back to baseline dosing          Counseling and Coordination of Care      Prognosis  Prior Authorization       x Risk / Benefits of RX  Disability Forms        Compliance  Discussion and / or letter to other health care provider         Risk Reduction     x More than half of the face-to-face time with the patient was spent in counseling or coordinating care    Exercise           Brochure / Handout      Visit Duration (including medical record review):    Minutes:    Referral:

## 2022-02-02 ENCOUNTER — TELEPHONE (OUTPATIENT)
Dept: NON INVASIVE DIAGNOSTICS | Age: 72
End: 2022-02-02

## 2022-02-09 ENCOUNTER — TELEPHONE (OUTPATIENT)
Dept: NON INVASIVE DIAGNOSTICS | Age: 72
End: 2022-02-09

## 2022-02-11 ENCOUNTER — HOSPITAL ENCOUNTER (OUTPATIENT)
Dept: NUCLEAR MEDICINE | Age: 72
Discharge: HOME | End: 2022-02-13
Payer: COMMERCIAL

## 2022-02-11 ENCOUNTER — HOSPITAL ENCOUNTER (OUTPATIENT)
Dept: NON INVASIVE DIAGNOSTICS | Age: 72
Discharge: HOME | End: 2022-02-11
Payer: COMMERCIAL

## 2022-02-11 DIAGNOSIS — I25.10 ATHEROSCLEROSIS OF NATIVE CORONARY ARTERY OF NATIVE HEART, UNSPECIFIED WHETHER ANGINA PRESENT: ICD-10-CM

## 2022-02-11 DIAGNOSIS — I25.84 CORONARY ARTERY CALCIFICATION OF NATIVE ARTERY: ICD-10-CM

## 2022-02-11 DIAGNOSIS — I25.10 CORONARY ARTERY CALCIFICATION OF NATIVE ARTERY: ICD-10-CM

## 2022-02-11 DIAGNOSIS — E78.2 MIXED HYPERLIPIDEMIA: ICD-10-CM

## 2022-02-11 LAB
LV EF: 76 %
LVEF MODALITY: NORMAL

## 2022-02-11 PROCEDURE — 78452 HT MUSCLE IMAGE SPECT MULT: CPT

## 2022-02-11 PROCEDURE — 78452 HT MUSCLE IMAGE SPECT MULT: CPT | Performed by: INTERNAL MEDICINE

## 2022-02-11 PROCEDURE — A9500 TC99M SESTAMIBI: HCPCS | Performed by: RADIOLOGY

## 2022-02-11 PROCEDURE — 6360000002 HC RX W HCPCS: Performed by: INTERNAL MEDICINE

## 2022-02-11 PROCEDURE — 93017 CV STRESS TEST TRACING ONLY: CPT

## 2022-02-11 PROCEDURE — 93016 CV STRESS TEST SUPVJ ONLY: CPT | Performed by: INTERNAL MEDICINE

## 2022-02-11 PROCEDURE — 93018 CV STRESS TEST I&R ONLY: CPT | Performed by: INTERNAL MEDICINE

## 2022-02-11 PROCEDURE — 3430000000 HC RX DIAGNOSTIC RADIOPHARMACEUTICAL: Performed by: RADIOLOGY

## 2022-02-11 RX ADMIN — Medication 9 MILLICURIE: at 11:05

## 2022-02-11 RX ADMIN — Medication 28 MILLICURIE: at 14:05

## 2022-02-11 RX ADMIN — REGADENOSON 0.4 MG: 0.08 INJECTION, SOLUTION INTRAVENOUS at 13:59

## 2022-02-11 NOTE — PROGRESS NOTES
Lexiscan Stress EKG Report: Indication: Atypical angina    Baseline EKG: normal EKG, normal sinus rhythm. Stress EKG: No ST-T changes. Arrhythmias: None. Symptoms: None. Summary:  Unremarkable lexiscan stress EKG. See separate report for stress perfusion results.      Genesis Curiel MD  Cardiologist  Cardiology, [facility]) Physicians

## 2022-02-14 ENCOUNTER — TELEPHONE (OUTPATIENT)
Dept: INTERNAL MEDICINE CLINIC | Age: 72
End: 2022-02-14

## 2022-02-15 NOTE — TELEPHONE ENCOUNTER
Called and spoke with pt via phone. Pt notified of note per Dr. Jennyfer Hanks. Pt appreciative of call.

## 2022-03-10 ENCOUNTER — HOSPITAL ENCOUNTER (OUTPATIENT)
Age: 72
Discharge: HOME | End: 2022-03-10
Payer: COMMERCIAL

## 2022-03-10 ENCOUNTER — OFFICE VISIT (OUTPATIENT)
Dept: INTERNAL MEDICINE | Age: 72
End: 2022-03-10
Payer: COMMERCIAL

## 2022-03-10 VITALS
DIASTOLIC BLOOD PRESSURE: 78 MMHG | OXYGEN SATURATION: 99 % | BODY MASS INDEX: 39.74 KG/M2 | WEIGHT: 231.5 LBS | SYSTOLIC BLOOD PRESSURE: 122 MMHG | RESPIRATION RATE: 18 BRPM | TEMPERATURE: 97.2 F | HEART RATE: 79 BPM

## 2022-03-10 DIAGNOSIS — M05.79 RHEUMATOID ARTHRITIS INVOLVING MULTIPLE SITES WITH POSITIVE RHEUMATOID FACTOR (HCC): ICD-10-CM

## 2022-03-10 DIAGNOSIS — Z79.899 HIGH RISK MEDICATIONS (NOT ANTICOAGULANTS) LONG-TERM USE: ICD-10-CM

## 2022-03-10 DIAGNOSIS — E78.2 MIXED HYPERLIPIDEMIA: ICD-10-CM

## 2022-03-10 DIAGNOSIS — Z12.31 ENCOUNTER FOR SCREENING MAMMOGRAM FOR MALIGNANT NEOPLASM OF BREAST: ICD-10-CM

## 2022-03-10 DIAGNOSIS — R23.3 EASY BRUISING: ICD-10-CM

## 2022-03-10 DIAGNOSIS — K76.0 FATTY LIVER DISEASE, NONALCOHOLIC: ICD-10-CM

## 2022-03-10 DIAGNOSIS — I25.84 CORONARY ARTERY CALCIFICATION OF NATIVE ARTERY: ICD-10-CM

## 2022-03-10 DIAGNOSIS — E55.9 VITAMIN D DEFICIENCY: ICD-10-CM

## 2022-03-10 DIAGNOSIS — K21.9 GASTROESOPHAGEAL REFLUX DISEASE WITHOUT ESOPHAGITIS: ICD-10-CM

## 2022-03-10 DIAGNOSIS — E66.01 OBESITY, CLASS III, BMI 40-49.9 (MORBID OBESITY) (HCC): ICD-10-CM

## 2022-03-10 DIAGNOSIS — E11.9 TYPE 2 DIABETES MELLITUS WITHOUT COMPLICATION, WITHOUT LONG-TERM CURRENT USE OF INSULIN (HCC): ICD-10-CM

## 2022-03-10 DIAGNOSIS — Z87.81 HISTORY OF FRACTURE OF RIGHT HIP: ICD-10-CM

## 2022-03-10 DIAGNOSIS — I25.10 CORONARY ARTERY CALCIFICATION OF NATIVE ARTERY: ICD-10-CM

## 2022-03-10 DIAGNOSIS — M76.32 ILIOTIBIAL BAND SYNDROME, LEFT LEG: Primary | ICD-10-CM

## 2022-03-10 LAB
ALBUMIN SERPL-MCNC: 4.4 G/DL (ref 3.5–5.2)
ALP BLD-CCNC: 44 U/L (ref 35–104)
ALT SERPL-CCNC: 29 U/L (ref 0–32)
ANION GAP SERPL CALCULATED.3IONS-SCNC: 14 MMOL/L (ref 7–16)
AST SERPL-CCNC: 25 U/L (ref 0–31)
BASOPHILS ABSOLUTE: 0.04 E9/L (ref 0–0.2)
BASOPHILS RELATIVE PERCENT: 0.5 % (ref 0–2)
BILIRUB SERPL-MCNC: 0.4 MG/DL (ref 0–1.2)
BUN BLDV-MCNC: 16 MG/DL (ref 6–23)
C-REACTIVE PROTEIN: 0.3 MG/DL (ref 0–0.4)
CALCIUM SERPL-MCNC: 9.5 MG/DL (ref 8.6–10.2)
CHLORIDE BLD-SCNC: 100 MMOL/L (ref 98–107)
CHOLESTEROL, TOTAL: 189 MG/DL (ref 0–199)
CO2: 22 MMOL/L (ref 22–29)
CREAT SERPL-MCNC: 0.9 MG/DL (ref 0.5–1)
CREATININE URINE: 124 MG/DL (ref 29–226)
EOSINOPHILS ABSOLUTE: 0.17 E9/L (ref 0.05–0.5)
EOSINOPHILS RELATIVE PERCENT: 2.3 % (ref 0–6)
GFR AFRICAN AMERICAN: >60
GFR NON-AFRICAN AMERICAN: >60 ML/MIN/1.73
GLUCOSE BLD-MCNC: 239 MG/DL (ref 74–99)
HCT VFR BLD CALC: 42.5 % (ref 34–48)
HDLC SERPL-MCNC: 48 MG/DL
HEMOGLOBIN: 14.7 G/DL (ref 11.5–15.5)
IMMATURE GRANULOCYTES #: 0.02 E9/L
IMMATURE GRANULOCYTES %: 0.3 % (ref 0–5)
LDL CHOLESTEROL CALCULATED: 82 MG/DL (ref 0–99)
LYMPHOCYTES ABSOLUTE: 2.14 E9/L (ref 1.5–4)
LYMPHOCYTES RELATIVE PERCENT: 28.8 % (ref 20–42)
MCH RBC QN AUTO: 29.8 PG (ref 26–35)
MCHC RBC AUTO-ENTMCNC: 34.6 % (ref 32–34.5)
MCV RBC AUTO: 86 FL (ref 80–99.9)
MICROALBUMIN UR-MCNC: <12 MG/L
MICROALBUMIN/CREAT UR-RTO: ABNORMAL (ref 0–30)
MONOCYTES ABSOLUTE: 0.84 E9/L (ref 0.1–0.95)
MONOCYTES RELATIVE PERCENT: 11.3 % (ref 2–12)
NEUTROPHILS ABSOLUTE: 4.22 E9/L (ref 1.8–7.3)
NEUTROPHILS RELATIVE PERCENT: 56.8 % (ref 43–80)
PDW BLD-RTO: 13.2 FL (ref 11.5–15)
PLATELET # BLD: 215 E9/L (ref 130–450)
PMV BLD AUTO: 8.4 FL (ref 7–12)
POTASSIUM SERPL-SCNC: 4.2 MMOL/L (ref 3.5–5)
RBC # BLD: 4.94 E12/L (ref 3.5–5.5)
SODIUM BLD-SCNC: 136 MMOL/L (ref 132–146)
TOTAL PROTEIN: 6.8 G/DL (ref 6.4–8.3)
TRIGL SERPL-MCNC: 295 MG/DL (ref 0–149)
VITAMIN D 25-HYDROXY: 34 NG/ML (ref 30–100)
VLDLC SERPL CALC-MCNC: 59 MG/DL
WBC # BLD: 7.4 E9/L (ref 4.5–11.5)

## 2022-03-10 PROCEDURE — 82570 ASSAY OF URINE CREATININE: CPT

## 2022-03-10 PROCEDURE — G8427 DOCREV CUR MEDS BY ELIG CLIN: HCPCS | Performed by: INTERNAL MEDICINE

## 2022-03-10 PROCEDURE — 1090F PRES/ABSN URINE INCON ASSESS: CPT | Performed by: INTERNAL MEDICINE

## 2022-03-10 PROCEDURE — 85025 COMPLETE CBC W/AUTO DIFF WBC: CPT

## 2022-03-10 PROCEDURE — 1123F ACP DISCUSS/DSCN MKR DOCD: CPT | Performed by: INTERNAL MEDICINE

## 2022-03-10 PROCEDURE — 99214 OFFICE O/P EST MOD 30 MIN: CPT | Performed by: INTERNAL MEDICINE

## 2022-03-10 PROCEDURE — 3046F HEMOGLOBIN A1C LEVEL >9.0%: CPT | Performed by: INTERNAL MEDICINE

## 2022-03-10 PROCEDURE — 82044 UR ALBUMIN SEMIQUANTITATIVE: CPT

## 2022-03-10 PROCEDURE — 2022F DILAT RTA XM EVC RTNOPTHY: CPT | Performed by: INTERNAL MEDICINE

## 2022-03-10 PROCEDURE — 80061 LIPID PANEL: CPT

## 2022-03-10 PROCEDURE — 80053 COMPREHEN METABOLIC PANEL: CPT

## 2022-03-10 PROCEDURE — 3017F COLORECTAL CA SCREEN DOC REV: CPT | Performed by: INTERNAL MEDICINE

## 2022-03-10 PROCEDURE — G8484 FLU IMMUNIZE NO ADMIN: HCPCS | Performed by: INTERNAL MEDICINE

## 2022-03-10 PROCEDURE — 99212 OFFICE O/P EST SF 10 MIN: CPT | Performed by: INTERNAL MEDICINE

## 2022-03-10 PROCEDURE — 4040F PNEUMOC VAC/ADMIN/RCVD: CPT | Performed by: INTERNAL MEDICINE

## 2022-03-10 PROCEDURE — 36415 COLL VENOUS BLD VENIPUNCTURE: CPT

## 2022-03-10 PROCEDURE — G8399 PT W/DXA RESULTS DOCUMENT: HCPCS | Performed by: INTERNAL MEDICINE

## 2022-03-10 PROCEDURE — 82306 VITAMIN D 25 HYDROXY: CPT

## 2022-03-10 PROCEDURE — 86140 C-REACTIVE PROTEIN: CPT

## 2022-03-10 PROCEDURE — G8417 CALC BMI ABV UP PARAM F/U: HCPCS | Performed by: INTERNAL MEDICINE

## 2022-03-10 PROCEDURE — 1036F TOBACCO NON-USER: CPT | Performed by: INTERNAL MEDICINE

## 2022-03-10 ASSESSMENT — ENCOUNTER SYMPTOMS
SHORTNESS OF BREATH: 0
ABDOMINAL PAIN: 0
COUGH: 0
WHEEZING: 0
CONSTIPATION: 0
DIARRHEA: 0
VOMITING: 0

## 2022-03-10 NOTE — PROGRESS NOTES
AVS printed with follow up appointment and discharge instructions and given to patient. Patient encouraged to reconsider Covid vaccine. Patient instructed to call with any issues.

## 2022-03-10 NOTE — PATIENT INSTRUCTIONS
Please take all medications as prescribed. Please reconsider Covid vaccine. Please follow up on your mammogram  Referral will be placed for bariatrics weight loss center. Referral will be placed for physical therapy. Always call with all issues. Thank you for coming in today.

## 2022-03-10 NOTE — PROGRESS NOTES
Flores Russell (:  1950) is a 80 y.o. female,Established patient, here for evaluation of the following chief complaint(s):  3 Month Follow-Up and Health Maintenance (declines Covid vaccine)         ASSESSMENT/PLAN:  1. Iliotibial band syndrome, left leg  -     External Referral To Physical Therapy  Continued complaints of left leg with possibly multifactorial including compensation due to right hip fracture s/p repair and subsequent shortening of right lower extremity  Pain with certain movements- described as tightness  No noted rest pain or night pain; no deep bone pain symptoms  Consider imaging if changes  Recommend conservative trial with PT and intervention   Follow for any changes  2. Coronary artery calcification of native artery- noted on CT imaging    Stress test- low risk noted- explained sensitivity/specificity of stress testing and significance- all questions answered    Continue high intensity statin   No new symptoms   Continue to improve modifiable risk factors   Consider Cardiology follow-up in future   3. Mixed hyperlipidemia   Continue statin therapy    Lipid panel remains stable LDL is 82   Consider escalation with stable LFTs to goal LDL less than 70   4. Type 2 diabetes mellitus without complication, without long-term current use of insulin ([facility] [address]- A1c had recently increased but remained less than 7%    Continue current management   Goal weight loss goals   Medical weight loss referral discussed and agreed upon- referral placed. Diabetes Management Plan:     Blood Glucose Log Present no  Blood Pressure Monitoring <130/<80 yes - at goal  Retinopathy No    ACEI/ARB Yes  MOST RECENT A1c less than 8% yes - less than 7%   Microalbumin/Cr Ratio completed in last year no  Diabetic Foot Exam completed in last year no  Eye Exam completed in last year no- need follow-up  Statin Use Yes  Appropriate Aspirin Use if Known CAD yes - no concerns     5.  Rheumatoid arthritis involving multiple sites with positive rheumatoid factor ([facility] [address]- following with Rheum and tolerating management    CRP remains stable   6. Gastroesophageal reflux disease without esophagitis- stable   7. History of fracture of right hip- management as above   -     External Referral To Physical Therapy  8. Encounter for screening mammogram for malignant neoplasm of breast   Active Mammogram order in place- need to complete   9. Obesity, Class III, BMI 40-49.9 (morbid obesity) (HCC)  -     Xiomara Duggan MD, Bariatrics, [facility]    Continue to  on importance of vaccinations per ACIP recommendations- patient has continued to refuse despite counseling. Counseled patient regarding current guidelines and recommendations per the CDC for appropriate vaccination, as well as appropriate booster if applicable. Discussed the importance of vaccination including the current statistics that support vaccination and a significant reduction in severity of symptoms, hospitalizations, worsening morbidity, and even mortality when compared to unvaccinated patients. Return in about 3 months (around 6/10/2022). Subjective   SUBJECTIVE/OBJECTIVE:  HPI    Presents for follow-up appointment. Glucose 120's-150's and 2 hours post less than 140. Overall feeling well. Slight weight gain- concerned with continued weight gain and concerns with consistency of food choices. Discussed options and excited about medical weight loss assessment opportunity. Notes ongoing symptoms of left lower leg pain/tightness- told findings by Rheumatology consistent with IT band syndrome and likely multifactorial. No noted night pain, rest pain, or deep pain. NO alarm findings currently. Has not had PT since Right hip fracture s/p repair. Still using assist device. Right LE is reduced in length due to fracture compared to LLE. Review of Systems   Constitutional: Negative for appetite change, chills and fever.    Respiratory: Negative for cough, shortness of breath and wheezing. Cardiovascular: Negative for chest pain, palpitations and leg swelling. Gastrointestinal: Negative for abdominal pain, constipation, diarrhea and vomiting. Genitourinary: Negative for dysuria. Musculoskeletal: Positive for arthralgias (left leg) and myalgias (left leg). Neurological: Negative for dizziness, syncope and light-headedness. Objective    Vitals:    03/10/22 1316   BP: 122/78   Site: Right Upper Arm   Position: Sitting   Cuff Size: Large Adult   Pulse: 79   Resp: 18   Temp: 97.2 °F (36.2 °C)   TempSrc: Temporal   SpO2: 99%   Weight: 231 lb 8 oz (105 kg)     Physical Exam  Constitutional:       Appearance: Normal appearance. She is obese. She is not ill-appearing. HENT:      Head: Normocephalic and atraumatic. Eyes:      General: No scleral icterus. Cardiovascular:      Rate and Rhythm: Normal rate and regular rhythm. Pulses: Normal pulses. Heart sounds: Normal heart sounds. No murmur heard. Pulmonary:      Effort: Pulmonary effort is normal.      Breath sounds: Normal breath sounds. No wheezing, rhonchi or rales. Abdominal:      General: Bowel sounds are normal.      Palpations: Abdomen is soft. Tenderness: There is no abdominal tenderness. There is no guarding. Musculoskeletal:      Right lower leg: No edema. Left lower leg: No swelling, deformity or bony tenderness. No edema. Comments: LLE: tightness with bending at the knee and internal rotation at the hip; no bony tenderness. Skin:     General: Skin is warm and dry. Neurological:      General: No focal deficit present. Mental Status: She is alert. Psychiatric:         Mood and Affect: Mood normal.                An electronic signature was used to authenticate this note.     --Montana Pollack MD, FACP

## 2022-03-21 ENCOUNTER — TELEPHONE (OUTPATIENT)
Dept: INTERNAL MEDICINE | Age: 72
End: 2022-03-21

## 2022-03-21 NOTE — TELEPHONE ENCOUNTER
Spoke to patient about the referral. Faxed referral over to [facility] therapy

## 2022-03-21 NOTE — TELEPHONE ENCOUNTER
----- Message from Vincenzo Greenberg sent at 3/21/2022  2:11 PM EDT -----  Subject: Message to Provider    QUESTIONS  Information for Provider? Patient has not received a call back yet to be   scheduled with Physical Therapy or a nutritionist. Please cb patient re?   request.   ---------------------------------------------------------------------------  --------------  CALL BACK INFO  What is the best way for the office to contact you? OK to leave message on   voicemail  Preferred Call Back Phone Number? [phone]  ---------------------------------------------------------------------------  --------------  SCRIPT ANSWERS  Relationship to Patient?  Self

## 2022-03-22 ENCOUNTER — TELEPHONE (OUTPATIENT)
Dept: INTERNAL MEDICINE | Age: 72
End: 2022-03-22

## 2022-03-22 NOTE — TELEPHONE ENCOUNTER
Spoke with Vandana Ricci at [facility] ([facility] weight loss and states they will be calling the patient in the next couple of days. Patient made aware to call back in she does not hear from by Friday.  Patient verbalized understanding

## 2022-03-22 NOTE — TELEPHONE ENCOUNTER
Spoked with Scotty NUGENT and they states they are calling patient today and message left with [facility] weight loss center. Patient notified and thankful.

## 2022-04-05 DIAGNOSIS — E78.2 MIXED HYPERLIPIDEMIA: ICD-10-CM

## 2022-04-05 RX ORDER — ROSUVASTATIN CALCIUM 5 MG/1
TABLET, COATED ORAL
Qty: 90 TABLET | Refills: 1 | Status: SHIPPED
Start: 2022-04-05 | End: 2022-09-22 | Stop reason: SDUPTHER

## 2022-04-13 ENCOUNTER — OFFICE VISIT (OUTPATIENT)
Dept: BARIATRICS/WEIGHT MGMT | Age: 72
End: 2022-04-13
Payer: COMMERCIAL

## 2022-04-13 VITALS
SYSTOLIC BLOOD PRESSURE: 138 MMHG | WEIGHT: 232.2 LBS | HEART RATE: 83 BPM | BODY MASS INDEX: 43.84 KG/M2 | DIASTOLIC BLOOD PRESSURE: 66 MMHG | HEIGHT: 61 IN | TEMPERATURE: 97.9 F

## 2022-04-13 DIAGNOSIS — E11.9 TYPE 2 DIABETES MELLITUS WITHOUT COMPLICATION, WITHOUT LONG-TERM CURRENT USE OF INSULIN (HCC): ICD-10-CM

## 2022-04-13 DIAGNOSIS — E66.01 CLASS 3 SEVERE OBESITY DUE TO EXCESS CALORIES WITH SERIOUS COMORBIDITY AND BODY MASS INDEX (BMI) OF 40.0 TO 44.9 IN ADULT (HCC): ICD-10-CM

## 2022-04-13 DIAGNOSIS — K21.9 GASTROESOPHAGEAL REFLUX DISEASE WITHOUT ESOPHAGITIS: ICD-10-CM

## 2022-04-13 DIAGNOSIS — I10 ESSENTIAL HYPERTENSION: Primary | ICD-10-CM

## 2022-04-13 PROBLEM — E66.813 CLASS 3 SEVERE OBESITY DUE TO EXCESS CALORIES WITH SERIOUS COMORBIDITY AND BODY MASS INDEX (BMI) OF 40.0 TO 44.9 IN ADULT: Status: ACTIVE | Noted: 2017-07-17

## 2022-04-13 PROCEDURE — 99417 PROLNG OP E/M EACH 15 MIN: CPT | Performed by: INTERNAL MEDICINE

## 2022-04-13 PROCEDURE — 99205 OFFICE O/P NEW HI 60 MIN: CPT | Performed by: INTERNAL MEDICINE

## 2022-04-13 PROCEDURE — 99202 OFFICE O/P NEW SF 15 MIN: CPT

## 2022-04-13 NOTE — PROGRESS NOTES
CC -   HTN, DM2, GERD with Weight gain    Would like to be at about 180 lbs. BACKGROUND -     First visit: 4/13/22     Obesity (all weight in lbs)  Began in childhood  Initial BMI 43.52, Wt 232.2, Ht 61.25\"  HS Grad wt 125   Lowest   wt 125   Highest  wt 247 (4-5 years ago)  Pattern of wt gain: grad  Wt change past yr: +20 lbs  Most wt lost: 39 lbs  Other diets attempted: keto diet    Initial Diet:    Number of meals per day - 3    Number of snacks per day - 3    Meal volume - 12\" plate,  rarely seconds    Fast food/convenience store - 3x/week    Restaurants (not fast food) - 1x/week   Sweets - 7d/week (cake, cheesecake, pie, icecream etc)   Chips - 3d/week   Crackers/pretzels - 2-3d/week   Nuts - 7d/week (about a handful of walnuts)   Peanut Butter - 0d/week   Popcorn - 1d/week   Dried fruit - 1d/week   Whole fruit - 7d/week (banana, apple, berries)   Breakfast cereal - 2d/week (oatmeal)   Granola/Protein/Energy bar - 0d/week   Sugar sweetened beverages - ricardo occasionally, rarely orange juice, 1 cup of coffee with cream and honey (half and half, creamora - ~3-4 tbsp)   Protein - No supplements   Fiber - No supplements     Initial Exercise:    Gym membership - no (goes for hip therapy 3d/wk 1 hr)    Walking - no    Running - no    Resistance - no    Aerobic class - no        Initial Sleep: Bedtime: 8:30-10:30pm, wake up time: 8-8:30 - sore when awake, daytime naps: no    Weight scale at home: yes, takes weight: none in the last 4-5 months at home.   Food scale: yes  ______________________    [facility] -  Past Medical History:   Diagnosis Date    COPD (chronic obstructive pulmonary disease) ([facility]     mild    Diabetes mellitus type II, controlled ([facility]     Diverticulosis     Hyperlipidemia     Hypertension     Kidney stone     Obesity, Class III, BMI 40-49.9 (morbid obesity) (HCC)     Osteoarthritis     Osteoporosis     Rheumatoid arthritis involving multiple sites ([facility]     follows with Dr. Jose Gutierrez Sepsis ([address]     on Levaquin per Dr. Sly Trejo since dischaarge from [facility] 3/30/2019    Tobacco abuse began age 16    past hx    Tubular adenoma of colon 2011    colonoscopy   h/o migrating arthritis. Past Surgical History:   Procedure Laterality Date    BREAST BIOPSY      benign, pt cannot remember which side    CARPAL TUNNEL RELEASE      right wrist     SECTION       SECTION       SECTION      CHOLECYSTECTOMY, OPEN      COLONOSCOPY  2011    screening, sigmoid polyp bx (tubular adenoma), Dr. Carlson [facility], [facility] COLONOSCOPY  4/3/2015    submucosal lipoma distal tranverse colon biopsied, diverticulosis, Dr. Ronda Cortez / REMOVAL STENT / STONE Left 3/27/2019    CYSTOSCOPY RETROGRADE PYELOGRAM STENT INSERTION performed by Valentín Trujillo MD at [address] [place] [address] Right 2020    RIGHT HIP OPEN REDUCTION INTERNAL FIXATION- REQUESTING 4 PM performed by Radha Tipton MD at [place] [address] LITHOTRIPSY Left 4/15/2019    CYSTOSCOPY RETROGRADE URETEROSCOPY, stone basket extraction, LEFT STENT INSERTION performed by Valentín Trujillo MD at [address]   [address]     Prior to Admission medications    Medication Sig Start Date End Date Taking?  Authorizing Provider   rosuvastatin (CRESTOR) 5 MG tablet TAKE 1 TABLET BY MOUTH EVERY DAY 22   Daniel Zamora MD   metFORMIN (GLUCOPHAGE) 850 MG tablet TAKE 1 TABLET BY MOUTH TWICE DAILY WITH MEALS 22   Daniel Zamora MD   hydroxychloroquine (PLAQUENIL) 200 MG tablet Take 1 tablet by mouth daily 22   Flower Ramirez MD   alendronate (FOSAMAX) 70 MG tablet Take 1 tablet by mouth every 7 days 22   Flower Ramirez MD   Ascorbic Acid (VITAMIN C) 250 MG tablet Take 1,000 mg by mouth daily  Patient not taking: Reported on 3/10/2022    Historical Provider, MD   zinc sulfate (ZINCATE) 220 (50 Zn) MG capsule Take 50 mg by mouth daily    Historical Provider, MD   Homeopathic Products (LIVER SUPPORT SL) Take 1 capsule by mouth daily    Historical Provider, MD   magnesium cl-calcium carbonate (SLOW-MAG) 71.5-119 MG TBEC tablet take 2 tablets once daily 9/16/21   Latisha Thompson MD   pantoprazole (PROTONIX) 40 MG tablet TAKE 1 TABLET BY MOUTH DAILY AS NEEDED FOR GERD 9/16/21   Latisha Thompson MD   Bioflavonoid Products (SHERIF C PO) Take 2 tablets by mouth 2 times daily    Historical Provider, MD   GLUTATHIONE PO Take 500 mg by mouth daily    Historical Provider, MD   lisinopril (PRINIVIL;ZESTRIL) 20 MG tablet Take 1 tablet by mouth daily 6/14/21   Latisha Thompson MD   blood glucose monitor strips Test 2 times a day & as needed for symptoms of irregular blood glucose. Dispense sufficient amount for indicated testing frequency plus additional to accommodate PRN testing needs. 6/14/21   Latisha Thompson MD   Turmeric 500 MG CAPS Take 2 tablets by mouth 3 times daily     Historical Provider, MD   Cholecalciferol (VITAMIN D) 50 MCG (2000 UT) CAPS capsule Take 2,000 Units by mouth daily 3/8/21   Latisha Thompson MD   aspirin EC 81 MG EC tablet Take 1 tablet by mouth daily Start taking aspirin AFTER completion of all doses of lovenox only 3/8/21   Latisha Thompson MD   albuterol sulfate  (90 Base) MCG/ACT inhaler Inhale 2 puffs into the lungs every 6 hours as needed for Wheezing 11/30/20   Latisha Thompson MD   blood glucose monitor kit and supplies Dispense sufficient amount for indicated testing frequency plus additional to accommodate PRN testing needs. Dispense all needed supplies to include: monitor, strips, lancing device, lancets, control solutions, alcohol swabs.  6/24/20   Latisha Thompson MD   Lancets MISC Test twice daily 6/24/20   Latisha Thompson MD   Cyanocobalamin (VITAMIN B 12 PO) Take by mouth daily     Historical Provider, MD   Coenzyme Q10 (CO Q 10 PO) Take 1 tablet by mouth daily QUINOL  / LD 4/9/2019    Historical Provider, MD   Vit C 2000 units bid. Vit D 2500 2x daily. Also takes vit K. Has not needed Albuterol for a year. Family history: DM: mother with T2Dm or prediabetes, Father passed away with complication of DM(probably type 1), Heart disease: GM had heart attack. Allergies: Allergies   Allergen Reactions    Rocephin [Ceftriaxone] Shortness Of Breath     Bronchospasm 3/27 and hypotension immediately after ceftriaxone     Social history: smoking: quit July 7, 2011 ; Alcohol: occasionally. ROS -  Card - no CP - recent stress test was low risk. GI - no N/V/D/C    PE -  Gen : /66 (Site: Left Upper Arm, Position: Sitting, Cuff Size: Medium Adult)   Pulse 83   Temp 97.9 °F (36.6 °C) (Temporal)   Ht 5' 1.25\" (1.556 m)   Wt 232 lb 3.2 oz (105.3 kg)   BMI 43.52 kg/m²    WN, WD, NAD  Lung: Nml resp effort, CTA B/L  Heart:  RRR w/o MGR, no LE pitting edema b/l  Psych: Normal mood   Full affect  Neuro: Moves all ext well  ______________________    HISTORY & ASSESSMENT/PLAN -     Problem 1 - Hypertension  HPI   - On lisinopril 20, BP was little high, repeat BP still >130 but <140 mmHg  Assessment  - fairly controlled. Plan   - continue Lisinopril, monitor BP. Weight reduction can help, weight reduction per plan below. Problem 2  - Obesity   HPI   - See above Background for description    Weight  Date    232.2  4/13/22  DEN (est.)= 1613 Prem/d = 21251 Prem/wk  Wt effect of HR foods = 2800 Prem/wk = 400 Prem/d= 25% DEN = 41 lb/year. Assessment  - Uncontrolled  Plan   - Talked about various options. Would like to try calorie counting using 'my fitness pal' emil. After talking about appetite suppressants and side effects, she does not want anything currently, but would like to try orlistat, we did talk about side effects of orlistat.  Planned as follows:  Patient Instructions   Rules:  · Count every calorie every day  · Limit sweets to one day per month  · Limit chips/crackers/pretzels/nuts/popcorn to 100 prem/day  · Eliminate all sugar sweetened beverages (including fruit juice)  · Limit restaurants (including fast food and food from a convenience store) to one time every two weeks while in town    Requirements:  · Make sure protein intake is at least 65 grams per day (do not count protein every day; instead spot check your intake every 2-3 weeks and make sure what you think you are getting is close to accurate; consider using a protein shake if needed; these are in the pharmacy section of the stores, not the grocery section; Premier, Pure Protein and Fairlife are relatively inexpensive and taste good to most patients; other options are Nectar, Boost Max, Ensure Max, BeneProtein and GNC lean (which is lactose-free); Nectar fruit, Premier Protein Clear, IsoPure Protein Drink, and Protein 2 O are water-based options; Quest (or Cosco, which is cheaper and is ordered on [employer]) and the [employer] 1 protein bars can also be used, but have less protein in them )  (Disclaimer: Dietary supplements rarely have their listed ingredients and the amount of each verified by a third party other. Sometimes they give verification for their claims to be GMO and gluten free and to be organic. However, even such verifications as these may still be untrustworthy.) (<200 Prem, >25 g protein)    · Make sure that fiber intake is at least 22 grams per day. Do this by either eating 12 tablespoons of the original, plain Fiber One cereal every day or 4 tablespoons of wheat dextrin powder (Benefiber or a generic brand) every day. Work up to this amount slowly by starting with only one-eighth to one-fourth of the target amount and then adding another one-eighth to one-fourth every one or two weeks until reaching the target.     · Take one multivitamin every day    Targets:  · Limit calorie intake to 1100 calories/day  · Walk 30 minutes daily or equivalent (210 min/week)  · Avoid eating 2 hours within bedtime. Tips:  · Do not eat outside of the dining room or the kitchen  · Do not eat while watching TV, videos, working on the computer or using a smart phone  · Do not eat food out of a multi-serving bag or container. · Establish 6 hours of food-free \"time-out\" periods (times you don't eat) each day. No period can be less than 1 hour long. The periods need to be the same every day for days that are the same (for example, workdays would have one set of food free periods and weekends would have another set of days). These six hours are in addition to the two hours before bedtime and the time spent sleeping. Orlistat:  Take 120 mg (2 x 60 mg capsules) 3 times a day before meals. Follow up in about 1 month. Problem 3  - DM2  HPI   - On Metformin, and seems to be doing well, a1c was 6.4 and was 4.4 prior when she had lost weight. Assessment  - controlled. Plan   - continue Metformin, which she is tolerating except for occ diarrhea. Weight reduction can help with DM as well. Problem 4  - GERD   HPI   - uses PPI occasionally that helps  Assessment  - controlled   Plan   - can continue PPI prn. Orders Placed This Encounter   Medications    orlistat (JOSE) 60 MG capsule     Sig: Take 1 capsule by mouth 3 times daily (with meals)     Dispense:  180 capsule     Refill:  1      Total time spent on encounter: 98 min. Izzy Hernandez MD  Internal Medicine/Obesity Medicine  4/13/2022.

## 2022-04-13 NOTE — PATIENT INSTRUCTIONS
Rules:  · Count every calorie every day  · Limit sweets to one day per month  · Limit chips/crackers/pretzels/nuts/popcorn to 100 prem/day  · Eliminate all sugar sweetened beverages (including fruit juice)  · Limit restaurants (including fast food and food from a convenience store) to one time every two weeks while in town    Requirements:  · Make sure protein intake is at least 65 grams per day (do not count protein every day; instead spot check your intake every 2-3 weeks and make sure what you think you are getting is close to accurate; consider using a protein shake if needed; these are in the pharmacy section of the stores, not the grocery section; Premier, Pure Protein and Fairlife are relatively inexpensive and taste good to most patients; other options are Nectar, Boost Max, Ensure Max, BeneProtein and GNC lean (which is lactose-free); Nectar fruit, Premier Protein Clear, IsoPure Protein Drink, and Protein 2 O are water-based options; Quest (or Cosco, which is cheaper and is ordered on [address]) and the [employer] 1 protein bars can also be used, but have less protein in them )  (Disclaimer: Dietary supplements rarely have their listed ingredients and the amount of each verified by a third party other. Sometimes they give verification for their claims to be GMO and gluten free and to be organic. However, even such verifications as these may still be untrustworthy.) (<200 Prem, >25 g protein)    · Make sure that fiber intake is at least 22 grams per day. Do this by either eating 12 tablespoons of the original, plain Fiber One cereal every day or 4 tablespoons of wheat dextrin powder (Benefiber or a generic brand) every day. Work up to this amount slowly by starting with only one-eighth to one-fourth of the target amount and then adding another one-eighth to one-fourth every one or two weeks until reaching the target.     · Take one multivitamin every day    Targets:  · Limit calorie intake to 1100 calories/day  · Walk 30 minutes daily or equivalent (210 min/week)  · Avoid eating 2 hours within bedtime. Tips:  · Do not eat outside of the dining room or the kitchen  · Do not eat while watching TV, videos, working on the computer or using a smart phone  · Do not eat food out of a multi-serving bag or container. · Establish 6 hours of food-free \"time-out\" periods (times you don't eat) each day. No period can be less than 1 hour long. The periods need to be the same every day for days that are the same (for example, workdays would have one set of food free periods and weekends would have another set of days). These six hours are in addition to the two hours before bedtime and the time spent sleeping. Orlistat:  Take 120 mg (2 x 60 mg capsules) 3 times a day before meals. Follow up in about 1 month.

## 2022-05-03 ENCOUNTER — OFFICE VISIT (OUTPATIENT)
Dept: RHEUMATOLOGY | Age: 72
End: 2022-05-03
Payer: COMMERCIAL

## 2022-05-03 VITALS
BODY MASS INDEX: 39.75 KG/M2 | RESPIRATION RATE: 18 BRPM | HEART RATE: 84 BPM | OXYGEN SATURATION: 98 % | HEIGHT: 64 IN | TEMPERATURE: 96.7 F | DIASTOLIC BLOOD PRESSURE: 68 MMHG | SYSTOLIC BLOOD PRESSURE: 128 MMHG | WEIGHT: 232.8 LBS

## 2022-05-03 DIAGNOSIS — M05.79 RHEUMATOID ARTHRITIS INVOLVING MULTIPLE SITES WITH POSITIVE RHEUMATOID FACTOR (HCC): ICD-10-CM

## 2022-05-03 DIAGNOSIS — M81.0 OSTEOPOROSIS, UNSPECIFIED OSTEOPOROSIS TYPE, UNSPECIFIED PATHOLOGICAL FRACTURE PRESENCE: ICD-10-CM

## 2022-05-03 DIAGNOSIS — R63.8 INCREASED BMI: ICD-10-CM

## 2022-05-03 DIAGNOSIS — K76.0 FATTY LIVER DISEASE, NONALCOHOLIC: Primary | ICD-10-CM

## 2022-05-03 DIAGNOSIS — R74.8 ELEVATED LIVER ENZYMES: ICD-10-CM

## 2022-05-03 DIAGNOSIS — Z79.899 HIGH RISK MEDICATIONS (NOT ANTICOAGULANTS) LONG-TERM USE: ICD-10-CM

## 2022-05-03 PROCEDURE — 1123F ACP DISCUSS/DSCN MKR DOCD: CPT | Performed by: INTERNAL MEDICINE

## 2022-05-03 PROCEDURE — 3017F COLORECTAL CA SCREEN DOC REV: CPT | Performed by: INTERNAL MEDICINE

## 2022-05-03 PROCEDURE — G8417 CALC BMI ABV UP PARAM F/U: HCPCS | Performed by: INTERNAL MEDICINE

## 2022-05-03 PROCEDURE — 99212 OFFICE O/P EST SF 10 MIN: CPT | Performed by: INTERNAL MEDICINE

## 2022-05-03 PROCEDURE — 4040F PNEUMOC VAC/ADMIN/RCVD: CPT | Performed by: INTERNAL MEDICINE

## 2022-05-03 PROCEDURE — 1036F TOBACCO NON-USER: CPT | Performed by: INTERNAL MEDICINE

## 2022-05-03 PROCEDURE — 99213 OFFICE O/P EST LOW 20 MIN: CPT | Performed by: INTERNAL MEDICINE

## 2022-05-03 PROCEDURE — G8399 PT W/DXA RESULTS DOCUMENT: HCPCS | Performed by: INTERNAL MEDICINE

## 2022-05-03 PROCEDURE — G8427 DOCREV CUR MEDS BY ELIG CLIN: HCPCS | Performed by: INTERNAL MEDICINE

## 2022-05-03 PROCEDURE — 1090F PRES/ABSN URINE INCON ASSESS: CPT | Performed by: INTERNAL MEDICINE

## 2022-05-03 RX ORDER — ALENDRONATE SODIUM 70 MG/1
70 TABLET ORAL
Qty: 12 TABLET | Refills: 1 | Status: SHIPPED
Start: 2022-05-03 | End: 2022-09-27 | Stop reason: SDUPTHER

## 2022-05-03 RX ORDER — HYDROXYCHLOROQUINE SULFATE 200 MG/1
200 TABLET, FILM COATED ORAL DAILY
Qty: 90 TABLET | Refills: 1 | Status: SHIPPED
Start: 2022-05-03 | End: 2022-09-27 | Stop reason: SDUPTHER

## 2022-05-03 NOTE — PATIENT INSTRUCTIONS
same meds  Same HCQ.  fu eye exam.  Same alendronate 70Qweekly  (Holding MTX for now)  Aug 16-fu  3.5 months  Labs if getting done in next couple months

## 2022-05-03 NOTE — PROGRESS NOTES
J O I N T  C A R E  C L I N I C        The patient is seen in follow-up for:  Rheumatoid Arhritis    The patient is seen in follow-up for:  Arthritis, pain both hips and low back , states right hand better is moving fingers better        Sp covid home test asya 4t- sick since cheikh 2  Improved now - expect mild cough/chest congestion  Resolved chills, chest congestion/sinuc congestion, myalgias etc.  She couuldn't get AB infusion, never hypoxic, not really sob     RA symptoms stable   US Nov  Sonographic findings are compatible with fatty infiltration of the liver.           DDD/mechanical back pain, moderate pain if on feet           Patient History Update Since Previous Visit      Yes No Comment      New illnesses  x       Seen other healthcare provider x  PCP       New x-ray, labs, or procedures  x       Started / changed / stopped medications  x       New allergies / reactions to medications  x       Changes in family medical history  x       Changes in patient social history  x       Morning stiffness x  Length:  15 minutes          Review of Systems        Check if Reviewed Comment if new or change  Check if Reviewed Comment if new or change   x Constitutional  xx Cardiovascular    x Musculoskeletal  x Pulmonary    x Eyes, Ears, Nose  x Psychiatric    x Mouth, Throat  x Neurological    x Skin & Hair  x GI    x Immunologic       x Allergic   Endocrine    xx Hematologic   Lymphatic             Medical Record Review        Check if Reviewed   xx Vital Signs & Weight x Other Providers Consults & Notes   x Laboratory Results x Imaging Results          Abbreviated Exam          System    nl   abn   Comment     1 Gen nutrition/hygiene x      appearance x     2 Skin turgor / integrity x      rash x      ecchymosis x     3 Psych orientation x      memory x      mood x      cooperative x     4 HENT mouth / throat x     5 Resp resp. effort x      auscultation x     6 CV heart auscultation x      extremity edema x Tender Points           Yes   No  Equivocal  Not Assessed   7 Other                   Joint Assessment          Patient Right     Patient Left     x (check if no synovitis seen on exam)   Joint Pain Swelling Joint Pain Swelling   Shoulder   Shoulder     Elbow   Elbow     Wrist x x Wrist     Knee   Knee     MCP I   MCP I     MCP II x  MCP II     MCP III   MCP III     MCP IV   MCP IV     MCP V   MCP V     IP I   IP I     PIP II   PIP II     PIP III   PIP III     PIP IV   PIP IV     PIP V   PIP V                      Impression          Arthritis, RA stable  pain both hips and low back ,   states right hand better is moving fingers better   RA symptoms stable  crp 0.3-stable on hcq    High risk meds-- mg daily restartedt on 06-15-21. stable  Eye exam    Stopped MTX in past bc of aerly fatty liver/pre  liver cirrhosis  Liver test N in march   US Nov  Sonographic findings are compatible with fatty infiltration of the liver.       Noted diabetic inc BS  Inc weight 20-25 lbs over past year  Inc bmi      Also CBC OK  (was off folate - TMX-smx issue with MTX in past)    DDD/mechanical back pain, moderate pain if on feet    OA L wrist post inflammatory   Am ice pack, tumeric    Carpal tunnel syndrome  0519 Right release; 11/2019 Left release  osteoporsiss--vit D now 34  dexa  Right hip:   Bone mineral density of the right femoral neck is 0.664 g/sq cm with a   T score of    -2.7.       Left hip:   Bone mineral density of the left femoral neck is 0.718 g/sq cm with a   T score of    -2.3.      Discussed alendronate compliance             Plan           same meds  Same HCQ.  fu eye exam.  F/U in 3.5 months   Same alendronate 70Qweekly  (Holdcing MTX for now)  crp/esr        Counseling and Coordination of Care      Prognosis  Prior Authorization       x Risk / Benefits of RX  Disability Forms        Compliance  Discussion and / or letter to other health care provider         Risk Reduction     x More than half of the face-to-face time with the patient was spent in counseling or coordinating care    Exercise           Brochure / Handout      Visit Duration (including medical record review):    Minutes:20    Referral:

## 2022-05-03 NOTE — PROGRESS NOTES
Patient verbalized understanding of office instructions. She will call with questions or concerns. She was given discharge instructions, and scripts for lab work to be done. All questions were fully answered.  Printed AVS was tania

## 2022-05-05 ENCOUNTER — HOSPITAL ENCOUNTER (EMERGENCY)
Age: 72
Discharge: LEFT BEFORE DISCHARGE | End: 2022-05-06

## 2022-05-05 VITALS — HEART RATE: 92 BPM | TEMPERATURE: 97.7 F | OXYGEN SATURATION: 97 %

## 2022-05-06 ENCOUNTER — APPOINTMENT (OUTPATIENT)
Dept: CT IMAGING | Age: 72
End: 2022-05-06
Payer: COMMERCIAL

## 2022-05-06 ENCOUNTER — HOSPITAL ENCOUNTER (EMERGENCY)
Age: 72
Discharge: HOME | End: 2022-05-06
Attending: STUDENT IN AN ORGANIZED HEALTH CARE EDUCATION/TRAINING PROGRAM
Payer: COMMERCIAL

## 2022-05-06 VITALS
RESPIRATION RATE: 16 BRPM | BODY MASS INDEX: 39.78 KG/M2 | TEMPERATURE: 98.1 F | SYSTOLIC BLOOD PRESSURE: 130 MMHG | OXYGEN SATURATION: 99 % | DIASTOLIC BLOOD PRESSURE: 76 MMHG | WEIGHT: 233 LBS | HEART RATE: 76 BPM | HEIGHT: 64 IN

## 2022-05-06 DIAGNOSIS — N30.00 ACUTE CYSTITIS WITHOUT HEMATURIA: Primary | ICD-10-CM

## 2022-05-06 DIAGNOSIS — M54.50 ACUTE LOW BACK PAIN WITHOUT SCIATICA, UNSPECIFIED BACK PAIN LATERALITY: ICD-10-CM

## 2022-05-06 DIAGNOSIS — R74.8 ELEVATED LIPASE: ICD-10-CM

## 2022-05-06 LAB
ALBUMIN SERPL-MCNC: 4.3 G/DL (ref 3.5–5.2)
ALP BLD-CCNC: 44 U/L (ref 35–104)
ALT SERPL-CCNC: 27 U/L (ref 0–32)
ANION GAP SERPL CALCULATED.3IONS-SCNC: 14 MMOL/L (ref 7–16)
AST SERPL-CCNC: 19 U/L (ref 0–31)
BACTERIA: ABNORMAL /HPF
BASOPHILS ABSOLUTE: 0.03 E9/L (ref 0–0.2)
BASOPHILS RELATIVE PERCENT: 0.4 % (ref 0–2)
BILIRUB SERPL-MCNC: 0.2 MG/DL (ref 0–1.2)
BILIRUBIN URINE: NEGATIVE
BLOOD, URINE: NEGATIVE
BUN BLDV-MCNC: 14 MG/DL (ref 6–23)
CALCIUM SERPL-MCNC: 9.2 MG/DL (ref 8.6–10.2)
CHLORIDE BLD-SCNC: 103 MMOL/L (ref 98–107)
CLARITY: CLEAR
CO2: 21 MMOL/L (ref 22–29)
COLOR: YELLOW
CREAT SERPL-MCNC: 0.9 MG/DL (ref 0.5–1)
EOSINOPHILS ABSOLUTE: 0.16 E9/L (ref 0.05–0.5)
EOSINOPHILS RELATIVE PERCENT: 2.3 % (ref 0–6)
EPITHELIAL CELLS, UA: ABNORMAL /HPF
GFR AFRICAN AMERICAN: >60
GFR NON-AFRICAN AMERICAN: >60 ML/MIN/1.73
GLUCOSE BLD-MCNC: 282 MG/DL (ref 74–99)
GLUCOSE URINE: 250 MG/DL
HCT VFR BLD CALC: 39.1 % (ref 34–48)
HEMOGLOBIN: 13.6 G/DL (ref 11.5–15.5)
IMMATURE GRANULOCYTES #: 0.02 E9/L
IMMATURE GRANULOCYTES %: 0.3 % (ref 0–5)
KETONES, URINE: NEGATIVE MG/DL
LEUKOCYTE ESTERASE, URINE: ABNORMAL
LIPASE: 140 U/L (ref 13–60)
LYMPHOCYTES ABSOLUTE: 1.88 E9/L (ref 1.5–4)
LYMPHOCYTES RELATIVE PERCENT: 27 % (ref 20–42)
MCH RBC QN AUTO: 29.7 PG (ref 26–35)
MCHC RBC AUTO-ENTMCNC: 34.8 % (ref 32–34.5)
MCV RBC AUTO: 85.4 FL (ref 80–99.9)
MONOCYTES ABSOLUTE: 0.96 E9/L (ref 0.1–0.95)
MONOCYTES RELATIVE PERCENT: 13.8 % (ref 2–12)
NEUTROPHILS ABSOLUTE: 3.92 E9/L (ref 1.8–7.3)
NEUTROPHILS RELATIVE PERCENT: 56.2 % (ref 43–80)
NITRITE, URINE: NEGATIVE
PDW BLD-RTO: 13.5 FL (ref 11.5–15)
PH UA: 5 (ref 5–9)
PLATELET # BLD: 181 E9/L (ref 130–450)
PMV BLD AUTO: 8.5 FL (ref 7–12)
POTASSIUM REFLEX MAGNESIUM: 4.2 MMOL/L (ref 3.5–5)
PROTEIN UA: NEGATIVE MG/DL
RBC # BLD: 4.58 E12/L (ref 3.5–5.5)
RBC UA: ABNORMAL /HPF (ref 0–2)
SODIUM BLD-SCNC: 138 MMOL/L (ref 132–146)
SPECIFIC GRAVITY UA: >=1.03 (ref 1–1.03)
TOTAL PROTEIN: 6.4 G/DL (ref 6.4–8.3)
UROBILINOGEN, URINE: 0.2 E.U./DL
WBC # BLD: 7 E9/L (ref 4.5–11.5)
WBC UA: ABNORMAL /HPF (ref 0–5)

## 2022-05-06 PROCEDURE — 81001 URINALYSIS AUTO W/SCOPE: CPT

## 2022-05-06 PROCEDURE — 36415 COLL VENOUS BLD VENIPUNCTURE: CPT

## 2022-05-06 PROCEDURE — 6360000002 HC RX W HCPCS: Performed by: STUDENT IN AN ORGANIZED HEALTH CARE EDUCATION/TRAINING PROGRAM

## 2022-05-06 PROCEDURE — 74178 CT ABD&PLV WO CNTR FLWD CNTR: CPT

## 2022-05-06 PROCEDURE — 87077 CULTURE AEROBIC IDENTIFY: CPT

## 2022-05-06 PROCEDURE — 99284 EMERGENCY DEPT VISIT MOD MDM: CPT

## 2022-05-06 PROCEDURE — 87088 URINE BACTERIA CULTURE: CPT

## 2022-05-06 PROCEDURE — 80053 COMPREHEN METABOLIC PANEL: CPT

## 2022-05-06 PROCEDURE — 2580000003 HC RX 258: Performed by: RADIOLOGY

## 2022-05-06 PROCEDURE — 87186 SC STD MICRODIL/AGAR DIL: CPT

## 2022-05-06 PROCEDURE — 6360000004 HC RX CONTRAST MEDICATION: Performed by: RADIOLOGY

## 2022-05-06 PROCEDURE — 96374 THER/PROPH/DIAG INJ IV PUSH: CPT

## 2022-05-06 PROCEDURE — 2580000003 HC RX 258: Performed by: STUDENT IN AN ORGANIZED HEALTH CARE EDUCATION/TRAINING PROGRAM

## 2022-05-06 PROCEDURE — 6370000000 HC RX 637 (ALT 250 FOR IP): Performed by: STUDENT IN AN ORGANIZED HEALTH CARE EDUCATION/TRAINING PROGRAM

## 2022-05-06 PROCEDURE — 85025 COMPLETE CBC W/AUTO DIFF WBC: CPT

## 2022-05-06 PROCEDURE — 83690 ASSAY OF LIPASE: CPT

## 2022-05-06 RX ORDER — KETOROLAC TROMETHAMINE 30 MG/ML
15 INJECTION, SOLUTION INTRAMUSCULAR; INTRAVENOUS ONCE
Status: COMPLETED | OUTPATIENT
Start: 2022-05-06 | End: 2022-05-06

## 2022-05-06 RX ORDER — LIDOCAINE 4 G/G
1 PATCH TOPICAL DAILY
Qty: 30 PATCH | Refills: 0 | Status: SHIPPED | OUTPATIENT
Start: 2022-05-06 | End: 2022-06-05

## 2022-05-06 RX ORDER — METHOCARBAMOL 500 MG/1
500 TABLET, FILM COATED ORAL 3 TIMES DAILY
Qty: 15 TABLET | Refills: 0 | Status: SHIPPED | OUTPATIENT
Start: 2022-05-06 | End: 2022-05-11

## 2022-05-06 RX ORDER — LIDOCAINE 4 G/G
1 PATCH TOPICAL DAILY
Status: DISCONTINUED | OUTPATIENT
Start: 2022-05-06 | End: 2022-05-06

## 2022-05-06 RX ORDER — CIPROFLOXACIN 500 MG/1
500 TABLET, FILM COATED ORAL 2 TIMES DAILY
Qty: 14 TABLET | Refills: 0 | Status: SHIPPED | OUTPATIENT
Start: 2022-05-06 | End: 2022-05-13

## 2022-05-06 RX ORDER — SODIUM CHLORIDE 0.9 % (FLUSH) 0.9 %
10 SYRINGE (ML) INJECTION PRN
Status: COMPLETED | OUTPATIENT
Start: 2022-05-06 | End: 2022-05-06

## 2022-05-06 RX ORDER — LIDOCAINE 4 G/G
1 PATCH TOPICAL ONCE
Status: DISCONTINUED | OUTPATIENT
Start: 2022-05-06 | End: 2022-05-06 | Stop reason: HOSPADM

## 2022-05-06 RX ORDER — CIPROFLOXACIN 500 MG/1
500 TABLET, FILM COATED ORAL ONCE
Status: COMPLETED | OUTPATIENT
Start: 2022-05-06 | End: 2022-05-06

## 2022-05-06 RX ORDER — 0.9 % SODIUM CHLORIDE 0.9 %
500 INTRAVENOUS SOLUTION INTRAVENOUS ONCE
Status: COMPLETED | OUTPATIENT
Start: 2022-05-06 | End: 2022-05-06

## 2022-05-06 RX ADMIN — IOPAMIDOL 75 ML: 755 INJECTION, SOLUTION INTRAVENOUS at 02:34

## 2022-05-06 RX ADMIN — KETOROLAC TROMETHAMINE 15 MG: 30 INJECTION, SOLUTION INTRAMUSCULAR at 01:46

## 2022-05-06 RX ADMIN — CIPROFLOXACIN 500 MG: 500 TABLET, FILM COATED ORAL at 04:01

## 2022-05-06 RX ADMIN — SODIUM CHLORIDE, PRESERVATIVE FREE 10 ML: 5 INJECTION INTRAVENOUS at 02:25

## 2022-05-06 RX ADMIN — SODIUM CHLORIDE 500 ML: 9 INJECTION, SOLUTION INTRAVENOUS at 01:49

## 2022-05-06 ASSESSMENT — PAIN DESCRIPTION - LOCATION
LOCATION: BACK
LOCATION: BACK

## 2022-05-06 ASSESSMENT — PAIN DESCRIPTION - ORIENTATION
ORIENTATION: LOWER
ORIENTATION: RIGHT;LEFT;LOWER

## 2022-05-06 ASSESSMENT — PAIN DESCRIPTION - FREQUENCY: FREQUENCY: CONTINUOUS

## 2022-05-06 ASSESSMENT — PAIN DESCRIPTION - ONSET: ONSET: PROGRESSIVE

## 2022-05-06 ASSESSMENT — PAIN DESCRIPTION - DESCRIPTORS: DESCRIPTORS: ACHING

## 2022-05-06 ASSESSMENT — PAIN - FUNCTIONAL ASSESSMENT
PAIN_FUNCTIONAL_ASSESSMENT: NONE - DENIES PAIN
PAIN_FUNCTIONAL_ASSESSMENT: 0-10

## 2022-05-06 ASSESSMENT — PAIN DESCRIPTION - PAIN TYPE: TYPE: ACUTE PAIN

## 2022-05-06 ASSESSMENT — PAIN SCALES - GENERAL
PAINLEVEL_OUTOF10: 0
PAINLEVEL_OUTOF10: 10

## 2022-05-06 NOTE — PROGRESS NOTES
Pt stated to [employer] after seeing how long the wait was after signing in she wanted to walk out. Pt walking out at this time.

## 2022-05-06 NOTE — ED PROVIDER NOTES
Department of Emergency Medicine   ED  Provider Note  Admit Date/RoomTime: 5/6/2022 12:17 AM  ED Room: 10/10          History of Present Illness:  5/6/22, Time: 1:40 AM EDT  Chief Complaint   Patient presents with    Back Pain     lower bilateral flank pain since yesterday, pain worse today       Brie Payne is a 80 y.o. female presenting to the ED for back pain. The patient states that she has had low back pain for the past 3 days. It is described as a sharp but achy sensation. It is significantly worsened with movement. Laying and sitting still seems to help. She denies any radiation of the pain. She does note that she has history of nephrolithiasis and has been infected requiring admission in the past.  She was concerned this could be causing her symptoms. She does note some left flank pain intermittently although denies any current pain. No history of trauma. No numbness, tingling or weakness. No IV drug use, fevers, incontinence or perirectal paresthesias. The patient denies any dysuria or hematuria but is noting some increased urinary urgency. Patient denies any chest pain, shortness of breath, or abdominal pain. No nausea or vomiting. Review of Systems:   Constitutional symptoms: [Denies fever]  Eyes: [Denies eye pain]  Ears, Nose, Mouth, Throat: [Denies ear pain]  Cardiovascular: [Denies chest pain]  Respiratory: [Denies shortness of breath]  Gastrointestinal: [Denies blood per rectum]  Genitourinary: [Denies hematuria].   Positive for increased urinary frequency  Skin: [Denies rashes]  Neurological: [Denies headache]  Musculoskeletal: Positive for low back pain    --------------------------------------------- PAST HISTORY ---------------------------------------------  Past Medical History:  has a past medical history of Class 3 severe obesity due to excess calories with serious comorbidity and body mass index (BMI) of 40.0 to 44.9 in [facility]), COPD (chronic obstructive pulmonary disease) ([facility] Diabetes mellitus type II, controlled ([facility] Diverticulosis, Gastroesophageal reflux disease without esophagitis, Hyperlipidemia, Hypertension, Kidney stone, Obesity, Class III, BMI 40-49.9 (morbid obesity) ([facility] Osteoarthritis, Osteoporosis, Rheumatoid arthritis involving multiple sites ([facility] Sepsis ([facility] Tobacco abuse, and Tubular adenoma of colon. Past Surgical History:  has a past surgical history that includes Tonsillectomy (childhood);  section ();  section ();  section (); Tubal ligation (); Cholecystectomy, open (); Breast biopsy (); Carpal tunnel release (); Colonoscopy (2011); Colonoscopy (4/3/2015); CYSTOSCOPY INSERTION / REMOVAL STENT / STONE (Left, 3/27/2019); Lithotripsy (Left, 4/15/2019); and Hip fracture surgery (Right, 2020). Social History:  reports that she quit smoking about 10 years ago. Her smoking use included cigarettes. She has a 40.00 pack-year smoking history. She has never used smokeless tobacco. She reports current alcohol use. She reports that she does not use drugs. Family History: family history includes Arthritis in her mother; Cancer in her paternal aunt; Diabetes in her brother, father, and mother; Heart Disease in her mother; High Cholesterol in her brother; Stroke in her father. . Unless otherwise noted, family history is non contributory    The patients home medications have been reviewed. Allergies: Rocephin [ceftriaxone]    I have reviewed the past medical history, past surgical history, social history, and family history    ---------------------------------------------------PHYSICAL EXAM--------------------------------------    General: The patient is conversational and lying comfortably in bed. Head: Normocephalic and atraumatic. Eyes: Sclera non-icteric and no conjunctival injection  ENT: Nasal and oral membranes appear mildly dry  Neck: Trachea midline.   No JVD  Respiratory: Lungs clear to auscultation bilaterally. Patient speaking in full sentences. Cardiovascular: Regular rate. No pedal edema. 2+ radial pulses  Gastrointestinal:  Abdomen is soft and nondistended. Bowel sounds present. There is no tenderness. There is no guarding or rebound. Genitourinary: No CVA tenderness  Musculoskeletal: No deformity. No tenderness of midline spine. No step-offs or deformities. Minimal paraspinal muscle tenderness in the left and right regions. No bony tenderness of the lower extremities. Pelvis stable. Patient is able to flex and extend at the hips and knees. Plantarflexion dorsiflexion of the ankle symmetric. Negative straight leg raise. Throughout the examination with the patient's movement she does reproduce her own pain  Skin: Skin warm and dry. No rashes. Neurologic: No gross motor deficits. No aphasia. Sensation intact to light touch  Psychiatric: Normal affect.    -------------------------------------------------- RESULTS -------------------------------------------------  I have personally reviewed all laboratory and imaging results for this patient. Results are listed below.      LABS: (Lab results interpreted by me)  Results for orders placed or performed during the hospital encounter of 05/06/22   Comprehensive Metabolic Panel w/ Reflex to MG   Result Value Ref Range    Sodium 138 132 - 146 mmol/L    Potassium reflex Magnesium 4.2 3.5 - 5.0 mmol/L    Chloride 103 98 - 107 mmol/L    CO2 21 (L) 22 - 29 mmol/L    Anion Gap 14 7 - 16 mmol/L    Glucose 282 (H) 74 - 99 mg/dL    BUN 14 6 - 23 mg/dL    CREATININE 0.9 0.5 - 1.0 mg/dL    GFR Non-African American >60 >=60 mL/min/1.73    GFR African American >60     Calcium 9.2 8.6 - 10.2 mg/dL    Total Protein 6.4 6.4 - 8.3 g/dL    Albumin 4.3 3.5 - 5.2 g/dL    Total Bilirubin 0.2 0.0 - 1.2 mg/dL    Alkaline Phosphatase 44 35 - 104 U/L    ALT 27 0 - 32 U/L    AST 19 0 - 31 U/L   CBC with Auto Differential   Result Value Ref Range WBC 7.0 4.5 - 11.5 E9/L    RBC 4.58 3.50 - 5.50 E12/L    Hemoglobin 13.6 11.5 - 15.5 g/dL    Hematocrit 39.1 34.0 - 48.0 %    MCV 85.4 80.0 - 99.9 fL    MCH 29.7 26.0 - 35.0 pg    MCHC 34.8 (H) 32.0 - 34.5 %    RDW 13.5 11.5 - 15.0 fL    Platelets 157 463 - 739 E9/L    MPV 8.5 7.0 - 12.0 fL    Neutrophils % 56.2 43.0 - 80.0 %    Immature Granulocytes % 0.3 0.0 - 5.0 %    Lymphocytes % 27.0 20.0 - 42.0 %    Monocytes % 13.8 (H) 2.0 - 12.0 %    Eosinophils % 2.3 0.0 - 6.0 %    Basophils % 0.4 0.0 - 2.0 %    Neutrophils Absolute 3.92 1.80 - 7.30 E9/L    Immature Granulocytes # 0.02 E9/L    Lymphocytes Absolute 1.88 1.50 - 4.00 E9/L    Monocytes Absolute 0.96 (H) 0.10 - 0.95 E9/L    Eosinophils Absolute 0.16 0.05 - 0.50 E9/L    Basophils Absolute 0.03 0.00 - 0.20 E9/L   Lipase   Result Value Ref Range    Lipase 140 (H) 13 - 60 U/L   Urinalysis with Microscopic   Result Value Ref Range    Color, UA Yellow Straw/Yellow    Clarity, UA Clear Clear    Glucose, Ur 250 (A) Negative mg/dL    Bilirubin Urine Negative Negative    Ketones, Urine Negative Negative mg/dL    Specific Gravity, UA >=1.030 1.005 - 1.030    Blood, Urine Negative Negative    pH, UA 5.0 5.0 - 9.0    Protein, UA Negative Negative mg/dL    Urobilinogen, Urine 0.2 <2.0 E.U./dL    Nitrite, Urine Negative Negative    Leukocyte Esterase, Urine TRACE (A) Negative    WBC, UA 10-20 (A) 0 - 5 /HPF    RBC, UA NONE 0 - 2 /HPF    Epithelial Cells, UA FEW /HPF    Bacteria, UA FEW (A) None Seen /HPF   ,     RADIOLOGY:  Interpreted by Radiologist unless otherwise specified  CT ABDOMEN PELVIS W WO CONTRAST Additional Contrast? None   Final Result   No acute intraabdominal or intrapelvic pathology.              ------------------------- NURSING NOTES AND VITALS REVIEWED ---------------------------   The nursing notes within the ED encounter and vital signs as below have been reviewed by myself  BP (!) 146/74   Pulse 83   Temp 98.1 °F (36.7 °C) (Temporal)   Resp 16  5' 4\" (1.626 m)   Wt 233 lb (105.7 kg)   SpO2 98%   BMI 39.99 kg/m²     Oxygen Saturation Interpretation: Normal    The patients available past medical records and past encounters were reviewed. ------------------------------ ED COURSE/MEDICAL DECISION MAKING----------------------  Medications   lidocaine 4 % external patch 1 patch (1 patch TransDERmal Patch Applied 5/6/22 0146)   0.9 % sodium chloride bolus (0 mLs IntraVENous Stopped 5/6/22 0358)   ketorolac (TORADOL) injection 15 mg (15 mg IntraVENous Given 5/6/22 0146)   iopamidol (ISOVUE-370) 76 % injection 75 mL (75 mLs IntraVENous Given 5/6/22 0234)   sodium chloride flush 0.9 % injection 10 mL (10 mLs IntraVENous Given 5/6/22 0225)   ciprofloxacin (CIPRO) tablet 500 mg (500 mg Oral Given 5/6/22 0401)       Medical Decision Making: This is a 80 y.o. female presenting to the ED for low back pain. On initial presentation, the patient's vital signs are interpreted as hypertensive, afebrile and saturating well on room air. Based on history and physical exam, we have a broad differential.  Patient symptoms are consistent with musculoskeletal strain. She is able to reproduce her pain with movement. No history of trauma or infectious symptoms. She is distally neurovascularly intact. However, the patient is concerned she has evidence of nephrolithiasis or urinary tract infection. Abdominal exam soft and nonsurgical.  We will obtain basic laboratory studies and CT abdomen pelvis. Patient will be symptomatically treated with fluids, Toradol and lidocaine patch. Laboratory studies show mild acidosis but no anion gap. Patient is hyperglycemic at 282. Known history of diabetes. Lipase is mildly elevated but bilirubin and LFTs otherwise normal.  No leukocytosis or anemia. Urinalysis shows a contaminated sample but mild inflammation. May be early urinary tract infection. CT abdomen pelvis shows No acute intra-abdominal or intrapelvic pathology. This is interpreted by radiology. On reevaluation, patient is resting comfortably. She endorses improvement of her pain. We discussed the findings. She will be given a dose of ciprofloxacin and a prescription for this as she does have antibiotic allergy to ceftriaxone that is high risk. She will be provided with Robaxin and lidocaine patch for her musculoskeletal strain. As her lipase is elevated this may also be contributing to her symptoms. She should follow closely with primary care physician. Given strict return precautions. Verbalized understanding and agreeable. This patient's ED course included: a personal history and physicial examination and re-evaluation prior to disposition    This patient has improved during their ED course. Consultations:  None    Oxygen Saturation Interpretation: 98 % on room air. Normal    Counseling:   I have spoken with the patient and spouse/SO and discussed todays results, in addition to providing specific details for the plan of care and counseling regarding the diagnosis and prognosis. Questions are answered at this time and they are agreeable with the plan.     --------------------------------- IMPRESSION AND DISPOSITION ---------------------------------    IMPRESSION  1. Acute cystitis without hematuria    2. Acute low back pain without sciatica, unspecified back pain laterality    3. Elevated lipase        DISPOSITION  Disposition: Discharge to home  Patient condition is fair    I, Dr. Tyron Samayoa, am the primary provider of record    NOTE: This report was transcribed using voice recognition software.  Every effort was made to ensure accuracy; however, inadvertent computerized transcription errors may be present        Tyron Samayoa MD  05/06/22 [id number]

## 2022-05-09 LAB
ORGANISM: ABNORMAL
URINE CULTURE, ROUTINE: ABNORMAL
URINE CULTURE, ROUTINE: ABNORMAL

## 2022-05-10 ENCOUNTER — TELEPHONE (OUTPATIENT)
Dept: INTERNAL MEDICINE | Age: 72
End: 2022-05-10

## 2022-05-10 NOTE — TELEPHONE ENCOUNTER
Patient called and states she was told to call you and follow up as she was in the ED for back pain and states they found out she had a UTI. States was to take Cipro but someone called her today and told her they were changing her antibiotic to Doxycycline. Reports she does not want to come in and is feeling much better. Denies pain and reports drinking water. Advise please. Aware of follow up next month. Thank you.

## 2022-05-10 NOTE — TELEPHONE ENCOUNTER
Previously reviewed the ED attestation with new urine culture that shows Enterococcus and attending changed to Doxycycline this am to complete course- sensitive to doxy. If patient continues to feel well, ok to monitor at this time. Per notes- new script was sent to pharmacy by ER attending.

## 2022-05-10 NOTE — TELEPHONE ENCOUNTER
Patient instructed to continue to monitor symptoms and to call if any issues. Patient states she is going to try and take it with food and will call back if cannot tolerate. Patient again aware of follow up in June.

## 2022-05-12 ENCOUNTER — TELEPHONE (OUTPATIENT)
Dept: BARIATRICS/WEIGHT MGMT | Age: 72
End: 2022-05-12

## 2022-05-12 NOTE — TELEPHONE ENCOUNTER
Pt to call back when ready to R/S [FreeTextEntry1] : History is strongly suggestive of recurrent syncope. The history of vertigo is less clear.  There is positional component, but the presence of chronic symptoms and absence of clear rotatory nystagmus on exam raise possibility of central cause. \par \par Plan \par 1. MRI brain, MRA neck and brain to r/o vascular or intracerebral etiology. \par 2. follow-up after scans completed. \par \par I have spent 45 minutes of face to face time with the patient reviewing the cause of syncope and vertigo, assessing the risk of recurrence, and discussing possible testing options.Greater than 50% of the encounter time was spent on counseling and coordination of care for reviewing records in [employer], discussion with patient regarding plan.

## 2022-06-15 NOTE — PROGRESS NOTES
Sara Yang (:  1950) is a 80 y.o. female,Established patient, here for evaluation of the following chief complaint(s):  Follow-up         ASSESSMENT/PLAN:  1. Controlled type 2 diabetes mellitus without complication, without long-term current use of insulin (HCC)- stable   -     Hemoglobin A1C; Future  Diabetes Management Plan:     Blood Glucose Log Present yes - A1c less than 7%   Blood Pressure Monitoring <130/<80 no- BP at goal per ACC/AHA goals   Retinopathy Yes    ACEI/ARB Yes  MOST RECENT A1c less than 8% yes - less than 7%   Microalbumin/Cr Ratio completed in last year yes - normal   Diabetic Foot Exam completed in last year yes - no concerns  Eye Exam completed in last year yes - no new concerns  Statin Use Yes  Appropriate Aspirin Use if Known CAD yes - coronary calcifications- continue ASA/Statin   2. Gastroesophageal reflux disease without esophagitis- stable   3. Age-related osteoporosis without current pathological fracture- on Bisphosphonate and continued at this time   4. Rheumatoid arthritis involving multiple sites with positive rheumatoid factor ([facility]- stbale   Following with Rheum   5. Essential hypertension- stable   6. Mixed hyperlipidemia- stable   7. Elevated lipase  -     Lipase; Future  Unclear etiology with ED visit- no complaints consistent with pancreatitis at this time   CT abdomen without acute pathology   Repeat lipase level   8. Left hip pain  -     XR HIP 2-3 VW W PELVIS LEFT; Future  -     XR LUMBAR SPINE (2-3 VIEWS); Future  Concern for primary OA vis hip-spine syndrome  Known low back pain concerns  IT band syndrome at this time seems less likely   Possible need for additional assessment  Await baseline xrays   9. Chronic bilateral low back pain, unspecified whether sciatica present  -     XR LUMBAR SPINE (2-3 VIEWS); Future   As above     Return in about 3 months (around 2022).          Subjective   SUBJECTIVE/OBJECTIVE:  HPI    Presents for follow-up appointment. Recent assessment in ED in May 2022 for abdominal pain with treatment for UTI- noted change to Doxycycline from ED when culture was noted and completed course. In addition, patient had an elevated lipase level. CT abdomen did not reveal any acute pathology. Still complaining of leg pain/tightness with concern of IT band syndrome s/p hip fracture and repair on opposite leg. No alarm findings- no fever, chills, no weight loss. Completed PT without significant improvement. Tolerating all medications at this time. No additional complaints. Following with GI for noted NAFLD and likely associated splenomegaly. Following with Rheum for underlying RA with stable symptoms. Recent stress test due to coronary calcifications without findings of stress induced ischemia. Statin therapy continued. Review of Systems   Constitutional: Negative for chills, diaphoresis, fatigue, fever and unexpected weight change. Gastrointestinal: Negative for abdominal pain, blood in stool, constipation, diarrhea, nausea and vomiting. Genitourinary: Negative for decreased urine volume, dysuria and hematuria. Dysuria    Musculoskeletal: Positive for back pain. No CVA PAIN- all resolved   Neurological: Negative for dizziness and numbness. Objective    Vitals:    06/16/22 1258   BP: 128/78   Site: Right Upper Arm   Position: Sitting   Cuff Size: Large Adult   Pulse: 77   Resp: 18   Temp: 97.6 °F (36.4 °C)   TempSrc: Temporal   SpO2: 100%   Weight: 235 lb (106.6 kg)   Height: 5' 4\" (1.626 m)     Physical Exam  Constitutional:       Appearance: She is not ill-appearing. Eyes:      General: No scleral icterus. Cardiovascular:      Rate and Rhythm: Normal rate and regular rhythm. Pulses: Normal pulses. Heart sounds: No murmur heard. Pulmonary:      Effort: Pulmonary effort is normal.      Breath sounds: Decreased breath sounds present. No wheezing, rhonchi or rales.    Abdominal: General: Bowel sounds are normal.      Palpations: Abdomen is soft. There is no hepatomegaly (unable to appreciate due to body habitus) or splenomegaly (unable to appreciate due to body habitus). Comments: obesity   Musculoskeletal:      Right hip: No deformity, tenderness, bony tenderness or crepitus. Normal range of motion (improved ROM s/p rapair of hip fracture). Normal strength. Left hip: No bony tenderness. Decreased range of motion (resistance and pain with internal rotation at hip; reduce external rotation). Right lower leg: No edema. Left lower leg: No edema. Comments: Abnormal gait from pain at left hip- no improvement with prior concerns for IT band syndrome and finished PT   Skin:     General: Skin is warm and dry. Neurological:      General: No focal deficit present. Mental Status: She is alert. An electronic signature was used to authenticate this note.     --Abelino Rutledge MD, FACP

## 2022-06-16 ENCOUNTER — OFFICE VISIT (OUTPATIENT)
Dept: INTERNAL MEDICINE | Age: 72
End: 2022-06-16
Payer: COMMERCIAL

## 2022-06-16 VITALS
SYSTOLIC BLOOD PRESSURE: 128 MMHG | WEIGHT: 235 LBS | HEART RATE: 77 BPM | OXYGEN SATURATION: 100 % | BODY MASS INDEX: 40.12 KG/M2 | RESPIRATION RATE: 18 BRPM | DIASTOLIC BLOOD PRESSURE: 78 MMHG | HEIGHT: 64 IN | TEMPERATURE: 97.6 F

## 2022-06-16 DIAGNOSIS — M81.0 AGE-RELATED OSTEOPOROSIS WITHOUT CURRENT PATHOLOGICAL FRACTURE: ICD-10-CM

## 2022-06-16 DIAGNOSIS — I10 ESSENTIAL HYPERTENSION: ICD-10-CM

## 2022-06-16 DIAGNOSIS — R74.8 ELEVATED LIPASE: ICD-10-CM

## 2022-06-16 DIAGNOSIS — G89.29 CHRONIC BILATERAL LOW BACK PAIN, UNSPECIFIED WHETHER SCIATICA PRESENT: ICD-10-CM

## 2022-06-16 DIAGNOSIS — E11.9 CONTROLLED TYPE 2 DIABETES MELLITUS WITHOUT COMPLICATION, WITHOUT LONG-TERM CURRENT USE OF INSULIN (HCC): Primary | ICD-10-CM

## 2022-06-16 DIAGNOSIS — M54.50 CHRONIC BILATERAL LOW BACK PAIN, UNSPECIFIED WHETHER SCIATICA PRESENT: ICD-10-CM

## 2022-06-16 DIAGNOSIS — K21.9 GASTROESOPHAGEAL REFLUX DISEASE WITHOUT ESOPHAGITIS: ICD-10-CM

## 2022-06-16 DIAGNOSIS — E78.2 MIXED HYPERLIPIDEMIA: ICD-10-CM

## 2022-06-16 DIAGNOSIS — M25.552 LEFT HIP PAIN: ICD-10-CM

## 2022-06-16 DIAGNOSIS — M05.79 RHEUMATOID ARTHRITIS INVOLVING MULTIPLE SITES WITH POSITIVE RHEUMATOID FACTOR (HCC): ICD-10-CM

## 2022-06-16 PROCEDURE — 3017F COLORECTAL CA SCREEN DOC REV: CPT | Performed by: INTERNAL MEDICINE

## 2022-06-16 PROCEDURE — 2022F DILAT RTA XM EVC RTNOPTHY: CPT | Performed by: INTERNAL MEDICINE

## 2022-06-16 PROCEDURE — 99214 OFFICE O/P EST MOD 30 MIN: CPT | Performed by: INTERNAL MEDICINE

## 2022-06-16 PROCEDURE — 1036F TOBACCO NON-USER: CPT | Performed by: INTERNAL MEDICINE

## 2022-06-16 PROCEDURE — G8417 CALC BMI ABV UP PARAM F/U: HCPCS | Performed by: INTERNAL MEDICINE

## 2022-06-16 PROCEDURE — 3046F HEMOGLOBIN A1C LEVEL >9.0%: CPT | Performed by: INTERNAL MEDICINE

## 2022-06-16 PROCEDURE — G8399 PT W/DXA RESULTS DOCUMENT: HCPCS | Performed by: INTERNAL MEDICINE

## 2022-06-16 PROCEDURE — 1123F ACP DISCUSS/DSCN MKR DOCD: CPT | Performed by: INTERNAL MEDICINE

## 2022-06-16 PROCEDURE — 1090F PRES/ABSN URINE INCON ASSESS: CPT | Performed by: INTERNAL MEDICINE

## 2022-06-16 PROCEDURE — G8427 DOCREV CUR MEDS BY ELIG CLIN: HCPCS | Performed by: INTERNAL MEDICINE

## 2022-06-16 PROCEDURE — 99212 OFFICE O/P EST SF 10 MIN: CPT | Performed by: INTERNAL MEDICINE

## 2022-06-16 SDOH — ECONOMIC STABILITY: FOOD INSECURITY: WITHIN THE PAST 12 MONTHS, THE FOOD YOU BOUGHT JUST DIDN'T LAST AND YOU DIDN'T HAVE MONEY TO GET MORE.: NEVER TRUE

## 2022-06-16 SDOH — ECONOMIC STABILITY: HOUSING INSECURITY: IN THE LAST 12 MONTHS, HOW MANY PLACES HAVE YOU LIVED?: 1

## 2022-06-16 SDOH — ECONOMIC STABILITY: INCOME INSECURITY: IN THE LAST 12 MONTHS, WAS THERE A TIME WHEN YOU WERE NOT ABLE TO PAY THE MORTGAGE OR RENT ON TIME?: NO

## 2022-06-16 SDOH — ECONOMIC STABILITY: HOUSING INSECURITY
IN THE LAST 12 MONTHS, WAS THERE A TIME WHEN YOU DID NOT HAVE A STEADY PLACE TO SLEEP OR SLEPT IN A SHELTER (INCLUDING NOW)?: NO

## 2022-06-16 SDOH — ECONOMIC STABILITY: FOOD INSECURITY: WITHIN THE PAST 12 MONTHS, YOU WORRIED THAT YOUR FOOD WOULD RUN OUT BEFORE YOU GOT MONEY TO BUY MORE.: NEVER TRUE

## 2022-06-16 ASSESSMENT — ENCOUNTER SYMPTOMS
NAUSEA: 0
VOMITING: 0
BACK PAIN: 1
DIARRHEA: 0
BLOOD IN STOOL: 0
ABDOMINAL PAIN: 0
CONSTIPATION: 0

## 2022-06-16 ASSESSMENT — PATIENT HEALTH QUESTIONNAIRE - PHQ9
SUM OF ALL RESPONSES TO PHQ QUESTIONS 1-9: 0
2. FEELING DOWN, DEPRESSED OR HOPELESS: 0
SUM OF ALL RESPONSES TO PHQ QUESTIONS 1-9: 0
SUM OF ALL RESPONSES TO PHQ9 QUESTIONS 1 & 2: 0
SUM OF ALL RESPONSES TO PHQ QUESTIONS 1-9: 0
1. LITTLE INTEREST OR PLEASURE IN DOING THINGS: 0
SUM OF ALL RESPONSES TO PHQ QUESTIONS 1-9: 0

## 2022-06-16 ASSESSMENT — LIFESTYLE VARIABLES: HOW OFTEN DO YOU HAVE A DRINK CONTAINING ALCOHOL: NEVER

## 2022-06-16 ASSESSMENT — SOCIAL DETERMINANTS OF HEALTH (SDOH): HOW HARD IS IT FOR YOU TO PAY FOR THE VERY BASICS LIKE FOOD, HOUSING, MEDICAL CARE, AND HEATING?: NOT HARD AT ALL

## 2022-06-16 NOTE — PROGRESS NOTES
AVS printed with follow up appointment and discharge instructions along with labs and xray scripts and given to patient. Instructed to call with all issues.

## 2022-06-16 NOTE — PATIENT INSTRUCTIONS
1) Please obtain hip xray    2) repeat lipase was ordered to follow     3) We will call you with results- if you do not hear regarding results after 72 hours of completion- please contact office. 4) Please call with any questions.

## 2022-06-23 ENCOUNTER — HOSPITAL ENCOUNTER (OUTPATIENT)
Dept: GENERAL RADIOLOGY | Age: 72
Discharge: HOME | End: 2022-06-25
Payer: COMMERCIAL

## 2022-06-23 ENCOUNTER — HOSPITAL ENCOUNTER (OUTPATIENT)
Age: 72
Discharge: HOME | End: 2022-06-25
Payer: COMMERCIAL

## 2022-06-23 DIAGNOSIS — M25.552 LEFT HIP PAIN: ICD-10-CM

## 2022-06-23 DIAGNOSIS — G89.29 CHRONIC BILATERAL LOW BACK PAIN, UNSPECIFIED WHETHER SCIATICA PRESENT: ICD-10-CM

## 2022-06-23 DIAGNOSIS — M54.50 CHRONIC BILATERAL LOW BACK PAIN, UNSPECIFIED WHETHER SCIATICA PRESENT: ICD-10-CM

## 2022-06-23 PROCEDURE — 73502 X-RAY EXAM HIP UNI 2-3 VIEWS: CPT

## 2022-06-23 PROCEDURE — 72100 X-RAY EXAM L-S SPINE 2/3 VWS: CPT

## 2022-06-28 ENCOUNTER — TELEPHONE (OUTPATIENT)
Dept: INTERNAL MEDICINE CLINIC | Age: 72
End: 2022-06-28

## 2022-06-28 DIAGNOSIS — M25.552 LEFT HIP PAIN: ICD-10-CM

## 2022-06-28 DIAGNOSIS — M13.852 ALLERGIC ARTHRITIS OF LEFT HIP: Primary | ICD-10-CM

## 2022-06-28 NOTE — TELEPHONE ENCOUNTER
Please notify Christen Panda Results:     Left Hip:   Impression   Moderate degenerative changes seen within the left hip with no acute bony   abnormality.         Back:  Impression   Mild anterolisthesis of L5 on S1 with curvature of the spine with convexity   to the left.  No fracture or dislocation.           Hip does note some moderate degenerative and arthritic concerns which could be related to her ongoing pain. Only mild changes on xray of back. Given these new findings- I would recommend that patient see a Hip Orthopedic Specialist for assessment given no improvement with PT trials. Await recommendations at this time.

## 2022-06-29 DIAGNOSIS — E11.9 CONTROLLED TYPE 2 DIABETES MELLITUS WITHOUT COMPLICATION, WITHOUT LONG-TERM CURRENT USE OF INSULIN (HCC): ICD-10-CM

## 2022-07-13 ENCOUNTER — OFFICE VISIT (OUTPATIENT)
Dept: ORTHOPEDIC SURGERY | Age: 72
End: 2022-07-13
Payer: COMMERCIAL

## 2022-07-13 VITALS — WEIGHT: 210 LBS | HEIGHT: 64 IN | BODY MASS INDEX: 35.85 KG/M2

## 2022-07-13 DIAGNOSIS — M25.552 LEFT HIP PAIN: Primary | ICD-10-CM

## 2022-07-13 PROCEDURE — G8427 DOCREV CUR MEDS BY ELIG CLIN: HCPCS | Performed by: ORTHOPAEDIC SURGERY

## 2022-07-13 PROCEDURE — 1123F ACP DISCUSS/DSCN MKR DOCD: CPT | Performed by: ORTHOPAEDIC SURGERY

## 2022-07-13 PROCEDURE — G8399 PT W/DXA RESULTS DOCUMENT: HCPCS | Performed by: ORTHOPAEDIC SURGERY

## 2022-07-13 PROCEDURE — 99204 OFFICE O/P NEW MOD 45 MIN: CPT | Performed by: ORTHOPAEDIC SURGERY

## 2022-07-13 PROCEDURE — 1090F PRES/ABSN URINE INCON ASSESS: CPT | Performed by: ORTHOPAEDIC SURGERY

## 2022-07-13 PROCEDURE — 20610 DRAIN/INJ JOINT/BURSA W/O US: CPT | Performed by: ORTHOPAEDIC SURGERY

## 2022-07-13 PROCEDURE — G8417 CALC BMI ABV UP PARAM F/U: HCPCS | Performed by: ORTHOPAEDIC SURGERY

## 2022-07-13 PROCEDURE — 3017F COLORECTAL CA SCREEN DOC REV: CPT | Performed by: ORTHOPAEDIC SURGERY

## 2022-07-13 PROCEDURE — 1036F TOBACCO NON-USER: CPT | Performed by: ORTHOPAEDIC SURGERY

## 2022-07-13 RX ORDER — TRIAMCINOLONE ACETONIDE 40 MG/ML
40 INJECTION, SUSPENSION INTRA-ARTICULAR; INTRAMUSCULAR ONCE
Status: COMPLETED | OUTPATIENT
Start: 2022-07-13 | End: 2022-07-14

## 2022-07-13 RX ORDER — LIDOCAINE HYDROCHLORIDE 10 MG/ML
4 INJECTION, SOLUTION INFILTRATION; PERINEURAL ONCE
Status: COMPLETED | OUTPATIENT
Start: 2022-07-13 | End: 2022-07-14

## 2022-07-13 NOTE — PROGRESS NOTES
Chief Complaint   Patient presents with    New Patient     L Hip pain. Luli Benavidez HX RIGHT HIP OPEN REDU[facility] Pegge Anthony Hip Pain     Pt states her R Leg is shorter than her left and she hobble walks. Luli Benavidez attributes this to the pain       SUBJECTIVE: Lynnette Donis is a 80 y.o. female who presents to office due to the above chief complaint. She has been experiencing left hip pain for the past 1 and half years and reports her left hip pain was present prior to breaking her right hip. November 2020 she had right hip ORIF with cephalomedullary nail performed by Dr. Hubbard [facility]. Her left hip pain is over the lateral hip region and does radiate distally to the knee and lower leg. She does admit to experiencing bilateral neuropathic symptoms in her feet but denies shooting pains or numbness and tingling coming from her hip. She has been participating physical therapy which is helped some. Reports that massage to the anterior hip relieves her hip pain significantly. She has not trialed any corticosteroid injections up to this point. Review of Systems   Constitutional: Negative for fever, chills, diaphoresis, appetite change and fatigue. HENT: Negative for dental issues, hearing loss and tinnitus. Negative for congestion, sinus pressure, sneezing, sore throat. Negative for headache. Eyes: Negative for visual disturbance, blurred and double vision. Negative for pain, discharge, redness and itching  Respiratory: Negative for cough, shortness of breath and wheezing. Cardiovascular: Negative for chest pain, palpitations and leg swelling. No dyspnea on exertion   Gastrointestinal:   Negative for nausea, vomiting, abdominal pain, diarrhea, constipation  or black or bloody. Hematologic\Lymphatic:  negative for bleeding, petechiae,   Genitourinary: Negative for hematuria and difficulty urinating. Musculoskeletal: Negative for neck pain and stiffness.  Negative for back pain, see HPI  Skin: Negative for pallor, rash and wound. Neurological: Negative for dizziness, tremors, seizures, weakness, light-headedness, no TIA or stroke symptoms. No numbness and headaches. Psychiatric/Behavioral: Negative. OBJECTIVE:      Physical Examination:   General appearance: alert, well appearing, and in no distress,  normal appearing weight. No visible signs of trauma   Mental status: alert, oriented to person, place, and time, normal mood, behavior, speech, dress, motor activity, and thought processes  Abdomen: soft, nondistended  Resp:   resp easy and unlabored, no audible wheezes note, normal symmetrical expansion of both hemithoraces  Cardiac: distal pulses palpable, skin and extremities well perfused  Neurological: alert, oriented X3, normal speech, no focal findings or movement disorder noted, motor and sensory grossly normal bilaterally, normal muscle tone, no tremors, strength 5/5, normal gait and station  HEENT: normochephalic atraumatic, external ears and eyes normal, sclera normal, neck supple, no nasal discharge. Extremities:   peripheral pulses normal, no edema, redness or tenderness in the calves   Skin: normal coloration, no rashes or open wounds, no suspicious skin lesions noted  Psych: Affect euthymic   Musculoskeletal:   Extremity:  Left lower extremity  · Mild TTP about the greater trochanter  · Mild discomfort with IR/ER of the hip  · Compartments soft and compressible  · Calf soft and non tender  · +PF/DF/EHL  · 5/5 strength L4-S1  · +2/4 DP & PT pulses, Brisk Cap refill, Toes warm and perfused  · Distal sensation grossly intact to Peroneals, Sural, Saphenous, and tibial nrs  · Distal sensation grossly intact to L4-S1 dermatomes  · Negative Trendelenburg    Ht 5' 4\" (1.626 m)   Wt 210 lb (95.3 kg) Comment: per pt. . last weight 235  BMI 36.05 kg/m²      XR: 7/13/22   Xray: x-rays of the left hip with pelvis obtained 6/23/2022 were reviewed today in the office and reviewed with the patient. 3 views: AP pelvis/AP hip/frog-leg lateral hip: demonstrate mild degenerative changes about the femoral acetabular joint most notably to the superior aspect of the joint. There is also mild shortening of the right hip in relation to the left hip. Right hip cephalomedullary nail is in appropriate position with no evidence of loosening. Fracture about the intertrochanteric region of the right hip is healed. Impression: Mild degenerative changes about the left femoral acetabular joint. Stable right hip ORIF hardware. Healed right peritrochanteric femur fracture     ASSESSMENT:     Diagnosis Orders   1. Left hip pain  [place] ARTHROCENTESIS ASPIR&/INJ MAJOR JT/BURSA W/O US       PLAN:  Discussed physical exam radiographic findings with the patient today at length. She was offered corticosteroid injection to the left trochanteric bursa to which the patient consented. This was well-tolerated and there were no complications. She is to continue home exercise program.  She is to return in 3 months for repeat evaluation. This is the soonest that a repeat injection can be performed. Surgical intervention, left total hip arthroplasty, was discussed with the patient briefly but given her symptoms and physical examination her pain is likely not coming from the femoral acetabular joint itself. She does have degenerative changes appreciable on radiographic imaging which may progress to symptomatic osteoarthritis of the left hip. This was discussed with the patient. Patient was in verbalized understanding of treatment plan, asked appropriate questions, and was in agreement. Questions were sought and answered at today's visit      Electronically signed by Steph Blair MD on 7/14/2022 at 5:17 PM  Note: This report was completed using VisionGate voiced recognition software. Every effort has been made to ensure accuracy; however, inadvertent computerized transcription errors may be present.     Staff Addendum    I have seen and evaluated the patient and agree with the assessment and plan as documented by the orthopaedic surgery resident. I have performed the key components of the history and physical examination and concur with the findings and plan, and have made changes where appropriate/necessary. Procedure Note: Hip trochanteric bursa Cortisone injection     The left hip greater trochanter was identified as the injection site, at the point of maximal tenderness. The patient was placed in the lateral decubitus position with affected hip up. The risk and benefits of a cortisone injection were explained and the patient consented to the injection. Under sterile conditions, the site was injected with a mixture of 40 mg of Kenalog and 2 mL of 0.25% Marcaine and 2 mL of 1% Lidocaine without complication. A sterile bandage was applied.     Administrations This Visit     lidocaine 1 % injection 4 mL     Admin Date  07/14/2022 Action  Given Dose  4 mL Route  Intra-artICUlar Administered By  Fiona Gonzalez RN          triamcinolone acetonide VIA [facility]) injection 40 mg     Admin Date  07/14/2022 Action  Given Dose  40 mg Route  Intra-artICUlar Administered By  JESSICA Clifford MD  [address] [place] [address]

## 2022-07-13 NOTE — PROGRESS NOTES
New Hip Patient     Referring Provider:   MD Lauro Cabrera 71  [facility],  [address]    CHIEF COMPLAINT:   Chief Complaint   Patient presents with    New Patient     L Hip pain        HPI:    Charlette Dakins is a 80y.o. year old female who is seen today  for evaluation of left hip pain. She reports the pain has been ongoing for the past 8 months. She   does not recall a specific injury which started the pain. She   reports the pain is worse with activity, better with rest.  The patient does have mechanical symptoms. She denies a feeling of instability. The prior treatments have been PT with mild improvement. The patient   has responded to the treatment. The patient is not working. The patients occupation is retired.     PAST MEDICAL HISTORY  Past Medical History:   Diagnosis Date    Class 3 severe obesity due to excess calories with serious comorbidity and body mass index (BMI) of 40.0 to 44.9 in adult [facility])     COPD (chronic obstructive pulmonary disease) (HCC)     mild    Diabetes mellitus type II, [facility]     Diverticulosis     Gastroesophageal reflux disease without esophagitis     Hyperlipidemia     Hypertension     Kidney stone     Obesity, Class III, BMI 40-49.9 (morbid obesity) ([facility]     Osteoarthritis     Osteoporosis     Rheumatoid arthritis involving multiple sites ([facility]     follows with Dr. Yonis Krueger Sepsis [facility])     on [facility] per Dr. Olga Rojas since [address] from [place] [address] 3/30/2019    Tobacco abuse began age 16    past hx    Tubular adenoma of colon 2011    colonoscopy       PAST SURGICAL HISTORY  Past Surgical History:   Procedure Laterality Date    BREAST BIOPSY      benign, pt cannot remember which side   [address]    right wrist     SECTION       SECTION       SECTION  1979    CHOLECYSTECTOMY, OPEN      COLONOSCOPY  2011    screening, sigmoid polyp bx (tubular adenoma),  [place] COLONOSCOPY  4/3/2015    submucosal lipoma distal tranverse colon biopsied, diverticulosis, Dr. Ivonne Love / REMOVAL STENT / STONE Left 3/27/2019    CYSTOSCOPY RETROGRADE PYELOGRAM STENT INSERTION performed by Bennie Oconnor MD at [address] [place] [address] Right 2020    RIGHT HIP OPEN REDUCTION INTERNAL FIXATION- REQUESTING 4 PM performed by Raul Brandt MD at [address] LITHOTRIPSY Left 4/15/2019    CYSTOSCOPY RETROGRADE URETEROSCOPY, stone basket extraction, LEFT STENT INSERTION [facility]    TUBAL LIGATION  1979       FAMILY HISTORY   Family History   Problem Relation Age of Onset    Diabetes Mother     Heart Disease Mother     Arthritis Mother     Diabetes Father     Stroke Father     Diabetes Brother     High Cholesterol Brother     Cancer Paternal Aunt        SOCIAL HISTORY  Social History     Occupational History    Not on file   Tobacco Use    Smoking status: Former Smoker     Packs/day: 1.00     Years: 40.00     Pack years: 40.00     Types: Cigarettes     Quit date: 2011     Years since quittin.0    Smokeless tobacco: Never Used   Vaping Use    Vaping Use: Never used   Substance and Sexual Activity    Alcohol use:  Yes     Alcohol/week: 0.0 standard drinks     Comment: rarely    Drug use: No    Sexual activity: Not on file       CURRENT MEDICATIONS     Current Outpatient Medications:     metFORMIN (GLUCOPHAGE) 850 MG tablet, TAKE 1 TABLET BY MOUTH TWICE DAILY WITH MEALS, Disp: 180 tablet, Rfl: 1    hydroxychloroquine (PLAQUENIL) 200 MG tablet, Take 1 tablet by mouth daily, Disp: 90 tablet, Rfl: 1    alendronate (FOSAMAX) 70 MG tablet, Take 1 tablet by mouth every 7 days, Disp: 12 tablet, Rfl: 1    orlistat (JOSE) 60 MG capsule, Take 1 capsule by mouth 3 times daily (with meals) (Patient not taking: Reported on 2022), Disp: 180 capsule, Rfl: 1   rosuvastatin (CRESTOR) 5 MG tablet, TAKE 1 TABLET BY MOUTH EVERY DAY, Disp: 90 tablet, Rfl: 1    Ascorbic Acid (VITAMIN C) 250 MG tablet, Take 1,000 mg by mouth daily , Disp: , Rfl:     zinc sulfate (ZINCATE) 220 (50 Zn) MG capsule, Take 50 mg by mouth daily, Disp: , Rfl:     Homeopathic Products (LIVER SUPPORT SL), Take 1 capsule by mouth daily, Disp: , Rfl:     magnesium cl-calcium carbonate (SLOW-MAG) 71.5-119 MG TBEC tablet, take 2 tablets once daily, Disp: 60 tablet, Rfl: 2    pantoprazole (PROTONIX) 40 MG tablet, TAKE 1 TABLET BY MOUTH DAILY AS NEEDED FOR GERD, Disp: 90 tablet, Rfl: 0    Bioflavonoid Products (SHERIF C PO), Take 2 tablets by mouth 2 times daily, Disp: , Rfl:     GLUTATHIONE PO, Take 500 mg by mouth daily, Disp: , Rfl:     lisinopril (PRINIVIL;ZESTRIL) 20 MG tablet, Take 1 tablet by mouth daily, Disp: 90 tablet, Rfl: 1    blood glucose monitor strips, Test 2 times a day & as needed for symptoms of irregular blood glucose. Dispense sufficient amount for indicated testing frequency plus additional to accommodate PRN testing needs. , Disp: 100 strip, Rfl: 5    Turmeric 500 MG CAPS, Take 2 tablets by mouth 3 times daily , Disp: , Rfl:     Cholecalciferol (VITAMIN D) 50 MCG (2000 UT) CAPS capsule, Take 2,000 Units by mouth daily, Disp: 30 capsule, Rfl: 1    aspirin EC 81 MG EC tablet, Take 1 tablet by mouth daily Start taking aspirin AFTER completion of all doses of lovenox only, Disp: 30 tablet, Rfl: 1    albuterol sulfate  (90 Base) MCG/ACT inhaler, Inhale 2 puffs into the lungs every 6 hours as needed for Wheezing, Disp: 3 Inhaler, Rfl: 0    blood glucose monitor kit and supplies, Dispense sufficient amount for indicated testing frequency plus additional to accommodate PRN testing needs.  Dispense all needed supplies to include: monitor, strips, lancing device, lancets, control solutions, alcohol swabs., Disp: 1 kit, Rfl: 0    Lancets MISC, Test twice daily, Disp: 100 each, Rfl: 3    Cyanocobalamin (VITAMIN B 12 PO), Take by mouth daily , Disp: , Rfl:     Coenzyme Q10 (CO Q 10 PO), Take 1 tablet by mouth daily QUINOL  / LD 4/9/2019, Disp: , Rfl:     ALLERGIES  Allergies   Allergen Reactions    Rocephin [Ceftriaxone] Shortness Of Breath     Bronchospasm 3/27 and hypotension immediately after ceftriaxone       Controlled Substances Monitoring:      REVIEW OF SYSTEMS:     Constitutional:  Negative for weight loss, fevers, chills, fatigue  Cardiovascular: Negative for chest pain, palpitations  Pulmonary: Negative for shortness of breath, labored breathing, cough  GI: negative for abdominal pain, nausea, vomitting   MSK: per HPI  Skin: negative for rash, open wounds    All other systems reviewed and are negative           PHYSICAL EXAM     There were no vitals filed for this visit. Height:    Weight: [unfilled]  BMI:  There is no height or weight on file to calculate BMI. General: The patient is alert and oriented x 3, appears to be stated age and in no distress. HEENT: head is normocephalic, atraumatic. EOMI. Neck: supple, trachea midline, no thyromegaly   Cardiovascular: peripheral pulses palpable.   Normal Capillary refill   Respiratory: breathing unlabored, chest expansion symmetric   Skin: no rash, no open wounds, no erythema  Psych: normal affect; mood stable  Neurologic: gait normal, sensation grossly intact in extremities  MSK:     Hip:  ***     IMAGING:    XR: AP pelvis, AP and Lateral of the involved hip display ***       ASSESSMENT  {RIGHT OR LEFT:91174} hip     PLAN  ***        Jelena Montaño MD  Orthopaedic Surgery   7/13/22  8:59 AM

## 2022-07-13 NOTE — PATIENT INSTRUCTIONS
Patient Education        Hip Bursitis: Exercises  Introduction  Here are some examples of exercises for you to try. The exercises may be suggested for a condition or for rehabilitation. Start each exercise slowly. Ease off the exercises if you start to have pain. You will be told when to start these exercises and which ones will work bestfor you. How to do the exercises  Hip rotator stretch    1. Lie on your back with both knees bent and your feet flat on the floor. 2. Put the ankle of your affected leg on your opposite thigh near your knee. 3. Use your hand to gently push your knee away from your body until you feel a gentle stretch around your hip. 4. Hold the stretch for 15 to 30 seconds. 5. Repeat 2 to 4 times. 6. Repeat steps 1 through 5, but this time use your hand to gently pull your knee toward your opposite shoulder. Iliotibial band stretch    1. Lean sideways against a wall. If you are not steady on your feet, hold on to a chair or counter. 2. Stand on the leg with the affected hip, with that leg close to the wall. Then cross your other leg in front of it. 3. Let your affected hip drop out to the side of your body and against wall. Then lean away from your affected hip until you feel a stretch. 4. Hold the stretch for 15 to 30 seconds. 5. Repeat 2 to 4 times. Straight-leg raises to the outside    1. Lie on your side, with your affected hip on top. 2. Tighten the front thigh muscles of your top leg to keep your knee straight. 3. Keep your hip and your leg straight in line with the rest of your body, and keep your knee pointing forward. Do not drop your hip back. 4. Lift your top leg straight up toward the ceiling, about 12 inches off the floor. Hold for about 6 seconds, then slowly lower your leg. 5. Repeat 8 to 12 times. Clamshell    1. Lie on your side, with your affected hip on top and your head propped on a pillow. Keep your feet and knees together and your knees bent.   2. Raise your top knee, but keep your feet together. Do not let your hips roll back. Your legs should open up like a clamshell. 3. Hold for 6 seconds. 4. Slowly lower your knee back down. Rest for 10 seconds. 5. Repeat 8 to 12 times. Follow-up care is a key part of your treatment and safety. Be sure to make and go to all appointments, and call your doctor if you are having problems. It's also a good idea to know your test results and keep alist of the medicines you take. Where can you learn more? Go to https://[employer]leaf. org and sign in to your [employer] account. Enter Z506 in the [employer] box to learn more about \"Hip Bursitis: Exercises. \"     If you do not have an account, please click on the \"Sign Up Now\" link. Current as of: March 9, 2022               Content Version: 13.3  © 7519-3916 Healthwise, Incorporated. Care instructions adapted under license by [facility]. If you have questions about a medical condition or this instruction, always ask your healthcare professional. [place] [address] any warranty or liability for your use of this information.

## 2022-07-14 RX ADMIN — TRIAMCINOLONE ACETONIDE 40 MG: 40 INJECTION, SUSPENSION INTRA-ARTICULAR; INTRAMUSCULAR at 08:15

## 2022-07-14 RX ADMIN — LIDOCAINE HYDROCHLORIDE 4 ML: 10 INJECTION, SOLUTION INFILTRATION; PERINEURAL at 08:14

## 2022-08-18 ENCOUNTER — TELEPHONE (OUTPATIENT)
Dept: INTERNAL MEDICINE | Age: 72
End: 2022-08-18

## 2022-08-18 DIAGNOSIS — Z12.31 ENCOUNTER FOR SCREENING MAMMOGRAM FOR MALIGNANT NEOPLASM OF BREAST: Primary | ICD-10-CM

## 2022-08-18 NOTE — TELEPHONE ENCOUNTER
Called earlier and spoke with pt regarding need for mammogram and if ok with scheduling mammogram prior to appt is Sept. Pt agreeable to same. Appt scheduled and pt notified of same.

## 2022-09-22 ENCOUNTER — HOSPITAL ENCOUNTER (OUTPATIENT)
Age: 72
Discharge: HOME | End: 2022-09-22
Payer: COMMERCIAL

## 2022-09-22 ENCOUNTER — HOSPITAL ENCOUNTER (OUTPATIENT)
Dept: GENERAL RADIOLOGY | Age: 72
Discharge: HOME | End: 2022-09-24
Payer: COMMERCIAL

## 2022-09-22 ENCOUNTER — TELEPHONE (OUTPATIENT)
Dept: INTERNAL MEDICINE | Age: 72
End: 2022-09-22

## 2022-09-22 ENCOUNTER — OFFICE VISIT (OUTPATIENT)
Dept: INTERNAL MEDICINE | Age: 72
End: 2022-09-22
Payer: COMMERCIAL

## 2022-09-22 VITALS
WEIGHT: 231 LBS | HEART RATE: 83 BPM | BODY MASS INDEX: 39.44 KG/M2 | HEIGHT: 64 IN | SYSTOLIC BLOOD PRESSURE: 147 MMHG | TEMPERATURE: 97.7 F | RESPIRATION RATE: 20 BRPM | OXYGEN SATURATION: 98 % | DIASTOLIC BLOOD PRESSURE: 77 MMHG

## 2022-09-22 DIAGNOSIS — M05.79 RHEUMATOID ARTHRITIS INVOLVING MULTIPLE SITES WITH POSITIVE RHEUMATOID FACTOR (HCC): ICD-10-CM

## 2022-09-22 DIAGNOSIS — K76.0 FATTY (CHANGE OF) LIVER, NOT ELSEWHERE CLASSIFIED: ICD-10-CM

## 2022-09-22 DIAGNOSIS — E11.9 CONTROLLED TYPE 2 DIABETES MELLITUS WITHOUT COMPLICATION, WITHOUT LONG-TERM CURRENT USE OF INSULIN (HCC): ICD-10-CM

## 2022-09-22 DIAGNOSIS — Z00.00 MEDICARE ANNUAL WELLNESS VISIT, SUBSEQUENT: Primary | ICD-10-CM

## 2022-09-22 DIAGNOSIS — E78.2 MIXED HYPERLIPIDEMIA: ICD-10-CM

## 2022-09-22 DIAGNOSIS — R74.8 ELEVATED LIPASE: ICD-10-CM

## 2022-09-22 DIAGNOSIS — E11.9 TYPE 2 DIABETES MELLITUS WITHOUT COMPLICATION, WITHOUT LONG-TERM CURRENT USE OF INSULIN (HCC): ICD-10-CM

## 2022-09-22 DIAGNOSIS — R25.2 LEG CRAMPS: ICD-10-CM

## 2022-09-22 DIAGNOSIS — Z12.31 ENCOUNTER FOR SCREENING MAMMOGRAM FOR MALIGNANT NEOPLASM OF BREAST: ICD-10-CM

## 2022-09-22 LAB
C-REACTIVE PROTEIN: 0.3 MG/DL (ref 0–0.4)
HBA1C MFR BLD: 8.3 % (ref 4–5.6)
LIPASE: 83 U/L (ref 13–60)
SEDIMENTATION RATE, ERYTHROCYTE: 1 MM/HR (ref 0–20)

## 2022-09-22 PROCEDURE — G0439 PPPS, SUBSEQ VISIT: HCPCS | Performed by: INTERNAL MEDICINE

## 2022-09-22 PROCEDURE — 86140 C-REACTIVE PROTEIN: CPT

## 2022-09-22 PROCEDURE — 36415 COLL VENOUS BLD VENIPUNCTURE: CPT

## 2022-09-22 PROCEDURE — 77063 BREAST TOMOSYNTHESIS BI: CPT

## 2022-09-22 PROCEDURE — 3052F HG A1C>EQUAL 8.0%<EQUAL 9.0%: CPT | Performed by: INTERNAL MEDICINE

## 2022-09-22 PROCEDURE — 3017F COLORECTAL CA SCREEN DOC REV: CPT | Performed by: INTERNAL MEDICINE

## 2022-09-22 PROCEDURE — 83036 HEMOGLOBIN GLYCOSYLATED A1C: CPT

## 2022-09-22 PROCEDURE — 83690 ASSAY OF LIPASE: CPT

## 2022-09-22 PROCEDURE — 85651 RBC SED RATE NONAUTOMATED: CPT

## 2022-09-22 PROCEDURE — 1123F ACP DISCUSS/DSCN MKR DOCD: CPT | Performed by: INTERNAL MEDICINE

## 2022-09-22 PROCEDURE — 99212 OFFICE O/P EST SF 10 MIN: CPT | Performed by: INTERNAL MEDICINE

## 2022-09-22 RX ORDER — SODIUM FLUORIDE 5 MG/G
CREAM DENTAL
COMMUNITY
Start: 2022-09-15

## 2022-09-22 RX ORDER — ROSUVASTATIN CALCIUM 5 MG/1
TABLET, COATED ORAL
Qty: 90 TABLET | Refills: 1 | Status: SHIPPED | OUTPATIENT
Start: 2022-09-22

## 2022-09-22 RX ORDER — GLUCOSAMINE HCL/CHONDROITIN SU 500-400 MG
CAPSULE ORAL
Qty: 100 STRIP | Refills: 5 | Status: SHIPPED | OUTPATIENT
Start: 2022-09-22

## 2022-09-22 SDOH — ECONOMIC STABILITY: INCOME INSECURITY: IN THE LAST 12 MONTHS, WAS THERE A TIME WHEN YOU WERE NOT ABLE TO PAY THE MORTGAGE OR RENT ON TIME?: NO

## 2022-09-22 SDOH — ECONOMIC STABILITY: HOUSING INSECURITY: IN THE LAST 12 MONTHS, HOW MANY PLACES HAVE YOU LIVED?: 1

## 2022-09-22 ASSESSMENT — PATIENT HEALTH QUESTIONNAIRE - PHQ9
SUM OF ALL RESPONSES TO PHQ QUESTIONS 1-9: 0
SUM OF ALL RESPONSES TO PHQ9 QUESTIONS 1 & 2: 0
SUM OF ALL RESPONSES TO PHQ QUESTIONS 1-9: 0
SUM OF ALL RESPONSES TO PHQ QUESTIONS 1-9: 0
1. LITTLE INTEREST OR PLEASURE IN DOING THINGS: 0
SUM OF ALL RESPONSES TO PHQ QUESTIONS 1-9: 0
2. FEELING DOWN, DEPRESSED OR HOPELESS: 0

## 2022-09-22 ASSESSMENT — LIFESTYLE VARIABLES
HOW MANY STANDARD DRINKS CONTAINING ALCOHOL DO YOU HAVE ON A TYPICAL DAY: 1 OR 2
HOW OFTEN DO YOU HAVE A DRINK CONTAINING ALCOHOL: MONTHLY OR LESS

## 2022-09-22 ASSESSMENT — SOCIAL DETERMINANTS OF HEALTH (SDOH): HOW HARD IS IT FOR YOU TO PAY FOR THE VERY BASICS LIKE FOOD, HOUSING, MEDICAL CARE, AND HEATING?: NOT HARD AT ALL

## 2022-09-22 NOTE — PROGRESS NOTES
Refuses all vaccines. AVS printed with follow up appointment and discharge instructions  along with lab slips and given to the patient.  Advised to call with all issues

## 2022-09-22 NOTE — PATIENT INSTRUCTIONS
1) Please review both new Diabetes Medications as options for aggressive management of uncontrolled A1c results- your A1c increased to 8.3%- goal is less than 6.5%     2) Please review setting goals for active weight loss, change in diet, and physical therapy/safe exercise including weight-bearing exercises. 3) Expect a Diabetes Navigator phone call to cover your glucose readings and potential additional needs. 4) Repeat labs for muscle cramps and liver function at your convenience. 5) Please call with any concerns. Personalized Preventive Plan for Adrian Garcia - 9/22/2022  Medicare offers a range of preventive health benefits. Some of the tests and screenings are paid in full while other may be subject to a deductible, co-insurance, and/or copay. Some of these benefits include a comprehensive review of your medical history including lifestyle, illnesses that may run in your family, and various assessments and screenings as appropriate. After reviewing your medical record and screening and assessments performed today your provider may have ordered immunizations, labs, imaging, and/or referrals for you. A list of these orders (if applicable) as well as your Preventive Care list are included within your After Visit Summary for your review. Other Preventive Recommendations:    A preventive eye exam performed by an eye specialist is recommended every 1-2 years to screen for glaucoma; cataracts, macular degeneration, and other eye disorders. A preventive dental visit is recommended every 6 months. Try to get at least 150 minutes of exercise per week or 10,000 steps per day on a pedometer . Order or download the FREE \"Exercise & Physical Activity: Your Everyday Guide\" from [facility] [employer]. Call 7-271.513.4137 or search [employer] [facility] [employer] online. You need 6084-5625 mg of calcium and 4378-0878 IU of vitamin D per day.  It is possible to meet your calcium requirement with diet alone, but a vitamin D supplement is usually necessary to meet this goal.  When exposed to the sun, use a sunscreen that protects against both UVA and UVB radiation with an SPF of 30 or greater. Reapply every 2 to 3 hours or after sweating, drying off with a towel, or swimming. Always wear a seat belt when traveling in a car. Always wear a helmet when riding a bicycle or motorcycle.

## 2022-09-22 NOTE — PROGRESS NOTES
Medicare Annual Wellness Visit    [place] is here for Medicare AWV    Assessment & Plan   Medicare annual wellness visit, subsequent   [facility]- 5/5   Age/Sex based screening discussed   Patient refusing all vaccinations despite appropriate counseling based on ACIP guidelines    Patient refusing advanced care planning   Discussed need to update Eye Exam   Discussed recommendation for Audiology referral    Additional safety measures and counseling completed. Mixed hyperlipidemia  -     rosuvastatin (CRESTOR) 5 MG tablet; TAKE 1 TABLET BY MOUTH EVERY DAY, Disp-90 tablet, R-1Normal  -     Lipid Panel; Future  Type 2 diabetes mellitus without complication, without long-term current use of insulin (HCC)  -     blood glucose monitor strips; Test 2 times a day & as needed for symptoms of irregular blood glucose. Dispense sufficient amount for indicated testing frequency plus additional to accommodate PRN testing needs. , Disp-100 strip, R-5, Normal  Significant worsening A1c   A1c above 8%   Recent significant change in diet  Active weight loss goals set  Information given regarding GLP1 therapy vs. SGLT2- some concern with GLP1   Hesitant to consider GLP1 therapy given chronically elevated lipase level of unknown significance- noted abnormal since 2010  Leg cramps  -     Magnesium; Future  -     Iron and TIBC; Future  -     Ferritin; Future  Mag needed repeated  Worse night symptoms- check iron studies  Follow   Elevated lipase- chronically elevated since 2010 2010 elevation- unknown significance    CT abdomen in 2021- without pancreatic abnormalities    No noted retroperitoneal pain   No N/V, no additional findings   Unknown significance   Careful with meds that may stimulate the pancreas   Fatty (change of) liver, not elsewhere classified   -     Iron and TIBC; Future  -     Comprehensive Metabolic Panel;  Future    Recommendations for Preventive Services Due: see orders and patient 39. 73  Have you seen the dentist within the past year?: Yes  Health Habits/Nutrition Interventions:  Inadequate physical activity:  patient agrees to join a structured exercise program- planned at local gym  Nutritional issues:  educational materials to promote weight loss provided    Hearing/Vision:  Do you or your family notice any trouble with your hearing that hasn't been managed with hearing aids?: (!) Yes  Do you have difficulty driving, watching TV, or doing any of your daily activities because of your eyesight?: No  Have you had an eye exam within the past year?: (!) No  No results found.   Hearing/Vision Interventions:  Hearing concerns:  patient declines any further evaluation/treatment for hearing issues  Vision concerns:  patient encouraged to make appointment with his/her eye specialist    Safety:  Do you have working smoke detectors?: Yes  Do you have any tripping hazards - loose or unsecured carpets or rugs?: No  Do you have any tripping hazards - clutter in doorways, halls, or stairs?: (!) Yes  Do you have either shower bars, grab bars, non-slip mats or non-slip surfaces in your shower or bathtub?: Yes  Do all of your stairways have a railing or banister?: Yes  Do you always fasten your seatbelt when you are in a car?: (!) No  Safety Interventions:  NO direct concerns  Shower chair and additional safety in place s/p hip fracture  Family support in place     ADLs:  In the past 7 days, did you need help from others to perform any of the following everyday activities: Eating, dressing, grooming, bathing, toileting, or walking/balance?: No  In the past 7 days, did you need help from others to take care of any of the following: Laundry, housekeeping, banking/finances, shopping, telephone use, food preparation, transportation, or taking medications?: (!) Yes  Select all that apply: (!) Laundry  ADL Interventions:  Patient declines any further evaluation/treatment for this issue          Objective   Vitals: 09/22/22 1417   BP: (!) 147/77   Site: Right Upper Arm   Position: Sitting   Cuff Size: Medium Adult   Pulse: 83   Resp: 20   Temp: 97.7 °F (36.5 °C)   TempSrc: Temporal   SpO2: 98%   Weight: 231 lb (104.8 kg)   Height: 5' 4\" (1.626 m)      Body mass index is 39.65 kg/m². General Appearance: alert and oriented to person, place and time and in no acute distress  Pulmonary/Chest: Decreased breath sounds due to body habitus, no appreciable wheezes, rales, or rhonchi  Cardiovascular: normal rate, normal S1 and S2, no gallops, and intact distal pulses  Abdomen: soft, non-tender and obese, no appreciable tenderness in all quadrants, negative chavez's sign, no appreciable organomegaly but limited due to body habitus, no CVA tenderness  Extremities: no cyanosis and no clubbing, no swelling        Allergies   Allergen Reactions    Rocephin [Ceftriaxone] Shortness Of Breath     Bronchospasm 3/27 and hypotension immediately after ceftriaxone     Prior to Visit Medications    Medication Sig Taking? Authorizing Provider   SODIUM FLUORIDE 5000 PLUS 1.1 % CREA APPLY A PEA SIZED AMOUNT TO TOOTHBRUSH AND BRUSH FOR 2 MINUTES.  DO NOT EAT OR DRINK FOR 30 MINUTES AFTER USE Yes Historical Provider, MD   metFORMIN (GLUCOPHAGE) 850 MG tablet TAKE 1 TABLET BY MOUTH TWICE DAILY WITH MEALS Yes Cony Sánchez MD   alendronate (FOSAMAX) 70 MG tablet Take 1 tablet by mouth every 7 days Yes Lacy Rojas MD   rosuvastatin (CRESTOR) 5 MG tablet TAKE 1 TABLET BY MOUTH EVERY DAY Yes Cony Sánchez MD   Ascorbic Acid (VITAMIN C) 250 MG tablet Take 1,000 mg by mouth daily  Yes Historical Provider, MD   zinc sulfate (ZINCATE) 220 (50 Zn) MG capsule Take 50 mg by mouth daily Yes Historical Provider, MD   Homeopathic Products (LIVER SUPPORT SL) Take 1 capsule by mouth daily Yes Historical Provider, MD   magnesium cl-calcium carbonate (SLOW-MAG) 71.5-119 MG TBEC tablet take 2 tablets once daily Yes Cony Sánchez MD pantoprazole (PROTONIX) 40 MG tablet TAKE 1 TABLET BY MOUTH DAILY AS NEEDED FOR GERD Yes Alessandro Abel MD   Bioflavonoid Products (SHERIF C PO) Take 2 tablets by mouth 2 times daily Yes Historical Provider, MD   GLUTATHIONE PO Take 500 mg by mouth daily Yes Historical Provider, MD   lisinopril (PRINIVIL;ZESTRIL) 20 MG tablet Take 1 tablet by mouth daily Yes Alessandro Abel MD   blood glucose monitor strips Test 2 times a day & as needed for symptoms of irregular blood glucose. Dispense sufficient amount for indicated testing frequency plus additional to accommodate PRN testing needs. Yes Alessandro Abel MD   Turmeric 500 MG CAPS Take 2 tablets by mouth 3 times daily  Yes Historical Provider, MD   Cholecalciferol (VITAMIN D) 50 MCG (2000 UT) CAPS capsule Take 2,000 Units by mouth daily Yes Alessandro Abel MD   albuterol sulfate  (90 Base) MCG/ACT inhaler Inhale 2 puffs into the lungs every 6 hours as needed for Wheezing Yes Alessandro Abel MD   blood glucose monitor kit and supplies Dispense sufficient amount for indicated testing frequency plus additional to accommodate PRN testing needs. Dispense all needed supplies to include: monitor, strips, lancing device, lancets, control solutions, alcohol swabs.  Yes Alessandro Abel MD   Lancets MISC Test twice daily Yes Alessandro Abel MD   Cyanocobalamin (VITAMIN B 12 PO) Take by mouth daily  Yes Historical Provider, MD   Coenzyme Q10 (CO Q 10 PO) Take 1 tablet by mouth daily QUINOL  / LD 4/9/2019 Yes Historical Provider, MD   hydroxychloroquine (PLAQUENIL) 200 MG tablet Take 1 tablet by mouth daily  Patient not taking: Reported on 9/22/2022  Nisa Durant MD   aspirin EC 81 MG EC tablet Take 1 tablet by mouth daily Start taking aspirin AFTER completion of all doses of lovenox only  Patient not taking: Reported on 9/22/2022  Alessandro Abel MD       CareTeam (Including outside providers/suppliers regularly involved in providing care):   Patient Care Team:  Rory Isaacs MD as PCP - Jacques Lebron MD as PCP - [facility]  Dnaiele Reaves MD as Consulting Physician (Rheumatology)  Chuck Mcardle as Imaging Navigator     Reviewed and updated this visit:  Tobacco  Allergies  Meds  Med Hx  Surg Hx  Soc Hx  Fam Hx

## 2022-09-27 ENCOUNTER — OFFICE VISIT (OUTPATIENT)
Dept: RHEUMATOLOGY | Age: 72
End: 2022-09-27
Payer: COMMERCIAL

## 2022-09-27 VITALS
TEMPERATURE: 97.2 F | HEIGHT: 64 IN | HEART RATE: 96 BPM | SYSTOLIC BLOOD PRESSURE: 124 MMHG | WEIGHT: 235.6 LBS | DIASTOLIC BLOOD PRESSURE: 84 MMHG | RESPIRATION RATE: 18 BRPM | BODY MASS INDEX: 40.22 KG/M2 | OXYGEN SATURATION: 99 %

## 2022-09-27 DIAGNOSIS — M81.0 OSTEOPOROSIS, UNSPECIFIED OSTEOPOROSIS TYPE, UNSPECIFIED PATHOLOGICAL FRACTURE PRESENCE: ICD-10-CM

## 2022-09-27 DIAGNOSIS — M05.79 RHEUMATOID ARTHRITIS INVOLVING MULTIPLE SITES WITH POSITIVE RHEUMATOID FACTOR (HCC): ICD-10-CM

## 2022-09-27 DIAGNOSIS — R74.8 ELEVATED LIVER ENZYMES: Primary | ICD-10-CM

## 2022-09-27 DIAGNOSIS — Z92.29 HISTORY OF HIGH RISK MEDICATION TREATMENT: ICD-10-CM

## 2022-09-27 PROCEDURE — 1036F TOBACCO NON-USER: CPT | Performed by: INTERNAL MEDICINE

## 2022-09-27 PROCEDURE — G8427 DOCREV CUR MEDS BY ELIG CLIN: HCPCS | Performed by: INTERNAL MEDICINE

## 2022-09-27 PROCEDURE — G8417 CALC BMI ABV UP PARAM F/U: HCPCS | Performed by: INTERNAL MEDICINE

## 2022-09-27 PROCEDURE — 3017F COLORECTAL CA SCREEN DOC REV: CPT | Performed by: INTERNAL MEDICINE

## 2022-09-27 PROCEDURE — G8399 PT W/DXA RESULTS DOCUMENT: HCPCS | Performed by: INTERNAL MEDICINE

## 2022-09-27 PROCEDURE — 99212 OFFICE O/P EST SF 10 MIN: CPT | Performed by: INTERNAL MEDICINE

## 2022-09-27 PROCEDURE — 1090F PRES/ABSN URINE INCON ASSESS: CPT | Performed by: INTERNAL MEDICINE

## 2022-09-27 PROCEDURE — 99214 OFFICE O/P EST MOD 30 MIN: CPT | Performed by: INTERNAL MEDICINE

## 2022-09-27 PROCEDURE — 1123F ACP DISCUSS/DSCN MKR DOCD: CPT | Performed by: INTERNAL MEDICINE

## 2022-09-27 RX ORDER — HYDROXYCHLOROQUINE SULFATE 200 MG/1
200 TABLET, FILM COATED ORAL DAILY
Qty: 90 TABLET | Refills: 1 | Status: SHIPPED | OUTPATIENT
Start: 2022-09-27

## 2022-09-27 RX ORDER — ALENDRONATE SODIUM 70 MG/1
70 TABLET ORAL
Qty: 12 TABLET | Refills: 1 | Status: SHIPPED | OUTPATIENT
Start: 2022-09-27

## 2022-09-27 NOTE — PROGRESS NOTES
J O I N T  C A R E  C L I N I C        The patient is seen in follow-up for:   Rheumatoid Arthritis and Osteoporosis       The patient is seen in follow-up for:   RA- shw quit hcq 200mg daily 1-2 weeks ago   Arthritis, pain both hips and low back , states right hand better is moving fingers better  L wrist intermittent        Sp covid home test jan 4t- sick since cheikh 2  Improved now - expect mild cough/chest congestion     RA symptoms stable           DDD/mechanical back pain, moderate pain if on feet                 Patient History Update Since Previous Visit      Yes No Comment      New illnesses  x       Seen other healthcare provider x  PCP Dr. Nikos Quintero x-ray, labs, or procedures  x       Started / changed / stopped medications x  Plaquenil      New allergies / reactions to medications x  Rocephin      Changes in family medical history  x       Changes in patient social history  x       Morning stiffness x  Length:  15 minutes          Review of Systems        Check if Reviewed Comment if new or change  Check if Reviewed Comment if new or change   x Constitutional  x Cardiovascular    x Musculoskeletal  x Pulmonary    x Eyes, Ears, Nose  x Psychiatric    x Mouth, Throat  x Neurological    x Skin & Hair  x GI    x Immunologic  x     x Allergic  x Endocrine    x Hematologic   Lymphatic             Medical Record Review        Check if Reviewed   x Vital Signs & Weight x Other Providers Consults & Notes   x Laboratory Results  Imaging Results          Abbreviated Exam          System    nl   abn   Comment     1 Gen nutrition/hygiene x      appearance x     2 Skin turgor / integrity x      rash x      ecchymosis x     3 Psych orientation x      memory x      mood x      cooperative x     4 HENT mouth / throat x     5 Resp resp. effort x      auscultation x     6 CV heart auscultation x      extremity edema           Tender Points           Yes   Nxo  Equivocal  Not Assessed   7 Other                   Joint g/sq cm with a   T score of    -2.7. Left hip:   Bone mineral density of the left femoral neck is 0.718 g/sq cm with a   T score of    -2.3.       Discussed alendronate compliance  and hcq (she is sick of taking pills)           Plan          Resume HCQ 200mg daily   Same alendronate  Labs prior to fu  Fu in 4months          Counseling and Coordination of Care      Prognosis  Prior Authorization       x Risk / Benefits of RX  Disability Forms        Compliance  Discussion and / or letter to other health care provider         Risk Reduction     x More than half of the face-to-face time with the patient was spent in counseling or coordinating care    Exercise           Brochure / Handout      Visit Duration (including medical record review):    Minutes:    Referral:

## 2022-09-27 NOTE — PROGRESS NOTES
Patient verbalized understanding of office instructions. She will call with questions or concerns. She was given discharge instructions, and scripts for lab work to be done . All questions were fully answered.   Printed AVS given

## 2022-09-29 ENCOUNTER — TELEPHONE (OUTPATIENT)
Dept: INTERNAL MEDICINE | Age: 72
End: 2022-09-29

## 2022-09-29 DIAGNOSIS — I10 ESSENTIAL HYPERTENSION: ICD-10-CM

## 2022-09-29 NOTE — TELEPHONE ENCOUNTER
[facility] was changed to 850mg 3 times daily. Pearl would like a new script. Please Advise.  [place] [phone]

## 2022-09-30 RX ORDER — LISINOPRIL 20 MG/1
20 TABLET ORAL DAILY
Qty: 90 TABLET | Refills: 1 | Status: SHIPPED | OUTPATIENT
Start: 2022-09-30

## 2022-09-30 NOTE — TELEPHONE ENCOUNTER
Discussed with patient, she is taking 850 mg TID and has enough medication. Defer to Dr. Serg Noyola further dose adjustment, additional med per his discretion.     Electronically signed by Tung Hong DO on 9/30/2022 at 4:20 PM

## 2022-10-03 NOTE — TELEPHONE ENCOUNTER
Attempted to reach patient to inform that Metformin 850 mg is twice daily. Left message on voicemail stating to return call to office. Spoke with pharmacy and pharmacy states they have Metformin 850 mg as twice daily.

## 2022-10-03 NOTE — TELEPHONE ENCOUNTER
Patient was never changed to Three time daily dosing. Patient should only be taking Twice daily dosing. Three time daily dosing is above recommendation. Please advise immediately. Order to pharmacy is clearly twice daily.

## 2022-10-04 NOTE — TELEPHONE ENCOUNTER
Patient followed instructions and reduced Metformin to original dose intended- twice daily. She denies any complaints. Has made significant changes to diet- eliminated high glucose foods and honey, other foods. She notes she increased metformin on her own- explained that is above maximum dosing and with risk of lactic acidosis in patient with mild-moderate liver dysfunction- there is on benefit to higher dosing. Would benefit from 2nd line management- information previously given on GLP-1 and SGLT2- patient is now taking correct dosing of metformin and will review information given at DC of last appt. Follow-up glucose by phone planned in 2 weeks. She currently does not wish to start 2nd therapy yet given significant lifestyle changes- lost 2 lbs intentionally. Will call in 2 weeks.

## 2022-10-18 ENCOUNTER — TELEPHONE (OUTPATIENT)
Dept: INTERNAL MEDICINE | Age: 72
End: 2022-10-18

## 2022-10-18 NOTE — TELEPHONE ENCOUNTER
2 week glucose follow-up needed at this time. Please contact and follow for glucose measurements and discussion of new therapy- which patient previously deferred. In addition, patient was instructed to take metformin only as directed.

## 2022-10-19 ENCOUNTER — CARE COORDINATION (OUTPATIENT)
Dept: FAMILY MEDICINE CLINIC | Age: 72
End: 2022-10-19

## 2022-10-19 NOTE — CARE COORDINATION
Carly Durán, she is a Type 2 Diabetic,   Lab Results   Component Value Date    LABA1C 8.3 (H) 09/22/2022    LABA1C 6.4 12/04/2021    LABA1C 4.4 03/17/2021     She is checking FBS daily and one in the afternoon 2 hours after a meal. Her  this am but was at a birthday party and had cake last night. Her blood sugars are 160-240. She takes Metformin 850 mg bid. She father and grandma had diabetes. She is trying to watch what she eats and is cutting back on the carbohydrates. States she has been to diabetic education classes in the past. We went over the two medications that Dr Stephie Macedo wanted her to consider Dulaglutide and Empagliflozin we went over both medications. She is to speak with me in two weeks and call with her blood sugars. She states that if she has to take another medication she would pick Dulaglutide she is aware that it is an injection. In the past when she had her A1c controlled she did a low carb (Keto) diet and at that time she had lost 40 lb. Went over the benefit of weight loss and exercise. She will call in 2 weeks with her blood glucose.

## 2022-11-07 ENCOUNTER — CARE COORDINATION (OUTPATIENT)
Dept: INTERNAL MEDICINE | Age: 72
End: 2022-11-07

## 2022-11-07 ENCOUNTER — TELEPHONE (OUTPATIENT)
Dept: INTERNAL MEDICINE | Age: 72
End: 2022-11-07

## 2022-11-07 DIAGNOSIS — E11.9 CONTROLLED TYPE 2 DIABETES MELLITUS WITHOUT COMPLICATION, WITHOUT LONG-TERM CURRENT USE OF INSULIN (HCC): Primary | ICD-10-CM

## 2022-11-07 DIAGNOSIS — E11.65 TYPE 2 DIABETES MELLITUS WITH HYPERGLYCEMIA, WITHOUT LONG-TERM CURRENT USE OF INSULIN (HCC): ICD-10-CM

## 2022-11-07 DIAGNOSIS — E66.01 CLASS 3 SEVERE OBESITY DUE TO EXCESS CALORIES WITH SERIOUS COMORBIDITY AND BODY MASS INDEX (BMI) OF 40.0 TO 44.9 IN ADULT (HCC): ICD-10-CM

## 2022-11-07 NOTE — CARE COORDINATION
Called [facility] for the last week has been 180-240 and check at 2 pm hours after lunch and has been 160-180. Ready to start Trulicity. Admits she is doing some snacking like a bag of popcorn. Every 10 days has a glass of red wine. Last week states she had too many potatoes and bread. Goes to [employer] and had some pancakes.

## 2022-11-07 NOTE — TELEPHONE ENCOUNTER
Pt requesting to start med dr brian mentioned will help get her A1C down and wants it sent to [facility] on [address] please advise

## 2022-11-09 ENCOUNTER — CARE COORDINATION (OUTPATIENT)
Dept: INTERNAL MEDICINE | Age: 72
End: 2022-11-09

## 2022-11-09 NOTE — CARE COORDINATION
Jardiance sent to pharmacy     No drug to drug interactions noted. Allergies reviewed.     Renal function stable

## 2022-11-09 NOTE — CARE COORDINATION
Called Abdoul Interiano notified that Dr Stephie Macedo did not recommend GLP1 and notified of the reason, He recommends [place] or Wylene Hoguet, reviewed with her how these medications work and possible side effects. She states she is willing to start the medication that Dr Stephie Macedo recommends Monica Heaps or Wylene Hoguet and states he can make the choice.

## 2022-11-09 NOTE — CARE COORDINATION
Please note patient has chronically elevated lipase with unremarkable Pancreas assessment in last CT abdomen in May 2022. Given findings and caution/relative contraindication for use of GLP1 and pancreatitis- would recommend trial of SGLT2 therapy instead of GLP1- Jardiance vs. Olita Man would be preferred trial.    Please advise. If patient willing to proceed- I will send to pharmacy at starting dose to determine if covered from insurance.

## 2022-11-09 NOTE — TELEPHONE ENCOUNTER
See recommendation in care coordination note- would not recommend GLP1 trial at this time, instead recommend SGTL2 trial.

## 2022-11-10 ENCOUNTER — CARE COORDINATION (OUTPATIENT)
Dept: FAMILY MEDICINE CLINIC | Age: 72
End: 2022-11-10

## 2022-11-10 NOTE — CARE COORDINATION
Called Ernie Mota notified that Dr Xu Garcia called in [place], she states her pharmacy has called her and is waiting for it to come in.  She will let me know if she has any problems with the new medication

## 2022-11-13 DIAGNOSIS — E11.65 TYPE 2 DIABETES MELLITUS WITH HYPERGLYCEMIA, WITHOUT LONG-TERM CURRENT USE OF INSULIN (HCC): ICD-10-CM

## 2022-11-14 RX ORDER — EMPAGLIFLOZIN 10 MG/1
TABLET, FILM COATED ORAL
Qty: 30 TABLET | Refills: 0 | Status: SHIPPED | OUTPATIENT
Start: 2022-11-14

## 2022-11-14 NOTE — TELEPHONE ENCOUNTER
Last Appointment:  9/22/2022  Future Appointments   Date Time Provider Mil Christopheri   12/22/2022  1:45 PM Carlos Schmitz MD [facility]   1/24/2023  1:00 PM Layne Stanley MD [facility]

## 2022-11-15 ENCOUNTER — CARE COORDINATION (OUTPATIENT)
Dept: INTERNAL MEDICINE | Age: 72
End: 2022-11-15

## 2022-11-15 NOTE — CARE COORDINATION
Surekhapreet Rodriguez called started the Jardiance about 4 days ago, seeing some decrease in Blood sugar was 240, 230 today 205. Is trying to cut back on her carbohydrate intake. She will call in 7 to 10 days to give me some blood sugar readings. She has her next office visit 12/22/22 with Dr Bing Almendarez.

## 2022-11-21 ENCOUNTER — TELEPHONE (OUTPATIENT)
Dept: INTERNAL MEDICINE | Age: 72
End: 2022-11-21

## 2022-11-21 DIAGNOSIS — M79.605 LEFT LEG PAIN: Primary | ICD-10-CM

## 2022-11-21 NOTE — TELEPHONE ENCOUNTER
Pt calling for referral for physical therapy for left leg , she would like to try [facility] unless you have a preference. Please advise.

## 2022-11-22 NOTE — TELEPHONE ENCOUNTER
Called and spoke with patient, requesting referral for [facility] sent to [place]. Will fax when they open.

## 2022-12-09 ENCOUNTER — CARE COORDINATION (OUTPATIENT)
Dept: INTERNAL MEDICINE | Age: 72
End: 2022-12-09

## 2022-12-09 NOTE — CARE COORDINATION
Julio César Edwards called was very happy and wanted to let me know her blood sugars are doing much better  last week they were running in the 170 to 180 which was an improvement but this week even better  and yesterday and today BG, 12/8/22 , 12/9/22  . Denies any hypoglycemia. She did not have time to talk further is out with her  who is buying her jewelry but will be seeing  Dr Analisa Araujo 12/22/22 for an OV.

## 2022-12-11 DIAGNOSIS — E11.65 TYPE 2 DIABETES MELLITUS WITH HYPERGLYCEMIA, WITHOUT LONG-TERM CURRENT USE OF INSULIN (HCC): ICD-10-CM

## 2022-12-12 RX ORDER — EMPAGLIFLOZIN 10 MG/1
TABLET, FILM COATED ORAL
Qty: 30 TABLET | Refills: 0 | Status: SHIPPED | OUTPATIENT
Start: 2022-12-12

## 2022-12-16 ENCOUNTER — HOSPITAL ENCOUNTER (OUTPATIENT)
Dept: ULTRASOUND IMAGING | Age: 72
End: 2022-12-16
Payer: COMMERCIAL

## 2022-12-16 ENCOUNTER — HOSPITAL ENCOUNTER (OUTPATIENT)
Age: 72
Discharge: HOME | End: 2022-12-16
Payer: COMMERCIAL

## 2022-12-16 DIAGNOSIS — K76.0 FATTY (CHANGE OF) LIVER, NOT ELSEWHERE CLASSIFIED: ICD-10-CM

## 2022-12-16 DIAGNOSIS — E78.2 MIXED HYPERLIPIDEMIA: ICD-10-CM

## 2022-12-16 DIAGNOSIS — R25.2 LEG CRAMPS: ICD-10-CM

## 2022-12-16 DIAGNOSIS — M05.79 RHEUMATOID ARTHRITIS INVOLVING MULTIPLE SITES WITH POSITIVE RHEUMATOID FACTOR (HCC): ICD-10-CM

## 2022-12-16 DIAGNOSIS — R74.9 ABNORMAL SERUM ENZYME LEVEL: ICD-10-CM

## 2022-12-16 LAB
ALBUMIN SERPL-MCNC: 4.7 G/DL (ref 3.5–5.2)
ALP BLD-CCNC: 36 U/L (ref 35–104)
ALT SERPL-CCNC: 31 U/L (ref 0–32)
ANION GAP SERPL CALCULATED.3IONS-SCNC: 12 MMOL/L (ref 7–16)
AST SERPL-CCNC: 28 U/L (ref 0–31)
BILIRUB SERPL-MCNC: 0.5 MG/DL (ref 0–1.2)
BUN BLDV-MCNC: 19 MG/DL (ref 6–23)
C-REACTIVE PROTEIN: <0.3 MG/DL (ref 0–0.4)
CALCIUM SERPL-MCNC: 10.2 MG/DL (ref 8.6–10.2)
CHLORIDE BLD-SCNC: 105 MMOL/L (ref 98–107)
CHOLESTEROL, TOTAL: 156 MG/DL (ref 0–199)
CO2: 25 MMOL/L (ref 22–29)
CREAT SERPL-MCNC: 1.1 MG/DL (ref 0.5–1)
FERRITIN: 67 NG/ML
GFR SERPL CREATININE-BSD FRML MDRD: 54 ML/MIN/1.73
GLUCOSE BLD-MCNC: 168 MG/DL (ref 74–99)
HDLC SERPL-MCNC: 44 MG/DL
IRON SATURATION: 26 % (ref 15–50)
IRON: 93 MCG/DL (ref 37–145)
LDL CHOLESTEROL CALCULATED: 75 MG/DL (ref 0–99)
MAGNESIUM: 2.2 MG/DL (ref 1.6–2.6)
POTASSIUM SERPL-SCNC: 4.8 MMOL/L (ref 3.5–5)
SEDIMENTATION RATE, ERYTHROCYTE: 4 MM/HR (ref 0–20)
SODIUM BLD-SCNC: 142 MMOL/L (ref 132–146)
TOTAL IRON BINDING CAPACITY: 362 MCG/DL (ref 250–450)
TOTAL PROTEIN: 7 G/DL (ref 6.4–8.3)
TRIGL SERPL-MCNC: 186 MG/DL (ref 0–149)
VLDLC SERPL CALC-MCNC: 37 MG/DL

## 2022-12-16 PROCEDURE — 36415 COLL VENOUS BLD VENIPUNCTURE: CPT

## 2022-12-16 PROCEDURE — 80053 COMPREHEN METABOLIC PANEL: CPT

## 2022-12-16 PROCEDURE — 83735 ASSAY OF MAGNESIUM: CPT

## 2022-12-16 PROCEDURE — 83550 IRON BINDING TEST: CPT

## 2022-12-16 PROCEDURE — 76981 USE PARENCHYMA: CPT

## 2022-12-16 PROCEDURE — 82728 ASSAY OF FERRITIN: CPT

## 2022-12-16 PROCEDURE — 80061 LIPID PANEL: CPT

## 2022-12-16 PROCEDURE — 86140 C-REACTIVE PROTEIN: CPT

## 2022-12-16 PROCEDURE — 85651 RBC SED RATE NONAUTOMATED: CPT

## 2022-12-16 PROCEDURE — 83540 ASSAY OF IRON: CPT

## 2022-12-19 ENCOUNTER — TELEPHONE (OUTPATIENT)
Dept: INTERNAL MEDICINE | Age: 72
End: 2022-12-19

## 2022-12-19 NOTE — TELEPHONE ENCOUNTER
Reviewed labs. Slight increase in Cr with use of Jardiance. Will need to follow closely. Appt to discuss labs on 12/22. No anion gap.

## 2022-12-22 ENCOUNTER — OFFICE VISIT (OUTPATIENT)
Dept: INTERNAL MEDICINE | Age: 72
End: 2022-12-22
Payer: COMMERCIAL

## 2022-12-22 VITALS
HEART RATE: 81 BPM | BODY MASS INDEX: 39.59 KG/M2 | DIASTOLIC BLOOD PRESSURE: 67 MMHG | RESPIRATION RATE: 18 BRPM | WEIGHT: 231.9 LBS | SYSTOLIC BLOOD PRESSURE: 130 MMHG | TEMPERATURE: 97.3 F | HEIGHT: 64 IN | OXYGEN SATURATION: 98 %

## 2022-12-22 DIAGNOSIS — M25.552 LEFT HIP PAIN: ICD-10-CM

## 2022-12-22 DIAGNOSIS — M05.79 RHEUMATOID ARTHRITIS INVOLVING MULTIPLE SITES WITH POSITIVE RHEUMATOID FACTOR (HCC): ICD-10-CM

## 2022-12-22 DIAGNOSIS — R79.89 ELEVATED SERUM CREATININE: ICD-10-CM

## 2022-12-22 DIAGNOSIS — I10 ESSENTIAL HYPERTENSION: ICD-10-CM

## 2022-12-22 DIAGNOSIS — E78.2 MIXED HYPERLIPIDEMIA: ICD-10-CM

## 2022-12-22 DIAGNOSIS — E11.649 UNCONTROLLED TYPE 2 DIABETES MELLITUS WITH HYPOGLYCEMIA WITHOUT COMA (HCC): Primary | ICD-10-CM

## 2022-12-22 DIAGNOSIS — Z87.891 HISTORY OF TOBACCO USE: ICD-10-CM

## 2022-12-22 DIAGNOSIS — I25.84 CORONARY ARTERY CALCIFICATION OF NATIVE ARTERY: ICD-10-CM

## 2022-12-22 DIAGNOSIS — I25.10 CORONARY ARTERY CALCIFICATION OF NATIVE ARTERY: ICD-10-CM

## 2022-12-22 DIAGNOSIS — E66.01 CLASS 3 SEVERE OBESITY DUE TO EXCESS CALORIES WITH SERIOUS COMORBIDITY AND BODY MASS INDEX (BMI) OF 40.0 TO 44.9 IN ADULT (HCC): ICD-10-CM

## 2022-12-22 DIAGNOSIS — K21.9 GASTROESOPHAGEAL REFLUX DISEASE WITHOUT ESOPHAGITIS: ICD-10-CM

## 2022-12-22 LAB — HBA1C MFR BLD: 7 %

## 2022-12-22 PROCEDURE — 3017F COLORECTAL CA SCREEN DOC REV: CPT | Performed by: INTERNAL MEDICINE

## 2022-12-22 PROCEDURE — 3078F DIAST BP <80 MM HG: CPT | Performed by: INTERNAL MEDICINE

## 2022-12-22 PROCEDURE — G8399 PT W/DXA RESULTS DOCUMENT: HCPCS | Performed by: INTERNAL MEDICINE

## 2022-12-22 PROCEDURE — G8427 DOCREV CUR MEDS BY ELIG CLIN: HCPCS | Performed by: INTERNAL MEDICINE

## 2022-12-22 PROCEDURE — G8417 CALC BMI ABV UP PARAM F/U: HCPCS | Performed by: INTERNAL MEDICINE

## 2022-12-22 PROCEDURE — 3051F HG A1C>EQUAL 7.0%<8.0%: CPT | Performed by: INTERNAL MEDICINE

## 2022-12-22 PROCEDURE — 1123F ACP DISCUSS/DSCN MKR DOCD: CPT | Performed by: INTERNAL MEDICINE

## 2022-12-22 PROCEDURE — G8484 FLU IMMUNIZE NO ADMIN: HCPCS | Performed by: INTERNAL MEDICINE

## 2022-12-22 PROCEDURE — 99213 OFFICE O/P EST LOW 20 MIN: CPT | Performed by: INTERNAL MEDICINE

## 2022-12-22 PROCEDURE — 3074F SYST BP LT 130 MM HG: CPT | Performed by: INTERNAL MEDICINE

## 2022-12-22 PROCEDURE — 1036F TOBACCO NON-USER: CPT | Performed by: INTERNAL MEDICINE

## 2022-12-22 PROCEDURE — 83036 HEMOGLOBIN GLYCOSYLATED A1C: CPT | Performed by: INTERNAL MEDICINE

## 2022-12-22 PROCEDURE — 2022F DILAT RTA XM EVC RTNOPTHY: CPT | Performed by: INTERNAL MEDICINE

## 2022-12-22 PROCEDURE — 1090F PRES/ABSN URINE INCON ASSESS: CPT | Performed by: INTERNAL MEDICINE

## 2022-12-22 PROCEDURE — 99212 OFFICE O/P EST SF 10 MIN: CPT | Performed by: INTERNAL MEDICINE

## 2022-12-22 RX ORDER — PANTOPRAZOLE SODIUM 40 MG/1
TABLET, DELAYED RELEASE ORAL
Qty: 90 TABLET | Refills: 0 | Status: SHIPPED | OUTPATIENT
Start: 2022-12-22

## 2022-12-22 ASSESSMENT — ENCOUNTER SYMPTOMS
SHORTNESS OF BREATH: 0
VOMITING: 0
NAUSEA: 0
BLOOD IN STOOL: 0
ABDOMINAL PAIN: 0
DIARRHEA: 0
WHEEZING: 0
CONSTIPATION: 0

## 2022-12-22 NOTE — PROGRESS NOTES
AVS printed with follow up appointment and discharge instructions given to the patient along with lab scripts. Instructed to call with all issues.  Referral faxed to [facility]

## 2022-12-22 NOTE — PROGRESS NOTES
Tenisha Green (:  1950) is a 71 y.o. female,Established patient, here for evaluation of the following chief complaint(s):  3 Month Follow-Up, Diabetes, and Hypertension         ASSESSMENT/PLAN:  1. Uncontrolled type 2 diabetes mellitus with hypoglycemia without coma (HCC)  -     POCT glycosylated hemoglobin (Hb A1C)  -      DIABETES FOOT EXAM  Foot exam completed today   Need follow-up for Eye exam- encourage to complete   Diabetes Management Plan:     Blood Glucose Log Present no  Blood Pressure Monitoring <130/<80 no- BP at goal today   Retinopathy No    ACEI/ARB Yes  MOST RECENT A1c less than 8% yes - today's A1c 7.0%   Microalbumin/Cr Ratio completed in last year yes - no findings   Diabetic Foot Exam completed in last year yes - completed today- no changes  Eye Exam completed in last year no- need to complete   Statin Use Yes  Appropriate Aspirin Use if Known CAD yes - Calcifications noted on CT imaging; no signs of bleeding; improve lipid profile- continue ASA for now    2. Rheumatoid arthritis involving multiple sites with positive rheumatoid factor ([facility] [address]- stable   CRP remains stable   Continue follow-up with Dr. David Dahl and Rheumatology   3. Essential hypertension- at goal per ACC/AHA    Continue lisinopril at current dosing   4. Mixed hyperlipidemia- improved FLP today    Continue statin therapy   5. Class 3 severe obesity due to excess calories with serious comorbidity and body mass index (BMI) of 40.0 to 44.9 in adult (HCC)   4 lb weight loss- intentional   Continue lifestyle changes   Improved dietary choices    Aquatic therapy planned    Pain management for ongoing hip pain s/p hip fracture and contralateral hip/IT band concerns ongoing despite prior treatment options   Patient inquiring regarding pain management options   6. Coronary artery calcification of native artery  Assessment & Plan:     Impression   1: The stress EKG report is provided separately.    2  This is a normal myocardial perfusion scan. There is no evidence of   ischemia or infarction. Inferior attenuation artifact is present. 3: The left ventricle is normal in size with a preserved ejection   fraction. 4: Prognostically, this is a low risk study. 7. Elevated serum creatinine  -     Basic Metabolic Panel; Future  Slight elevation since initiation of SGLT2  Continue current management  Encouraged PO hydration  Repeat BMP in 3 months   8. History of tobacco use  -     CT LUNG SCREENING; Future  Repeat annual screen needed   11. Gastroesophageal reflux disease without esophagitis  -     pantoprazole (PROTONIX) 40 MG tablet; TAKE 1 TABLET BY MOUTH DAILY AS NEEDED FOR GERD, Disp-90 tablet, R-0**Patient requests 90 days supply**Normal  12. Left hip pain  -     [facility]  Ongoing complaints  Recommend follow-up with Pain management at this time  Agree with trial of Aquatic therapy     Continues to refuse all vaccinations despite ACIP recommendations     Return in about 3 months (around 3/22/2023). Subjective   SUBJECTIVE/OBJECTIVE:  HPI    Presents for follow-up appointment. At last appt, noting significant worsening of A1c and after shared-decision making, initiated SGLT2 therapeutic trial which patient has been tolerating. Repeat labs were completed prior to encounter and stable. Slight increase in HgB which could be causation of SGLT2 use and will need monitored. Today, notes 4 lb weight loss. Improved A1c to 7.0%. No noted concerns. Did note 1 episode of self-limited vaginal infection- treated with monistat- states desires to continue SGLT2 but aware of risk of recurrent urogenital infections and will need to monitor. Denies any vaginal discharge, dysuria, flank pain, or suprapubic pain at this time. Active lifestyle changes since last visit including dietary adjustments. No glucose log present today for review. Denies any new symptoms at this time.        Review of Systems Constitutional:  Negative for activity change, appetite change, chills, fever and unexpected weight change (lifestyle changes). Eyes:  Negative for visual disturbance. Respiratory:  Negative for shortness of breath and wheezing. Cardiovascular:  Negative for chest pain, palpitations and leg swelling. Gastrointestinal:  Negative for abdominal pain, blood in stool, constipation, diarrhea, nausea and vomiting. Endocrine: Positive for polyuria (slightly noticeable since starting new medication). Genitourinary:  Negative for dysuria and vaginal discharge. 1 vaginal fungal infection   Musculoskeletal:  Positive for arthralgias (Persistent hip pain on the right and trouble with right with shortening of right leg s/p hip fracture). Planned aquatic therapy    Neurological:  Negative for dizziness, syncope and light-headedness. Psychiatric/Behavioral:  Negative for dysphoric mood. Objective   Vitals:    12/22/22 1343   BP: 130/67   Site: Left Upper Arm   Position: Sitting   Cuff Size: Medium Adult   Pulse: 81   Resp: 18   Temp: 97.3 °F (36.3 °C)   TempSrc: Temporal   SpO2: 98%   Weight: 231 lb 14.4 oz (105.2 kg)   Height: 5' 4\" (1.626 m)      Physical Exam  Constitutional:       Appearance: She is obese. She is not ill-appearing. Cardiovascular:      Rate and Rhythm: Normal rate and regular rhythm. Pulses: Normal pulses. Heart sounds: Normal heart sounds. No murmur heard. Pulmonary:      Effort: Pulmonary effort is normal.      Breath sounds: Normal breath sounds. No wheezing, rhonchi or rales. Musculoskeletal:      Right lower leg: No edema. Left lower leg: No edema. Feet:      Right foot:      Protective Sensation: 10 sites tested. 10 sites sensed. Skin integrity: No skin breakdown, callus or fissure. Left foot:      Protective Sensation: 10 sites tested. 10 sites sensed. Skin integrity: No skin breakdown, callus or fissure.    Skin:     General: Skin is warm and dry. Neurological:      General: No focal deficit present. Mental Status: She is alert. Gait: Gait abnormal (Difficulty rising from chair- use of cane as assist device; able to transfer to table with assistance; pain noted in left hip and thigh- persistent). Psychiatric:         Mood and Affect: Mood normal.          An electronic signature was used to authenticate this note.     --Harpreet Hamm MD. [address]

## 2022-12-22 NOTE — ASSESSMENT & PLAN NOTE
Impression   1: The stress EKG report is provided separately. 2  This is a normal myocardial perfusion scan. There is no evidence of   ischemia or infarction. Inferior attenuation artifact is present. 3: The left ventricle is normal in size with a preserved ejection   fraction. 4: Prognostically, this is a low risk study.

## 2022-12-22 NOTE — PATIENT INSTRUCTIONS
1) Repeat kidney function at 3 months. 2) Plan for LDCT for annual lung cancer assessment. 3) Continue to check glucose at home and keep log. 4) Pain Management referral sent     5) 3 month follow-up. Pain

## 2023-01-10 ENCOUNTER — HOSPITAL ENCOUNTER (OUTPATIENT)
Dept: CT IMAGING | Age: 73
Discharge: HOME | End: 2023-01-12
Payer: COMMERCIAL

## 2023-01-10 ENCOUNTER — TELEPHONE (OUTPATIENT)
Dept: CASE MANAGEMENT | Age: 73
End: 2023-01-10

## 2023-01-10 ENCOUNTER — TELEPHONE (OUTPATIENT)
Dept: INTERNAL MEDICINE | Age: 73
End: 2023-01-10

## 2023-01-10 DIAGNOSIS — Z87.891 HISTORY OF TOBACCO USE: ICD-10-CM

## 2023-01-10 PROCEDURE — 71271 CT THORAX LUNG CANCER SCR C-: CPT

## 2023-01-10 NOTE — TELEPHONE ENCOUNTER
LDCT reviewed:    IMPRESSION:  1. There is no pulmonary infiltrate, mass or suspicious pulmonary nodule. LUNG RADS:  Per ACR Lung-RADS Version 1.1     Category 1, Negative (No nodules and definitely benign nodules). Management: Continue annual lung screening with LDCT in 12 months      Please inform of results and repeat plan to complete at 12 months.

## 2023-01-10 NOTE — TELEPHONE ENCOUNTER
I called the patient on 1/9/2023 and she confirmed her CT lung screening at [facility] on 1/10/2023 at 3:00 pm.  I reminded the patient to arrive at 2:30 pm, enter through the main entrance, and register. Patient confirmed.             Electronically signed by Valentino Case on 1/10/23 at 8:28 AM EST

## 2023-01-11 ENCOUNTER — TELEPHONE (OUTPATIENT)
Dept: CASE MANAGEMENT | Age: 73
End: 2023-01-11

## 2023-01-11 NOTE — TELEPHONE ENCOUNTER
No call, encounter opened to process CT Lung Screening. CT Lung Screen: 1/10/2023    Impression   1. There is no pulmonary infiltrate, mass or suspicious pulmonary nodule. LUNG RADS:   Per ACR Lung-RADS Version 1.1       Category 1, Negative (No nodules and definitely benign nodules). Management: Continue annual lung screening with LDCT in 12 months. RECOMMENDATIONS:   If you would like to register your patient with the [facility] [facility], please contact the Nurse Navigator at   [phone]. Pack years: 36    Social History     Tobacco Use  Smoking Status: Former Smoker    Start Date:    Quit Date: 07/07/2011   Types: Cigarettes   Packs/Day: 1   Years: 36   Pack Years: 36   Smokeless Tobacco: Never         Results letter sent to patient via my chart or mailed.      [address]

## 2023-03-14 ENCOUNTER — OFFICE VISIT (OUTPATIENT)
Dept: RHEUMATOLOGY | Age: 73
End: 2023-03-14
Payer: COMMERCIAL

## 2023-03-14 VITALS
BODY MASS INDEX: 38.76 KG/M2 | TEMPERATURE: 97 F | WEIGHT: 227 LBS | RESPIRATION RATE: 18 BRPM | DIASTOLIC BLOOD PRESSURE: 73 MMHG | HEART RATE: 76 BPM | HEIGHT: 64 IN | SYSTOLIC BLOOD PRESSURE: 137 MMHG

## 2023-03-14 DIAGNOSIS — Z79.899 HIGH RISK MEDICATIONS (NOT ANTICOAGULANTS) LONG-TERM USE: Primary | ICD-10-CM

## 2023-03-14 DIAGNOSIS — M81.0 OSTEOPOROSIS, UNSPECIFIED OSTEOPOROSIS TYPE, UNSPECIFIED PATHOLOGICAL FRACTURE PRESENCE: ICD-10-CM

## 2023-03-14 DIAGNOSIS — R63.8 INCREASED BMI: ICD-10-CM

## 2023-03-14 DIAGNOSIS — M05.79 RHEUMATOID ARTHRITIS INVOLVING MULTIPLE SITES WITH POSITIVE RHEUMATOID FACTOR (HCC): ICD-10-CM

## 2023-03-14 DIAGNOSIS — K76.0 FATTY LIVER DISEASE, NONALCOHOLIC: ICD-10-CM

## 2023-03-14 PROCEDURE — 99212 OFFICE O/P EST SF 10 MIN: CPT | Performed by: INTERNAL MEDICINE

## 2023-03-14 RX ORDER — ALENDRONATE SODIUM 70 MG/1
70 TABLET ORAL
Qty: 12 TABLET | Refills: 1 | Status: SHIPPED | OUTPATIENT
Start: 2023-03-14

## 2023-03-14 RX ORDER — HYDROXYCHLOROQUINE SULFATE 200 MG/1
200 TABLET, FILM COATED ORAL DAILY
Qty: 90 TABLET | Refills: 1 | Status: SHIPPED | OUTPATIENT
Start: 2023-03-14

## 2023-03-14 NOTE — PROGRESS NOTES
J O I N T  C A R E  C L I N I C        The patient is seen in follow-up for:  Rheumatoid  arthritis, having generalize discomfort     RA-    hcq 200mg daily - hands had been stable  But flaring up recently   Arthritis, pain both hips and low back , -- PT [facility] helpful temporarily    states right hand better is moving fingers better  L wrist intermittent      Hx of trigger thumb, not getting stuck  RA symptoms stable    DDD/mechanical back pain, moderate pain if on feet  No new fractures  Alendronate- missing doses             Patient History Update Since Previous Visit      Yes No Comment      New illnesses  x       Seen other healthcare provider  x       New x-ray, labs, or procedures  x       Started / changed / stopped medications  x       New allergies / reactions to medications  x       Changes in family medical history  x       Changes in patient social history  x       Morning stiffness x  Length:     25 minutes          Review of Systems        Check if Reviewed Comment if new or change  Check if Reviewed Comment if new or change   x Constitutional  x Cardiovascular    x Musculoskeletal  x Pulmonary    x Eyes, Ears, Nose   Psychiatric    x Mouth, Throat  x Neurological    x Skin & Hair  x GI    x Immunologic  x     x Allergic  x Endocrine    x Hematologic   Lymphatic             Medical Record Review        Check if Reviewed   x Vital Signs & Weight  Other Providers Consults & Notes   x Laboratory Results x Imaging Results          Abbreviated Exam          System    nl   abn   Comment     1 Gen nutrition/hygiene x      appearance x     2 Skin turgor / integrity x      rash xx      ecchymosis x     3 Psych orientation x      memory x      mood x      cooperative x     4 HENT mouth / throat x     5 Resp resp. effort x      auscultation xx     6 CV heart auscultation x      extremity edema           Tender Points           Yes  x No  Equivocal  Not Assessed   7 Other                   Joint Assessment Patient Right     Patient Left     x (check if no synovitis seen on exam)   Joint Pain Swelling Joint Pain Swelling   Shoulder   Shoulder     Elbow   Elbow     Wrist   Wrist     Knee   Knee     MCP I   MCP I     MCP II   MCP II     MCP III   MCP III     MCP IV   MCP IV     MCP V   MCP V     IP I   IP I     PIP II   PIP II     PIP III   PIP III     PIP IV   PIP IV     PIP V   PIP V                      Impression          +RF +CCP Arthritis, RA stable  pain both hips and low back ,   states left wrist --intermittent better is moving fingers better  15min am stiffness   RA symptoms stable  crp <0.3-stable   sed rate 4         High risk meds-- mg daily restartedt  on last visit  . stable-- Eye exam 2months ago      Hx of MTX  Stopped MTX in past bc of aerly fatty liver/pre  liver cirrhosis  Liver test N in march  Also CBC in past low      Nov   Sonographic findings are compatible with fatty infiltration of the liver. Noted diabetic inc BS--inc [address] weight  over past year        hihg risk meds- cr 1.1 and calcium N on aledronate  Also 5/6 and 3/10 --CBC OK  (was off folate - TMX-smx issue with MTX in past)     DDD/mechanical back pain, moderate pain if on feet- PT in past - [facility]   Seen ortho in [place] -- they did GTB  injection   Xray hips:  right hip reveal minimal   degenerative changes seen within the sacroiliac joints bilaterally with   hardware identified in the right hip. No hardware complication with   narrowing of the joint space in the left hip and mild spurring of the   acetabulum bilaterally. Age-related osteoporosis without current pathological fracture- on Bisphosphonate and continued at this time   Sp hip fracture-- seen ortho in July -- GTB injection was very helpful for one month, partial regression since.   OA L wrist post inflammatory   Am ice pack, tumeric     Carpal tunnel syndrome  1985 Right release; 11/2019 Left release  osteoporsiss--vit D now 34  Dexa  R hip -2.7   and L hip -2.3       Discussed alendronate compliance   Ok to switch to 2 pills then repeat 2 pills next day, then good for month   and hcq (she is sick of taking pills)          Plan          Discussed alendronate compliance   Ok to switch to 2 pills then repeat 2 pills next day, then good for month      Take daily  hcq   Labs soon, in next month      Fu sept 12th            Counseling and Coordination of Care      Prognosis  Prior Authorization       x Risk / Benefits of RX  Disability Forms        Compliance  Discussion and / or letter to other health care provider         Risk Reduction     x More than half of the face-to-face time with the patient was spent in counseling or coordinating care    Exercise           Brochure / Handout     x Visit Duration (including medical record review):    Minutes:20    Referral:

## 2023-03-23 ENCOUNTER — OFFICE VISIT (OUTPATIENT)
Dept: INTERNAL MEDICINE | Age: 73
End: 2023-03-23
Payer: COMMERCIAL

## 2023-03-23 ENCOUNTER — HOSPITAL ENCOUNTER (OUTPATIENT)
Age: 73
Discharge: HOME | End: 2023-03-23
Payer: COMMERCIAL

## 2023-03-23 VITALS
OXYGEN SATURATION: 98 % | HEIGHT: 64 IN | BODY MASS INDEX: 38.93 KG/M2 | RESPIRATION RATE: 18 BRPM | TEMPERATURE: 97.1 F | SYSTOLIC BLOOD PRESSURE: 138 MMHG | WEIGHT: 228 LBS | DIASTOLIC BLOOD PRESSURE: 60 MMHG | HEART RATE: 98 BPM

## 2023-03-23 DIAGNOSIS — M05.79 RHEUMATOID ARTHRITIS INVOLVING MULTIPLE SITES WITH POSITIVE RHEUMATOID FACTOR (HCC): ICD-10-CM

## 2023-03-23 DIAGNOSIS — R79.89 ELEVATED SERUM CREATININE: ICD-10-CM

## 2023-03-23 DIAGNOSIS — Z79.899 HIGH RISK MEDICATIONS (NOT ANTICOAGULANTS) LONG-TERM USE: ICD-10-CM

## 2023-03-23 DIAGNOSIS — R19.5 CHANGE IN STOOL: ICD-10-CM

## 2023-03-23 DIAGNOSIS — M81.0 AGE-RELATED OSTEOPOROSIS WITHOUT CURRENT PATHOLOGICAL FRACTURE: ICD-10-CM

## 2023-03-23 DIAGNOSIS — E11.9 CONTROLLED TYPE 2 DIABETES MELLITUS WITHOUT COMPLICATION, WITHOUT LONG-TERM CURRENT USE OF INSULIN (HCC): Primary | ICD-10-CM

## 2023-03-23 DIAGNOSIS — E11.9 TYPE 2 DIABETES MELLITUS WITHOUT COMPLICATION, WITHOUT LONG-TERM CURRENT USE OF INSULIN (HCC): ICD-10-CM

## 2023-03-23 DIAGNOSIS — I10 ESSENTIAL HYPERTENSION: ICD-10-CM

## 2023-03-23 DIAGNOSIS — K76.0 FATTY LIVER DISEASE, NONALCOHOLIC: ICD-10-CM

## 2023-03-23 DIAGNOSIS — E78.2 MIXED HYPERLIPIDEMIA: ICD-10-CM

## 2023-03-23 DIAGNOSIS — K21.9 GASTROESOPHAGEAL REFLUX DISEASE WITHOUT ESOPHAGITIS: ICD-10-CM

## 2023-03-23 LAB
ALBUMIN SERPL-MCNC: 4.8 G/DL (ref 3.5–5.2)
ALP SERPL-CCNC: 38 U/L (ref 35–104)
ALT SERPL-CCNC: 27 U/L (ref 0–32)
ANION GAP SERPL CALCULATED.3IONS-SCNC: 14 MMOL/L (ref 7–16)
AST SERPL-CCNC: 28 U/L (ref 0–31)
BILIRUB SERPL-MCNC: 0.4 MG/DL (ref 0–1.2)
BUN SERPL-MCNC: 17 MG/DL (ref 6–23)
CALCIUM SERPL-MCNC: 9.5 MG/DL (ref 8.6–10.2)
CHLORIDE SERPL-SCNC: 103 MMOL/L (ref 98–107)
CO2 SERPL-SCNC: 21 MMOL/L (ref 22–29)
CREAT SERPL-MCNC: 1 MG/DL (ref 0.5–1)
CRP SERPL HS-MCNC: <0.3 MG/DL (ref 0–0.4)
ERYTHROCYTE [SEDIMENTATION RATE] IN BLOOD BY WESTERGREN METHOD: 10 MM/HR (ref 0–20)
GLUCOSE SERPL-MCNC: 194 MG/DL (ref 74–99)
POTASSIUM SERPL-SCNC: 5 MMOL/L (ref 3.5–5)
PROT SERPL-MCNC: 7.3 G/DL (ref 6.4–8.3)
SODIUM SERPL-SCNC: 138 MMOL/L (ref 132–146)

## 2023-03-23 PROCEDURE — G8427 DOCREV CUR MEDS BY ELIG CLIN: HCPCS | Performed by: INTERNAL MEDICINE

## 2023-03-23 PROCEDURE — 3046F HEMOGLOBIN A1C LEVEL >9.0%: CPT | Performed by: INTERNAL MEDICINE

## 2023-03-23 PROCEDURE — 3017F COLORECTAL CA SCREEN DOC REV: CPT | Performed by: INTERNAL MEDICINE

## 2023-03-23 PROCEDURE — 1090F PRES/ABSN URINE INCON ASSESS: CPT | Performed by: INTERNAL MEDICINE

## 2023-03-23 PROCEDURE — G8484 FLU IMMUNIZE NO ADMIN: HCPCS | Performed by: INTERNAL MEDICINE

## 2023-03-23 PROCEDURE — 86140 C-REACTIVE PROTEIN: CPT

## 2023-03-23 PROCEDURE — 3078F DIAST BP <80 MM HG: CPT | Performed by: INTERNAL MEDICINE

## 2023-03-23 PROCEDURE — 1123F ACP DISCUSS/DSCN MKR DOCD: CPT | Performed by: INTERNAL MEDICINE

## 2023-03-23 PROCEDURE — 3075F SYST BP GE 130 - 139MM HG: CPT | Performed by: INTERNAL MEDICINE

## 2023-03-23 PROCEDURE — 2022F DILAT RTA XM EVC RTNOPTHY: CPT | Performed by: INTERNAL MEDICINE

## 2023-03-23 PROCEDURE — 99212 OFFICE O/P EST SF 10 MIN: CPT | Performed by: INTERNAL MEDICINE

## 2023-03-23 PROCEDURE — 99213 OFFICE O/P EST LOW 20 MIN: CPT | Performed by: INTERNAL MEDICINE

## 2023-03-23 PROCEDURE — G8417 CALC BMI ABV UP PARAM F/U: HCPCS | Performed by: INTERNAL MEDICINE

## 2023-03-23 PROCEDURE — 36415 COLL VENOUS BLD VENIPUNCTURE: CPT

## 2023-03-23 PROCEDURE — 1036F TOBACCO NON-USER: CPT | Performed by: INTERNAL MEDICINE

## 2023-03-23 PROCEDURE — G8399 PT W/DXA RESULTS DOCUMENT: HCPCS | Performed by: INTERNAL MEDICINE

## 2023-03-23 PROCEDURE — 85651 RBC SED RATE NONAUTOMATED: CPT

## 2023-03-23 PROCEDURE — 80053 COMPREHEN METABOLIC PANEL: CPT

## 2023-03-23 RX ORDER — LANCETS 30 GAUGE
EACH MISCELLANEOUS
Qty: 100 EACH | Refills: 3 | Status: SHIPPED | OUTPATIENT
Start: 2023-03-23

## 2023-03-23 RX ORDER — PANTOPRAZOLE SODIUM 40 MG/1
TABLET, DELAYED RELEASE ORAL
Qty: 90 TABLET | Refills: 0 | Status: SHIPPED | OUTPATIENT
Start: 2023-03-23

## 2023-03-23 RX ORDER — LISINOPRIL 20 MG/1
20 TABLET ORAL DAILY
Qty: 90 TABLET | Refills: 1 | Status: SHIPPED | OUTPATIENT
Start: 2023-03-23

## 2023-03-23 RX ORDER — GLUCOSAMINE HCL/CHONDROITIN SU 500-400 MG
CAPSULE ORAL
Qty: 100 STRIP | Refills: 5 | Status: SHIPPED | OUTPATIENT
Start: 2023-03-23

## 2023-03-23 RX ORDER — ROSUVASTATIN CALCIUM 5 MG/1
TABLET, COATED ORAL
Qty: 90 TABLET | Refills: 1 | Status: SHIPPED | OUTPATIENT
Start: 2023-03-23

## 2023-03-23 ASSESSMENT — PATIENT HEALTH QUESTIONNAIRE - PHQ9
SUM OF ALL RESPONSES TO PHQ9 QUESTIONS 1 & 2: 0
1. LITTLE INTEREST OR PLEASURE IN DOING THINGS: 0
2. FEELING DOWN, DEPRESSED OR HOPELESS: 0
SUM OF ALL RESPONSES TO PHQ QUESTIONS 1-9: 0

## 2023-03-23 ASSESSMENT — ENCOUNTER SYMPTOMS
DIARRHEA: 0
ABDOMINAL PAIN: 0
BLOOD IN STOOL: 0
VOMITING: 0
SHORTNESS OF BREATH: 0
NAUSEA: 0
CONSTIPATION: 0

## 2023-03-23 NOTE — PATIENT INSTRUCTIONS
1) Monitor stools- if any recurrence of change in stool- please call office immediately. Please obtain CBC (hemoglobin) level if concerns of blood in stool and we will also coordinate with GI for potential repeat need of endoscopy. 2) Monitor urinary symptoms. If recurrent vaginal infections, please call office as we may need to stop Jardiance as discussed. 3) Focus on improvement of dietary intervention. 4) Establish with [facility] for aquatic therapy for left leg.

## 2023-03-23 NOTE — ASSESSMENT & PLAN NOTE
Diabetes Management Plan:     Blood Glucose Log Present no  Blood Pressure Monitoring <130/<80 yes - at goal  Retinopathy No- need results of last eye exam- recently completed   ACEI/ARB Yes  MOST RECENT A1c less than 8% yes - 7% noted in December 2022  Microalbumin/Cr Ratio completed in last year yes - repeat for annual ordered today to be done with already scheduled Rheum labs- CMP, ESR, CRP   Diabetic Foot Exam completed in last year yes   Eye Exam completed in last year yes - need records   Statin Use Yes  Appropriate Aspirin Use if Known CAD yes

## 2023-03-23 NOTE — PROGRESS NOTES
AVS printed with follow up appointment, lab scripts and discharge instructions given to the patient. Advised to call with all issues.
General: No scleral icterus. Cardiovascular:      Rate and Rhythm: Normal rate and regular rhythm. Heart sounds: No murmur heard. Pulmonary:      Effort: Pulmonary effort is normal.      Breath sounds: Normal breath sounds. No wheezing, rhonchi or rales. Abdominal:      Palpations: Abdomen is soft. There is no hepatomegaly (not appreciable on exam). Tenderness: There is no abdominal tenderness. There is no guarding. Musculoskeletal:      Right lower leg: No edema. Left lower leg: No edema. Skin:     General: Skin is warm and dry. Neurological:      Mental Status: She is alert. An electronic signature was used to authenticate this note.     --Jeff Magaña MD, FACP

## 2023-03-29 ENCOUNTER — TELEPHONE (OUTPATIENT)
Dept: INTERNAL MEDICINE | Age: 73
End: 2023-03-29

## 2023-03-29 ENCOUNTER — HOSPITAL ENCOUNTER (EMERGENCY)
Age: 73
Discharge: HOME | End: 2023-03-29
Payer: COMMERCIAL

## 2023-03-29 ENCOUNTER — APPOINTMENT (OUTPATIENT)
Dept: GENERAL RADIOLOGY | Age: 73
End: 2023-03-29
Payer: COMMERCIAL

## 2023-03-29 VITALS
OXYGEN SATURATION: 98 % | HEIGHT: 64 IN | SYSTOLIC BLOOD PRESSURE: 147 MMHG | BODY MASS INDEX: 38.41 KG/M2 | DIASTOLIC BLOOD PRESSURE: 72 MMHG | HEART RATE: 92 BPM | WEIGHT: 225 LBS | TEMPERATURE: 98.1 F | RESPIRATION RATE: 16 BRPM

## 2023-03-29 DIAGNOSIS — S20.211A CONTUSION OF RIB ON RIGHT SIDE, INITIAL ENCOUNTER: Primary | ICD-10-CM

## 2023-03-29 DIAGNOSIS — R07.81 RIB PAIN ON RIGHT SIDE: ICD-10-CM

## 2023-03-29 DIAGNOSIS — I10 ELEVATED BLOOD PRESSURE READING WITH DIAGNOSIS OF HYPERTENSION: ICD-10-CM

## 2023-03-29 PROCEDURE — 6370000000 HC RX 637 (ALT 250 FOR IP)

## 2023-03-29 PROCEDURE — 71101 X-RAY EXAM UNILAT RIBS/CHEST: CPT

## 2023-03-29 PROCEDURE — 99283 EMERGENCY DEPT VISIT LOW MDM: CPT

## 2023-03-29 RX ORDER — ACETAMINOPHEN 500 MG
500 TABLET ORAL EVERY 6 HOURS PRN
Qty: 28 TABLET | Refills: 0 | Status: SHIPPED | OUTPATIENT
Start: 2023-03-29

## 2023-03-29 RX ORDER — LIDOCAINE 4 G/G
1 PATCH TOPICAL ONCE
Status: DISCONTINUED | OUTPATIENT
Start: 2023-03-29 | End: 2023-03-29 | Stop reason: HOSPADM

## 2023-03-29 RX ORDER — LIDOCAINE 50 MG/G
1 PATCH TOPICAL DAILY
Qty: 10 PATCH | Refills: 0 | Status: SHIPPED | OUTPATIENT
Start: 2023-03-29 | End: 2023-04-08

## 2023-03-29 RX ORDER — NAPROXEN 500 MG/1
500 TABLET ORAL ONCE
Status: COMPLETED | OUTPATIENT
Start: 2023-03-29 | End: 2023-03-29

## 2023-03-29 RX ORDER — NAPROXEN 500 MG/1
500 TABLET ORAL 2 TIMES DAILY
Qty: 14 TABLET | Refills: 0 | Status: SHIPPED | OUTPATIENT
Start: 2023-03-29 | End: 2023-03-29

## 2023-03-29 RX ADMIN — NAPROXEN 500 MG: 500 TABLET ORAL at 15:52

## 2023-03-29 ASSESSMENT — PAIN DESCRIPTION - FREQUENCY
FREQUENCY: CONTINUOUS
FREQUENCY: CONTINUOUS

## 2023-03-29 ASSESSMENT — PAIN - FUNCTIONAL ASSESSMENT
PAIN_FUNCTIONAL_ASSESSMENT: 0-10
PAIN_FUNCTIONAL_ASSESSMENT: 0-10

## 2023-03-29 ASSESSMENT — PAIN DESCRIPTION - LOCATION
LOCATION: RIB CAGE
LOCATION: ABDOMEN
LOCATION: ABDOMEN

## 2023-03-29 ASSESSMENT — PAIN SCALES - GENERAL
PAINLEVEL_OUTOF10: 3
PAINLEVEL_OUTOF10: 7
PAINLEVEL_OUTOF10: 6

## 2023-03-29 ASSESSMENT — PAIN DESCRIPTION - PAIN TYPE
TYPE: ACUTE PAIN
TYPE: ACUTE PAIN

## 2023-03-29 ASSESSMENT — PAIN DESCRIPTION - ONSET: ONSET: ON-GOING

## 2023-03-29 ASSESSMENT — PAIN DESCRIPTION - DESCRIPTORS
DESCRIPTORS: ACHING
DESCRIPTORS: SORE;DULL
DESCRIPTORS: ACHING

## 2023-03-29 ASSESSMENT — PAIN DESCRIPTION - ORIENTATION: ORIENTATION: RIGHT

## 2023-03-29 NOTE — TELEPHONE ENCOUNTER
Pt phoned this a.m. c/o right lateral side pain below armpit , states \"feels lIke someone punched me there \" Denies falling down or injury . Onset yesterday, no c/o fever, SOB or sore throat,Questioning could this be her Liver causing this.   Pt would like for you to call her back Txs Munira Dalal

## 2023-03-29 NOTE — ED PROVIDER NOTES
to release and result in lidocaine toxicity, cardiac arrhythmia, and possibly death. PLAN OF CARE & COUNSELING:  NHAN Nichols CNP reviewed today's visit with the patient. This included the differential, results and plan of care in addition to providing specific details for the plan of care and counseling regarding the diagnosis and prognosis and they are agreeable with the plan. All results reviewed with patient. and all questions answered. The patient expressed understanding. Patient is currently established with a PCP and will follow-up in 3 days as instructed. I emphasized the importance of follow-up with the physician I referred them to in the timeframe recommended. I discussed with the patient emergent symptoms and the need to immediately return to the ER for any new or worsening symptoms as discussed above. Written information was included in their discharge instructions. Additional verbal discharge instructions were also given and discussed with the patient to supplement those generated by the EMR. We also discussed medications that were prescribed including common side effects and interactions. All questions were addressed. They understand return precautions and discharge instructions. The patient  expressed understanding. Vitals were stable and they were in no distress at discharge. FINAL IMPRESSION      1. Contusion of rib on right side, initial encounter    2. Rib pain on right side    3. Elevated blood pressure reading with diagnosis of hypertension          DISPOSITION/PLAN     DISPOSITION Decision To Discharge 03/29/2023 05:37:14 PM  Discharge to home. Patient condition is good.     PATIENT REFERRED TO:  Carlos Alberto Bertrand, [address] [facility] [phone]    Schedule an appointment as soon as possible for a visit in 3 days      DISCHARGE MEDICATIONS:  Discharge Medication List as of 3/29/2023  5:39 PM        START taking these medications    Details lidocaine (LIDODERM) 5 % Place 1 patch onto the skin daily for 10 days 12 hours on, 12 hours off., Disp-10 patch, R-0Print      acetaminophen (TYLENOL) 500 MG tablet Take 1 tablet by mouth every 6 hours as needed for Pain, Disp-28 tablet, R-0Print             DISCONTINUED MEDICATIONS:  Discharge Medication List as of 3/29/2023  5:39 PM        STOP taking these medications       SODIUM FLUORIDE 5000 PLUS 1.1 % CREA Comments:   Reason for Stopping:         Bioflavonoid Products (SHERIF C PO) Comments:   Reason for Stopping:                 END OF PROVIDER NOTE    I am the primary clinician of record.      Electronically signed by NHAN Starr CNP   DD: 3/29/23    (Please note that portions of this note were completed with a voice recognition program.  Efforts were made to edit the dictations but occasionally words are mis-transcribed.)         NHAN Starr CNP  03/29/23 1402

## 2023-03-29 NOTE — TELEPHONE ENCOUNTER
Call placed to patient for discussion on RUQ pain. She states she is walking into [facility] ED immediately. She states \"soreness\" of the RUQ. She is s/p cholecystectomy and new pain noted. Hx of hepatomegaly with likely underlying NAFLD in the context of metabolic syndrome. She denies any additional findings. Encouraged assessment and will follow results with ED assessment given the consistency of pain and concerns.

## 2023-04-01 ENCOUNTER — HOSPITAL ENCOUNTER (EMERGENCY)
Age: 73
Discharge: HOME | End: 2023-04-01
Attending: EMERGENCY MEDICINE
Payer: COMMERCIAL

## 2023-04-01 ENCOUNTER — APPOINTMENT (OUTPATIENT)
Dept: GENERAL RADIOLOGY | Age: 73
End: 2023-04-01
Payer: COMMERCIAL

## 2023-04-01 VITALS
SYSTOLIC BLOOD PRESSURE: 158 MMHG | TEMPERATURE: 97.7 F | DIASTOLIC BLOOD PRESSURE: 78 MMHG | HEART RATE: 91 BPM | RESPIRATION RATE: 18 BRPM | OXYGEN SATURATION: 98 %

## 2023-04-01 DIAGNOSIS — U07.1 COVID: Primary | ICD-10-CM

## 2023-04-01 LAB
INFLUENZA A BY PCR: NOT DETECTED
INFLUENZA B BY PCR: NOT DETECTED
SARS-COV-2 RDRP RESP QL NAA+PROBE: DETECTED

## 2023-04-01 PROCEDURE — 87502 INFLUENZA DNA AMP PROBE: CPT

## 2023-04-01 PROCEDURE — 87635 SARS-COV-2 COVID-19 AMP PRB: CPT

## 2023-04-01 PROCEDURE — 99284 EMERGENCY DEPT VISIT MOD MDM: CPT

## 2023-04-01 PROCEDURE — 71045 X-RAY EXAM CHEST 1 VIEW: CPT

## 2023-04-01 ASSESSMENT — LIFESTYLE VARIABLES: HOW OFTEN DO YOU HAVE A DRINK CONTAINING ALCOHOL: MONTHLY OR LESS

## 2023-04-02 NOTE — ED PROVIDER NOTES
hospital encounter of 04/01/23   COVID-19, Rapid    Specimen: Nasopharyngeal Swab   Result Value Ref Range    SARS-CoV-2, NAAT DETECTED (A) Not Detected   RAPID INFLUENZA A/B ANTIGENS    Specimen: Nasopharyngeal   Result Value Ref Range    Influenza A by PCR Not Detected Not Detected    Influenza B by PCR Not Detected Not Detected       Independent interpretation of Radiology tests by Raffi Frazier MD: No acute disease    Final Interpretation by Radiology:  XR CHEST PORTABLE   Final Result   No acute disease. RECOMMENDATION:   Careful clinical correlation and follow up recommended. Are there any additional factors to consider that affect care (uninsured, homeless, illiterate, history from another source, etc.) (If yes, which ones). No      Name and Route of medications administered in the ED:  Medications - No data to display        ED Course: This patient's ED course included: a personal history and physicial examination and re-evaluation prior to disposition    This patient has remained hemodynamically stable during their ED course. Re-Evaluations:     Patient's vital signs are stable. Physical exam is unremarkable. Labs and imaging were obtained. Discussed the results with the patient. She is agreeable with the shared decision making. Influenza negative. COVID-positive. Chest x-ray did not show an acute process. I discussed the results with the patient. She is agreeable with discharge home. She says that she has a home pulse ox to monitor her oxygen saturation. I told her to take over-the-counter medication as needed and to follow-up with her primary care physician for reevaluation this week. Patient is agreeable with the shared decision making at this time. Consultations:  none      Critical Care: none    Counseling:    The emergency provider has spoken with the patient and discussed todays results, in addition to providing specific details for the plan of care and

## 2023-04-03 ENCOUNTER — TELEPHONE (OUTPATIENT)
Dept: INTERNAL MEDICINE CLINIC | Age: 73
End: 2023-04-03

## 2023-04-03 NOTE — TELEPHONE ENCOUNTER
Attempted to contact patient twice without success to discuss COVID symptoms and potential Anti-Viral management. Left message for call back. Seen in ED on 4/1 and COVID positive. Day 1 of symptoms appears to be April 1st; Day 3 would be today; patient would still be eligible to initiate anti-viral therapy if warranted tomorrow- less than 5 days since onset of symptoms. Drug to Drug interaction assessment completed in advance- patient would need to stop Crestor therapy with use of Paxlovid. Await call back and further discussion. Please address with faculty outpatient provider if able given time sensitivity to treatment algorithm.

## 2023-04-03 NOTE — TELEPHONE ENCOUNTER
Called and spoke with patient who states her temp today is 99. States sneezing, coughing clear, nasal congestion, tired but still eating. Denies adomianl pain, shortness of breathe, nausea, vomiting, diarrhea. States took a couple of tylenol since leaving the ER. Reports dull headache. States would like to get rid of this. Please advise.

## 2023-04-03 NOTE — TELEPHONE ENCOUNTER
Patient seen in ED for acute COVID over the weekend. Needs immediate follow-up call. Assess symptoms and progression. Duration and other specifics needs. Given patient's age and co-morbidities, may be candidate for outpatient Anti-Viral therapy with Paxlovid. However, eligibility is time sensitive and will need assessed. Should patient have moderate to severe symptoms- re-evaluation in ED is needed.

## 2023-04-04 NOTE — TELEPHONE ENCOUNTER
Spoke to patient directly- doing well overall. Denies any worsening symptoms today. Symptoms actually started on 3/31. Day 5 of symptoms- states currently feeling much improved; Denies fevers, chills, diaphoresis, dyspnea. States cough improving. Mild congestion. Given Day 5, clinical improvement, does not wish to consider Paxlovid at this time. She defers for supportive management only.

## 2023-05-01 NOTE — TELEPHONE ENCOUNTER
No call, encounter opened to process CT Lung Screening. CT Lung Screen 2/16/18 : Findings:    Lung parenchyma and pleura: The tracheobronchial tree is patent. There   are mild centrilobular bullous emphysematous changes in the lungs   bilaterally, most prominently involving the upper lobes. There is a 2   mm subpleural nodule in the right lung apex (series 3, image 24). There is no focal consolidation, pleural effusion or pneumothorax. There are minimal patchy bibasal dependent pulmonary parenchymal   opacities consistent with minimal atelectatic change.        Thyroid: Grossly unremarkable.       Mediastinum: No significant lymphadenopathy.       Jacey: Suboptimally evaluated due to lack of intravenous contrast.       Heart and pericardium: The heart is not enlarged. There is no   pericardial effusion. There are trace coronary artery calcifications.       Chest wall: Unremarkable.       Axilla: No enlarged lymph nodes.       Visualized upper abdomen: There is decreased attenuation of the   hepatic parenchyma, consistent with diffuse fatty infiltration. The   gallbladder is absent and surgical clips are present in the   gallbladder fossa, consistent with prior cholecystectomy. There is a 6   mm calcification in the midpole of the left kidney, consistent with a   nonobstructing calculus (series 2, image 159). There is a 2 mm   calcification in the posterior right hepatic lobe, consistent with a   granuloma (see series 2, image 159). The remaining visualized upper   abdominal organs are grossly unremarkable.       Bones: There are degenerative changes of the spine.           Impression   1. No focal consolidation, pleural effusion or pneumothorax. 2. Right apical pulmonary nodule as described above. Recommend   follow-up as per Fleischner criteria below. 3. Mild pulmonary emphysematous disease. 4. Hepatic steatosis. 5. Nonobstructing left renal calculus.    6. Status post cholecystectomy.       Lung - No

## 2023-07-10 ENCOUNTER — TELEPHONE (OUTPATIENT)
Dept: CARDIOLOGY | Age: 73
End: 2023-07-10

## 2023-07-10 NOTE — TELEPHONE ENCOUNTER
Attempt to reach the patient was unsuccessful. Left a VM reminding her of her 815 am stress test at [address] [place] [address]. Cardiology and where to report. She was instructed on NPO after MN except meds with water but to avoid taking her diabetic medication. She was also instructed on avoidance of caffeine products for 12 hours prior. Phone number provided to return call with any questions.

## 2023-07-12 ENCOUNTER — HOSPITAL ENCOUNTER (OUTPATIENT)
Dept: CARDIOLOGY | Age: 73
Discharge: HOME | End: 2023-07-12
Payer: COMMERCIAL

## 2023-07-12 VITALS
DIASTOLIC BLOOD PRESSURE: 70 MMHG | BODY MASS INDEX: 39.09 KG/M2 | HEIGHT: 64 IN | WEIGHT: 229 LBS | RESPIRATION RATE: 20 BRPM | HEART RATE: 81 BPM | SYSTOLIC BLOOD PRESSURE: 114 MMHG

## 2023-07-12 DIAGNOSIS — R07.9 CHEST PAIN, UNSPECIFIED TYPE: ICD-10-CM

## 2023-07-12 PROCEDURE — 2580000003 HC RX 258: Performed by: INTERNAL MEDICINE

## 2023-07-12 PROCEDURE — 6360000002 HC RX W HCPCS: Performed by: INTERNAL MEDICINE

## 2023-07-12 PROCEDURE — 93017 CV STRESS TEST TRACING ONLY: CPT

## 2023-07-12 PROCEDURE — A9502 TC99M TETROFOSMIN: HCPCS | Performed by: INTERNAL MEDICINE

## 2023-07-12 PROCEDURE — 3430000000 HC RX DIAGNOSTIC RADIOPHARMACEUTICAL: Performed by: INTERNAL MEDICINE

## 2023-07-12 PROCEDURE — 78452 HT MUSCLE IMAGE SPECT MULT: CPT

## 2023-07-12 RX ORDER — SODIUM CHLORIDE 0.9 % (FLUSH) 0.9 %
10 SYRINGE (ML) INJECTION PRN
Status: DISCONTINUED | OUTPATIENT
Start: 2023-07-12 | End: 2023-07-13 | Stop reason: HOSPADM

## 2023-07-12 RX ORDER — REGADENOSON 0.08 MG/ML
0.4 INJECTION, SOLUTION INTRAVENOUS
Status: COMPLETED | OUTPATIENT
Start: 2023-07-12 | End: 2023-07-12

## 2023-07-12 RX ADMIN — REGADENOSON 0.4 MG: 0.08 INJECTION, SOLUTION INTRAVENOUS at 10:11

## 2023-07-12 RX ADMIN — TETROFOSMIN 26.7 MILLICURIE: 0.23 INJECTION, POWDER, LYOPHILIZED, FOR SOLUTION INTRAVENOUS at 10:13

## 2023-07-12 RX ADMIN — SODIUM CHLORIDE, PRESERVATIVE FREE 10 ML: 5 INJECTION INTRAVENOUS at 10:11

## 2023-07-12 RX ADMIN — SODIUM CHLORIDE, PRESERVATIVE FREE 10 ML: 5 INJECTION INTRAVENOUS at 08:43

## 2023-07-12 RX ADMIN — SODIUM CHLORIDE, PRESERVATIVE FREE 10 ML: 5 INJECTION INTRAVENOUS at 10:12

## 2023-07-12 RX ADMIN — TETROFOSMIN 8.9 MILLICURIE: 0.23 INJECTION, POWDER, LYOPHILIZED, FOR SOLUTION INTRAVENOUS at 08:43

## 2023-07-12 NOTE — DISCHARGE INSTRUCTIONS
[facility]      Instructions to Patients    The following are the instructions for patients who have had a procedure in our office today. Patient name: Carmel Gallagher    Radionuclide Activity: 40mCi of 99mTc-Tetrofosmin (Myoview)    Date Administered: 7/12/2023    Expires: 48 hours after scheduled appointment time      Patient may resume normal activity unless otherwise instructed. Patient may resume medications as normal.  If the need should arise, patient may call [phone] between the hours of 8:00am-4:30pm.  After hours there is at least one physician on-call at all times for those patients needing assistance. Patients may call [phone] and the answering service will direct the patient to one of our physicians for assistance. After the patient's test if they are going to be leaving from an airport in the near future they should take this letter with them to verify the test and radionuclide used for their test.      This letter verifies that the above named bearer received an injection of a radionuclide for medical purpose/usage only.         Electronically signed by Dillon Cohen on 7/12/2023 at 9:56 AM

## 2023-07-12 NOTE — PROCEDURES
[facility] [place]   [address] [facility]. Chao Morales, [address] [facility]  [phone]               Pharmacologic Stress Nuclear Gated SPECT Study    Name: Ranulfo Dickerson Account Number: [de-identified]    :  1950          Sex: female         Date of Study:  2023    Height: 5' 4\" (162.6 cm)         Weight - Scale: 229 lb (103.9 kg)     Ordering Provider: Enrique Rizvi MD          PCP: Enrique Rizvi MD      Cardiologist: none             Interpreting Physician: Lizette Lundy MD  _________________________________________________________________________________    Indication:   Detecting the presence and location of coronary artery disease    Clinical History:   Patient has no known history of coronary artery disease. Resting ECG:    SR with low QRS voltages and with poor R wave progression V1-V3    Procedure:   Pharmacologic stress testing was performed with regadenoson 0.4 mg for 15 seconds. The heart rate was 81 at baseline and angelique to 97 beats during the infusion. The blood pressure at baseline was 114/70 and blood pressure at the end of infusion was 118/72. Blood pressure response was normal during the stress procedure. The patient experienced chest \"pressure\" during the infusion. ECG during the infusion did not change. IMAGING: Myocardial perfusion imaging was performed at rest 30-35 minutes following the intravenous injection of 8.9 mCi of (Tc-tetrofosmin) followed by 10 ml of Normal Saline. As per infusion protocol, the patient was injected intravenously with 26.7 mCi of (Tc-tetrofosmin) followed by 10 ml of Normal Saline. Gated post-stress tomographic imaging was performed 45 minutes after stress. FINDINGS: The overall quality of the study was good.      Left ventricular cavity size was noted to be normal.    Rotational analog analysis demonstrated abnormal patient motion, extracardiac radioactivity, soft tissue breast

## 2023-07-13 ENCOUNTER — TELEPHONE (OUTPATIENT)
Dept: INTERNAL MEDICINE | Age: 73
End: 2023-07-13

## 2023-07-13 NOTE — TELEPHONE ENCOUNTER
Please inform patient of results of stress test:    Impression:    Electrocardiographically normal regadenoson infusion with a clinically nonischemic response. Myocardial perfusion imaging was normal with attenuation artifact. Overall left ventricular systolic function was normal without regional wall motion abnormalities. 4. Low risk general pharmacologic stress test    Results noting no acute findings that would necessitate additional work-up. If any recurrent chest pain symptoms or progressive shortness of breath, recommend Cardiology assessment in future.

## 2023-07-13 NOTE — TELEPHONE ENCOUNTER
Patient notified of stress test results per Dr. Kan Lanier and all recommendations. No questions voiced and patient verbalized understanding.

## 2023-08-04 ENCOUNTER — TELEPHONE (OUTPATIENT)
Dept: INTERNAL MEDICINE | Age: 73
End: 2023-08-04

## 2023-08-04 DIAGNOSIS — E11.9 CONTROLLED TYPE 2 DIABETES MELLITUS WITHOUT COMPLICATION, WITHOUT LONG-TERM CURRENT USE OF INSULIN (HCC): Primary | ICD-10-CM

## 2023-08-04 RX ORDER — GLUCOSAMINE HCL/CHONDROITIN SU 500-400 MG
CAPSULE ORAL
Qty: 100 STRIP | Refills: 5 | Status: SHIPPED | OUTPATIENT
Start: 2023-08-04

## 2023-08-04 RX ORDER — BLOOD-GLUCOSE METER
1 KIT MISCELLANEOUS DAILY
Qty: 1 KIT | Refills: 0 | Status: SHIPPED | OUTPATIENT
Start: 2023-08-04

## 2023-08-04 RX ORDER — LANCETS 30 GAUGE
1 EACH MISCELLANEOUS 2 TIMES DAILY
Qty: 300 EACH | Refills: 1 | Status: SHIPPED | OUTPATIENT
Start: 2023-08-04

## 2023-08-04 NOTE — TELEPHONE ENCOUNTER
Phoned in this a.m states glucometer at home is not working not sure how long she had her other one requesting a new glucometer probably will also need new strips for what machine is given per her insurance  uses [employer]

## 2023-09-06 ENCOUNTER — TELEPHONE (OUTPATIENT)
Dept: INTERNAL MEDICINE | Age: 73
End: 2023-09-06

## 2023-09-06 NOTE — TELEPHONE ENCOUNTER
Called patient for rescheduling needs. She is currently in a restaurant and will need contacted back but ok to reschedule. Please provide options 1 pm on Monday, Sept 11th or  1 pm on Tuesday Sept 12th. Patient will have bloodwork morning of scheduled appt- upon reschedule.  Please maintain appt time blocked

## 2023-09-08 NOTE — TELEPHONE ENCOUNTER
Spoke with pt and Dr. Roberto Palomino. Appt rescheduled for Tuesday 9-12 at 1:45 as pt has appt earlier with Dr. Nancy Byrnes.

## 2023-09-11 ENCOUNTER — HOSPITAL ENCOUNTER (OUTPATIENT)
Age: 73
Discharge: HOME | End: 2023-09-11
Payer: COMMERCIAL

## 2023-09-11 DIAGNOSIS — Z92.29 HISTORY OF HIGH RISK MEDICATION TREATMENT: ICD-10-CM

## 2023-09-11 DIAGNOSIS — E87.20 METABOLIC ACIDOSIS: ICD-10-CM

## 2023-09-11 DIAGNOSIS — M81.0 OSTEOPOROSIS, UNSPECIFIED OSTEOPOROSIS TYPE, UNSPECIFIED PATHOLOGICAL FRACTURE PRESENCE: ICD-10-CM

## 2023-09-11 LAB
ALBUMIN SERPL-MCNC: 4.8 G/DL (ref 3.5–5.2)
ALP SERPL-CCNC: 33 U/L (ref 35–104)
ALT SERPL-CCNC: 22 U/L (ref 0–32)
ANION GAP SERPL CALCULATED.3IONS-SCNC: 18 MMOL/L (ref 7–16)
AST SERPL-CCNC: 23 U/L (ref 0–31)
BILIRUB SERPL-MCNC: 0.4 MG/DL (ref 0–1.2)
BUN SERPL-MCNC: 24 MG/DL (ref 6–23)
CALCIUM SERPL-MCNC: 9.6 MG/DL (ref 8.6–10.2)
CHLORIDE SERPL-SCNC: 104 MMOL/L (ref 98–107)
CO2 SERPL-SCNC: 21 MMOL/L (ref 22–29)
CREAT SERPL-MCNC: 1.1 MG/DL (ref 0.5–1)
GFR SERPL CREATININE-BSD FRML MDRD: 56 ML/MIN/1.73M2
GLUCOSE SERPL-MCNC: 87 MG/DL (ref 74–99)
POTASSIUM SERPL-SCNC: 4.5 MMOL/L (ref 3.5–5)
PROT SERPL-MCNC: 7 G/DL (ref 6.4–8.3)
SODIUM SERPL-SCNC: 143 MMOL/L (ref 132–146)

## 2023-09-11 PROCEDURE — 80053 COMPREHEN METABOLIC PANEL: CPT

## 2023-09-11 PROCEDURE — 36415 COLL VENOUS BLD VENIPUNCTURE: CPT

## 2023-09-12 ENCOUNTER — OFFICE VISIT (OUTPATIENT)
Dept: RHEUMATOLOGY | Age: 73
End: 2023-09-12
Payer: COMMERCIAL

## 2023-09-12 ENCOUNTER — OFFICE VISIT (OUTPATIENT)
Dept: INTERNAL MEDICINE | Age: 73
End: 2023-09-12

## 2023-09-12 VITALS
HEIGHT: 64 IN | OXYGEN SATURATION: 96 % | BODY MASS INDEX: 37.73 KG/M2 | WEIGHT: 221 LBS | DIASTOLIC BLOOD PRESSURE: 64 MMHG | SYSTOLIC BLOOD PRESSURE: 128 MMHG | RESPIRATION RATE: 16 BRPM | HEART RATE: 68 BPM | TEMPERATURE: 97 F

## 2023-09-12 VITALS — DIASTOLIC BLOOD PRESSURE: 78 MMHG | SYSTOLIC BLOOD PRESSURE: 128 MMHG | TEMPERATURE: 97 F | HEART RATE: 68 BPM

## 2023-09-12 DIAGNOSIS — E55.9 VITAMIN D DEFICIENCY: ICD-10-CM

## 2023-09-12 DIAGNOSIS — R16.1 SPLENOMEGALY: ICD-10-CM

## 2023-09-12 DIAGNOSIS — E11.9 CONTROLLED TYPE 2 DIABETES MELLITUS WITHOUT COMPLICATION, WITHOUT LONG-TERM CURRENT USE OF INSULIN (HCC): ICD-10-CM

## 2023-09-12 DIAGNOSIS — M05.79 RHEUMATOID ARTHRITIS INVOLVING MULTIPLE SITES WITH POSITIVE RHEUMATOID FACTOR (HCC): ICD-10-CM

## 2023-09-12 DIAGNOSIS — E66.01 CLASS 3 SEVERE OBESITY DUE TO EXCESS CALORIES WITH SERIOUS COMORBIDITY AND BODY MASS INDEX (BMI) OF 40.0 TO 44.9 IN ADULT (HCC): Primary | ICD-10-CM

## 2023-09-12 DIAGNOSIS — I25.10 CORONARY ARTERY CALCIFICATION OF NATIVE ARTERY: ICD-10-CM

## 2023-09-12 DIAGNOSIS — I25.84 CORONARY ARTERY CALCIFICATION OF NATIVE ARTERY: ICD-10-CM

## 2023-09-12 DIAGNOSIS — I10 ESSENTIAL HYPERTENSION: ICD-10-CM

## 2023-09-12 DIAGNOSIS — E78.2 MIXED HYPERLIPIDEMIA: ICD-10-CM

## 2023-09-12 DIAGNOSIS — M81.0 AGE-RELATED OSTEOPOROSIS WITHOUT CURRENT PATHOLOGICAL FRACTURE: ICD-10-CM

## 2023-09-12 DIAGNOSIS — K21.9 GASTROESOPHAGEAL REFLUX DISEASE WITHOUT ESOPHAGITIS: ICD-10-CM

## 2023-09-12 DIAGNOSIS — Z79.899 HIGH RISK MEDICATIONS (NOT ANTICOAGULANTS) LONG-TERM USE: ICD-10-CM

## 2023-09-12 DIAGNOSIS — M81.0 OSTEOPOROSIS, UNSPECIFIED OSTEOPOROSIS TYPE, UNSPECIFIED PATHOLOGICAL FRACTURE PRESENCE: ICD-10-CM

## 2023-09-12 DIAGNOSIS — K76.0 FATTY LIVER DISEASE, NONALCOHOLIC: Primary | ICD-10-CM

## 2023-09-12 PROCEDURE — 99212 OFFICE O/P EST SF 10 MIN: CPT | Performed by: INTERNAL MEDICINE

## 2023-09-12 PROCEDURE — 3074F SYST BP LT 130 MM HG: CPT | Performed by: INTERNAL MEDICINE

## 2023-09-12 PROCEDURE — 99213 OFFICE O/P EST LOW 20 MIN: CPT | Performed by: INTERNAL MEDICINE

## 2023-09-12 PROCEDURE — 3078F DIAST BP <80 MM HG: CPT | Performed by: INTERNAL MEDICINE

## 2023-09-12 PROCEDURE — 1123F ACP DISCUSS/DSCN MKR DOCD: CPT | Performed by: INTERNAL MEDICINE

## 2023-09-12 RX ORDER — HYDROXYCHLOROQUINE SULFATE 200 MG/1
200 TABLET, FILM COATED ORAL DAILY
Qty: 90 TABLET | Refills: 1 | Status: SHIPPED | OUTPATIENT
Start: 2023-09-12

## 2023-09-12 RX ORDER — ALENDRONATE SODIUM 70 MG/1
70 TABLET ORAL
Qty: 12 TABLET | Refills: 1 | Status: SHIPPED | OUTPATIENT
Start: 2023-09-12

## 2023-09-12 NOTE — PROGRESS NOTES
Denia Yepez (:  1950) is a 71 y.o. female,Established patient, here for evaluation of the following chief complaint(s):  No chief complaint on file. ASSESSMENT/PLAN:  1. Class 3 severe obesity due to excess calories with serious comorbidity and body mass index (BMI) of 40.0 to 44.9 in adult [facility])  2. Controlled type 2 diabetes mellitus without complication, without long-term current use of insulin (HCC)  -     Hemoglobin A1C; Future  3. Essential hypertension  4. Gastroesophageal reflux disease without esophagitis  5. Age-related osteoporosis without current pathological fracture  6. Mixed hyperlipidemia  7. Rheumatoid arthritis involving multiple sites with positive rheumatoid factor (HCC)  8. Splenomegaly  9. Vitamin D deficiency  10. Coronary artery calcification of native artery    Continued weight loss goals  Continue current medications no adjustments today   Continue massotherapy to improve pain and mobility of left leg   Continue follow-up with Rheumatology  ACIP recommendations regarding appropriate immunization completion       3 month follow-up          Subjective   SUBJECTIVE/OBJECTIVE:  HPI    Presents for follow-up appointment. Seen earlier by Rheumatology and no changes to medications. Tolerating all medications. Continues to lose weight intentionally with change in diet. She has been using a massotherapist that has helped improved her pain in left side/lower leg and ambulation has improved. Denies any falls or additional findings. Review of Systems   Constitutional:  Negative for chills and fever. Eyes:  Negative for visual disturbance. Respiratory:  Negative for shortness of breath. Cardiovascular:  Negative for chest pain and leg swelling. Gastrointestinal:  Negative for abdominal distention, abdominal pain, blood in stool, constipation, diarrhea and nausea. Genitourinary:  Negative for dysuria.    Neurological:  Negative for dizziness, syncope and

## 2023-09-12 NOTE — PROGRESS NOTES
Patient verbalized understanding of office instructions. She will call with questions or concerns. She was given discharge instructions, and lab work to be done. All questions were fully answered.  Printed AVS was given
III     MCP IV   MCP IV     MCP V   MCP V     IP I   IP I     PIP II   PIP II     PIP III   PIP III     PIP IV   PIP IV     PIP V   PIP V                      Impression          +RF +CCP Arthritis, RA stable    15-20min am stiffness  Last rheum labs in march (she didn't get these)  crp <0.3-stable   sed rate 4   RA-    hcq 200mg daily - hands had been stable    One \"flare\" in June- edematous hands/knees swelled x3 days  Possibly exac by sugar intake    Since then changed diet - dec  sugar, ketodiet    No flares --recently   right hand better is moving fingers better (but shakes)  L wrist intermittent      Hx of trigger thumb, not getting stuck  RA symptoms stable    DDD/mechanical back pain, moderate pain if on feet    No new fractures  Alendronate-  4 tabs/month-takes 2 one day and 2 next day - then done for month        High risk meds-- mg daily restartedt  on last visit  . stable-- Eye exam 8-10months ago -October planned     Hx of MTX  Stopped MTX in past bc of aerly fatty liver/pre  liver cirrhosis  Liver test N i  Also CBC in past low       Nov fatty liver  Noted diabetic inc BS--inc bmi  Inc weight  [facility]         hihg risk meds-   Recent cr okand calcium N on aledronate  Also 5/6 and 3/10 --CBC OK  (was off folate - TMX-smx issue with MTX in past)     DDD/mechanical back pain, moderate pain if on feet- PT in past - [facility]   Seen ortho in [place] -- they did GTB  injection   Xray hips:  right hip reveal minimal   degenerative changes seen within the sacroiliac joints bilaterally with   hardware identified in the right hip. No hardware complication with   narrowing of the joint space in the left hip and mild spurring of the   acetabulum bilaterally. Age-related osteoporosis without current pathological fracture- on Bisphosphonate and continued at this time   Sp hip fracture-- seen ortho in July -- GTB injection was very helpful for one month, partial regression since.   OA L

## 2023-09-17 ASSESSMENT — ENCOUNTER SYMPTOMS
ABDOMINAL PAIN: 0
SHORTNESS OF BREATH: 0
BLOOD IN STOOL: 0
NAUSEA: 0
ABDOMINAL DISTENTION: 0
CONSTIPATION: 0
DIARRHEA: 0

## 2023-11-07 ENCOUNTER — TELEPHONE (OUTPATIENT)
Dept: INTERNAL MEDICINE | Age: 73
End: 2023-11-07

## 2023-11-07 DIAGNOSIS — E11.9 TYPE 2 DIABETES MELLITUS WITHOUT COMPLICATION, WITHOUT LONG-TERM CURRENT USE OF INSULIN (HCC): ICD-10-CM

## 2023-11-07 NOTE — TELEPHONE ENCOUNTER
Request for Jardiance refill sent form [facility]- refill sent (90 day) to [place] as requested.

## 2023-11-08 ENCOUNTER — TELEPHONE (OUTPATIENT)
Dept: INTERNAL MEDICINE | Age: 73
End: 2023-11-08

## 2023-11-08 DIAGNOSIS — E11.9 CONTROLLED TYPE 2 DIABETES MELLITUS WITHOUT COMPLICATION, WITHOUT LONG-TERM CURRENT USE OF INSULIN (HCC): Primary | ICD-10-CM

## 2023-11-08 NOTE — TELEPHONE ENCOUNTER
Pt called and left message asking for a return call regarding her Jardiance. Called patient back today. Pt reports that since loosing weight, she had been cutting her Jardiance in half and taking 12.5 mg daily. Pt reports she has lost about 18 lbs. Her blood sugars have been ranging . If she drinks a glass of wine then sugars range from 112-122. She starting taking the lower dose approximately 3 weeks ago. When asked how she is loosing weight, pt reported she is on a Keto diet, but does have some carbs. She is asking for a new script for lower dose of Jardiance.

## 2023-11-09 NOTE — TELEPHONE ENCOUNTER
Please note that Firman Mcburney was resent to pharmacy at adjusted dose- 10 mg daily. Excellent job with intervening from a lifestyle standpoint and ok for adjustment. SGLT2 therapy will also help reduce risk of development/advancement of chronic kidney disease. Therefore, ok to reduce dose but need to maintain use. Please advise.

## 2023-12-22 ENCOUNTER — CLINICAL DOCUMENTATION (OUTPATIENT)
Dept: SPIRITUAL SERVICES | Age: 73
End: 2023-12-22

## 2023-12-22 NOTE — ACP (ADVANCE CARE PLANNING)
Advance Care Planning   Ambulatory ACP Specialist Patient Outreach    Date:  12/22/2023    ACP Specialist:  Cindy Tamayo    Outreach call to patient in follow-up to ACP Specialist referral from:Adán Lay MD    [x] PCP  [] Provider   [] Ambulatory Care Management [] Other     For:                  [x] Advance Directive Assistance              [] Complete Portable DNR order              [] Complete POST/POLST/MOST              [] Code Status Discussion             [] Discuss Goals of Care             [] Early ACP Decision-Making              [] Other (Specify)    Date Referral Received:12/21/2023    Next Step:   [x] ACP scheduled conversation  [] Outreach again in one week               [] Email / Mail ACP Info Sheets  [] Email / Mail Advance Directive  1/11/2024 [x] Closing referral.  Routing closure to referring provider/staff and to ACP Specialist .    [] Closure letter mailed to patient with invitation to contact ACP Specialist if  when ready.   [] Other (Specify here):         [x] At this time, Healthcare Decision Maker Is:        [] Primary agent named in scanned advance directive.    [x] Legal Next of Kin. Advance Care Planning   Healthcare Decision Maker:    Primary Decision Maker: ElliottAdrien - Child - [phone]         Outreaches:       [x] 1st -  Date:  12/22/2023               Intervention:  [x] Spoke with Patient   [] Left Voice mail [] Email / Mail    [] [employer]  [] Other (Specify) :     Outcomes: I scheduled with Gricel for 1/11 @ 10:00.           [x] 2nd -  Date:  1/11/2024               Intervention:  [x] Spoke with Patient  [] Left Voice mail [] Email / Mail    [] [employer]  [] Other (Specify) :              Outcomes: Gricel missed her appointment and request that I close referral at this time. She will call me should she want to complete the documents.                [] 3rd -  Date:                Intervention:  [] Spoke with Patient   [] Left Voice mail [] Email /

## 2023-12-27 ENCOUNTER — TELEPHONE (OUTPATIENT)
Dept: INTERNAL MEDICINE | Age: 73
End: 2023-12-27

## 2023-12-27 NOTE — TELEPHONE ENCOUNTER
Called and spoke with pt via phone. Pt states she knows she needs her teeth cleaned. Also reports she has a lot of missing teeth in the back. Thinks she also has some cavities. Thinks it has been 1 & 1/2 yrs since last seen in the dental clinic. Pt willing to be seen on Dental [facility] Denier if she is able to be scheduled an earlier appt. VM message left at Dental office requesting a return call back. Dental Clinic returned call. States pt has had some missed appts. Last seen over 1 yr ago. Not sure if patient able to see earlier earlier on Z[facility] Denier due to Zule[facility] Denier availability. Offered earliest appt on 3-1-24. Pt with appt in Rheum clinic 3-5-24. Requested appt be same day if possible. Appt scheduled in dental clinic 3-5-24 at 1 PM. Appt should last 1-1.5 hrs. Appt in Rheum clinic moved to 2:30 PM on 3-5-24. Pt notified of same.

## 2024-03-05 ENCOUNTER — HOSPITAL ENCOUNTER (OUTPATIENT)
Age: 74
Discharge: HOME | End: 2024-03-05
Payer: COMMERCIAL

## 2024-03-05 ENCOUNTER — OFFICE VISIT (OUTPATIENT)
Dept: RHEUMATOLOGY | Age: 74
End: 2024-03-05
Payer: COMMERCIAL

## 2024-03-05 VITALS
BODY MASS INDEX: 41.44 KG/M2 | TEMPERATURE: 97.9 F | HEIGHT: 60 IN | HEART RATE: 77 BPM | SYSTOLIC BLOOD PRESSURE: 143 MMHG | OXYGEN SATURATION: 98 % | WEIGHT: 211.1 LBS | RESPIRATION RATE: 16 BRPM | DIASTOLIC BLOOD PRESSURE: 77 MMHG

## 2024-03-05 DIAGNOSIS — M81.0 OSTEOPOROSIS, UNSPECIFIED OSTEOPOROSIS TYPE, UNSPECIFIED PATHOLOGICAL FRACTURE PRESENCE: ICD-10-CM

## 2024-03-05 DIAGNOSIS — Z79.899 HIGH RISK MEDICATIONS (NOT ANTICOAGULANTS) LONG-TERM USE: Primary | ICD-10-CM

## 2024-03-05 DIAGNOSIS — R25.2 LEG CRAMPS: ICD-10-CM

## 2024-03-05 DIAGNOSIS — M76.32 IT BAND SYNDROME, LEFT: ICD-10-CM

## 2024-03-05 DIAGNOSIS — M05.79 RHEUMATOID ARTHRITIS INVOLVING MULTIPLE SITES WITH POSITIVE RHEUMATOID FACTOR (HCC): ICD-10-CM

## 2024-03-05 DIAGNOSIS — Z79.899 HIGH RISK MEDICATIONS (NOT ANTICOAGULANTS) LONG-TERM USE: ICD-10-CM

## 2024-03-05 LAB
ALBUMIN SERPL-MCNC: 4.7 G/DL (ref 3.5–5.2)
ALP SERPL-CCNC: 36 U/L (ref 35–104)
ALT SERPL-CCNC: 14 U/L (ref 0–32)
ANION GAP SERPL CALCULATED.3IONS-SCNC: 15 MMOL/L (ref 7–16)
AST SERPL-CCNC: 12 U/L (ref 0–31)
BILIRUB SERPL-MCNC: 0.4 MG/DL (ref 0–1.2)
BUN SERPL-MCNC: 23 MG/DL (ref 6–23)
CALCIUM SERPL-MCNC: 9.2 MG/DL (ref 8.6–10.2)
CHLORIDE SERPL-SCNC: 106 MMOL/L (ref 98–107)
CO2 SERPL-SCNC: 21 MMOL/L (ref 22–29)
CREAT SERPL-MCNC: 0.9 MG/DL (ref 0.5–1)
CRP SERPL HS-MCNC: <3 MG/L (ref 0–5)
ERYTHROCYTE [SEDIMENTATION RATE] IN BLOOD BY WESTERGREN METHOD: 2 MM/HR (ref 0–20)
GFR SERPL CREATININE-BSD FRML MDRD: >60 ML/MIN/1.73M2
GLUCOSE SERPL-MCNC: 149 MG/DL (ref 74–99)
POTASSIUM SERPL-SCNC: 4.4 MMOL/L (ref 3.5–5)
PROT SERPL-MCNC: 7 G/DL (ref 6.4–8.3)
SODIUM SERPL-SCNC: 142 MMOL/L (ref 132–146)

## 2024-03-05 PROCEDURE — 99212 OFFICE O/P EST SF 10 MIN: CPT | Performed by: INTERNAL MEDICINE

## 2024-03-05 PROCEDURE — 86140 C-REACTIVE PROTEIN: CPT

## 2024-03-05 PROCEDURE — 36415 COLL VENOUS BLD VENIPUNCTURE: CPT

## 2024-03-05 PROCEDURE — 85652 RBC SED RATE AUTOMATED: CPT

## 2024-03-05 PROCEDURE — 99214 OFFICE O/P EST MOD 30 MIN: CPT | Performed by: INTERNAL MEDICINE

## 2024-03-05 PROCEDURE — 1123F ACP DISCUSS/DSCN MKR DOCD: CPT | Performed by: INTERNAL MEDICINE

## 2024-03-05 PROCEDURE — 3077F SYST BP >= 140 MM HG: CPT | Performed by: INTERNAL MEDICINE

## 2024-03-05 PROCEDURE — 3078F DIAST BP <80 MM HG: CPT | Performed by: INTERNAL MEDICINE

## 2024-03-05 PROCEDURE — 80053 COMPREHEN METABOLIC PANEL: CPT

## 2024-03-05 RX ORDER — MAGNESIUM CHLORIDE 71.5 G/G
TABLET ORAL
Qty: 180 TABLET | Refills: 0 | Status: SHIPPED | OUTPATIENT
Start: 2024-03-05

## 2024-03-05 RX ORDER — HYDROXYCHLOROQUINE SULFATE 200 MG/1
200 TABLET, FILM COATED ORAL DAILY
Qty: 90 TABLET | Refills: 0 | Status: SHIPPED | OUTPATIENT
Start: 2024-03-05

## 2024-03-05 RX ORDER — ALENDRONATE SODIUM 70 MG/1
70 TABLET ORAL
Qty: 12 TABLET | Refills: 1 | Status: SHIPPED | OUTPATIENT
Start: 2024-03-05

## 2024-03-05 NOTE — PATIENT INSTRUCTIONS
Same meds  PT for IT band flare  Labs prior to fu  3months- June 4th   telephone with readback to DC Q6 blood sugar checks per Dr. Dennise Lanier

## 2024-03-05 NOTE — PROGRESS NOTES
J O I N T  C A R E  C L I N I C        The patient is seen in follow-up for: rheumatoid arthritis  RA-    hcq 200mg daily - hands had been stable     One \"flare\" in June- edematous hands/knees swelled x3 days  exac by sugar intake   tumeric helpful  Since then changed diet - dec  sugar, ketodiet     No flares --recently   right hand better is moving fingers better (but shakes)  L wrist intermittent      Hx of trigger thumb, not getting stuck  RA symptoms stable    DDD/mechanical back pain, moderate pain if on feet   mostly stiffness in back  No new fractures  Alendronate-  4 tabs/month-takes 2 one day and 2 next day - then done for month           Patient History Update Since Previous Visit      Yes No Comment      New illnesses  x       Seen other healthcare provider  x       New x-ray, labs, or procedures x  labs      Started / changed / stopped medications  x       New allergies / reactions to medications  x       Changes in family medical history  x       Changes in patient social history  x       Morning stiffness x  Length:fifteen minutes      Worst Joint: fingers 4th R        Abbreviated Exam    System    nl   abn   Comment     1 Gen nutrition/hygiene x      appearance x     2 Skin turgor / integrity x      rash x      ecchymosis x     3 Psych orientation x      memory x      mood x      cooperative x     4 HENT mouth / throat x     5 Resp resp. effort x      auscultation x     6 CV heart auscultation x      extremity edema x     7 Other             Joint Assessment      Patient Right     Patient Left     x (check if no synovitis seen on exam)   Joint Pain Swelling Joint Pain Swelling   Shoulder   Shoulder     Elbow   Elbow     Wrist   Wrist     Knee   Knee     MCP I   MCP I     MCP II   MCP II     MCP III   MCP III     MCP IV   MCP IV     MCP V   MCP V     IP I   IP I     PIP II   PIP II     PIP III   PIP III     PIP IV   PIP IV     PIP V   PIP V             Generalized Ligament Laxity Prior Test /

## 2024-03-26 NOTE — ASSESSMENT & PLAN NOTE
Last seen by Rheumatology in March 2024  Adjustment in meds  Hydroxychloroquine continued   Annual Eye Exam needed

## 2024-03-28 ENCOUNTER — OFFICE VISIT (OUTPATIENT)
Dept: INTERNAL MEDICINE | Age: 74
End: 2024-03-28
Payer: MEDICAID

## 2024-03-28 ENCOUNTER — TELEPHONE (OUTPATIENT)
Dept: INTERNAL MEDICINE | Age: 74
End: 2024-03-28

## 2024-03-28 VITALS
BODY MASS INDEX: 36.19 KG/M2 | HEART RATE: 82 BPM | OXYGEN SATURATION: 97 % | TEMPERATURE: 97 F | SYSTOLIC BLOOD PRESSURE: 124 MMHG | RESPIRATION RATE: 14 BRPM | WEIGHT: 212 LBS | HEIGHT: 64 IN | DIASTOLIC BLOOD PRESSURE: 63 MMHG

## 2024-03-28 DIAGNOSIS — J42 CHRONIC BRONCHITIS, UNSPECIFIED CHRONIC BRONCHITIS TYPE (HCC): ICD-10-CM

## 2024-03-28 DIAGNOSIS — E78.2 MIXED HYPERLIPIDEMIA: ICD-10-CM

## 2024-03-28 DIAGNOSIS — M81.0 AGE-RELATED OSTEOPOROSIS WITHOUT CURRENT PATHOLOGICAL FRACTURE: ICD-10-CM

## 2024-03-28 DIAGNOSIS — I10 ESSENTIAL HYPERTENSION: ICD-10-CM

## 2024-03-28 DIAGNOSIS — Z78.9 NEED FOR FOLLOW-UP BY SOCIAL WORKER: ICD-10-CM

## 2024-03-28 DIAGNOSIS — Z12.2 SCREENING FOR LUNG CANCER: ICD-10-CM

## 2024-03-28 DIAGNOSIS — E11.9 CONTROLLED TYPE 2 DIABETES MELLITUS WITHOUT COMPLICATION, WITHOUT LONG-TERM CURRENT USE OF INSULIN (HCC): ICD-10-CM

## 2024-03-28 DIAGNOSIS — K21.9 GASTROESOPHAGEAL REFLUX DISEASE WITHOUT ESOPHAGITIS: ICD-10-CM

## 2024-03-28 DIAGNOSIS — M05.79 RHEUMATOID ARTHRITIS INVOLVING MULTIPLE SITES WITH POSITIVE RHEUMATOID FACTOR (HCC): Primary | ICD-10-CM

## 2024-03-28 PROCEDURE — 3074F SYST BP LT 130 MM HG: CPT | Performed by: INTERNAL MEDICINE

## 2024-03-28 PROCEDURE — 99213 OFFICE O/P EST LOW 20 MIN: CPT | Performed by: INTERNAL MEDICINE

## 2024-03-28 PROCEDURE — 3078F DIAST BP <80 MM HG: CPT | Performed by: INTERNAL MEDICINE

## 2024-03-28 PROCEDURE — 1123F ACP DISCUSS/DSCN MKR DOCD: CPT | Performed by: INTERNAL MEDICINE

## 2024-03-28 RX ORDER — PANTOPRAZOLE SODIUM 40 MG/1
TABLET, DELAYED RELEASE ORAL
Qty: 30 TABLET | Refills: 2 | Status: SHIPPED | OUTPATIENT
Start: 2024-03-28

## 2024-03-28 RX ORDER — ALBUTEROL SULFATE 90 UG/1
2 AEROSOL, METERED RESPIRATORY (INHALATION) EVERY 6 HOURS PRN
Qty: 1 EACH | Refills: 5 | Status: SHIPPED | OUTPATIENT
Start: 2024-03-28

## 2024-03-28 RX ORDER — LISINOPRIL 20 MG/1
20 TABLET ORAL DAILY
Qty: 90 TABLET | Refills: 1 | Status: SHIPPED | OUTPATIENT
Start: 2024-03-28

## 2024-03-28 RX ORDER — ROSUVASTATIN CALCIUM 5 MG/1
TABLET, COATED ORAL
Qty: 90 TABLET | Refills: 1 | Status: SHIPPED | OUTPATIENT
Start: 2024-03-28

## 2024-03-28 ASSESSMENT — ENCOUNTER SYMPTOMS
DIARRHEA: 0
SHORTNESS OF BREATH: 0
WHEEZING: 0
BLOOD IN STOOL: 0
VOICE CHANGE: 0
CONSTIPATION: 0
NAUSEA: 0
TROUBLE SWALLOWING: 0
VOMITING: 0

## 2024-03-28 NOTE — PATIENT INSTRUCTIONS
1) We will request an assessment of insurance for coverage for PT- we will ask our  to contact appropriately.    2) Maintain scheduled labs for Rheumatology follow-up.    3) Continue glucose monitoring     4) Order for Bone assessment with DEXA scan and CT scan to assess for prior smoking hx placed.    5) Please call with any questions.

## 2024-03-28 NOTE — TELEPHONE ENCOUNTER
Consult from Dr. Lay on 3.28.24 re: financial concerns related to PT at [facility].  Phone call to pt as she had already left the office.  Pt states she had completed paperwork at [facility] for possible financial assistance and has not yet heard of outcome to assist with out of pocket copy assistance.  LSW spoke with Lester at [facility]; reports pt has completed paperwork, but needs to provide explanations for other factors to be granted assistance and needs to call to follow up  LSW left message for pt to return call to LSW direct number

## 2024-04-03 ENCOUNTER — TELEPHONE (OUTPATIENT)
Dept: INTERNAL MEDICINE | Age: 74
End: 2024-04-03

## 2024-04-03 NOTE — TELEPHONE ENCOUNTER
Call initiated to pt as she did not return LSW call; appeared pt was familiar with info needed to pursue with [facility] financial assistance.  Per pt request texted email she is to send letters of explanation re: household and account info clarificationl to be emailed to [email or web address] or call [phone].  Pt considering [facility] PT but has not completed [employer] assistance and states has had an application but not completed; advised to inform LSW of decision moving forward

## 2024-04-25 ENCOUNTER — HOSPITAL ENCOUNTER (OUTPATIENT)
Dept: CT IMAGING | Age: 74
Discharge: HOME | End: 2024-04-27
Attending: INTERNAL MEDICINE
Payer: COMMERCIAL

## 2024-04-25 ENCOUNTER — HOSPITAL ENCOUNTER (OUTPATIENT)
Dept: MAMMOGRAPHY | Age: 74
Discharge: HOME | End: 2024-04-27
Attending: INTERNAL MEDICINE
Payer: COMMERCIAL

## 2024-04-25 DIAGNOSIS — M81.0 AGE-RELATED OSTEOPOROSIS WITHOUT CURRENT PATHOLOGICAL FRACTURE: ICD-10-CM

## 2024-04-25 DIAGNOSIS — Z12.2 SCREENING FOR LUNG CANCER: ICD-10-CM

## 2024-04-25 PROCEDURE — 71271 CT THORAX LUNG CANCER SCR C-: CPT

## 2024-04-25 PROCEDURE — 77080 DXA BONE DENSITY AXIAL: CPT

## 2024-04-26 ENCOUNTER — TELEPHONE (OUTPATIENT)
Dept: CASE MANAGEMENT | Age: 74
End: 2024-04-26

## 2024-04-26 NOTE — TELEPHONE ENCOUNTER
No call, encounter opened to process CT Lung Screening.    CT Lung Screen: 4/25/2024    IMPRESSION:  1. There is no pulmonary infiltrate, mass or suspicious pulmonary nodule  2. Emphysematous changes  3. Significant calcified plaque within the coronary arteries.     LUNG RADS:  Lung-RADS 1 - Negative (v2022)     Management:  12 month screening LDCT     RECOMMENDATIONS:  If you would like to register your patient with the [facility] Lung Nodule/Lung  Cancer Screening Program, please contact the Nurse Navigator at  [phone].     Pack years: 40    Social History     Tobacco Use  Smoking Status: Former Smoker    Start Date: 1971   Quit Date: 2011   Types: Cigarettes   Packs/Day: 1   Years: 40   Pack Years: 40   Smokeless Tobacco: Never         Results letter sent to patient via my chart or mailed.     Lorena Dupree  Imaging Navigator   [facility]   [phone]

## 2024-05-30 DIAGNOSIS — M81.0 OSTEOPOROSIS, UNSPECIFIED OSTEOPOROSIS TYPE, UNSPECIFIED PATHOLOGICAL FRACTURE PRESENCE: ICD-10-CM

## 2024-06-03 ENCOUNTER — HOSPITAL ENCOUNTER (OUTPATIENT)
Age: 74
Discharge: HOME | End: 2024-06-03
Payer: MEDICARE

## 2024-06-03 DIAGNOSIS — Z79.899 HIGH RISK MEDICATIONS (NOT ANTICOAGULANTS) LONG-TERM USE: ICD-10-CM

## 2024-06-03 DIAGNOSIS — M05.79 RHEUMATOID ARTHRITIS INVOLVING MULTIPLE SITES WITH POSITIVE RHEUMATOID FACTOR (HCC): ICD-10-CM

## 2024-06-03 LAB
ALBUMIN SERPL-MCNC: 4.6 G/DL (ref 3.5–5.2)
ALP SERPL-CCNC: 37 U/L (ref 35–104)
ALT SERPL-CCNC: 15 U/L (ref 0–32)
ANION GAP SERPL CALCULATED.3IONS-SCNC: 17 MMOL/L (ref 7–16)
AST SERPL-CCNC: 16 U/L (ref 0–31)
BILIRUB SERPL-MCNC: 0.5 MG/DL (ref 0–1.2)
BUN SERPL-MCNC: 19 MG/DL (ref 6–23)
CALCIUM SERPL-MCNC: 9.4 MG/DL (ref 8.6–10.2)
CHLORIDE SERPL-SCNC: 103 MMOL/L (ref 98–107)
CO2 SERPL-SCNC: 19 MMOL/L (ref 22–29)
CREAT SERPL-MCNC: 1.1 MG/DL (ref 0.5–1)
CRP SERPL HS-MCNC: 3 MG/L (ref 0–5)
ERYTHROCYTE [SEDIMENTATION RATE] IN BLOOD BY WESTERGREN METHOD: 2 MM/HR (ref 0–20)
GFR, ESTIMATED: 56 ML/MIN/1.73M2
GLUCOSE SERPL-MCNC: 127 MG/DL (ref 74–99)
POTASSIUM SERPL-SCNC: 4.4 MMOL/L (ref 3.5–5)
PROT SERPL-MCNC: 7.3 G/DL (ref 6.4–8.3)
SODIUM SERPL-SCNC: 139 MMOL/L (ref 132–146)

## 2024-06-03 PROCEDURE — 80053 COMPREHEN METABOLIC PANEL: CPT

## 2024-06-03 PROCEDURE — 86200 CCP ANTIBODY: CPT

## 2024-06-03 PROCEDURE — 86140 C-REACTIVE PROTEIN: CPT

## 2024-06-03 PROCEDURE — 36415 COLL VENOUS BLD VENIPUNCTURE: CPT

## 2024-06-03 PROCEDURE — 85652 RBC SED RATE AUTOMATED: CPT

## 2024-06-04 ENCOUNTER — OFFICE VISIT (OUTPATIENT)
Dept: RHEUMATOLOGY | Age: 74
End: 2024-06-04
Payer: MEDICARE

## 2024-06-04 VITALS
HEART RATE: 83 BPM | HEIGHT: 64 IN | OXYGEN SATURATION: 97 % | RESPIRATION RATE: 16 BRPM | DIASTOLIC BLOOD PRESSURE: 64 MMHG | BODY MASS INDEX: 36.16 KG/M2 | TEMPERATURE: 97.5 F | SYSTOLIC BLOOD PRESSURE: 117 MMHG | WEIGHT: 211.8 LBS

## 2024-06-04 DIAGNOSIS — M05.79 RHEUMATOID ARTHRITIS INVOLVING MULTIPLE SITES WITH POSITIVE RHEUMATOID FACTOR (HCC): ICD-10-CM

## 2024-06-04 DIAGNOSIS — Z79.899 HIGH RISK MEDICATIONS (NOT ANTICOAGULANTS) LONG-TERM USE: ICD-10-CM

## 2024-06-04 DIAGNOSIS — K76.0 FATTY LIVER DISEASE, NONALCOHOLIC: Primary | ICD-10-CM

## 2024-06-04 DIAGNOSIS — R25.2 LEG CRAMPS: ICD-10-CM

## 2024-06-04 DIAGNOSIS — M81.0 OSTEOPOROSIS, UNSPECIFIED OSTEOPOROSIS TYPE, UNSPECIFIED PATHOLOGICAL FRACTURE PRESENCE: ICD-10-CM

## 2024-06-04 DIAGNOSIS — E11.9 CONTROLLED TYPE 2 DIABETES MELLITUS WITHOUT COMPLICATION, WITHOUT LONG-TERM CURRENT USE OF INSULIN (HCC): Primary | ICD-10-CM

## 2024-06-04 DIAGNOSIS — M76.32 IT BAND SYNDROME, LEFT: ICD-10-CM

## 2024-06-04 LAB — CCP AB SER IA-ACNC: 24 U/ML (ref 0–7)

## 2024-06-04 PROCEDURE — 1123F ACP DISCUSS/DSCN MKR DOCD: CPT | Performed by: INTERNAL MEDICINE

## 2024-06-04 PROCEDURE — 3078F DIAST BP <80 MM HG: CPT | Performed by: INTERNAL MEDICINE

## 2024-06-04 PROCEDURE — 99214 OFFICE O/P EST MOD 30 MIN: CPT | Performed by: INTERNAL MEDICINE

## 2024-06-04 PROCEDURE — 3074F SYST BP LT 130 MM HG: CPT | Performed by: INTERNAL MEDICINE

## 2024-06-04 PROCEDURE — 99212 OFFICE O/P EST SF 10 MIN: CPT | Performed by: INTERNAL MEDICINE

## 2024-06-04 RX ORDER — HYDROXYCHLOROQUINE SULFATE 200 MG/1
200 TABLET, FILM COATED ORAL DAILY
Qty: 90 TABLET | Refills: 1 | Status: SHIPPED | OUTPATIENT
Start: 2024-06-04

## 2024-06-04 RX ORDER — ALENDRONATE SODIUM 70 MG/1
70 TABLET ORAL
Qty: 12 TABLET | Refills: 1 | Status: SHIPPED | OUTPATIENT
Start: 2024-06-04

## 2024-06-04 RX ORDER — ALENDRONATE SODIUM 70 MG/1
70 TABLET ORAL
Qty: 12 TABLET | Refills: 1 | OUTPATIENT
Start: 2024-06-04

## 2024-06-04 NOTE — PROGRESS NOTES
J O I N T  C A R E  C L I N I C        The patient is seen in follow-up for:   Rheumatoid  arthritis , review labs  having discomfort to bilateral hands ,left hip and knee    : rheumatoid arthritis  RA-    hcq 200mg daily - hands had been stable- intermtittently stiff few hours in am     One \"flare\" in June- edematous hands/knees swelled x3 days  exac by sugar intake +gluten exac   hcq +tumeric helpful  Since then changed diet - dec  sugar, ketodiet     No flares --recently   right hand better is moving fingers better (but shakes)  L wrist intermittent      Hx of trigger thumb, not getting stuck  RA symptoms stable    DDD/mechanical back pain, moderate pain if on feet   mostly stiffness in back  No new fractures  Alendronate-  4 tabs/month-takes 2 one day and 2 next day - then done for month    +THC gummie helpful bedtime             Patient History Update Since Previous Visit      Yes No Comment      New illnesses x  Having more difficulty with hearing and tennitus      Seen other healthcare provider  x       New x-ray, labs, or procedures x  Labs       Started / changed / stopped medications  x                                                                             New allergies / reactions to medications  x       Changes in family medical history  x       Changes in patient social history  x       Morning stiffness x  Length: 30 minutes              Medical Record Review        Check if Reviewed   x Vital Signs & Weight  Other Providers Consults & Notes   x Laboratory Results x Imaging Results          Abbreviated Exam          System    nl   abn   Comment     1 Gen nutrition/hygiene x      appearance x     2 Skin turgor / integrity x      rash xx      ecchymosis x     3 Psych orientation x      memory x      mood x      cooperative x     4 HENT mouth / throat x     5 Resp resp. effort x      auscultation x     6 CV heart auscultation x      extremity edema x          Tender Points           Yes x  No

## 2024-06-04 NOTE — PROGRESS NOTES
Patient verbalized understanding of office instructions.  She will call with questions or concerns.  She was given discharge instructions, and scripts for lab work to be done. All questions were fully answered.  Printed AVS mailed to pt.

## 2024-06-11 DIAGNOSIS — M81.0 OSTEOPOROSIS, UNSPECIFIED OSTEOPOROSIS TYPE, UNSPECIFIED PATHOLOGICAL FRACTURE PRESENCE: ICD-10-CM

## 2024-06-11 DIAGNOSIS — M05.79 RHEUMATOID ARTHRITIS INVOLVING MULTIPLE SITES WITH POSITIVE RHEUMATOID FACTOR (HCC): ICD-10-CM

## 2024-06-11 RX ORDER — HYDROXYCHLOROQUINE SULFATE 200 MG/1
200 TABLET, FILM COATED ORAL DAILY
Qty: 90 TABLET | Refills: 1 | Status: SHIPPED | OUTPATIENT
Start: 2024-06-11

## 2024-06-11 RX ORDER — ALENDRONATE SODIUM 70 MG/1
70 TABLET ORAL
Qty: 12 TABLET | Refills: 1 | Status: SHIPPED | OUTPATIENT
Start: 2024-06-11

## 2024-06-12 NOTE — ASSESSMENT & PLAN NOTE
Following with Rheumatology and Dr. Desai  Surveillance labs from 6/3 reviewed  Renal function needs to be monitored closely   Inflammatory markers stable   Continued use of hydroxychloroquine

## 2024-06-12 NOTE — ASSESSMENT & PLAN NOTE
DEXA completed in April 2024  Osteoporosis confirmed  Hx of Hip Fracture  Continue Bisphosphonate for 2nd prevention and continued Hip fx risk of > 3% per FRAX scoring    FRAX 10 year probability of major osteoporotic fracture is 17.6% and hip  fracture is 3.4%.     IMPRESSION:  Osteoporosis left femoral neck by WHO criteria.

## 2024-06-12 NOTE — PROGRESS NOTES
Gricel Gallagher (:  1950) is a 73 y.o. female,Established patient, here for evaluation of the following chief complaint(s):  No chief complaint on file.      Assessment & Plan   1. Coronary artery calcification of native artery  Assessment & Plan:  Continue risk reduction  Would recommend if tolerated to increase statin given ASCVD risk to higher intensity  Nuclear stress in - no acute findings with preserved EF  Significant calcification burden on imaging- recommend Cardiology assessment as noted in past for further risk reduction- will consider in future   2. Centrilobular emphysema (HCC)  Assessment & Plan:  LDCT continued annually- last in 2024  Repeat in 2025  3. Controlled type 2 diabetes mellitus without complication, without long-term current use of insulin (HCC)  Assessment & Plan:  A1c most recently below 6%  Jardiance reduced to 10 mg daily   Renal function stable  Continue current management   Orders:  -     Urinalysis with Microscopic; Future  4. Rheumatoid arthritis involving multiple sites with positive rheumatoid factor (HCC)  Assessment & Plan:  Following with Rheumatology and Dr. Desai  Surveillance labs from 6/3 reviewed  Renal function needs to be monitored closely   Inflammatory markers stable   Continued use of hydroxychloroquine   5. Age-related osteoporosis without current pathological fracture  Assessment & Plan:   DEXA completed in 2024  Osteoporosis confirmed  Hx of Hip Fracture  Continue Bisphosphonate for 2nd prevention and continued Hip fx risk of > 3% per FRAX scoring    FRAX 10 year probability of major osteoporotic fracture is 17.6% and hip  fracture is 3.4%.     IMPRESSION:  Osteoporosis left femoral neck by WHO criteria.  6. Splenomegaly  -     CBC with Auto Differential; Future  -     US ABDOMEN LIMITED; Future  -     Path Review, Smear; Future  Last CT abdomen noted spleen unremarkable in size but hx of splenomegaly and F1 fibrosis of liver

## 2024-06-12 NOTE — ASSESSMENT & PLAN NOTE
Continue risk reduction  Would recommend if tolerated to increase statin given ASCVD risk to higher intensity  Nuclear stress in 2023- no acute findings with preserved EF  Significant calcification burden on imaging- recommend Cardiology assessment as noted in past for further risk reduction

## 2024-06-13 ENCOUNTER — OFFICE VISIT (OUTPATIENT)
Dept: INTERNAL MEDICINE | Age: 74
End: 2024-06-13
Payer: MEDICARE

## 2024-06-13 VITALS
DIASTOLIC BLOOD PRESSURE: 62 MMHG | TEMPERATURE: 98.2 F | OXYGEN SATURATION: 97 % | HEART RATE: 84 BPM | SYSTOLIC BLOOD PRESSURE: 133 MMHG | WEIGHT: 208.3 LBS | BODY MASS INDEX: 35.56 KG/M2 | HEIGHT: 64 IN | RESPIRATION RATE: 18 BRPM

## 2024-06-13 DIAGNOSIS — I25.10 CORONARY ARTERY CALCIFICATION OF NATIVE ARTERY: Primary | ICD-10-CM

## 2024-06-13 DIAGNOSIS — R16.1 SPLENOMEGALY: ICD-10-CM

## 2024-06-13 DIAGNOSIS — M25.552 LEFT HIP PAIN: ICD-10-CM

## 2024-06-13 DIAGNOSIS — E11.9 CONTROLLED TYPE 2 DIABETES MELLITUS WITHOUT COMPLICATION, WITHOUT LONG-TERM CURRENT USE OF INSULIN (HCC): ICD-10-CM

## 2024-06-13 DIAGNOSIS — J43.2 CENTRILOBULAR EMPHYSEMA (HCC): ICD-10-CM

## 2024-06-13 DIAGNOSIS — I25.84 CORONARY ARTERY CALCIFICATION OF NATIVE ARTERY: Primary | ICD-10-CM

## 2024-06-13 DIAGNOSIS — M81.0 AGE-RELATED OSTEOPOROSIS WITHOUT CURRENT PATHOLOGICAL FRACTURE: ICD-10-CM

## 2024-06-13 DIAGNOSIS — M05.79 RHEUMATOID ARTHRITIS INVOLVING MULTIPLE SITES WITH POSITIVE RHEUMATOID FACTOR (HCC): ICD-10-CM

## 2024-06-13 DIAGNOSIS — M25.562 ACUTE PAIN OF LEFT KNEE: ICD-10-CM

## 2024-06-13 PROCEDURE — 3075F SYST BP GE 130 - 139MM HG: CPT | Performed by: INTERNAL MEDICINE

## 2024-06-13 PROCEDURE — 99213 OFFICE O/P EST LOW 20 MIN: CPT | Performed by: INTERNAL MEDICINE

## 2024-06-13 PROCEDURE — 3078F DIAST BP <80 MM HG: CPT | Performed by: INTERNAL MEDICINE

## 2024-06-13 PROCEDURE — 1123F ACP DISCUSS/DSCN MKR DOCD: CPT | Performed by: INTERNAL MEDICINE

## 2024-06-13 ASSESSMENT — ENCOUNTER SYMPTOMS
NAUSEA: 0
CONSTIPATION: 0
VOMITING: 0
WHEEZING: 0
ABDOMINAL DISTENTION: 0
TROUBLE SWALLOWING: 0
ABDOMINAL PAIN: 0
SHORTNESS OF BREATH: 0
COUGH: 0
BLOOD IN STOOL: 0

## 2024-06-13 ASSESSMENT — PATIENT HEALTH QUESTIONNAIRE - PHQ9
SUM OF ALL RESPONSES TO PHQ QUESTIONS 1-9: 0
2. FEELING DOWN, DEPRESSED OR HOPELESS: NOT AT ALL
SUM OF ALL RESPONSES TO PHQ QUESTIONS 1-9: 0
SUM OF ALL RESPONSES TO PHQ QUESTIONS 1-9: 0
1. LITTLE INTEREST OR PLEASURE IN DOING THINGS: NOT AT ALL
SUM OF ALL RESPONSES TO PHQ9 QUESTIONS 1 & 2: 0
SUM OF ALL RESPONSES TO PHQ QUESTIONS 1-9: 0

## 2024-06-13 NOTE — PATIENT INSTRUCTIONS
1) Obtain Lipid panel with repeat 3 month labs for Rheumatology.     2) We ordered Xrays and U/S of the spleen to be completed.     3) We will follow results and discuss next steps    4) Follow-up in 3 months

## 2024-06-26 ENCOUNTER — HOSPITAL ENCOUNTER (OUTPATIENT)
Age: 74
Discharge: HOME | End: 2024-06-28
Payer: MEDICARE

## 2024-06-26 ENCOUNTER — HOSPITAL ENCOUNTER (OUTPATIENT)
Dept: GENERAL RADIOLOGY | Age: 74
Discharge: HOME | End: 2024-06-28
Payer: MEDICARE

## 2024-06-26 ENCOUNTER — HOSPITAL ENCOUNTER (OUTPATIENT)
Age: 74
Discharge: HOME | End: 2024-06-26
Payer: MEDICARE

## 2024-06-26 DIAGNOSIS — M25.562 ACUTE PAIN OF LEFT KNEE: ICD-10-CM

## 2024-06-26 DIAGNOSIS — M25.552 LEFT HIP PAIN: ICD-10-CM

## 2024-06-26 DIAGNOSIS — R16.1 SPLENOMEGALY: ICD-10-CM

## 2024-06-26 DIAGNOSIS — E11.9 CONTROLLED TYPE 2 DIABETES MELLITUS WITHOUT COMPLICATION, WITHOUT LONG-TERM CURRENT USE OF INSULIN (HCC): ICD-10-CM

## 2024-06-26 LAB
ANION GAP SERPL CALCULATED.3IONS-SCNC: 14 MMOL/L (ref 7–16)
BACTERIA URNS QL MICRO: ABNORMAL
BASOPHILS # BLD: 0.04 K/UL (ref 0–0.2)
BASOPHILS NFR BLD: 1 % (ref 0–2)
BILIRUB UR QL STRIP: NEGATIVE
BUN SERPL-MCNC: 16 MG/DL (ref 6–23)
CALCIUM SERPL-MCNC: 9.3 MG/DL (ref 8.6–10.2)
CHLORIDE SERPL-SCNC: 106 MMOL/L (ref 98–107)
CLARITY UR: CLEAR
CO2 SERPL-SCNC: 22 MMOL/L (ref 22–29)
COLOR UR: YELLOW
CREAT SERPL-MCNC: 0.9 MG/DL (ref 0.5–1)
CREAT UR-MCNC: 89.6 MG/DL (ref 29–226)
EOSINOPHIL # BLD: 0.2 K/UL (ref 0.05–0.5)
EOSINOPHILS RELATIVE PERCENT: 3 % (ref 0–6)
EPI CELLS #/AREA URNS HPF: ABNORMAL /HPF
ERYTHROCYTE [DISTWIDTH] IN BLOOD BY AUTOMATED COUNT: 13.6 % (ref 11.5–15)
GFR, ESTIMATED: 64 ML/MIN/1.73M2
GLUCOSE SERPL-MCNC: 165 MG/DL (ref 74–99)
GLUCOSE UR STRIP-MCNC: >=1000 MG/DL
HCT VFR BLD AUTO: 47.7 % (ref 34–48)
HGB BLD-MCNC: 15.7 G/DL (ref 11.5–15.5)
HGB UR QL STRIP.AUTO: ABNORMAL
IMM GRANULOCYTES # BLD AUTO: 0.03 K/UL (ref 0–0.58)
IMM GRANULOCYTES NFR BLD: 0 % (ref 0–5)
KETONES UR STRIP-MCNC: NEGATIVE MG/DL
LEUKOCYTE ESTERASE UR QL STRIP: ABNORMAL
LYMPHOCYTES NFR BLD: 1.68 K/UL (ref 1.5–4)
LYMPHOCYTES RELATIVE PERCENT: 22 % (ref 20–42)
MCH RBC QN AUTO: 29 PG (ref 26–35)
MCHC RBC AUTO-ENTMCNC: 32.9 G/DL (ref 32–34.5)
MCV RBC AUTO: 88 FL (ref 80–99.9)
MICROALBUMIN UR-MCNC: 22 MG/L (ref 0–19)
MICROALBUMIN/CREAT UR-RTO: 25 MCG/MG CREAT (ref 0–30)
MONOCYTES NFR BLD: 0.85 K/UL (ref 0.1–0.95)
MONOCYTES NFR BLD: 11 % (ref 2–12)
NEUTROPHILS NFR BLD: 63 % (ref 43–80)
NEUTS SEG NFR BLD: 4.8 K/UL (ref 1.8–7.3)
NITRITE UR QL STRIP: NEGATIVE
PATH REV BLD -IMP: NORMAL
PH UR STRIP: 5.5 [PH] (ref 5–9)
PLATELET # BLD AUTO: 235 K/UL (ref 130–450)
PMV BLD AUTO: 8.4 FL (ref 7–12)
POTASSIUM SERPL-SCNC: 4.4 MMOL/L (ref 3.5–5)
PROT UR STRIP-MCNC: NEGATIVE MG/DL
RBC # BLD AUTO: 5.42 M/UL (ref 3.5–5.5)
RBC #/AREA URNS HPF: ABNORMAL /HPF
SODIUM SERPL-SCNC: 142 MMOL/L (ref 132–146)
SP GR UR STRIP: >1.03 (ref 1–1.03)
UROBILINOGEN UR STRIP-ACNC: 0.2 EU/DL (ref 0–1)
WBC #/AREA URNS HPF: ABNORMAL /HPF
WBC OTHER # BLD: 7.6 K/UL (ref 4.5–11.5)
YEAST URNS QL MICRO: PRESENT

## 2024-06-26 PROCEDURE — 36415 COLL VENOUS BLD VENIPUNCTURE: CPT

## 2024-06-26 PROCEDURE — 82570 ASSAY OF URINE CREATININE: CPT

## 2024-06-26 PROCEDURE — 81001 URINALYSIS AUTO W/SCOPE: CPT

## 2024-06-26 PROCEDURE — 82043 UR ALBUMIN QUANTITATIVE: CPT

## 2024-06-26 PROCEDURE — 73564 X-RAY EXAM KNEE 4 OR MORE: CPT

## 2024-06-26 PROCEDURE — 73502 X-RAY EXAM HIP UNI 2-3 VIEWS: CPT

## 2024-06-26 PROCEDURE — 80048 BASIC METABOLIC PNL TOTAL CA: CPT

## 2024-06-26 PROCEDURE — 85025 COMPLETE CBC W/AUTO DIFF WBC: CPT

## 2024-06-27 ENCOUNTER — TELEPHONE (OUTPATIENT)
Dept: INTERNAL MEDICINE | Age: 74
End: 2024-06-27

## 2024-06-27 NOTE — TELEPHONE ENCOUNTER
Please note xrays of hip and knee were reviewed- it shows significant degenerative changes at both knee and hip- significant arthritis;    Given findings and worsening function of hip- recommending assessment by Sports Medicine- ultimately- may need to consider Ortho assessment.  Given already completed PT, would recommend Sports Med- if patient is agreeable- will place referral.

## 2024-07-03 ENCOUNTER — HOSPITAL ENCOUNTER (OUTPATIENT)
Dept: ULTRASOUND IMAGING | Age: 74
Discharge: HOME | End: 2024-07-05
Payer: MEDICARE

## 2024-07-03 DIAGNOSIS — R16.1 SPLENOMEGALY: ICD-10-CM

## 2024-07-03 PROCEDURE — 76705 ECHO EXAM OF ABDOMEN: CPT

## 2024-07-10 DIAGNOSIS — I10 ESSENTIAL HYPERTENSION: ICD-10-CM

## 2024-07-10 DIAGNOSIS — K21.9 GASTROESOPHAGEAL REFLUX DISEASE WITHOUT ESOPHAGITIS: ICD-10-CM

## 2024-07-10 DIAGNOSIS — E11.9 CONTROLLED TYPE 2 DIABETES MELLITUS WITHOUT COMPLICATION, WITHOUT LONG-TERM CURRENT USE OF INSULIN (HCC): ICD-10-CM

## 2024-07-10 DIAGNOSIS — E78.2 MIXED HYPERLIPIDEMIA: ICD-10-CM

## 2024-07-10 RX ORDER — PANTOPRAZOLE SODIUM 40 MG/1
TABLET, DELAYED RELEASE ORAL
Qty: 30 TABLET | Refills: 2 | Status: SHIPPED | OUTPATIENT
Start: 2024-07-10

## 2024-07-10 RX ORDER — LISINOPRIL 20 MG/1
20 TABLET ORAL DAILY
Qty: 90 TABLET | Refills: 1 | Status: SHIPPED | OUTPATIENT
Start: 2024-07-10

## 2024-07-10 RX ORDER — ROSUVASTATIN CALCIUM 5 MG/1
TABLET, COATED ORAL
Qty: 90 TABLET | Refills: 1 | Status: SHIPPED | OUTPATIENT
Start: 2024-07-10

## 2024-08-07 ENCOUNTER — OFFICE VISIT (OUTPATIENT)
Dept: ORTHOPEDIC SURGERY | Age: 74
End: 2024-08-07
Payer: MEDICARE

## 2024-08-07 VITALS — WEIGHT: 212 LBS | HEIGHT: 64 IN | BODY MASS INDEX: 36.19 KG/M2

## 2024-08-07 DIAGNOSIS — M17.12 PRIMARY OSTEOARTHRITIS OF LEFT KNEE: ICD-10-CM

## 2024-08-07 DIAGNOSIS — M16.12 PRIMARY OSTEOARTHRITIS OF LEFT HIP: Primary | ICD-10-CM

## 2024-08-07 PROCEDURE — 1123F ACP DISCUSS/DSCN MKR DOCD: CPT | Performed by: ORTHOPAEDIC SURGERY

## 2024-08-07 PROCEDURE — 99214 OFFICE O/P EST MOD 30 MIN: CPT | Performed by: ORTHOPAEDIC SURGERY

## 2024-08-08 NOTE — PROGRESS NOTES
[facility]  ORTHOPAEDIC AND SPORTS MEDICINE  DATE OF VISIT: 24  Established Patient new hip ailment    Referring Provider:   Adán Lay MD  [address] [place] [address]    CHIEF COMPLAINT:   Chief Complaint   Patient presents with    Hip Pain     Left hip pain     Knee Pain     Left knee pain        HPI:      Gricel Gallagher is a 73 y.o. year old female who is seen today  for evaluation of left hip pain.  Patient was seen once and evaluated for right hip trochanteric bursitis back in 2022 following surgery for a right hip fracture treated by Dr. Betancur.  Patient reports left hip pain that has begun over the last 6 to 12 months.  Reports symptoms have been steadily worsening recently.  Reports symptoms remain unchanged with previous treatments by her PCP.  She reports persistent pain and stiffness even while at rest.  Reports limited range of motion that has been affecting her gait.    PAST MEDICAL HISTORY  Past Medical History:   Diagnosis Date    Class 3 severe obesity due to excess calories with serious comorbidity and body mass index (BMI) of 40.0 to 44.9 in adult ([facility])     COPD (chronic obstructive pulmonary disease) ([facility])     mild    Diabetes mellitus type II, controlled ([facility])     Diverticulosis     Gastroesophageal reflux disease without esophagitis     Hyperlipidemia     Hypertension     Kidney stone     Obesity, Class III, BMI 40-49.9 (morbid obesity) ([facility])     Osteoarthritis     Osteoporosis     Rheumatoid arthritis involving multiple sites ([facility])     follows with Dr. Giselle Alfaro ([facility])     on Levaquin per Dr. Lay since dischaarge from [facility] 3/30/2019    Tobacco abuse began age 13    past hx    Tubular adenoma of colon 2011    colonoscopy       PAST SURGICAL HISTORY  Past Surgical History:   Procedure Laterality Date    BREAST BIOPSY      benign, pt cannot remember which side    CARPAL TUNNEL RELEASE      right wrist     SECTION

## 2024-09-03 ENCOUNTER — HOSPITAL ENCOUNTER (OUTPATIENT)
Dept: AUDIOLOGY | Age: 74
Discharge: HOME | End: 2024-09-03
Payer: MEDICARE

## 2024-09-03 PROCEDURE — 92567 TYMPANOMETRY: CPT | Performed by: AUDIOLOGIST

## 2024-09-03 PROCEDURE — 92557 COMPREHENSIVE HEARING TEST: CPT | Performed by: AUDIOLOGIST

## 2024-09-03 NOTE — PROGRESS NOTES
This patient was referred for audiometric and tympanometric testing by  Andrea Lay MD  due to hearing loss.   She states she has had general hearing decline.  She does have a set of OTC hearing aids that she uses.     Audiometry using pure tone air and bone conduction testing revealed a mild-to-moderate  sensorineural hearing loss, bilaterally. Reliability was good. Speech reception thresholds were in good agreement with the pure tone averages, bilaterally. Speech discrimination scores were good, bilaterally.    Tympanometry revealed normal middle ear peak pressure and compliance, bilaterally.    The results were reviewed with the patient and sent to her ordering provider.     Recommend re evaluation if hearing sensitivity changes.  Recommend continued use of her hearing aids as well as check her insurance benefits for hearing aids.  She can call as needed.     Electronically signed by Marianne Keyes on 9/3/2024 at 1:58 PM

## 2024-09-05 ENCOUNTER — TELEPHONE (OUTPATIENT)
Dept: INTERNAL MEDICINE | Age: 74
End: 2024-09-05

## 2024-09-17 ENCOUNTER — HOSPITAL ENCOUNTER (OUTPATIENT)
Age: 74
Discharge: HOME | End: 2024-09-17
Payer: MEDICARE

## 2024-09-17 ENCOUNTER — OFFICE VISIT (OUTPATIENT)
Dept: RHEUMATOLOGY | Age: 74
End: 2024-09-17
Payer: MEDICARE

## 2024-09-17 VITALS
HEART RATE: 88 BPM | OXYGEN SATURATION: 98 % | RESPIRATION RATE: 18 BRPM | SYSTOLIC BLOOD PRESSURE: 120 MMHG | WEIGHT: 212 LBS | TEMPERATURE: 97.2 F | HEIGHT: 64 IN | DIASTOLIC BLOOD PRESSURE: 70 MMHG | BODY MASS INDEX: 36.19 KG/M2

## 2024-09-17 DIAGNOSIS — M05.79 RHEUMATOID ARTHRITIS INVOLVING MULTIPLE SITES WITH POSITIVE RHEUMATOID FACTOR (HCC): ICD-10-CM

## 2024-09-17 DIAGNOSIS — Z79.899 HIGH RISK MEDICATIONS (NOT ANTICOAGULANTS) LONG-TERM USE: ICD-10-CM

## 2024-09-17 DIAGNOSIS — R25.2 LEG CRAMPS: ICD-10-CM

## 2024-09-17 DIAGNOSIS — M81.0 OSTEOPOROSIS, UNSPECIFIED OSTEOPOROSIS TYPE, UNSPECIFIED PATHOLOGICAL FRACTURE PRESENCE: ICD-10-CM

## 2024-09-17 DIAGNOSIS — Z79.899 HIGH RISK MEDICATIONS (NOT ANTICOAGULANTS) LONG-TERM USE: Primary | ICD-10-CM

## 2024-09-17 DIAGNOSIS — E78.2 MIXED HYPERLIPIDEMIA: ICD-10-CM

## 2024-09-17 LAB
ALBUMIN SERPL-MCNC: 4.5 G/DL (ref 3.5–5.2)
ALP SERPL-CCNC: 41 U/L (ref 35–104)
ALT SERPL-CCNC: 17 U/L (ref 0–32)
ANION GAP SERPL CALCULATED.3IONS-SCNC: 14 MMOL/L (ref 7–16)
AST SERPL-CCNC: 18 U/L (ref 0–31)
BILIRUB SERPL-MCNC: 0.4 MG/DL (ref 0–1.2)
BUN SERPL-MCNC: 19 MG/DL (ref 6–23)
CALCIUM SERPL-MCNC: 9.4 MG/DL (ref 8.6–10.2)
CHLORIDE SERPL-SCNC: 105 MMOL/L (ref 98–107)
CHOLEST SERPL-MCNC: 212 MG/DL
CO2 SERPL-SCNC: 24 MMOL/L (ref 22–29)
CREAT SERPL-MCNC: 1 MG/DL (ref 0.5–1)
CRP SERPL HS-MCNC: <3 MG/L (ref 0–5)
GFR, ESTIMATED: 63 ML/MIN/1.73M2
GLUCOSE SERPL-MCNC: 164 MG/DL (ref 74–99)
HDLC SERPL-MCNC: 51 MG/DL
LDLC SERPL CALC-MCNC: 113 MG/DL
POTASSIUM SERPL-SCNC: 4.9 MMOL/L (ref 3.5–5)
PROT SERPL-MCNC: 7 G/DL (ref 6.4–8.3)
SODIUM SERPL-SCNC: 143 MMOL/L (ref 132–146)
TRIGL SERPL-MCNC: 238 MG/DL
VLDLC SERPL CALC-MCNC: 48 MG/DL

## 2024-09-17 PROCEDURE — 36415 COLL VENOUS BLD VENIPUNCTURE: CPT

## 2024-09-17 PROCEDURE — 99214 OFFICE O/P EST MOD 30 MIN: CPT | Performed by: INTERNAL MEDICINE

## 2024-09-17 PROCEDURE — 80053 COMPREHEN METABOLIC PANEL: CPT

## 2024-09-17 PROCEDURE — 1123F ACP DISCUSS/DSCN MKR DOCD: CPT | Performed by: INTERNAL MEDICINE

## 2024-09-17 PROCEDURE — 80061 LIPID PANEL: CPT

## 2024-09-17 PROCEDURE — 3078F DIAST BP <80 MM HG: CPT | Performed by: INTERNAL MEDICINE

## 2024-09-17 PROCEDURE — 3074F SYST BP LT 130 MM HG: CPT | Performed by: INTERNAL MEDICINE

## 2024-09-17 PROCEDURE — 86140 C-REACTIVE PROTEIN: CPT

## 2024-09-17 RX ORDER — MAGNESIUM CHLORIDE 71.5 G/G
TABLET ORAL
Qty: 180 TABLET | Refills: 1 | Status: SHIPPED | OUTPATIENT
Start: 2024-09-17

## 2024-09-17 RX ORDER — ALENDRONATE SODIUM 70 MG/1
70 TABLET ORAL
Qty: 12 TABLET | Refills: 1 | Status: SHIPPED | OUTPATIENT
Start: 2024-09-17

## 2024-09-17 RX ORDER — HYDROXYCHLOROQUINE SULFATE 200 MG/1
200 TABLET, FILM COATED ORAL DAILY
Qty: 90 TABLET | Refills: 1 | Status: SHIPPED | OUTPATIENT
Start: 2024-09-17

## 2024-09-19 ENCOUNTER — SOCIAL WORK (OUTPATIENT)
Dept: INTERNAL MEDICINE | Age: 74
End: 2024-09-19

## 2024-09-19 ENCOUNTER — OFFICE VISIT (OUTPATIENT)
Dept: INTERNAL MEDICINE | Age: 74
End: 2024-09-19
Payer: MEDICARE

## 2024-09-19 VITALS
OXYGEN SATURATION: 97 % | RESPIRATION RATE: 14 BRPM | DIASTOLIC BLOOD PRESSURE: 63 MMHG | SYSTOLIC BLOOD PRESSURE: 119 MMHG | BODY MASS INDEX: 36.02 KG/M2 | HEART RATE: 75 BPM | TEMPERATURE: 97.7 F | WEIGHT: 211 LBS | HEIGHT: 64 IN

## 2024-09-19 DIAGNOSIS — Z00.00 MEDICARE ANNUAL WELLNESS VISIT, SUBSEQUENT: Primary | ICD-10-CM

## 2024-09-19 DIAGNOSIS — E11.9 CONTROLLED TYPE 2 DIABETES MELLITUS WITHOUT COMPLICATION, WITHOUT LONG-TERM CURRENT USE OF INSULIN (HCC): ICD-10-CM

## 2024-09-19 DIAGNOSIS — Z12.31 ENCOUNTER FOR SCREENING MAMMOGRAM FOR MALIGNANT NEOPLASM OF BREAST: ICD-10-CM

## 2024-09-19 DIAGNOSIS — M05.79 RHEUMATOID ARTHRITIS INVOLVING MULTIPLE SITES WITH POSITIVE RHEUMATOID FACTOR (HCC): ICD-10-CM

## 2024-09-19 DIAGNOSIS — F32.A DEPRESSION, UNSPECIFIED DEPRESSION TYPE: ICD-10-CM

## 2024-09-19 DIAGNOSIS — E78.2 MIXED HYPERLIPIDEMIA: ICD-10-CM

## 2024-09-19 PROCEDURE — 99213 OFFICE O/P EST LOW 20 MIN: CPT | Performed by: INTERNAL MEDICINE

## 2024-09-19 ASSESSMENT — PATIENT HEALTH QUESTIONNAIRE - PHQ9
4. FEELING TIRED OR HAVING LITTLE ENERGY: NOT AT ALL
SUM OF ALL RESPONSES TO PHQ QUESTIONS 1-9: 1
3. TROUBLE FALLING OR STAYING ASLEEP: NOT AT ALL
SUM OF ALL RESPONSES TO PHQ QUESTIONS 1-9: 1
2. FEELING DOWN, DEPRESSED OR HOPELESS: SEVERAL DAYS
SUM OF ALL RESPONSES TO PHQ9 QUESTIONS 1 & 2: 1
6. FEELING BAD ABOUT YOURSELF - OR THAT YOU ARE A FAILURE OR HAVE LET YOURSELF OR YOUR FAMILY DOWN: NOT AT ALL
8. MOVING OR SPEAKING SO SLOWLY THAT OTHER PEOPLE COULD HAVE NOTICED. OR THE OPPOSITE, BEING SO FIGETY OR RESTLESS THAT YOU HAVE BEEN MOVING AROUND A LOT MORE THAN USUAL: NOT AT ALL
10. IF YOU CHECKED OFF ANY PROBLEMS, HOW DIFFICULT HAVE THESE PROBLEMS MADE IT FOR YOU TO DO YOUR WORK, TAKE CARE OF THINGS AT HOME, OR GET ALONG WITH OTHER PEOPLE: NOT DIFFICULT AT ALL
1. LITTLE INTEREST OR PLEASURE IN DOING THINGS: NOT AT ALL
SUM OF ALL RESPONSES TO PHQ QUESTIONS 1-9: 1
7. TROUBLE CONCENTRATING ON THINGS, SUCH AS READING THE NEWSPAPER OR WATCHING TELEVISION: NOT AT ALL
5. POOR APPETITE OR OVEREATING: NOT AT ALL
9. THOUGHTS THAT YOU WOULD BE BETTER OFF DEAD, OR OF HURTING YOURSELF: NOT AT ALL
SUM OF ALL RESPONSES TO PHQ QUESTIONS 1-9: 1

## 2024-09-19 ASSESSMENT — LIFESTYLE VARIABLES
HOW MANY STANDARD DRINKS CONTAINING ALCOHOL DO YOU HAVE ON A TYPICAL DAY: 1 OR 2
HOW OFTEN DO YOU HAVE A DRINK CONTAINING ALCOHOL: 2-4 TIMES A MONTH

## 2024-09-20 ENCOUNTER — CLINICAL DOCUMENTATION (OUTPATIENT)
Dept: SPIRITUAL SERVICES | Age: 74
End: 2024-09-20

## 2024-09-30 ENCOUNTER — CLINICAL DOCUMENTATION (OUTPATIENT)
Dept: SPIRITUAL SERVICES | Age: 74
End: 2024-09-30

## 2024-09-30 NOTE — ACP (ADVANCE CARE PLANNING)
Advance Care Planning     Advance Care Planning Clinical Specialist  Conversation Note      Date of ACP Conversation: 9/30/2024    Conversation Conducted with: Patient with Decision Making Capacity    ACP Clinical Specialist: TALAT Soria    Healthcare Decision Maker:     Current Designated Healthcare Decision Maker:     Primary Decision Maker: Adrien Gallagher - Estefani - [phone]    Today we documented Decision Maker(s) consistent with Legal Next of Kin hierarchy.    Care Preferences    Ventilation:  \"If you were in your present state of health and suddenly became very ill and were unable to breathe on your own, what would your preference be about the use of a ventilator (breathing machine) if it were available to you?\"      If the patient would desire the use of ventilator (breathing machine), answer \"yes\".  If not, \"no\": yes, pt would want measures \"if it will help me recover\"    \"If your health worsens and it becomes clear that your chance of recovery is unlikely, what would your preference be about the use of a ventilator (breathing machine) if it were available to you?\"     Would the patient desire the use of ventilator (breathing machine)?: No-pt shared she has talked with her son about her wishes for the future. Pt does not wish to complete ACP documents.       Resuscitation  \"CPR works best to restart the heart when there is a sudden event, like a heart attack, in someone who is otherwise healthy. Unfortunately, CPR does not typically restart the heart for people who have serious health conditions or who are very sick.\"    \"In the event your heart stopped as a result of an underlying serious health condition, would you want attempts to be made to restart your heart (answer \"yes\" for attempt to resuscitate) or would you prefer a natural death (answer \"no\" for do not attempt to resuscitate)?\"  Pt remains FULL CODE and shared she would defer wishes for the future to her son, Roger.       [x] Yes   [] No

## 2024-10-03 ENCOUNTER — TELEMEDICINE (OUTPATIENT)
Age: 74
End: 2024-10-03

## 2024-10-03 DIAGNOSIS — F32.A DEPRESSION, UNSPECIFIED DEPRESSION TYPE: Primary | ICD-10-CM

## 2024-10-03 NOTE — PROGRESS NOTES
treatment      INTERACTIVE COMPLEXITY  Is interactive complexity present?  No  Reason:  N/A  Additional Supporting Information:  N/A       Electronically signed by TALAT Chawla on 10/3/24 at 12:17 PM EDT    Gricel Gallagher, was evaluated through a synchronous (real-time) audio-video encounter. The patient (or guardian if applicable) is aware that this is a billable service, which includes applicable co-pays. This Virtual Visit was conducted with patient's (and/or legal guardian's) consent. Patient identification was verified, and a caregiver was present when appropriate.   The patient was located at Home: [address] [place] [address]  Provider was located at Facility (Appt Dept): [address] [place] [address]  Confirm you are appropriately licensed, registered, or certified to deliver care in the state where the patient is located as indicated above. If you are not or unsure, please re-schedule the visit: Yes, I confirm.      Total time spent for this encounter:  41 minutes    --TALAT Chawla on 10/4/2024 at 9:37 AM    An electronic signature was used to authenticate this note.

## 2024-10-04 ASSESSMENT — PATIENT HEALTH QUESTIONNAIRE - PHQ9
SUM OF ALL RESPONSES TO PHQ QUESTIONS 1-9: 3
1. LITTLE INTEREST OR PLEASURE IN DOING THINGS: SEVERAL DAYS
SUM OF ALL RESPONSES TO PHQ QUESTIONS 1-9: 3
5. POOR APPETITE OR OVEREATING: NOT AT ALL
9. THOUGHTS THAT YOU WOULD BE BETTER OFF DEAD, OR OF HURTING YOURSELF: NOT AT ALL
SUM OF ALL RESPONSES TO PHQ9 QUESTIONS 1 & 2: 2
SUM OF ALL RESPONSES TO PHQ QUESTIONS 1-9: 3
6. FEELING BAD ABOUT YOURSELF - OR THAT YOU ARE A FAILURE OR HAVE LET YOURSELF OR YOUR FAMILY DOWN: NOT AT ALL
4. FEELING TIRED OR HAVING LITTLE ENERGY: SEVERAL DAYS
10. IF YOU CHECKED OFF ANY PROBLEMS, HOW DIFFICULT HAVE THESE PROBLEMS MADE IT FOR YOU TO DO YOUR WORK, TAKE CARE OF THINGS AT HOME, OR GET ALONG WITH OTHER PEOPLE: NOT DIFFICULT AT ALL
SUM OF ALL RESPONSES TO PHQ QUESTIONS 1-9: 3
7. TROUBLE CONCENTRATING ON THINGS, SUCH AS READING THE NEWSPAPER OR WATCHING TELEVISION: NOT AT ALL
2. FEELING DOWN, DEPRESSED OR HOPELESS: SEVERAL DAYS
8. MOVING OR SPEAKING SO SLOWLY THAT OTHER PEOPLE COULD HAVE NOTICED. OR THE OPPOSITE, BEING SO FIGETY OR RESTLESS THAT YOU HAVE BEEN MOVING AROUND A LOT MORE THAN USUAL: NOT AT ALL

## 2024-10-09 ENCOUNTER — TELEPHONE (OUTPATIENT)
Dept: INTERNAL MEDICINE | Age: 74
End: 2024-10-09

## 2024-10-09 NOTE — TELEPHONE ENCOUNTER
Received call back from [facility]. Referral was received and patient should be scheduled over the next day or 2. Will fax over letter to office with appointment information.    Rhombic Flap Text: The defect edges were debeveled with a #15 scalpel blade.  Given the location of the defect and the proximity to free margins a rhombic flap was deemed most appropriate.  Using a sterile surgical marker, an appropriate rhombic flap was drawn incorporating the defect.    The area thus outlined was incised deep to adipose tissue with a #15 scalpel blade.  The skin margins were undermined to an appropriate distance in all directions utilizing iris scissors.

## 2024-10-09 NOTE — TELEPHONE ENCOUNTER
VM message left at [facility] regarding referral that was faxed on 9-20-24. Asking if pt has been scheduled and or seen. Phone number left to return call.

## 2024-10-10 ENCOUNTER — TELEPHONE (OUTPATIENT)
Dept: INTERNAL MEDICINE | Age: 74
End: 2024-10-10

## 2024-10-10 NOTE — TELEPHONE ENCOUNTER
Pt called and left message asking for return call.    Pt's call returned. Pt calling regarding her Jardiance.  Pt reports: buring/itching in vaginal area-\"not inside but outside\". States he told Dr. Lay about this previously. States could take a shower and be all clean, will go to the bathroom and about 1 hr later symptoms will reappear. No fever. Does not feel this is a UTI. States hasn't had a UTI in a long time.   Pt asking is there anyway she can get off of this med? Will this cause her A1c to increase? Asking for advice.   Pt notified Dr álvarez in office until Monday. Pt ok with same.

## 2024-10-10 NOTE — TELEPHONE ENCOUNTER
Would recommend stopping medication. With A1c stable and less than 6%- can repeat at 3 months off med    Continue to monitor blood glucose. As noted given symptoms- recommend stopping med with close monitoring.    If any signs of skin changes around the perineum or outer skin area- immediate assessment is needed.

## 2024-10-11 NOTE — TELEPHONE ENCOUNTER
Spoke with pt via phone. Pt read note per Dr. Lay. Pt instructed to check BS 3-4 times a week and record results. Requested pt bring in BS results with her to her upcoming appt. Pt verbalizes understanding. Pt voiced appreciation for quick response.  Last Appointment:  9/19/2024  Future Appointments   Date Time Provider Department Center   10/24/2024 12:00 PM Kim Chahal LISW SE IM SOC [facility]   10/31/2024  2:15 PM Adán Lay MD [facility] ECC DEP   1/7/2025  2:00 PM Papito Desai MD [facility]   9/25/2025  1:45 PM Adán Lay MD [facility] DEP

## 2024-10-16 NOTE — TELEPHONE ENCOUNTER
Called and spoke with pt via phone. Pt states she has not heard from [facility]. Call placed to office. States when they tried to call, noted that pt had non working number. Verified phone number they have on file. Notified that is pt's number and it is a working number as I just hung up the phone with her. States she will pass on message to try and call patient again to schedule. Pt notified of same. Requested she leave a message with this nurse if she does not hear from them by Friday afternoon.

## 2024-10-18 LAB — DIABETIC RETINOPATHY: NEGATIVE

## 2024-10-22 NOTE — TELEPHONE ENCOUNTER
Called and spoke with pt via phone. Pt states she already had her appt last week and everything went well. Will call [facility] and ask for office note to be faxed to office.

## 2024-10-24 ENCOUNTER — TELEMEDICINE (OUTPATIENT)
Age: 74
End: 2024-10-24

## 2024-10-24 DIAGNOSIS — F32.A DEPRESSION, UNSPECIFIED DEPRESSION TYPE: Primary | ICD-10-CM

## 2024-10-24 NOTE — PROGRESS NOTES
ADULT BEHAVIORAL HEALTH FOLLOW UP  INGE Delatorre,LISW      Visit Date: 10/24/2024   Time of appointment:  12:00   Time spent with Patient: 25 minutes.   This is patient's second appointment.    Reason for Consult:  No chief complaint on file.     Referring Provider/PCP:    No ref. provider found  Adán Lay MD      Pt provided informed consent for the behavioral health program. Discussed with patient model of service to include the limits of confidentiality (i.e. abuse reporting, suicide intervention, etc.) and short-term intervention focused approach.  Pt indicated understanding.    SUBJECTIVE  Gricel is a 73 y.o. female who presents for follow up of depression.      MENTAL STATUS EXAM  Mood was euthymic with congruent affect.   Suicidal ideation was denied.   Homicidal ideation was denied.   Hygiene was good .  Dress was neat.   Behavior was Within Normal Limits with No self-report of difficulties ambulating.   Attitude was Cooperative, Engageable, and Friendly.  Eye-contact was good.  Speech: rate - WNL, rhythm - WNL, volume - WNL.  Verbalizations were goal directed.  Thought processes were intact and goal-oriented without evidence of delusions, hallucinations, obsessions, or jewels; with little cognitive distortions.   Associations were characterized by intact cognitive processes.  Pt was oriented to person, place, time, and general circumstances;  recent:  good.  Insight and judgment were estimated to be good, AEB, a good  understanding of cyclical maladaptive patterns, and the ability to use insight to inform behavior change.       ASSESSMENT  Gricel Gallagher presented to the appointment today for evaluation and treatment of symptoms of depression.  She is currently deemed no risk to herself or others and meets criteria for depression. Discussed and processed mild symptoms of depression. Pt reports she often feels her mood changes by worrying about other family members. Dicussed and processed

## 2024-10-30 PROBLEM — F32.A DEPRESSION: Status: ACTIVE | Noted: 2024-10-30

## 2024-10-30 NOTE — ASSESSMENT & PLAN NOTE
Discussed and addressed [facility] appt  Now following with Behavioral Health- last seen on 10/25.   Doing well with improved symptoms overall  Continue deferring any medication trial as discussed during shared-decision making   Continue to follow with Behavioral health      Remain off SGLT2 therapy- repeat A1c with additional surveillance labs in 3 months

## 2024-10-30 NOTE — PROGRESS NOTES
Gricel Gallagher (:  1950) is a 73 y.o. female,Established patient, here for evaluation of the following chief complaint(s):  Follow-up (6 follow up)         Assessment & Plan  Depression, unspecified depression type  Discussed and addressed AWV appt  Now following with Behavioral Health- last seen on 10/25.   Doing well with improved symptoms overall  Continue deferring any medication trial as discussed during shared-decision making   Continue to follow with Behavioral health      Remain off SGLT2 therapy- repeat A1c with additional surveillance labs in 3 months     Return in about 3 months (around 2025).       Subjective   HPI  Presents for 6 week follow-up. Since last exam- patient has been discontinued on SGLT2 therapy due to side effects. No noted dysuria. No noted concerns with perineum or skin changes- all symptoms resolved. Since stopping medication- not checking regularly- but last was 140.     At [facility] 6 weeks prior, noted + PHQ9. Since last appt, she has established with Behavioral Health and had 2 sessions. Improved mood overall. Still some significant stressors in life related to fear and worry of health of son.      Review of Systems   Constitutional:  Negative for chills, fatigue and fever.   Respiratory:  Negative for cough, shortness of breath and wheezing.    Cardiovascular:  Negative for chest pain and leg swelling.   Genitourinary:  Negative for dysuria, hematuria and vaginal discharge.   Psychiatric/Behavioral:  Negative for dysphoric mood (improved).         Objective   Vitals:    10/31/24 1405   BP: (!) 142/72   Site: Left Upper Arm   Position: Sitting   Cuff Size: Large Adult   Pulse: 81   Resp: 16   Temp: (!) 96.5 °F (35.8 °C)   TempSrc: Oral   SpO2: 95%   Weight: 94.8 kg (209 lb)   Height: 1.626 m (5' 4\")       Physical Exam  Psychiatric:         Attention and Perception: Attention normal.         Mood and Affect: Mood normal. Mood is not depressed. Affect is not tearful.

## 2024-10-31 ENCOUNTER — OFFICE VISIT (OUTPATIENT)
Dept: INTERNAL MEDICINE | Age: 74
End: 2024-10-31
Payer: MEDICARE

## 2024-10-31 VITALS
DIASTOLIC BLOOD PRESSURE: 72 MMHG | RESPIRATION RATE: 16 BRPM | HEART RATE: 81 BPM | HEIGHT: 64 IN | BODY MASS INDEX: 35.68 KG/M2 | WEIGHT: 209 LBS | TEMPERATURE: 96.5 F | SYSTOLIC BLOOD PRESSURE: 142 MMHG | OXYGEN SATURATION: 95 %

## 2024-10-31 DIAGNOSIS — F32.A DEPRESSION, UNSPECIFIED DEPRESSION TYPE: Primary | ICD-10-CM

## 2024-10-31 DIAGNOSIS — E11.9 CONTROLLED TYPE 2 DIABETES MELLITUS WITHOUT COMPLICATION, WITHOUT LONG-TERM CURRENT USE OF INSULIN (HCC): ICD-10-CM

## 2024-10-31 PROCEDURE — 99212 OFFICE O/P EST SF 10 MIN: CPT | Performed by: INTERNAL MEDICINE

## 2024-10-31 NOTE — PATIENT INSTRUCTIONS
1) Follow-up in 3 months     2) Maintain planned follow-up with Kim.    3) Start checking sugar in mornings and keep log for next appt.    4) Labs to be done prior to Dr. Desai's next appt.       If you need to get in touch with Dr. Lay's nurse, Hoa, please call [phone].

## 2024-10-31 NOTE — PROGRESS NOTES
Discharge instructions, AVS and lab scripts given to patient    Pt asking for handicap parking letter. States ok to mail if  for letter from Dr. Lay.   Reviewed with Dr. Lay. Ok for letter. Same reason as previous. Duration lifetime.

## 2024-11-01 ASSESSMENT — ENCOUNTER SYMPTOMS
SHORTNESS OF BREATH: 0
WHEEZING: 0
COUGH: 0

## 2024-11-20 ENCOUNTER — TELEPHONE (OUTPATIENT)
Dept: RHEUMATOLOGY | Age: 74
End: 2024-11-20

## 2024-11-20 ENCOUNTER — TELEPHONE (OUTPATIENT)
Dept: INTERNAL MEDICINE | Age: 74
End: 2024-11-20

## 2024-11-20 NOTE — TELEPHONE ENCOUNTER
Called pt and informed medication was ordered per Dr. Desai and to take as directed and we need to rescheduled her for an earlier appointment  and one was made on Dec. 10 th per Dr. Desai and January's appointment was cancelled

## 2024-11-20 NOTE — TELEPHONE ENCOUNTER
Pt called to see if ok to take Tylenol ES. States her right hand has been hurting more than usual the last  few weeks. She has tried putting hand in ice water and then warm water, started wearing right arm brace and even sleeping in it.  Has been taking 2 ES tylenol in AM and PM. Today she noticed a little pain in her liver area and wanted to make sure ok for Tylenol.   Last Appointment:  10/31/2024 Rosanne  9/17/24 Dr. Desai-Rheumatology  Future Appointments   Date Time Provider Department Center   11/27/2024  1:00 PM Kim Chahal LISW SE IM SOC [facility]   1/7/2025  2:00 PM Papito Desai MD [facility]   2/13/2025  1:15 PM Adán Lay MD [facility] ECC DEP   9/25/2025  1:45 PM Adán Lay MD [facility] DEP

## 2024-11-21 NOTE — TELEPHONE ENCOUNTER
This message was taken care of on 11/20/24  medication prednisone was ordered per Dr. Desai and pt's next appointment was change per Dr. Desai ,she was called  and informed and verbalizes understanding

## 2024-12-10 ENCOUNTER — OFFICE VISIT (OUTPATIENT)
Dept: RHEUMATOLOGY | Age: 74
End: 2024-12-10
Payer: MEDICARE

## 2024-12-10 ENCOUNTER — HOSPITAL ENCOUNTER (OUTPATIENT)
Age: 74
Discharge: HOME | End: 2024-12-10
Payer: MEDICARE

## 2024-12-10 VITALS
RESPIRATION RATE: 18 BRPM | DIASTOLIC BLOOD PRESSURE: 71 MMHG | SYSTOLIC BLOOD PRESSURE: 119 MMHG | BODY MASS INDEX: 36.31 KG/M2 | HEART RATE: 84 BPM | WEIGHT: 212.7 LBS | OXYGEN SATURATION: 97 % | HEIGHT: 64 IN | TEMPERATURE: 97.3 F

## 2024-12-10 DIAGNOSIS — M05.79 RHEUMATOID ARTHRITIS INVOLVING MULTIPLE SITES WITH POSITIVE RHEUMATOID FACTOR (HCC): ICD-10-CM

## 2024-12-10 DIAGNOSIS — M81.0 OSTEOPOROSIS, UNSPECIFIED OSTEOPOROSIS TYPE, UNSPECIFIED PATHOLOGICAL FRACTURE PRESENCE: ICD-10-CM

## 2024-12-10 DIAGNOSIS — K76.0 FATTY LIVER DISEASE, NONALCOHOLIC: ICD-10-CM

## 2024-12-10 DIAGNOSIS — R63.8 INCREASED BMI: ICD-10-CM

## 2024-12-10 DIAGNOSIS — Z79.899 HIGH RISK MEDICATIONS (NOT ANTICOAGULANTS) LONG-TERM USE: Primary | ICD-10-CM

## 2024-12-10 DIAGNOSIS — R25.2 LEG CRAMPS: ICD-10-CM

## 2024-12-10 DIAGNOSIS — M13.0 POLYARTHRITIS: ICD-10-CM

## 2024-12-10 DIAGNOSIS — Z11.1 SCREENING FOR TUBERCULOSIS: ICD-10-CM

## 2024-12-10 DIAGNOSIS — Z79.899 HIGH RISK MEDICATIONS (NOT ANTICOAGULANTS) LONG-TERM USE: ICD-10-CM

## 2024-12-10 LAB
ALBUMIN SERPL-MCNC: 4.8 G/DL (ref 3.5–5.2)
ALP SERPL-CCNC: 38 U/L (ref 35–104)
ALT SERPL-CCNC: 22 U/L (ref 0–32)
ANION GAP SERPL CALCULATED.3IONS-SCNC: 10 MMOL/L (ref 7–16)
AST SERPL-CCNC: 20 U/L (ref 0–31)
BASOPHILS # BLD: 0.04 K/UL (ref 0–0.2)
BASOPHILS NFR BLD: 0 % (ref 0–2)
BILIRUB SERPL-MCNC: 0.5 MG/DL (ref 0–1.2)
BUN SERPL-MCNC: 22 MG/DL (ref 6–23)
CALCIUM SERPL-MCNC: 9.8 MG/DL (ref 8.6–10.2)
CHLORIDE SERPL-SCNC: 101 MMOL/L (ref 98–107)
CO2 SERPL-SCNC: 28 MMOL/L (ref 22–29)
CREAT SERPL-MCNC: 1.1 MG/DL (ref 0.5–1)
CRP SERPL HS-MCNC: <3 MG/L (ref 0–5)
EOSINOPHIL # BLD: 0.23 K/UL (ref 0.05–0.5)
EOSINOPHILS RELATIVE PERCENT: 3 % (ref 0–6)
ERYTHROCYTE [DISTWIDTH] IN BLOOD BY AUTOMATED COUNT: 13.9 % (ref 11.5–15)
GFR, ESTIMATED: 55 ML/MIN/1.73M2
GLUCOSE SERPL-MCNC: 137 MG/DL (ref 74–99)
HCT VFR BLD AUTO: 45.7 % (ref 34–48)
HGB BLD-MCNC: 15.1 G/DL (ref 11.5–15.5)
IMM GRANULOCYTES # BLD AUTO: 0.03 K/UL (ref 0–0.58)
IMM GRANULOCYTES NFR BLD: 0 % (ref 0–5)
LYMPHOCYTES NFR BLD: 2.03 K/UL (ref 1.5–4)
LYMPHOCYTES RELATIVE PERCENT: 22 % (ref 20–42)
MCH RBC QN AUTO: 29.5 PG (ref 26–35)
MCHC RBC AUTO-ENTMCNC: 33 G/DL (ref 32–34.5)
MCV RBC AUTO: 89.4 FL (ref 80–99.9)
MONOCYTES NFR BLD: 0.94 K/UL (ref 0.1–0.95)
MONOCYTES NFR BLD: 10 % (ref 2–12)
NEUTROPHILS NFR BLD: 65 % (ref 43–80)
NEUTS SEG NFR BLD: 5.98 K/UL (ref 1.8–7.3)
PLATELET # BLD AUTO: 281 K/UL (ref 130–450)
PMV BLD AUTO: 8.8 FL (ref 7–12)
POTASSIUM SERPL-SCNC: 4.3 MMOL/L (ref 3.5–5)
PROT SERPL-MCNC: 7.3 G/DL (ref 6.4–8.3)
RBC # BLD AUTO: 5.11 M/UL (ref 3.5–5.5)
SODIUM SERPL-SCNC: 139 MMOL/L (ref 132–146)
WBC OTHER # BLD: 9.3 K/UL (ref 4.5–11.5)

## 2024-12-10 PROCEDURE — 86140 C-REACTIVE PROTEIN: CPT

## 2024-12-10 PROCEDURE — 85025 COMPLETE CBC W/AUTO DIFF WBC: CPT

## 2024-12-10 PROCEDURE — 36415 COLL VENOUS BLD VENIPUNCTURE: CPT

## 2024-12-10 PROCEDURE — 80053 COMPREHEN METABOLIC PANEL: CPT

## 2024-12-10 PROCEDURE — 99212 OFFICE O/P EST SF 10 MIN: CPT | Performed by: INTERNAL MEDICINE

## 2024-12-10 RX ORDER — ALENDRONATE SODIUM 70 MG/1
70 TABLET ORAL
Qty: 12 TABLET | Refills: 1 | Status: SHIPPED | OUTPATIENT
Start: 2024-12-10

## 2024-12-10 RX ORDER — MAGNESIUM CHLORIDE 71.5 G/G
TABLET ORAL
Qty: 180 TABLET | Refills: 0 | Status: SHIPPED | OUTPATIENT
Start: 2024-12-10

## 2024-12-10 RX ORDER — TOFACITINIB 5 MG/1
5 TABLET, FILM COATED ORAL EVERY OTHER DAY
Qty: 60 TABLET | Refills: 0 | Status: SHIPPED | COMMUNITY
Start: 2024-12-10

## 2024-12-10 RX ORDER — PREDNISONE 2.5 MG/1
TABLET ORAL
Qty: 45 TABLET | Refills: 0 | Status: SHIPPED | OUTPATIENT
Start: 2024-12-10

## 2024-12-10 RX ORDER — HYDROXYCHLOROQUINE SULFATE 200 MG/1
200 TABLET, FILM COATED ORAL 2 TIMES DAILY
Qty: 180 TABLET | Refills: 0 | Status: SHIPPED | OUTPATIENT
Start: 2024-12-10

## 2024-12-10 NOTE — PROGRESS NOTES
Patient verbalized understanding of office instructions. Patient will call with questions or concerns.  Discharge instructions given and all questions fully answered.Follow up appointment scheduled. AVS and lab scripts mailed to pt.  
complication with   narrowing of the joint space in the left hip and mild spurring of the   acetabulum bilaterally.           Age-related osteoporosis without current pathological fracture- on Bisphosphonate and continued at this time      No other new fracture since   Sp hip fracture-- seen ortho in July -- GTB injection was very helpful for one month, partial regression since.  osteoporsiss--vit D 34-- changed to 2000QD now  Dexa  R hip -2.7   and L hip -2.3        Discussed alendronate compliance   Ok to switch to 2 pills then repeat 2 pills next day, then good for month  Discussed risk/benefits Wes inhibitor  Trial xeljanz 5mg low dose M,W,F  Sample bottle given:  Sample bottle:  [id number] exp Jan 2028  Risk benefits discussed, clots at higher dose, shingles vaccinastion declined- she did have in past 2/3 \"long time ago\"  TB screen also    Plan inc hcq 2tabs daily  Ok for tylenol prn  2.5mg prednisone QD prn flares- not every day  Avoid gluten and sugar   Fu end of FEB- labs prior     (she is sick of taking pills- she deferred resconsideration MTX/or arava +/- Wes inhibitor re eval)              Counseling and Coordination of Care    Prognosis  Prior Authorization       x Risk / Benefits of RX  Disability Forms        Compliance  Discussion and / or letter to other health care provider         Risk Reduction      More than half of the face-to-face time with the patient was spent  in counseling or coordinating care    Exercise           Brochure / Handout     x Other:50min including time spent calling in prednisone/time w pharmacist/calls etc..    Referral:

## 2024-12-10 NOTE — PATIENT INSTRUCTIONS
Trial xeljanz 5mg low dose M,W,F  3x/week  Sample bottle given:  Sample bottle:  [id number] exp Jan 2028  Risk benefits discussed, clots at higher dose, shingles vaccinastion declined- she did have in past 2/3 \"long time ago\"  TB screen also    Plan inc hcq 2tabs daily  If flaring  Ok for tylenol prn  2.5mg prednisone QD prn flares- not every day  Avoid gluten and sugar     Feb 25  Fu end of FEB- labs prior

## 2025-02-12 ENCOUNTER — HOSPITAL ENCOUNTER (OUTPATIENT)
Age: 75
Discharge: HOME | End: 2025-02-12
Payer: MEDICARE

## 2025-02-12 DIAGNOSIS — E11.9 CONTROLLED TYPE 2 DIABETES MELLITUS WITHOUT COMPLICATION, WITHOUT LONG-TERM CURRENT USE OF INSULIN (HCC): ICD-10-CM

## 2025-02-12 DIAGNOSIS — M13.0 POLYARTHRITIS: ICD-10-CM

## 2025-02-12 DIAGNOSIS — Z79.899 HIGH RISK MEDICATIONS (NOT ANTICOAGULANTS) LONG-TERM USE: ICD-10-CM

## 2025-02-12 DIAGNOSIS — M05.79 RHEUMATOID ARTHRITIS INVOLVING MULTIPLE SITES WITH POSITIVE RHEUMATOID FACTOR (HCC): ICD-10-CM

## 2025-02-12 LAB
ALBUMIN SERPL-MCNC: 4.6 G/DL (ref 3.5–5.2)
ALP SERPL-CCNC: 41 U/L (ref 35–104)
ALT SERPL-CCNC: 20 U/L (ref 0–32)
ANION GAP SERPL CALCULATED.3IONS-SCNC: 13 MMOL/L (ref 7–16)
AST SERPL-CCNC: 19 U/L (ref 0–31)
BASOPHILS # BLD: 0.04 K/UL (ref 0–0.2)
BASOPHILS NFR BLD: 1 % (ref 0–2)
BILIRUB SERPL-MCNC: 0.4 MG/DL (ref 0–1.2)
BUN SERPL-MCNC: 18 MG/DL (ref 6–23)
CALCIUM SERPL-MCNC: 9.6 MG/DL (ref 8.6–10.2)
CHLORIDE SERPL-SCNC: 103 MMOL/L (ref 98–107)
CO2 SERPL-SCNC: 24 MMOL/L (ref 22–29)
CREAT SERPL-MCNC: 1.2 MG/DL (ref 0.5–1)
CRP SERPL HS-MCNC: <3 MG/L (ref 0–5)
EOSINOPHIL # BLD: 0.23 K/UL (ref 0.05–0.5)
EOSINOPHILS RELATIVE PERCENT: 3 % (ref 0–6)
ERYTHROCYTE [DISTWIDTH] IN BLOOD BY AUTOMATED COUNT: 13.1 % (ref 11.5–15)
ERYTHROCYTE [SEDIMENTATION RATE] IN BLOOD BY WESTERGREN METHOD: 2 MM/HR (ref 0–20)
GFR, ESTIMATED: 49 ML/MIN/1.73M2
GLUCOSE SERPL-MCNC: 91 MG/DL (ref 74–99)
HBA1C MFR BLD: 6.2 % (ref 4–5.6)
HCT VFR BLD AUTO: 44.3 % (ref 34–48)
HGB BLD-MCNC: 14.7 G/DL (ref 11.5–15.5)
IMM GRANULOCYTES # BLD AUTO: 0.03 K/UL (ref 0–0.58)
IMM GRANULOCYTES NFR BLD: 0 % (ref 0–5)
LYMPHOCYTES NFR BLD: 1.89 K/UL (ref 1.5–4)
LYMPHOCYTES RELATIVE PERCENT: 23 % (ref 20–42)
MCH RBC QN AUTO: 29.5 PG (ref 26–35)
MCHC RBC AUTO-ENTMCNC: 33.2 G/DL (ref 32–34.5)
MCV RBC AUTO: 88.8 FL (ref 80–99.9)
MONOCYTES NFR BLD: 1.13 K/UL (ref 0.1–0.95)
MONOCYTES NFR BLD: 14 % (ref 2–12)
NEUTROPHILS NFR BLD: 60 % (ref 43–80)
NEUTS SEG NFR BLD: 5.08 K/UL (ref 1.8–7.3)
PLATELET # BLD AUTO: 273 K/UL (ref 130–450)
PMV BLD AUTO: 8.5 FL (ref 7–12)
POTASSIUM SERPL-SCNC: 4.5 MMOL/L (ref 3.5–5)
PROT SERPL-MCNC: 7.1 G/DL (ref 6.4–8.3)
RBC # BLD AUTO: 4.99 M/UL (ref 3.5–5.5)
SODIUM SERPL-SCNC: 140 MMOL/L (ref 132–146)
WBC OTHER # BLD: 8.4 K/UL (ref 4.5–11.5)

## 2025-02-12 PROCEDURE — 80053 COMPREHEN METABOLIC PANEL: CPT

## 2025-02-12 PROCEDURE — 36415 COLL VENOUS BLD VENIPUNCTURE: CPT

## 2025-02-12 PROCEDURE — 85025 COMPLETE CBC W/AUTO DIFF WBC: CPT

## 2025-02-12 PROCEDURE — 83516 IMMUNOASSAY NONANTIBODY: CPT

## 2025-02-12 PROCEDURE — 83036 HEMOGLOBIN GLYCOSYLATED A1C: CPT

## 2025-02-12 PROCEDURE — 85652 RBC SED RATE AUTOMATED: CPT

## 2025-02-12 PROCEDURE — 86140 C-REACTIVE PROTEIN: CPT

## 2025-02-12 NOTE — PATIENT INSTRUCTIONS
some cancers early, when treatment may be more likely to work.  What happens after screening?  The results of your CT scan will be sent to your doctor. Someone from your care team will explain the results of your scan and answer any questions you may have. If you need any follow-up, he or she will help you understand what to do next.  After a lung cancer screening, you can go back to your usual activities right away.  A lung cancer screening test can't tell if you have lung cancer. If your results are positive, your doctor can't tell whether an abnormal finding is a harmless nodule, cancer, or something else without doing more tests.  What can you do to help prevent lung cancer?  Some lung cancers can't be prevented. But if you smoke, quitting smoking is the best step you can take to prevent lung cancer. If you want to quit, your doctor can recommend medicines or other ways to help.  Follow-up care is a key part of your treatment and safety. Be sure to make and go to all appointments, and call your doctor if you are having problems. It's also a good idea to know your test results and keep a list of the medicines you take.  Where can you learn more?  Go to https://www.[employer].net/patientEd and enter Q940 to learn more about \"Learning About Lung Cancer Screening.\"  Current as of: October 25, 2023  Content Version: 14.3  © 2024 [employer].   Care instructions adapted under license by [employer]. If you have questions about a medical condition or this instruction, always ask your healthcare professional. [employer] disclaims any warranty or liability for your use of this information.

## 2025-02-12 NOTE — ASSESSMENT & PLAN NOTE
A1c completed yesterday at 6.2%  Remains stable at this time   Continue current management     Orders:    metFORMIN (GLUCOPHAGE) 850 MG tablet; TAKE 1 TABLET BY MOUTH TWICE DAILY WITH MEALS

## 2025-02-12 NOTE — PROGRESS NOTES
Gricel Gallagher (:  1950) is a 74 y.o. female,Established patient, here for evaluation of the following chief complaint(s):  Results and Diabetes         Assessment & Plan  Controlled type 2 diabetes mellitus without complication, without long-term current use of insulin (HCC)   A1c completed yesterday at 6.2%  Remains stable at this time   Continue current management     Orders:    metFORMIN (GLUCOPHAGE) 850 MG tablet; TAKE 1 TABLET BY MOUTH TWICE DAILY WITH MEALS    Essential hypertension  BP at goal per ACC/AHA goals  Continue current management   Age-related osteoporosis without current pathological fracture  Continue bisphosphonate therapy   Continue planned DEXA surveillance  Continue weight bearing exercises as able  Recommended due to gait concerns or repeating physical therapy- currently deferred.   Rheumatoid arthritis involving multiple sites with positive rheumatoid factor (HCC)  Following with Rheumatology   Added Xeljanz- states feeling improved overall with symptoms with combination of Xeljanz and Hydroxychloroquine   Inflammatory markers unremarkable on surveillance labs.   Splenomegaly  Spleen is mildly enlarged on surveillance U/S in 2024  Continue monitoring  GI follow-up recommended for MASLD concerns as well   Mixed hyperlipidemia   Chronic, at goal (stable), continue current treatment plan    Orders:    rosuvastatin (CRESTOR) 5 MG tablet; TAKE 1 TABLET BY MOUTH EVERY DAY    Gastroesophageal reflux disease without esophagitis   Chronic, at goal (stable), continue current treatment plan         Personal history of tobacco use    LDCT ordered for May 2025     Orders:    AK VISIT TO DISCUSS LUNG CA SCREEN W LDCT    CT Lung Screen (Initial/Annual/Baseline); Future      HealthCare Maintenance   Breast cancer screening remains overdue- mammogram re-ordered in September- not currently scheduled.     Return in about 4 months (around 2025). AWV at next appt        Subjective
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
## 2025-02-13 ENCOUNTER — OFFICE VISIT (OUTPATIENT)
Dept: INTERNAL MEDICINE | Age: 75
End: 2025-02-13
Payer: MEDICARE

## 2025-02-13 VITALS
RESPIRATION RATE: 16 BRPM | DIASTOLIC BLOOD PRESSURE: 69 MMHG | TEMPERATURE: 97.2 F | OXYGEN SATURATION: 94 % | HEART RATE: 83 BPM | HEIGHT: 64 IN | BODY MASS INDEX: 36.7 KG/M2 | WEIGHT: 215 LBS | SYSTOLIC BLOOD PRESSURE: 134 MMHG

## 2025-02-13 DIAGNOSIS — E11.9 CONTROLLED TYPE 2 DIABETES MELLITUS WITHOUT COMPLICATION, WITHOUT LONG-TERM CURRENT USE OF INSULIN (HCC): Primary | ICD-10-CM

## 2025-02-13 DIAGNOSIS — R16.1 SPLENOMEGALY: ICD-10-CM

## 2025-02-13 DIAGNOSIS — K21.9 GASTROESOPHAGEAL REFLUX DISEASE WITHOUT ESOPHAGITIS: ICD-10-CM

## 2025-02-13 DIAGNOSIS — E78.2 MIXED HYPERLIPIDEMIA: ICD-10-CM

## 2025-02-13 DIAGNOSIS — Z87.891 PERSONAL HISTORY OF TOBACCO USE: ICD-10-CM

## 2025-02-13 DIAGNOSIS — I10 ESSENTIAL HYPERTENSION: ICD-10-CM

## 2025-02-13 DIAGNOSIS — M81.0 AGE-RELATED OSTEOPOROSIS WITHOUT CURRENT PATHOLOGICAL FRACTURE: ICD-10-CM

## 2025-02-13 DIAGNOSIS — M05.79 RHEUMATOID ARTHRITIS INVOLVING MULTIPLE SITES WITH POSITIVE RHEUMATOID FACTOR (HCC): ICD-10-CM

## 2025-02-13 PROCEDURE — 99213 OFFICE O/P EST LOW 20 MIN: CPT | Performed by: INTERNAL MEDICINE

## 2025-02-13 PROCEDURE — 3044F HG A1C LEVEL LT 7.0%: CPT | Performed by: INTERNAL MEDICINE

## 2025-02-13 PROCEDURE — 1159F MED LIST DOCD IN RCRD: CPT | Performed by: INTERNAL MEDICINE

## 2025-02-13 PROCEDURE — 3075F SYST BP GE 130 - 139MM HG: CPT | Performed by: INTERNAL MEDICINE

## 2025-02-13 PROCEDURE — 3078F DIAST BP <80 MM HG: CPT | Performed by: INTERNAL MEDICINE

## 2025-02-13 PROCEDURE — 1123F ACP DISCUSS/DSCN MKR DOCD: CPT | Performed by: INTERNAL MEDICINE

## 2025-02-13 PROCEDURE — 1125F AMNT PAIN NOTED PAIN PRSNT: CPT | Performed by: INTERNAL MEDICINE

## 2025-02-13 PROCEDURE — G0296 VISIT TO DETERM LDCT ELIG: HCPCS | Performed by: INTERNAL MEDICINE

## 2025-02-13 RX ORDER — PANTOPRAZOLE SODIUM 40 MG/1
TABLET, DELAYED RELEASE ORAL
Qty: 30 TABLET | Refills: 2 | Status: CANCELLED | OUTPATIENT
Start: 2025-02-13

## 2025-02-13 RX ORDER — ROSUVASTATIN CALCIUM 5 MG/1
TABLET, COATED ORAL
Qty: 90 TABLET | Refills: 1 | Status: SHIPPED | OUTPATIENT
Start: 2025-02-13

## 2025-02-13 ASSESSMENT — ENCOUNTER SYMPTOMS
CONSTIPATION: 0
BLOOD IN STOOL: 0
NAUSEA: 0
ABDOMINAL DISTENTION: 0
COUGH: 0
WHEEZING: 0
TROUBLE SWALLOWING: 0
ABDOMINAL PAIN: 0
SHORTNESS OF BREATH: 0
VOICE CHANGE: 0
DIARRHEA: 0
VOMITING: 0

## 2025-02-13 NOTE — ASSESSMENT & PLAN NOTE
Following with Rheumatology   Added Xeljanz- states feeling improved overall with symptoms with combination of Xeljanz and Hydroxychloroquine   Inflammatory markers unremarkable on surveillance labs.

## 2025-02-13 NOTE — PROGRESS NOTES
[facility]    Discharge instructions, lab script and AVS given to patient.

## 2025-02-14 LAB — TTG IGA SER IA-ACNC: <0.1 U/ML

## 2025-02-14 NOTE — ASSESSMENT & PLAN NOTE
Chronic, at goal (stable), continue current treatment plan    Orders:    rosuvastatin (CRESTOR) 5 MG tablet; TAKE 1 TABLET BY MOUTH EVERY DAY

## 2025-02-14 NOTE — ASSESSMENT & PLAN NOTE
Spleen is mildly enlarged on surveillance U/S in July 2024  Continue monitoring  GI follow-up recommended for MASLD concerns as well

## 2025-02-16 DIAGNOSIS — I10 ESSENTIAL HYPERTENSION: ICD-10-CM

## 2025-02-17 DIAGNOSIS — I10 ESSENTIAL HYPERTENSION: ICD-10-CM

## 2025-02-17 RX ORDER — LISINOPRIL 20 MG/1
20 TABLET ORAL DAILY
Qty: 90 TABLET | Refills: 1 | Status: SHIPPED | OUTPATIENT
Start: 2025-02-17

## 2025-02-17 RX ORDER — LISINOPRIL 20 MG/1
20 TABLET ORAL DAILY
Qty: 90 TABLET | Refills: 1 | OUTPATIENT
Start: 2025-02-17

## 2025-02-17 NOTE — TELEPHONE ENCOUNTER
Name of Medication(s) Requested:  Requested Prescriptions     Pending Prescriptions Disp Refills    lisinopril (PRINIVIL;ZESTRIL) 20 MG tablet 90 tablet 1     Sig: Take 1 tablet by mouth daily       Medication is on current medication list Yes    Dosage and directions were verified? Yes    Quantity verified: 90 day supply     Pharmacy Verified?  Yes    Last Appointment:  2/13/2025    Future appts:  Future Appointments   Date Time Provider Department Center   2/25/2025 12:45 PM YZ RAH [facility] RM 1 SEYZ RA[facility] Rad/Car   2/25/2025  2:45 PM Papito Desai MD [facility]   6/19/2025  1:45 PM Adán Lay MD [facility] DEP        (If no appt send self scheduling link. .REFILLAPPT)  Scheduling request sent?     [] Yes  [x] No    Does patient need updated?  [] Yes  [x] No

## 2025-02-18 ENCOUNTER — HOSPITAL ENCOUNTER (OUTPATIENT)
Age: 75
Discharge: HOME | End: 2025-02-18
Payer: MEDICARE

## 2025-02-18 DIAGNOSIS — Z11.1 SCREENING FOR TUBERCULOSIS: ICD-10-CM

## 2025-02-18 PROCEDURE — 36415 COLL VENOUS BLD VENIPUNCTURE: CPT

## 2025-02-18 PROCEDURE — 86481 TB AG RESPONSE T-CELL SUSP: CPT

## 2025-02-22 LAB — T SPOT TB TEST: NORMAL

## 2025-02-25 ENCOUNTER — HOSPITAL ENCOUNTER (OUTPATIENT)
Dept: GENERAL RADIOLOGY | Age: 75
Discharge: HOME | End: 2025-02-27
Attending: INTERNAL MEDICINE
Payer: MEDICARE

## 2025-02-25 ENCOUNTER — OFFICE VISIT (OUTPATIENT)
Dept: RHEUMATOLOGY | Age: 75
End: 2025-02-25
Payer: MEDICARE

## 2025-02-25 VITALS
HEART RATE: 89 BPM | RESPIRATION RATE: 18 BRPM | HEIGHT: 64 IN | OXYGEN SATURATION: 97 % | WEIGHT: 216 LBS | DIASTOLIC BLOOD PRESSURE: 79 MMHG | BODY MASS INDEX: 36.88 KG/M2 | TEMPERATURE: 97.2 F | SYSTOLIC BLOOD PRESSURE: 147 MMHG

## 2025-02-25 VITALS — WEIGHT: 210 LBS | HEIGHT: 64 IN | BODY MASS INDEX: 35.85 KG/M2

## 2025-02-25 DIAGNOSIS — Z12.31 ENCOUNTER FOR SCREENING MAMMOGRAM FOR MALIGNANT NEOPLASM OF BREAST: ICD-10-CM

## 2025-02-25 DIAGNOSIS — M13.0 POLYARTHRITIS: ICD-10-CM

## 2025-02-25 DIAGNOSIS — K76.0 FATTY LIVER DISEASE, NONALCOHOLIC: ICD-10-CM

## 2025-02-25 DIAGNOSIS — R25.2 LEG CRAMPS: ICD-10-CM

## 2025-02-25 DIAGNOSIS — M05.79 RHEUMATOID ARTHRITIS INVOLVING MULTIPLE SITES WITH POSITIVE RHEUMATOID FACTOR (HCC): ICD-10-CM

## 2025-02-25 DIAGNOSIS — M81.0 OSTEOPOROSIS, UNSPECIFIED OSTEOPOROSIS TYPE, UNSPECIFIED PATHOLOGICAL FRACTURE PRESENCE: ICD-10-CM

## 2025-02-25 DIAGNOSIS — Z79.899 HIGH RISK MEDICATIONS (NOT ANTICOAGULANTS) LONG-TERM USE: Primary | ICD-10-CM

## 2025-02-25 PROCEDURE — 99212 OFFICE O/P EST SF 10 MIN: CPT | Performed by: INTERNAL MEDICINE

## 2025-02-25 PROCEDURE — 1123F ACP DISCUSS/DSCN MKR DOCD: CPT | Performed by: INTERNAL MEDICINE

## 2025-02-25 PROCEDURE — 99214 OFFICE O/P EST MOD 30 MIN: CPT | Performed by: INTERNAL MEDICINE

## 2025-02-25 PROCEDURE — 1159F MED LIST DOCD IN RCRD: CPT | Performed by: INTERNAL MEDICINE

## 2025-02-25 PROCEDURE — 77063 BREAST TOMOSYNTHESIS BI: CPT

## 2025-02-25 PROCEDURE — 3077F SYST BP >= 140 MM HG: CPT | Performed by: INTERNAL MEDICINE

## 2025-02-25 PROCEDURE — 3078F DIAST BP <80 MM HG: CPT | Performed by: INTERNAL MEDICINE

## 2025-02-25 RX ORDER — TOFACITINIB 5 MG/1
5 TABLET, FILM COATED ORAL EVERY OTHER DAY
Qty: 60 TABLET | Status: CANCELLED | OUTPATIENT
Start: 2025-02-25

## 2025-02-25 RX ORDER — HYDROXYCHLOROQUINE SULFATE 200 MG/1
200 TABLET, FILM COATED ORAL 2 TIMES DAILY
Qty: 180 TABLET | Refills: 0 | Status: SHIPPED | OUTPATIENT
Start: 2025-02-25

## 2025-02-25 RX ORDER — MAGNESIUM CHLORIDE 71.5 G/G
TABLET ORAL
Qty: 180 TABLET | Refills: 0 | Status: SHIPPED | OUTPATIENT
Start: 2025-02-25

## 2025-02-25 RX ORDER — PREDNISONE 2.5 MG/1
TABLET ORAL
Qty: 45 TABLET | Refills: 0 | Status: SHIPPED | OUTPATIENT
Start: 2025-02-25

## 2025-02-25 RX ORDER — ALENDRONATE SODIUM 70 MG/1
70 TABLET ORAL
Qty: 12 TABLET | Refills: 1 | Status: SHIPPED | OUTPATIENT
Start: 2025-02-25

## 2025-02-25 NOTE — PROGRESS NOTES
J O I N T  C A R E  C L I N I C        The patient is seen in follow-up for: Rheumatoid Arthritis    b/l hands  R hand in am stiffness/painful to \"fist\"  Hypothenar +mcp/pips 4+5 area  Possibly exac by inc gluten consumption-- joints flare         minor flare couple months ago  Last major flare  One \"flare\" in June- edematous hands/knees swelled x3 days  exac by sugar intake +gluten exac   hcq 200mg QD +tumeric helpful + changed diet - dec  sugar, ketodiet   called in Rx prednisone 2 tabs daily x one week  Then one daily x7 days -  Hx of trigger thumb, not getting stuck-intermittently painful  RA symptoms otherwise joint stable          DDD/mechanical back pain, moderate pain if on feet   mostly stiffness in back  No new fractures     Alendronate-  4 tabs/month-takes 2 one day and 2 next day - then done for month  +THC gummi +krantom  helpful bedtime   +mag bid helpful for cramps           Patient History Update Since Previous Visit      Yes No Comment      New illnesses  x       Seen other healthcare provider x  PCP      New x-ray, labs, or procedures x  Labs, Mammogram      Started / changed / stopped medications  x       New allergies / reactions to medications x  Kratom      Changes in family medical history  x       Changes in patient social history  x       Morning stiffness x  Length: 10 mins      Worst Joint: Bilateral hands        Abbreviated Exam    System    nl   abn   Comment     1 Gen nutrition/hygiene x      appearance x     2 Skin turgor / integrity x      rash x      ecchymosis x     3 Psych orientation x      memory x      mood x      cooperative x     4 HENT mouth / throat x     5 Resp resp. effort x      auscultation x     6 CV heart auscultation x      extremity edema x     7 Other             Joint Assessment      Patient Right     Patient Left     x (check if no synovitis seen on exam)   Joint Pain Swelling Joint Pain Swelling   Shoulder   Shoulder     Elbow   Elbow     Wrist   Wrist     Knee

## 2025-02-25 NOTE — PROGRESS NOTES
Patient verbalized understanding of office instructions.  She will call with questions or concerns.  Pt was given discharge instructions and lab scripts.All questions were fully answered.Follow up appointment scheduled. AVS given to pt.

## 2025-02-27 LAB — T SPOT TB TEST: NORMAL

## 2025-03-16 ENCOUNTER — TRANSCRIBE ORDERS (OUTPATIENT)
Dept: ADMINISTRATIVE | Age: 75
End: 2025-03-16

## 2025-03-16 DIAGNOSIS — R74.02 NONSPECIFIC ELEVATION OF LEVELS OF TRANSAMINASE OR LACTIC ACID DEHYDROGENASE (LDH): Primary | ICD-10-CM

## 2025-03-16 DIAGNOSIS — E78.5 HYPERLIPIDEMIA, UNSPECIFIED HYPERLIPIDEMIA TYPE: ICD-10-CM

## 2025-03-16 DIAGNOSIS — R74.01 NONSPECIFIC ELEVATION OF LEVELS OF TRANSAMINASE OR LACTIC ACID DEHYDROGENASE (LDH): Primary | ICD-10-CM

## 2025-04-24 ENCOUNTER — HOSPITAL ENCOUNTER (OUTPATIENT)
Dept: ULTRASOUND IMAGING | Age: 75
Discharge: HOME | End: 2025-04-26
Payer: COMMERCIAL

## 2025-04-24 DIAGNOSIS — R74.02 NONSPECIFIC ELEVATION OF LEVELS OF TRANSAMINASE OR LACTIC ACID DEHYDROGENASE (LDH): ICD-10-CM

## 2025-04-24 DIAGNOSIS — E78.5 HYPERLIPIDEMIA, UNSPECIFIED HYPERLIPIDEMIA TYPE: ICD-10-CM

## 2025-04-24 DIAGNOSIS — R74.01 NONSPECIFIC ELEVATION OF LEVELS OF TRANSAMINASE OR LACTIC ACID DEHYDROGENASE (LDH): ICD-10-CM

## 2025-04-24 PROCEDURE — 76981 USE PARENCHYMA: CPT

## 2025-04-27 ENCOUNTER — HOSPITAL ENCOUNTER (OUTPATIENT)
Dept: CT IMAGING | Age: 75
Discharge: HOME | End: 2025-04-29
Attending: INTERNAL MEDICINE
Payer: COMMERCIAL

## 2025-04-27 DIAGNOSIS — Z87.891 PERSONAL HISTORY OF TOBACCO USE: ICD-10-CM

## 2025-04-27 PROCEDURE — 71271 CT THORAX LUNG CANCER SCR C-: CPT

## 2025-04-28 ENCOUNTER — RESULTS FOLLOW-UP (OUTPATIENT)
Dept: INTERNAL MEDICINE | Age: 75
End: 2025-04-28

## 2025-04-28 NOTE — TELEPHONE ENCOUNTER
Spoke with pt via phone and notified of note per Dr Lay. Pt asking about testing she had done on her liver. Pt notified that testing was ordered per Dr. Lopez and results had went to his office. Pt verifies she does have My Chart. Recommended she review results and send message to his office for further explanation of results. Pt verbalizes understanding.

## 2025-04-28 NOTE — TELEPHONE ENCOUNTER
Completed LDCT for lung cancer screening- results:     IMPRESSION:  Chronic changes of COPD without significant progression.  No acute  infiltrate, isolated nodule or mass of suspicion.     LUNG RADS:  Lung-RADS 1 - Negative (v2022)     Management:  12 month screening LDCT    No changes- overall- will follow respiratory status- repeat LDCT- April 2026 recommended.

## 2025-06-17 ENCOUNTER — OFFICE VISIT (OUTPATIENT)
Age: 75
End: 2025-06-17
Payer: MEDICARE

## 2025-06-17 ENCOUNTER — HOSPITAL ENCOUNTER (OUTPATIENT)
Age: 75
Discharge: HOME | End: 2025-06-17
Payer: MEDICARE

## 2025-06-17 VITALS
BODY MASS INDEX: 37.01 KG/M2 | WEIGHT: 216.8 LBS | HEART RATE: 85 BPM | OXYGEN SATURATION: 96 % | RESPIRATION RATE: 18 BRPM | HEIGHT: 64 IN | TEMPERATURE: 97.7 F | SYSTOLIC BLOOD PRESSURE: 140 MMHG | DIASTOLIC BLOOD PRESSURE: 65 MMHG

## 2025-06-17 DIAGNOSIS — M05.79 RHEUMATOID ARTHRITIS INVOLVING MULTIPLE SITES WITH POSITIVE RHEUMATOID FACTOR (HCC): ICD-10-CM

## 2025-06-17 DIAGNOSIS — R25.2 LEG CRAMPS: ICD-10-CM

## 2025-06-17 DIAGNOSIS — Z79.899 HIGH RISK MEDICATIONS (NOT ANTICOAGULANTS) LONG-TERM USE: Primary | ICD-10-CM

## 2025-06-17 DIAGNOSIS — Z79.899 HIGH RISK MEDICATIONS (NOT ANTICOAGULANTS) LONG-TERM USE: ICD-10-CM

## 2025-06-17 DIAGNOSIS — M81.0 OSTEOPOROSIS, UNSPECIFIED OSTEOPOROSIS TYPE, UNSPECIFIED PATHOLOGICAL FRACTURE PRESENCE: ICD-10-CM

## 2025-06-17 LAB
ALBUMIN SERPL-MCNC: 4.4 G/DL (ref 3.5–5.2)
ALP SERPL-CCNC: 39 U/L (ref 35–104)
ALT SERPL-CCNC: 17 U/L (ref 0–35)
ANION GAP SERPL CALCULATED.3IONS-SCNC: 13 MMOL/L (ref 7–16)
AST SERPL-CCNC: 20 U/L (ref 0–35)
BILIRUB SERPL-MCNC: 0.3 MG/DL (ref 0–1.2)
BUN SERPL-MCNC: 24 MG/DL (ref 8–23)
CALCIUM SERPL-MCNC: 9.4 MG/DL (ref 8.8–10.2)
CHLORIDE SERPL-SCNC: 105 MMOL/L (ref 98–107)
CO2 SERPL-SCNC: 22 MMOL/L (ref 22–29)
CREAT SERPL-MCNC: 1 MG/DL (ref 0.5–1)
CRP SERPL HS-MCNC: <3 MG/L (ref 0–5)
ERYTHROCYTE [SEDIMENTATION RATE] IN BLOOD BY WESTERGREN METHOD: 3 MM/HR (ref 0–20)
GFR, ESTIMATED: 62 ML/MIN/1.73M2
GLUCOSE SERPL-MCNC: 157 MG/DL (ref 74–99)
POTASSIUM SERPL-SCNC: 4.8 MMOL/L (ref 3.5–5.1)
PROT SERPL-MCNC: 6.8 G/DL (ref 6.4–8.3)
SODIUM SERPL-SCNC: 140 MMOL/L (ref 136–145)

## 2025-06-17 PROCEDURE — 3017F COLORECTAL CA SCREEN DOC REV: CPT | Performed by: INTERNAL MEDICINE

## 2025-06-17 PROCEDURE — 1090F PRES/ABSN URINE INCON ASSESS: CPT | Performed by: INTERNAL MEDICINE

## 2025-06-17 PROCEDURE — 3077F SYST BP >= 140 MM HG: CPT | Performed by: INTERNAL MEDICINE

## 2025-06-17 PROCEDURE — 1123F ACP DISCUSS/DSCN MKR DOCD: CPT | Performed by: INTERNAL MEDICINE

## 2025-06-17 PROCEDURE — 1159F MED LIST DOCD IN RCRD: CPT | Performed by: INTERNAL MEDICINE

## 2025-06-17 PROCEDURE — G8427 DOCREV CUR MEDS BY ELIG CLIN: HCPCS | Performed by: INTERNAL MEDICINE

## 2025-06-17 PROCEDURE — 80053 COMPREHEN METABOLIC PANEL: CPT

## 2025-06-17 PROCEDURE — G8399 PT W/DXA RESULTS DOCUMENT: HCPCS | Performed by: INTERNAL MEDICINE

## 2025-06-17 PROCEDURE — 36415 COLL VENOUS BLD VENIPUNCTURE: CPT

## 2025-06-17 PROCEDURE — 85652 RBC SED RATE AUTOMATED: CPT

## 2025-06-17 PROCEDURE — G8417 CALC BMI ABV UP PARAM F/U: HCPCS | Performed by: INTERNAL MEDICINE

## 2025-06-17 PROCEDURE — 3078F DIAST BP <80 MM HG: CPT | Performed by: INTERNAL MEDICINE

## 2025-06-17 PROCEDURE — 99214 OFFICE O/P EST MOD 30 MIN: CPT | Performed by: INTERNAL MEDICINE

## 2025-06-17 PROCEDURE — 86140 C-REACTIVE PROTEIN: CPT

## 2025-06-17 PROCEDURE — 1036F TOBACCO NON-USER: CPT | Performed by: INTERNAL MEDICINE

## 2025-06-17 RX ORDER — ALENDRONATE SODIUM 70 MG/1
70 TABLET ORAL
Qty: 12 TABLET | Refills: 1 | Status: SHIPPED | OUTPATIENT
Start: 2025-06-17

## 2025-06-17 RX ORDER — MAGNESIUM CHLORIDE 71.5 G/G
TABLET ORAL
Qty: 180 TABLET | Refills: 0 | Status: SHIPPED | OUTPATIENT
Start: 2025-06-17

## 2025-06-17 RX ORDER — PREDNISONE 2.5 MG/1
TABLET ORAL
Qty: 45 TABLET | Refills: 0 | Status: SHIPPED | OUTPATIENT
Start: 2025-06-17

## 2025-06-17 RX ORDER — HYDROXYCHLOROQUINE SULFATE 200 MG/1
200 TABLET, FILM COATED ORAL 2 TIMES DAILY
Qty: 180 TABLET | Refills: 0 | Status: SHIPPED | OUTPATIENT
Start: 2025-06-17

## 2025-06-17 NOTE — PROGRESS NOTES
Patient verbalized understanding of office instructions and will call with questions or concerns.  She was given discharge instructions and all questions were fully answered. Follow up appointment scheduled. AVS and lab scripts mailed to pt.  
not getting stuck-intermittently painful  RA symptoms otherwise joint stable          DDD/mechanical back pain, moderate pain if on feet   mostly stiffness in back  No new fractures     Alendronate-  4 tabs/month-takes 2 one day and 2 next day - then done for month  +THC gummi +krantom  helpful bedtime   +mag bid helpful for cramps  Discussed again avoiding opiods if doesn't need to  Risk xeljanz low (NON smoking and on statin)     Possibly exac by sugar, gluten or etoh intake  Since then changed diet - dec  sugar, ketodiet   right hand better is moving fingers better (but shakes)  L wrist intermittent  on hcq 200mg daily - hands had been stable   off MTX  (liver concerns in past, cmp repeatedly Normal recently)  ARAVA    In past  sample xeljanz- precontemplative to take,but plan M,W,F)     Locking trigger thumb in past  SP INJECTION long time ago  Hx of trigger thumb, not getting stuck NOW       DDD/mechanical back pain, moderate pain if on feet    OA L hip/knee post traumatic flares occasionally       High risk meds-- mg daily restartedt  on last visit  . stable-- Eye exam yearly  Hx of MTX--non recently--Stopped MTX in past bc of aerly fatty liver/pre  liver cirrhosis  Liver test N   ONGOING  rare etoh  Recent cr okand calcium N on aledronate        preDM inc bmi, fatty liver  Noted diabetic inc BS--inc bmi  Inc weight  -now sebastiennb rosendo     +coronary arteries calcification  On CT lung:  \"Emphysematous changes   Significant calcified plaque within the coronary arteries.\"        DDD/mechanical back pain, moderate pain if on feet- PT in past - [facility]   Seen ortho in [place] -- they did GTB  injection   Xray hips:  right hip reveal minimal   degenerative changes seen within the sacroiliac joints bilaterally with   hardware identified in the right hip.  No hardware complication with   narrowing of the joint space in the left hip and mild spurring of the   acetabulum bilaterally.           Age-related

## 2025-06-19 ENCOUNTER — OFFICE VISIT (OUTPATIENT)
Age: 75
End: 2025-06-19
Payer: MEDICARE

## 2025-06-19 ENCOUNTER — PATIENT MESSAGE (OUTPATIENT)
Age: 75
End: 2025-06-19

## 2025-06-19 VITALS
HEART RATE: 86 BPM | SYSTOLIC BLOOD PRESSURE: 128 MMHG | OXYGEN SATURATION: 97 % | HEIGHT: 64 IN | TEMPERATURE: 97 F | RESPIRATION RATE: 16 BRPM | BODY MASS INDEX: 37.05 KG/M2 | WEIGHT: 217 LBS | DIASTOLIC BLOOD PRESSURE: 68 MMHG

## 2025-06-19 DIAGNOSIS — E78.2 MIXED HYPERLIPIDEMIA: ICD-10-CM

## 2025-06-19 DIAGNOSIS — E11.9 CONTROLLED TYPE 2 DIABETES MELLITUS WITHOUT COMPLICATION, WITHOUT LONG-TERM CURRENT USE OF INSULIN (HCC): ICD-10-CM

## 2025-06-19 DIAGNOSIS — Z00.00 MEDICARE ANNUAL WELLNESS VISIT, SUBSEQUENT: Primary | ICD-10-CM

## 2025-06-19 DIAGNOSIS — E66.01 MORBID (SEVERE) OBESITY DUE TO EXCESS CALORIES (HCC): ICD-10-CM

## 2025-06-19 DIAGNOSIS — J43.2 CENTRILOBULAR EMPHYSEMA (HCC): ICD-10-CM

## 2025-06-19 DIAGNOSIS — M05.79 RHEUMATOID ARTHRITIS INVOLVING MULTIPLE SITES WITH POSITIVE RHEUMATOID FACTOR (HCC): ICD-10-CM

## 2025-06-19 PROCEDURE — 3078F DIAST BP <80 MM HG: CPT | Performed by: INTERNAL MEDICINE

## 2025-06-19 PROCEDURE — G0439 PPPS, SUBSEQ VISIT: HCPCS | Performed by: INTERNAL MEDICINE

## 2025-06-19 PROCEDURE — 1126F AMNT PAIN NOTED NONE PRSNT: CPT | Performed by: INTERNAL MEDICINE

## 2025-06-19 PROCEDURE — 3017F COLORECTAL CA SCREEN DOC REV: CPT | Performed by: INTERNAL MEDICINE

## 2025-06-19 PROCEDURE — 1123F ACP DISCUSS/DSCN MKR DOCD: CPT | Performed by: INTERNAL MEDICINE

## 2025-06-19 PROCEDURE — 1159F MED LIST DOCD IN RCRD: CPT | Performed by: INTERNAL MEDICINE

## 2025-06-19 PROCEDURE — 3044F HG A1C LEVEL LT 7.0%: CPT | Performed by: INTERNAL MEDICINE

## 2025-06-19 PROCEDURE — 3074F SYST BP LT 130 MM HG: CPT | Performed by: INTERNAL MEDICINE

## 2025-06-19 SDOH — ECONOMIC STABILITY: FOOD INSECURITY: WITHIN THE PAST 12 MONTHS, THE FOOD YOU BOUGHT JUST DIDN'T LAST AND YOU DIDN'T HAVE MONEY TO GET MORE.: NEVER TRUE

## 2025-06-19 SDOH — ECONOMIC STABILITY: FOOD INSECURITY: WITHIN THE PAST 12 MONTHS, YOU WORRIED THAT YOUR FOOD WOULD RUN OUT BEFORE YOU GOT MONEY TO BUY MORE.: NEVER TRUE

## 2025-06-19 ASSESSMENT — PATIENT HEALTH QUESTIONNAIRE - PHQ9
SUM OF ALL RESPONSES TO PHQ QUESTIONS 1-9: 1
1. LITTLE INTEREST OR PLEASURE IN DOING THINGS: SEVERAL DAYS
5. POOR APPETITE OR OVEREATING: NEARLY EVERY DAY
SUM OF ALL RESPONSES TO PHQ QUESTIONS 1-9: 5
SUM OF ALL RESPONSES TO PHQ QUESTIONS 1-9: 1
SUM OF ALL RESPONSES TO PHQ QUESTIONS 1-9: 1
8. MOVING OR SPEAKING SO SLOWLY THAT OTHER PEOPLE COULD HAVE NOTICED. OR THE OPPOSITE, BEING SO FIGETY OR RESTLESS THAT YOU HAVE BEEN MOVING AROUND A LOT MORE THAN USUAL: NOT AT ALL
9. THOUGHTS THAT YOU WOULD BE BETTER OFF DEAD, OR OF HURTING YOURSELF: NOT AT ALL
SUM OF ALL RESPONSES TO PHQ QUESTIONS 1-9: 5
2. FEELING DOWN, DEPRESSED OR HOPELESS: NOT AT ALL
SUM OF ALL RESPONSES TO PHQ QUESTIONS 1-9: 5
SUM OF ALL RESPONSES TO PHQ QUESTIONS 1-9: 5
SUM OF ALL RESPONSES TO PHQ QUESTIONS 1-9: 1
3. TROUBLE FALLING OR STAYING ASLEEP: NOT AT ALL
10. IF YOU CHECKED OFF ANY PROBLEMS, HOW DIFFICULT HAVE THESE PROBLEMS MADE IT FOR YOU TO DO YOUR WORK, TAKE CARE OF THINGS AT HOME, OR GET ALONG WITH OTHER PEOPLE: NOT DIFFICULT AT ALL
4. FEELING TIRED OR HAVING LITTLE ENERGY: SEVERAL DAYS
6. FEELING BAD ABOUT YOURSELF - OR THAT YOU ARE A FAILURE OR HAVE LET YOURSELF OR YOUR FAMILY DOWN: NOT AT ALL
2. FEELING DOWN, DEPRESSED OR HOPELESS: NOT AT ALL
7. TROUBLE CONCENTRATING ON THINGS, SUCH AS READING THE NEWSPAPER OR WATCHING TELEVISION: NOT AT ALL
1. LITTLE INTEREST OR PLEASURE IN DOING THINGS: SEVERAL DAYS

## 2025-06-19 ASSESSMENT — ENCOUNTER SYMPTOMS
TROUBLE SWALLOWING: 0
ABDOMINAL PAIN: 0
DIARRHEA: 0
ABDOMINAL DISTENTION: 0
VOICE CHANGE: 0
NAUSEA: 0
WHEEZING: 0
VOMITING: 0
SHORTNESS OF BREATH: 0
BLOOD IN STOOL: 0
SINUS PRESSURE: 0

## 2025-06-19 ASSESSMENT — LIFESTYLE VARIABLES
HOW OFTEN DO YOU HAVE A DRINK CONTAINING ALCOHOL: 2-4 TIMES A MONTH
HOW MANY STANDARD DRINKS CONTAINING ALCOHOL DO YOU HAVE ON A TYPICAL DAY: 1 OR 2

## 2025-06-19 NOTE — PROGRESS NOTES
Medicare Annual Wellness Visit    Gricel Gallagher is here for No chief complaint on file.    Assessment & Plan   Medicare annual wellness visit, subsequent  -      DIABETES FOOT EXAM  AWV questionnaire completed and all areas addressed  PHQ9- 5- discussed stressors and mood- patient notes will continue to follow without intervention   Mixed hyperlipidemia  -     Lipid, Fasting; Future  Follow FLP   Controlled type 2 diabetes mellitus without complication, without long-term current use of insulin (HCC)  -     Lipid, Fasting; Future  -     Albumin/Creatinine Ratio, Urine; Future  Follow Microalb/Cr ratio   Continue treatment   Centrilobular emphysema (HCC)- respiratory status stable; annual LDCT April 2026   Rheumatoid arthritis involving multiple sites with positive rheumatoid factor (HCC)  -     [facility]  Recently started new trial of Xeljanz- will be followign for toleration and response   Follow with Rheumatology   Patient also requesting pain management assessment including ongoing pain at hip and leg   Morbid (severe) obesity due to excess calories (E66.01)- weight loss goals counseled    Continue to monitor   Body mass index [BMI] 37.0-37.9, adult (Z68.37)- as above        Return for Medicare Annual Wellness Visit in 1 year.     Subjective   The following acute and/or chronic problems were also addressed today:  Following with Rheumatology appt- since last appt- started on Xeljanz in addition to hydroxychloroquine use.   Xeljanz just restarted very recently- M/W/F- states some improvement in pain profile- still only at 2 weeks and will await additional 2 weeks for response.  Notes wrist and fingers; left knee and left hip.  Was referred to pain management in the past but did not establish. At this time, wishes for referral- which will be placed today.     Patient's complete Health Risk Assessment and screening values have been reviewed and are found in Flowsheets. The following problems

## 2025-06-19 NOTE — PATIENT INSTRUCTIONS
fruits and vegetables are especially good for you. Examples include spinach, carrots, peaches, and berries.     Foods high in fiber can reduce your cholesterol and provide important vitamins and minerals. High-fiber foods include whole-grain cereals and breads, oatmeal, beans, brown rice, citrus fruits, and apples.     Eat lean proteins. Heart-healthy proteins include seafood, lean meats and poultry, eggs, beans, peas, nuts, seeds, and soy products.     Limit drinks and foods with added sugar. These include candy, desserts, and soda pop.   Heart-healthy lifestyle    If your doctor recommends it, get more exercise. For many people, walking is a good choice. Or you may want to swim, bike, or do other activities. Bit by bit, increase the time you're active every day. Try for at least 30 minutes on most days of the week.     Try to quit or cut back on using tobacco and other nicotine products. This includes smoking and vaping. If you need help quitting, talk to your doctor about stop-smoking programs and medicines. These can increase your chances of quitting for good. Quitting is one of the most important things you can do to protect your heart. It is never too late to quit. Try to avoid secondhand smoke too.     Stay at a weight that's healthy for you. Talk to your doctor if you need help losing weight.     Try to get 7 to 9 hours of sleep each night.     Limit alcohol to 2 drinks a day for men and 1 drink a day for women. Too much alcohol can cause health problems.     Manage other health problems such as diabetes, high blood pressure, and high cholesterol. If you think you may have a problem with alcohol or drug use, talk to your doctor.   Medicines    Take your medicines exactly as prescribed. Call your doctor if you think you are having a problem with your medicine.     If your doctor recommends aspirin, take the amount directed each day. Make sure you take aspirin and not another kind of pain reliever, such as

## 2025-06-19 NOTE — PROGRESS NOTES
Discharge instructions, lab script and AVS given to patient. Pt notified 2 scheduling links sent to patient.

## 2025-07-19 DIAGNOSIS — I10 ESSENTIAL HYPERTENSION: ICD-10-CM

## 2025-07-21 RX ORDER — LISINOPRIL 20 MG/1
20 TABLET ORAL DAILY
Qty: 90 TABLET | Refills: 0 | Status: SHIPPED | OUTPATIENT
Start: 2025-07-21

## 2025-07-21 NOTE — TELEPHONE ENCOUNTER
Name of Medication(s) Requested:  Requested Prescriptions     Pending Prescriptions Disp Refills    lisinopril (PRINIVIL;ZESTRIL) 20 MG tablet [Pharmacy Med Name: LISINOPRIL 20 MG TABLET] 90 tablet 1     Sig: TAKE 1 TABLET BY MOUTH EVERY DAY       Medication is on current medication list Yes    Dosage and directions were verified? Yes    Quantity verified: 90 day supply     Pharmacy Verified?  Yes    Last Appointment:  6/19/2025    Future appts:  Future Appointments   Date Time Provider Department Center   9/16/2025  1:45 PM Papito Desai MD [facility]   9/18/2025  2:15 PM Adán Lay MD [facility] DEP   7/2/2026  2:15 PM Adán Lay MD [facility] DEP        (If no appt send self scheduling link. .REFILLAPPT)  Scheduling request sent?     [] Yes  [x] No    Does patient need updated?  [] Yes  [x] No

## 2025-08-22 ENCOUNTER — TELEPHONE (OUTPATIENT)
Age: 75
End: 2025-08-22

## 2025-08-22 DIAGNOSIS — R30.0 DYSURIA: Primary | ICD-10-CM

## 2025-08-23 ENCOUNTER — HOSPITAL ENCOUNTER (OUTPATIENT)
Dept: LAB | Age: 75
Discharge: HOME | End: 2025-08-23
Payer: MEDICARE

## 2025-08-23 DIAGNOSIS — R30.0 DYSURIA: ICD-10-CM

## 2025-08-23 DIAGNOSIS — E78.2 MIXED HYPERLIPIDEMIA: ICD-10-CM

## 2025-08-23 DIAGNOSIS — E11.9 CONTROLLED TYPE 2 DIABETES MELLITUS WITHOUT COMPLICATION, WITHOUT LONG-TERM CURRENT USE OF INSULIN (HCC): ICD-10-CM

## 2025-08-23 DIAGNOSIS — M05.79 RHEUMATOID ARTHRITIS INVOLVING MULTIPLE SITES WITH POSITIVE RHEUMATOID FACTOR (HCC): ICD-10-CM

## 2025-08-23 DIAGNOSIS — Z79.899 HIGH RISK MEDICATIONS (NOT ANTICOAGULANTS) LONG-TERM USE: ICD-10-CM

## 2025-08-23 LAB
ALBUMIN SERPL-MCNC: 4.5 G/DL (ref 3.5–5.2)
ALP SERPL-CCNC: 38 U/L (ref 35–104)
ALT SERPL-CCNC: 22 U/L (ref 0–35)
ANION GAP SERPL CALCULATED.3IONS-SCNC: 15 MMOL/L (ref 7–16)
AST SERPL-CCNC: 25 U/L (ref 0–35)
BACTERIA URNS QL MICRO: ABNORMAL
BASOPHILS # BLD: 0.04 K/UL (ref 0–0.2)
BASOPHILS NFR BLD: 1 % (ref 0–2)
BILIRUB SERPL-MCNC: 0.4 MG/DL (ref 0–1.2)
BILIRUB UR QL STRIP: NEGATIVE
BUN SERPL-MCNC: 25 MG/DL (ref 8–23)
CALCIUM SERPL-MCNC: 9.7 MG/DL (ref 8.8–10.2)
CHLORIDE SERPL-SCNC: 102 MMOL/L (ref 98–107)
CHOLEST SERPL-MCNC: 195 MG/DL
CLARITY UR: ABNORMAL
CO2 SERPL-SCNC: 21 MMOL/L (ref 22–29)
COLOR UR: YELLOW
CREAT SERPL-MCNC: 1.4 MG/DL (ref 0.5–1)
CREAT UR-MCNC: 116 MG/DL (ref 29–226)
CRP SERPL HS-MCNC: <3 MG/L (ref 0–5)
EOSINOPHIL # BLD: 0.16 K/UL (ref 0.05–0.5)
EOSINOPHILS RELATIVE PERCENT: 2 % (ref 0–6)
ERYTHROCYTE [DISTWIDTH] IN BLOOD BY AUTOMATED COUNT: 13.8 % (ref 11.5–15)
ERYTHROCYTE [SEDIMENTATION RATE] IN BLOOD BY WESTERGREN METHOD: 4 MM/HR (ref 0–20)
GFR, ESTIMATED: 41 ML/MIN/1.73M2
GLUCOSE SERPL-MCNC: 198 MG/DL (ref 74–99)
GLUCOSE UR STRIP-MCNC: NEGATIVE MG/DL
HCT VFR BLD AUTO: 41.3 % (ref 34–48)
HDLC SERPL-MCNC: 59 MG/DL
HGB BLD-MCNC: 13.4 G/DL (ref 11.5–15.5)
HGB UR QL STRIP.AUTO: ABNORMAL
IMM GRANULOCYTES # BLD AUTO: 0.03 K/UL (ref 0–0.58)
IMM GRANULOCYTES NFR BLD: 0 % (ref 0–5)
KETONES UR STRIP-MCNC: NEGATIVE MG/DL
LDLC SERPL CALC-MCNC: 80 MG/DL
LEUKOCYTE ESTERASE UR QL STRIP: ABNORMAL
LYMPHOCYTES NFR BLD: 1.84 K/UL (ref 1.5–4)
LYMPHOCYTES RELATIVE PERCENT: 21 % (ref 20–42)
MCH RBC QN AUTO: 28.4 PG (ref 26–35)
MCHC RBC AUTO-ENTMCNC: 32.4 G/DL (ref 32–34.5)
MCV RBC AUTO: 87.5 FL (ref 80–99.9)
MICROALBUMIN UR-MCNC: 150 MG/L (ref 0–20)
MICROALBUMIN/CREAT UR-RTO: 129 MCG/MG CREAT (ref 0–30)
MONOCYTES NFR BLD: 0.84 K/UL (ref 0.1–0.95)
MONOCYTES NFR BLD: 10 % (ref 2–12)
NEUTROPHILS NFR BLD: 67 % (ref 43–80)
NEUTS SEG NFR BLD: 5.83 K/UL (ref 1.8–7.3)
NITRITE UR QL STRIP: NEGATIVE
PH UR STRIP: 6 [PH] (ref 5–8)
PLATELET # BLD AUTO: 317 K/UL (ref 130–450)
PMV BLD AUTO: 8.3 FL (ref 7–12)
POTASSIUM SERPL-SCNC: 4.7 MMOL/L (ref 3.5–5.1)
PROT SERPL-MCNC: 7 G/DL (ref 6.4–8.3)
PROT UR STRIP-MCNC: 30 MG/DL
RBC # BLD AUTO: 4.72 M/UL (ref 3.5–5.5)
RBC #/AREA URNS HPF: ABNORMAL /HPF
SODIUM SERPL-SCNC: 138 MMOL/L (ref 136–145)
SP GR UR STRIP: 1.02 (ref 1–1.03)
TRIGL SERPL-MCNC: 279 MG/DL
UROBILINOGEN UR STRIP-ACNC: 0.2 EU/DL (ref 0–1)
VLDLC SERPL CALC-MCNC: 56 MG/DL
WBC #/AREA URNS HPF: ABNORMAL /HPF
WBC OTHER # BLD: 8.7 K/UL (ref 4.5–11.5)

## 2025-08-23 PROCEDURE — 80061 LIPID PANEL: CPT

## 2025-08-23 PROCEDURE — 36415 COLL VENOUS BLD VENIPUNCTURE: CPT

## 2025-08-23 PROCEDURE — 85652 RBC SED RATE AUTOMATED: CPT

## 2025-08-23 PROCEDURE — 86140 C-REACTIVE PROTEIN: CPT

## 2025-08-23 PROCEDURE — 87086 URINE CULTURE/COLONY COUNT: CPT

## 2025-08-23 PROCEDURE — 82043 UR ALBUMIN QUANTITATIVE: CPT

## 2025-08-23 PROCEDURE — 87077 CULTURE AEROBIC IDENTIFY: CPT

## 2025-08-23 PROCEDURE — 85025 COMPLETE CBC W/AUTO DIFF WBC: CPT

## 2025-08-23 PROCEDURE — 80053 COMPREHEN METABOLIC PANEL: CPT

## 2025-08-23 PROCEDURE — 81001 URINALYSIS AUTO W/SCOPE: CPT

## 2025-08-23 PROCEDURE — 82570 ASSAY OF URINE CREATININE: CPT

## 2025-08-24 ENCOUNTER — RESULTS FOLLOW-UP (OUTPATIENT)
Dept: INTERNAL MEDICINE CLINIC | Age: 75
End: 2025-08-24

## 2025-08-24 DIAGNOSIS — N30.01 ACUTE CYSTITIS WITH HEMATURIA: Primary | ICD-10-CM

## 2025-08-24 LAB
MICROORGANISM SPEC CULT: ABNORMAL
SERVICE CMNT-IMP: ABNORMAL
SPECIMEN DESCRIPTION: ABNORMAL

## 2025-08-24 RX ORDER — CIPROFLOXACIN 250 MG/1
250 TABLET, FILM COATED ORAL 2 TIMES DAILY
Qty: 14 TABLET | Refills: 0 | Status: SHIPPED | OUTPATIENT
Start: 2025-08-24 | End: 2025-08-31

## 2025-08-24 RX ORDER — CIPROFLOXACIN 500 MG/1
500 TABLET, FILM COATED ORAL 2 TIMES DAILY
Qty: 14 TABLET | Refills: 0 | Status: SHIPPED | OUTPATIENT
Start: 2025-08-24 | End: 2025-08-24

## 2025-08-25 LAB
MICROORGANISM SPEC CULT: ABNORMAL
SERVICE CMNT-IMP: ABNORMAL
SPECIMEN DESCRIPTION: ABNORMAL

## (undated) DEVICE — CYSTO PACK: Brand: MEDLINE INDUSTRIES, INC.

## (undated) DEVICE — BIT DRL L145MM DIA4.2MM ST 3 FLUT NDL PNT QUIK CPL FOR FEM

## (undated) DEVICE — CAMERA STRYKER 1488 HD GEN

## (undated) DEVICE — APPLICATOR MEDICATED 26 CC SOLUTION HI LT ORNG CHLORAPREP

## (undated) DEVICE — Device

## (undated) DEVICE — GOWN,SIRUS,FABRNF,XL,20/CS: Brand: MEDLINE

## (undated) DEVICE — DRILL SYSTEM 7

## (undated) DEVICE — 1.5L THIN WALL CAN: Brand: CRD

## (undated) DEVICE — Z DISCONTINUED PER MEDLINE USE 2741942 DRESSING AQUACEL 6 IN ALG W9XL15CM SIL CVR WTRPRF VIR BACT BARR ANTIMIC

## (undated) DEVICE — 4-PORT MANIFOLD: Brand: NEPTUNE 2

## (undated) DEVICE — Z INACTIVE USE 2660664 SOLUTION IRRIG 3000ML 0.9% SOD CHL USP UROMATIC PLAS CONT

## (undated) DEVICE — SET ORTHO STD STORTSTD2

## (undated) DEVICE — CATHETER F BLLN 5CC 18FR 2 W HYDRGEL COAT LESS TRAUM LUB

## (undated) DEVICE — BAG DRNGE COMB PK

## (undated) DEVICE — PADDING CAST W6INXL4YD COT LO LINTING WYTEX

## (undated) DEVICE — SET ORTHO STD STORTSTD1

## (undated) DEVICE — PATIENT RETURN ELECTRODE, SINGLE-USE, CONTACT QUALITY MONITORING, ADULT, WITH 9FT CORD, FOR PATIENTS WEIGING OVER 33LBS. (15KG): Brand: MEGADYNE

## (undated) DEVICE — CLOTH SURG PREP PREOPERATIVE CHLORHEXIDINE GLUC 2% READYPREP

## (undated) DEVICE — GLOVE SURG SZ 75 STD WHT LTX SYN POLYMER BEAD REINF ANTI RL

## (undated) DEVICE — DRAPE PATIENT ISOL IRRIG POUCH

## (undated) DEVICE — COVER,TABLE,60X90,STERILE: Brand: MEDLINE

## (undated) DEVICE — 1.5 FR, 120 CM, NITI TIPLESS STONE BASKET: Brand: HALO

## (undated) DEVICE — SOLUTION IV IRRIG WATER 1000ML POUR BRL 2F7114

## (undated) DEVICE — GLOVE ORANGE PI 8 1/2   MSG9085

## (undated) DEVICE — GUIDEWIRE ENDOSCP L150CM DIA0.035IN TIP L15CM BENT PTFE

## (undated) DEVICE — SURGICAL PROCEDURE PACK BASIC

## (undated) DEVICE — 3M™ COBAN™ NL STERILE NON-LATEX SELF-ADHERENT WRAP, 2084S, 4 IN X 5 YD (10 CM X 4,5 M), 18 ROLLS/CASE: Brand: 3M™ COBAN™

## (undated) DEVICE — GAUZE,SPONGE,4"X4",8PLY,STRL,LF,10/TRAY: Brand: MEDLINE

## (undated) DEVICE — READY WET SKIN SCRUB TRAY-LF: Brand: MEDLINE INDUSTRIES, INC.

## (undated) DEVICE — TUBING, SUCTION, 3/16" X 12', STRAIGHT: Brand: MEDLINE

## (undated) DEVICE — DRAPE C ARM W41XL74IN UNIV MOB W RUBBERBAND CLP

## (undated) DEVICE — SCREW BNE L46MM DIA5MM LAT FEM LT GRN TI ST LOK FULL THRD
Type: IMPLANTABLE DEVICE | Site: HIP | Status: NON-FUNCTIONAL
Removed: 2020-11-17

## (undated) DEVICE — SOLUTION IV IRRIG POUR BRL 0.9% SODIUM CHL 2F7124

## (undated) DEVICE — HOSE CONN FOR WST MGMT SYS NEPTUNE 2

## (undated) DEVICE — MEDIA CONTRAST ISOVUE  300 10X50ML

## (undated) DEVICE — BAG DRAINAGE CONTAINER 15 LT FLUID COLLCTN

## (undated) DEVICE — GUIDEWIRE ORTH L400MM DIA3.2MM FOR TFN

## (undated) DEVICE — INTENDED FOR TISSUE SEPARATION, AND OTHER PROCEDURES THAT REQUIRE A SHARP SURGICAL BLADE TO PUNCTURE OR CUT.: Brand: BARD-PARKER ® STAINLESS STEEL BLADES

## (undated) DEVICE — Z INACTIVE USE 2635503 SOLUTION IRRIG 3000ML ST H2O USP UROMATIC PLAS CONT

## (undated) DEVICE — MEDIA CONTRAST RX ISOVUE-300 61% 30ML VIALS

## (undated) DEVICE — TOWEL,OR,DSP,ST,BLUE,DLX,10/PK,8PK/CS: Brand: MEDLINE

## (undated) DEVICE — SPECIMEN CUP W/LID: Brand: DEROYAL

## (undated) DEVICE — DRIP REDUCTION MANIFOLD

## (undated) DEVICE — CATHETER URET 5FR L70CM OPN END SGL LUMN INJ HUB FLEXIMA

## (undated) DEVICE — BNDG,ELSTC,MATRIX,STRL,6"X5YD,LF,HOOK&LP: Brand: MEDLINE

## (undated) DEVICE — DRAINBAG,ANTI-REFLUX TOWER,L/F,2000ML,LL: Brand: MEDLINE

## (undated) DEVICE — SOLUTION SURG PREP ANTIMICROBIAL 4 OZ SKIN WND EXIDINE

## (undated) DEVICE — ROD RMR L950MM DIA2.5MM W/ EXTN BALL TIP

## (undated) DEVICE — BLADE CLIPPER GEN PURP NS

## (undated) DEVICE — DRESSING,GAUZE,XEROFORM,CURAD,5"X9",ST: Brand: CURAD

## (undated) DEVICE — GLOVE SURG SZ 9 L12IN FNGR THK13MIL WHT ISOLEX POLYISOPRENE

## (undated) DEVICE — DRAPE,REIN 53X77,STERILE: Brand: MEDLINE

## (undated) DEVICE — GUIDEWIRE ENDOSCP L150CM DIA0.035IN TIP 3CM PTFE NIT